# Patient Record
Sex: FEMALE | Race: WHITE | NOT HISPANIC OR LATINO | Employment: OTHER | URBAN - METROPOLITAN AREA
[De-identification: names, ages, dates, MRNs, and addresses within clinical notes are randomized per-mention and may not be internally consistent; named-entity substitution may affect disease eponyms.]

---

## 2017-01-19 ENCOUNTER — GENERIC CONVERSION - ENCOUNTER (OUTPATIENT)
Dept: OTHER | Facility: OTHER | Age: 72
End: 2017-01-19

## 2017-01-30 ENCOUNTER — GENERIC CONVERSION - ENCOUNTER (OUTPATIENT)
Dept: OTHER | Facility: OTHER | Age: 72
End: 2017-01-30

## 2017-02-07 ENCOUNTER — ALLSCRIPTS OFFICE VISIT (OUTPATIENT)
Dept: OTHER | Facility: OTHER | Age: 72
End: 2017-02-07

## 2017-03-22 ENCOUNTER — GENERIC CONVERSION - ENCOUNTER (OUTPATIENT)
Dept: OTHER | Facility: OTHER | Age: 72
End: 2017-03-22

## 2017-04-06 ENCOUNTER — GENERIC CONVERSION - ENCOUNTER (OUTPATIENT)
Dept: OTHER | Facility: OTHER | Age: 72
End: 2017-04-06

## 2017-07-12 ENCOUNTER — GENERIC CONVERSION - ENCOUNTER (OUTPATIENT)
Dept: OTHER | Facility: OTHER | Age: 72
End: 2017-07-12

## 2017-07-27 ENCOUNTER — ALLSCRIPTS OFFICE VISIT (OUTPATIENT)
Dept: OTHER | Facility: OTHER | Age: 72
End: 2017-07-27

## 2017-07-27 DIAGNOSIS — R06.02 SHORTNESS OF BREATH: ICD-10-CM

## 2017-07-31 ENCOUNTER — GENERIC CONVERSION - ENCOUNTER (OUTPATIENT)
Dept: OTHER | Facility: OTHER | Age: 72
End: 2017-07-31

## 2017-08-31 ENCOUNTER — GENERIC CONVERSION - ENCOUNTER (OUTPATIENT)
Dept: OTHER | Facility: OTHER | Age: 72
End: 2017-08-31

## 2017-09-07 ENCOUNTER — HOSPITAL ENCOUNTER (OUTPATIENT)
Dept: CT IMAGING | Facility: CLINIC | Age: 72
Discharge: HOME | End: 2017-09-07
Payer: MEDICARE

## 2017-09-07 DIAGNOSIS — R06.02 SHORTNESS OF BREATH: ICD-10-CM

## 2017-09-07 PROCEDURE — 71250 CT THORAX DX C-: CPT

## 2017-09-20 ENCOUNTER — GENERIC CONVERSION - ENCOUNTER (OUTPATIENT)
Dept: OTHER | Facility: OTHER | Age: 72
End: 2017-09-20

## 2018-01-11 NOTE — MISCELLANEOUS
Message   Recorded as Task   Date: 2016 03:18 PM, Created By: Mario Rodriguez   Task Name: Care Coordination   Assigned To: [facility] [place] [facility]   Regarding Patient: Selena Dance, Status: Active   Comment:    CintronLori - 17 May 2016 3:18 PM     TASK CREATED  faxed hold form to Dr Cielo Trujillo (Aspirin)    schedule for LESI X2   Reyna Shafer - 24 May 2016 3:53 PM     TASK EDITED  Recieved ok to hold asa x 6 days signed by Dr Cielo Trujillo 2016     s/w pt, advised of above  scheduled LESI #1 2016 - hold asa 6/3/2016, hold meloxicam 2016  LESI #2 2016, hold asa 2016, hold meloxicam 2016  Reviewed pre procedure instructions; eat a light meal - NPO 1 hour prior, , loose fitting clothing, c/b if illness or abx present  pt verbalized understanding and appreciation  denied additional blood thinning medication / nsaids  ***ASA HOLD WILL  2016  Request for hold prior to 2016 procedure faxed to Kelsea Mejia - 2016 4:09 PM     TASK EDITED  Received VM from pt  on [facility] triage line from  pm  Pt  states that she has an appt  on 16, and needs to reschedule due to a death in the family  Pt  requesting c/b at [phone]  Attempted to reach pt  around 2 pm  LMOM for pt  to c/b  Received additional VM on [place] triage line from 319 pm  Pt  states that she has to cx next Thursday's appt  Pt  requesting c/b  Kelsea Regalado - 2016 4:22 PM     TASK REPLIED TO: Previously Assigned To NURIA Cole  S/w pt  who states there was a death in her family, she will be unable to make her  appt, and that she is not expected to return until the third week in   Pt  was advised to cx  appt, and keep the scheduled  appt  as her first inj  appt  Pt  agreed that would be the best option  Reviewed hold instructions and pre-procedure instructions w/ pt  Pt  verbalized understanding in all matters addressed   Pt  very apprecaitive of help  Refaxed ASA hold request to Russell Hammond since current hold approval will  on 16  Reyna Shafer - 2016 10:32 AM     TASK EDITED  OK to hold asa x 6 days prior to procedure signed by Dr Cooper Sen 2016     to be scanned   Reyna Shafer - 2016 1:04 PM     TASK EDITED  s/w pt, confirmed 2016 procedure  Reviewed pre procedure instructions and asa hold starting   Pt verbalized understanding and appreciation  Active Problems    1  Allergic rhinitis (477 9) (J30 9)   2  Ankle pain, unspecified laterality   3  Anxiety disorder (300 00) (F41 9)   4  Arthralgia Of The Pelvis / Hip / Femur (719 45)   5  Asthma (493 90) (J45 909)   6  Breast cancer screening (V76 10) (Z12 39)   7  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)   8  Chronic low back pain (724 2,338 29) (M54 5,G89 29)   9  Chronic pain syndrome (338 4) (G89 4)   10  Generalized abdominal pain (789 07) (R10 84)   11  HTN (hypertension) (401 9) (I10)   12  Intervertebral disc disorder with radiculopathy of lumbosacral region (724 4) (M51 17)   13  Low back pain (724 2) (M54 5)   14  Lumbar spinal stenosis (724 02) (M48 06)   15  Lumbosacral stenosis (724 02) (M48 07)   16  Myofascial pain syndrome (729 1) (M79 1)   17  Need for immunization against influenza (V04 81) (Z23)   18  Need for influenza vaccination (V04 81) (Z23)   19  Obstructive sleep apnea (327 23) (G47 33)   20  Pancreatitis (577 0) (K85 9)   21  Xerostomia (527 7) (K11 7)    Current Meds   1  Advair Diskus 250-50 MCG/DOSE Inhalation Aerosol Powder Breath Activated; INHALE   1 PUFFS Every twelve hours; Therapy: 79REY7636 to (Evaluate:2016); Last Rx:76Zqw7738 Ordered   2  Aspirin 81 MG TABS; TAKE 1 TABLET DAILY; Therapy: (Recorded:56Pae6502) to Recorded   3  Atenolol 25 MG Oral Tablet; TAKE 1 TABLET TWICE DAILY; Therapy: (Recorded:2014) to Recorded   4   ClonazePAM 0 5 MG Oral Tablet; TAKE 1 TABLET TWICE DAILY; Last Rx:13Apr2016   Ordered   5  Co Q-10 100 MG Oral Capsule; TAKE 1 CAPSULE TWICE DAILY; Therapy: 00VUH8334 to Recorded   6  Crestor 5 MG Oral Tablet (Rosuvastatin Calcium); take 1 tablet 3 days per week; Therapy: (Recorded:87Ywv1619) to Recorded   7  Fenofibrate 48 MG Oral Tablet; 1 qd; Therapy: (Recorded:36Hgb0047) to Recorded   8  Losartan Potassium 50 MG Oral Tablet; 1 qd; Therapy: (Recorded:65Xub6824) to Recorded   9  Magnesium 250 MG Oral Tablet; TAKE 1 TABLET DAILY; Therapy: 92BYT4465 to Recorded   10  Meloxicam 15 MG Oral Tablet; TAKE 1 TABLET DAILY; Therapy: 32GRB2359 to (Evaluate:02Jun2015)  Requested for: 04RCZ0177; Last    Rx:52Ony0653 Ordered   11  Multi-Vitamin TABS; TAKE 1 TABLET DAILY; Therapy: (Recorded:98Fxn0259) to Recorded   12  Tylenol Extra Strength 500 MG Oral Tablet Recorded   13  Vitamin C TABS; Take 2 tablets daily; Therapy: (Recorded:67Jbs4908) to Recorded   14  Vitamin D 1000 UNIT Oral Tablet; TAKE 1 TABLET DAILY; Therapy: (Recorded:62Cgu9323) to Recorded    Allergies    1  Antihistamine TABS   2  Biaxin TABS   3  Codeine Sulfate TABS   4  Darvon CAPS   5  Pravastatin Sodium TABS   6   Wellbutrin TABS    Signatures   Electronically signed by : Anamaria Mathur, ; Jun 16 2016  1:06PM EST                       (Author)

## 2018-01-11 NOTE — RESULT NOTES
Verified Results  VAS RENAL ARTERY COMPLETE BILATERAL [id number] 10:21AM [place] Order Number: [id number]   Performing Comments: copy to Dr Liza Ortiz cardiology group   - Patient Instructions: To schedule this appointment, please contact Central Scheduling at [phone]   Order Number: [id number]   Performing Comments: copy to Dr Liza Ortiz cardiology group   - Patient Instructions: To schedule this appointment, please contact Central Scheduling at [phone]  Test Name Result Flag Reference   VAS RENAL ARTERY COMPLETE BILATERAL (Report)     [facility] REPORT   CLINICAL:   Indications: Benign Essential Hypertension [I10]  Intermittent spikes in   blood pressure per patient of couple of months  Risk Factors   The patient has history of hypertension and hyperlipidemia  FINDINGS:      Unilateral   PSV (cm/s) EDV (cm/s)  RI    Sup-Becca Ao       92     14 1 15    Celiac        128     30       Prox  SMA       157     26       Px Inf-Jose Ao     108     11 1 10    Ds Inf-Jose Ao     104     14 0 86       Right     PSV (cm/s) EDV (cm/s)  RAR  RI Kidney (cm)    Prox Renal      106     38 1 15 0 67           Mid Renal       121     36 1 31 0 70           Dist Renal      119     35 1 29 0 71           Celiac Artery     24      8    0 67           Kidney                          10 40    Hilum 3        25      8    0 67           Hilum         21      0    1 00              Left      PSV (cm/s) EDV (cm/s)  RAR  RI Kidney (cm)    Prox Renal      115     42 1 25 0 63           Mid Renal       100     22 1 08 0 77           Dist Renal      108     21 1 18 0 81           Celiac Artery     28      8    0 70           Kidney                          10 30    Hilum         32      8    0 74                    CONCLUSION:   Impression   The abdominal aorta is widely patent and normal caliber        RIGHT RENAL:   No evidence of significant arterial occlusive disease in the main renal artery  Adequate parenchymal flow noted with a renovascular resistive index of 0 67  Renal/Aorta Ratio: 1 3  The Kidney measures 10 4 cm      LEFT RENAL:   No evidence of significant arterial occlusive disease in the main renal artery  Adequate parenchymal flow noted with a renovascular resistive index of 0 70   Renal/Aorta Ratio: 1 3   The Kidney measures 10 3 cm      MESENTERIC:   Celiac and superior mesenteric arteries are patent        SIGNATURE:   Electronically Signed by: Jose Orozco MD, RPVI on 2016-11-16 08:41:51 PM

## 2018-01-12 NOTE — MISCELLANEOUS
Message   Recorded as Task   Date: 06/27/2016 09:52 AM, Created By: Jose Trejo   Task Name: Follow Up   Assigned To: [facility]   Regarding Patient: Raman Scale, Status: In Progress   Comment:    Elli Lauren - 27 Jun 2016 9:52 AM     TASK CREATED  S/p LESI #1 on 6/23/16 w/Dr Johnnie Galloway in [place]  No f/u scheduled    Please call 6/30/16   Elli Lauren - 30 Jun 2016 2:00 PM     TASK EDITED  1st attempt-lmom for f/u after injection   Elli Lauren - 07 Jul 2016 11:53 AM     TASK EDITED  2nd attempt-lmom for f/u after injection   Reyna Shafer - 08 Jul 2016 10:27 AM     TASK EDITED  addressed in task of 6/23        Active Problems    1  Allergic rhinitis (477 9) (J30 9)   2  Ankle pain, unspecified laterality   3  Anxiety disorder (300 00) (F41 9)   4  Arthralgia Of The Pelvis / Hip / Femur (719 45)   5  Asthma (493 90) (J45 909)   6  Breast cancer screening (V76 10) (Z12 39)   7  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)   8  Chronic low back pain (724 2,338 29) (M54 5,G89 29)   9  Chronic pain syndrome (338 4) (G89 4)   10  Generalized abdominal pain (789 07) (R10 84)   11  HTN (hypertension) (401 9) (I10)   12  Intervertebral disc disorder with radiculopathy of lumbosacral region (724 4) (M51 17)   13  Low back pain (724 2) (M54 5)   14  Lumbar spinal stenosis (724 02) (M48 06)   15  Lumbosacral stenosis (724 02) (M48 07)   16  Myofascial pain syndrome (729 1) (M79 1)   17  Need for immunization against influenza (V04 81) (Z23)   18  Need for influenza vaccination (V04 81) (Z23)   19  Obstructive sleep apnea (327 23) (G47 33)   20  Pancreatitis (577 0) (K85 9)   21  Xerostomia (527 7) (K11 7)    Current Meds   1  Advair Diskus 250-50 MCG/DOSE Inhalation Aerosol Powder Breath Activated; INHALE   1 PUFFS Every twelve hours; Therapy: 78WBK9468 to (Evaluate:28Jan2016); Last Rx:43Bap4909 Ordered   2  Aspirin 81 MG TABS; TAKE 1 TABLET DAILY;    Therapy: (Recorded:96Oav3668) to Recorded 3  Atenolol 25 MG Oral Tablet; TAKE 1 TABLET TWICE DAILY; Therapy: (Recorded:03Nov2014) to Recorded   4  ClonazePAM 0 5 MG Oral Tablet; TAKE 1 TABLET TWICE DAILY; Last Rx:22Jun2016   Ordered   5  Co Q-10 100 MG Oral Capsule; TAKE 1 CAPSULE TWICE DAILY; Therapy: 02FON2246 to Recorded   6  Crestor 5 MG Oral Tablet (Rosuvastatin Calcium); take 1 tablet 3 days per week; Therapy: (Recorded:58Kbz5918) to Recorded   7  Fenofibrate 48 MG Oral Tablet; 1 qd; Therapy: (Recorded:87Zmo6220) to Recorded   8  Losartan Potassium 50 MG Oral Tablet; 1 qd; Therapy: (Recorded:91Ogo8586) to Recorded   9  Magnesium 250 MG Oral Tablet; TAKE 1 TABLET DAILY; Therapy: 81UXI3668 to Recorded   10  Meloxicam 15 MG Oral Tablet; TAKE 1 TABLET DAILY; Therapy: 15QIM6263 to (Evaluate:02Jun2015)  Requested for: 89YGW5068; Last    Rx:60Xxt5800 Ordered   11  Multi-Vitamin TABS; TAKE 1 TABLET DAILY; Therapy: (Recorded:94Mnw9104) to Recorded   12  Tylenol Extra Strength 500 MG Oral Tablet Recorded   13  Vitamin C TABS; Take 2 tablets daily; Therapy: (Recorded:54Onk8720) to Recorded   14  Vitamin D 1000 UNIT Oral Tablet; TAKE 1 TABLET DAILY; Therapy: (Recorded:54Brw8111) to Recorded    Allergies    1  Antihistamine TABS   2  Biaxin TABS   3  Codeine Sulfate TABS   4  Darvon CAPS   5  Pravastatin Sodium TABS   6   Wellbutrin TABS    Signatures   Electronically signed by : Yasmeen Bowman, ; Jul 8 2016 10:27AM EST                       (Author)

## 2018-01-12 NOTE — MISCELLANEOUS
Message   Recorded as Task   Date: 10/26/2016 07:33 AM, Created By: Nicolás Chan   Task Name: Follow Up   Assigned To: [address] ,Team   Regarding Patient: Lizzie Strickland, Status: In Progress   Comment:    Velasquez Valenzuela - 26 Oct 2016 7:33 AM     TASK CREATED  Please call the patient and advise her that her insurance will not allow her to be on the nortriptyline  So she is to titrate off of the gabapentin as discussed and when she is off of the gabapentin, she will try a low dose Cymbalta at 20 mg every other night x 3 weeks, then every night  The side effects/risks are similar to the Nortriptyline as we had discussed at her OV, however, she should not drive or operate machinery until she sees how the medication affects her  She should f/u as scheduled  Reyna Shafer - 26 Oct 2016 10:57 AM     TASK EDITED  s/w pt, advised of above  Pt verbalized understanding  pt states she developed a rash over the weekend, stopped gabapentin abruptly on sunday r/t itching  Pt states itching resolved later in the day  Advised pt, will make DG aware  Pt confirmed cymbalta / duloxetine  will c/b w/ questions or if se's present  pt confirmed 12/1 ov w/ DG  Reyna Shafer - 26 Oct 2016 10:58 AM     TASK EDITED  Gabapentin added to allergy list   Velasquez Valenzuela - 26 Oct 2016 12:33 PM     TASK REPLIED TO: Previously Assigned To Velasquez Valenzuela  Provider is aware  She is to proceed with the Cymbalta as prescribed and escribed to her pharmacy  She is not to drive or operate machineary until she sees how it affects her and call with any side effects or issues  Reyna Shafer - 26 Oct 2016 4:08 PM     TASK EDITED  LMOM to cb  Reyna Shafer - 28 Oct 2016 3:46 PM     TASK EDITED  LMOM to cb - 2nd attempt   Irina Sherman - 31 Oct 2016 10:26 AM     TASK EDITED   Would you like us to send a can't reach you letter? 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)   9  Chronic low back pain (724 2,338 29) (M54 5,G89 29)   10  Chronic lumbar radiculopathy (724 4) (M54 16)   11  Encounter for screening mammogram for breast cancer (V76 12) (Z12 31)   12  Generalized abdominal pain (789 07) (R10 84)   13  HTN (hypertension) (401 9) (I10)   14  Lumbar spinal stenosis (724 02) (M48 06)   15  Myofascial pain syndrome (729 1) (M79 1)   16  Need for immunization against influenza (V04 81) (Z23)   17  Need for influenza vaccination (V04 81) (Z23)   18  Neurogenic claudication (724 03) (M48 06)   19  Obstructive sleep apnea (327 23) (G47 33)   20  Pain syndrome, chronic (338 4) (G89 4)   21  Pancreatitis (577 0) (K85 90)   22  Syncope and collapse (780 2) (R55)   23  Xerostomia (527 7) (K11 7)    Current Meds   1  Aleve 220 MG Oral Tablet; 1 qd along with 2 tylenol  as per pts ortho; Therapy: 67Ivu1833 to Recorded   2  Aspirin 81 MG TABS; TAKE 1 TABLET DAILY; Therapy: (Recorded:71Zyd5165) to Recorded   3  Atenolol 25 MG Oral Tablet; TAKE 1 TABLET TWICE DAILY; Therapy: (Recorded:96Xbu3797) to Recorded   4  Cholestyramine Light 4 GM Oral Packet; MIX THE CONTENTS OF 1 POWDER PACKET   WITH 2-6 OZ OF NONCARBONATED BEVERAGE AND SWALLOW ONCE DAILY; Therapy: 82Sss0352 to (Evaluate:60Jpy1093)  Requested for: 34Icf3615; Last   Rx:02Aek0267 Ordered   5  ClonazePAM 0 5 MG Oral Tablet; TAKE 1 TABLET TWICE DAILY; Last Rx:06Oct2016   Ordered   6  Co Q-10 100 MG Oral Capsule; TAKE 1 CAPSULE TWICE DAILY;    Therapy: 96OKG3023 to Recorded   7  Crestor 5 MG Oral Tablet (Rosuvastatin Calcium); take 1 tablet 3 days per week; Therapy: (Recorded:58Ltm1359) to Recorded   8  DULoxetine HCl - 20 MG Oral Capsule Delayed Release Particles; Take 1 pill every other   night x 3 weeks, then 1 pill every night; Therapy: 10LSH8201 to (Evaluate:25Nov2016)  Requested for: 26Oct2016; Last   Rx:26Oct2016 Ordered   9  Fenofibrate 48 MG Oral Tablet; 1 qd; Therapy: (Recorded:34Tit5189) to Recorded   10  Losartan Potassium 50 MG Oral Tablet; 1 qd; Therapy: (Recorded:47Rnn6249) to Recorded   11  Magnesium 250 MG Oral Tablet; TAKE 1 TABLET DAILY; Therapy: 02VCC1875 to Recorded   12  Multi-Vitamin TABS; TAKE 1 TABLET DAILY; Therapy: (Recorded:64Kqt9641) to Recorded   13  Tylenol Extra Strength 500 MG Oral Tablet Recorded   14  Vitamin C TABS; Take 2 tablets daily; Therapy: (Recorded:24Pgx0154) to Recorded    Allergies    1  Meloxicam TABS   2  Antihistamine TABS   3  Biaxin TABS   4  Codeine Sulfate TABS   5  Darvon CAPS   6  Gabapentin CAPS   7  Pravastatin Sodium TABS   8   Wellbutrin TABS    Signatures   Electronically signed by : Mollie Dakin, ; Nov 1 2016 11:19AM EST                       (Author)

## 2018-01-13 NOTE — MISCELLANEOUS
Message   Date: 17 Aug 2016 4:09 PM EST, Recorded By: Timo Dumont For: [facility] [place] [facility],Team   Caller: Abdulaziz Montana, Provider   Phone: [phone]   Reason: General Medical Question   vmm renetta conway calling from [facility] home care  Questioning if there is a sooner opening for the pt's procedure  Confirmed 81 mg asa  S/w Renetta, advised pt was offered an ov on 8/24 which she could not appear for  Offered next availability on 9/1  Renetta questioned if the 8/24 ov was still available - confirmed it is - offered to pt, pt declined  Confirmed 9/1/16 procedure date and time  Pt and Tmmy verbalized understanding  Active Problems    1  Allergic rhinitis (477 9) (J30 9)   2  Ankle pain, unspecified laterality   3  Anxiety disorder (300 00) (F41 9)   4  Arthralgia Of The Pelvis / Hip / Femur (719 45)   5  Asthma (493 90) (J45 909)   6  Breast cancer screening (V76 10) (Z12 39)   7  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)   8  Chronic low back pain (724 2,338 29) (M54 5,G89 29)   9  Chronic pain syndrome (338 4) (G89 4)   10  Generalized abdominal pain (789 07) (R10 84)   11  HTN (hypertension) (401 9) (I10)   12  Intervertebral disc disorder with radiculopathy of lumbosacral region (724 4) (M51 17)   13  Low back pain (724 2) (M54 5)   14  Lumbar spinal stenosis (724 02) (M48 06)   15  Lumbosacral stenosis (724 02) (M48 07)   16  Myofascial pain syndrome (729 1) (M79 1)   17  Need for immunization against influenza (V04 81) (Z23)   18  Need for influenza vaccination (V04 81) (Z23)   19  Obstructive sleep apnea (327 23) (G47 33)   20  Pancreatitis (577 0) (K85 9)   21  Xerostomia (527 7) (K11 7)    Current Meds   1  Advair Diskus 250-50 MCG/DOSE Inhalation Aerosol Powder Breath Activated; INHALE   1 PUFFS Every twelve hours; Therapy: 12YBH7533 to (Evaluate:28Jan2016); Last Rx:92Hgz8697 Ordered   2  Aspirin 81 MG TABS; TAKE 1 TABLET DAILY;    Therapy: (Recorded:04Qnj6789) to Recorded 3  Atenolol 25 MG Oral Tablet; TAKE 1 TABLET TWICE DAILY; Therapy: (Recorded:03Nov2014) to Recorded   4  ClonazePAM 0 5 MG Oral Tablet; TAKE 1 TABLET TWICE DAILY; Last Rx:83Sxg3594   Ordered   5  Co Q-10 100 MG Oral Capsule; TAKE 1 CAPSULE TWICE DAILY; Therapy: 28PCR9206 to Recorded   6  Crestor 5 MG Oral Tablet (Rosuvastatin Calcium); take 1 tablet 3 days per week; Therapy: (Recorded:16Udf2770) to Recorded   7  Fenofibrate 48 MG Oral Tablet; 1 qd; Therapy: (Recorded:13Ems5074) to Recorded   8  Losartan Potassium 50 MG Oral Tablet; 1 qd; Therapy: (Recorded:58Kiu8554) to Recorded   9  Magnesium 250 MG Oral Tablet; TAKE 1 TABLET DAILY; Therapy: 09RTL7846 to Recorded   10  Meloxicam 15 MG Oral Tablet; TAKE 1 TABLET DAILY; Therapy: 56OPU1311 to (Evaluate:02Jun2015)  Requested for: 07SNK9978; Last    Rx:59Spk8827 Ordered   11  Multi-Vitamin TABS; TAKE 1 TABLET DAILY; Therapy: (Recorded:72Apb3181) to Recorded   12  Tylenol Extra Strength 500 MG Oral Tablet Recorded   13  Vitamin C TABS; Take 2 tablets daily; Therapy: (Recorded:08Yub8221) to Recorded   14  Vitamin D 1000 UNIT Oral Tablet; TAKE 1 TABLET DAILY; Therapy: (Recorded:18Vcp6847) to Recorded    Allergies    1  Meloxicam TABS   2  Antihistamine TABS   3  Biaxin TABS   4  Codeine Sulfate TABS   5  Darvon CAPS   6  Pravastatin Sodium TABS   7   Wellbutrin TABS    Signatures   Electronically signed by : Josh Pulliam, ; Aug 17 2016  4:12PM EST                       (Author)

## 2018-01-14 VITALS
WEIGHT: 160 LBS | HEART RATE: 68 BPM | SYSTOLIC BLOOD PRESSURE: 126 MMHG | DIASTOLIC BLOOD PRESSURE: 76 MMHG | BODY MASS INDEX: 27.9 KG/M2

## 2018-01-14 NOTE — MISCELLANEOUS
Message   Recorded as Task   Date: 2016 03:18 PM, Created By: Tiny Cox   Task Name: Care Coordination   Assigned To: [facility] [place] [facility]   Regarding Patient: Kvng Harris, Status: Active   Comment:    Lori Cintron - 17 May 2016 3:18 PM     TASK CREATED  faxed hold form to Dr Margarita Borges (Aspirin)    schedule for LESI X2   Reyna Shafer - 24 May 2016 3:53 PM     TASK EDITED  Recieved ok to hold asa x 6 days signed by Dr Margarita Borges 2016     s/w pt, advised of above  scheduled LESI #1 2016 - hold asa 6/3/2016, hold meloxicam 2016  LESI #2 2016, hold asa 2016, hold meloxicam 2016  Reviewed pre procedure instructions; eat a light meal - NPO 1 hour prior, , loose fitting clothing, c/b if illness or abx present  pt verbalized understanding and appreciation  denied additional blood thinning medication / nsaids  ***ASA HOLD WILL  2016  Request for hold prior to 2016 procedure faxed to Kelsea Salter - 2016 4:09 PM     TASK EDITED  Received VM from pt  on [facility] triage line from  pm  Pt  states that she has an appt  on 16, and needs to reschedule due to a death in the family  Pt  requesting c/b at [phone]  Attempted to reach pt  around 2 pm  LMOM for pt  to c/b  Received additional VM on [place] triage line from 319 pm  Pt  states that she has to cx next Thursday's appt  Pt  requesting c/b  Kelsea Regalado - 2016 4:22 PM     TASK REPLIED TO: Previously Assigned To NURIA Cole  S/w pt  who states there was a death in her family, she will be unable to make her  appt, and that she is not expected to return until the third week in   Pt  was advised to cx  appt, and keep the scheduled  appt  as her first inj  appt  Pt  agreed that would be the best option  Reviewed hold instructions and pre-procedure instructions w/ pt  Pt  verbalized understanding in all matters addressed   Pt  very apprecaitive of help  Refaxed ASA hold request to Mina Carrasquillo since current hold approval will  on 16  Reyna Shafer - 2016 10:32 AM     TASK EDITED  OK to hold asa x 6 days prior to procedure signed by Dr Irma Cutler 2016     to be scanned   Reyna Shafer - 2016 1:04 PM     TASK EDITED  s/w pt, confirmed 2016 procedure  Reviewed pre procedure instructions and asa hold starting   Pt verbalized understanding and appreciation  Active Problems    1  Allergic rhinitis (477 9) (J30 9)   2  Ankle pain, unspecified laterality   3  Anxiety disorder (300 00) (F41 9)   4  Arthralgia Of The Pelvis / Hip / Femur (719 45)   5  Asthma (493 90) (J45 909)   6  Breast cancer screening (V76 10) (Z12 39)   7  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)   8  Chronic low back pain (724 2,338 29) (M54 5,G89 29)   9  Chronic pain syndrome (338 4) (G89 4)   10  Generalized abdominal pain (789 07) (R10 84)   11  HTN (hypertension) (401 9) (I10)   12  Intervertebral disc disorder with radiculopathy of lumbosacral region (724 4) (M51 17)   13  Low back pain (724 2) (M54 5)   14  Lumbar spinal stenosis (724 02) (M48 06)   15  Lumbosacral stenosis (724 02) (M48 07)   16  Myofascial pain syndrome (729 1) (M79 1)   17  Need for immunization against influenza (V04 81) (Z23)   18  Need for influenza vaccination (V04 81) (Z23)   19  Obstructive sleep apnea (327 23) (G47 33)   20  Pancreatitis (577 0) (K85 9)   21  Xerostomia (527 7) (K11 7)    Current Meds   1  Advair Diskus 250-50 MCG/DOSE Inhalation Aerosol Powder Breath Activated; INHALE   1 PUFFS Every twelve hours; Therapy: 06OWR1047 to (Evaluate:2016); Last Rx:25Wlb8206 Ordered   2  Aspirin 81 MG TABS; TAKE 1 TABLET DAILY; Therapy: (Recorded:79Uyx1418) to Recorded   3  Atenolol 25 MG Oral Tablet; TAKE 1 TABLET TWICE DAILY; Therapy: (Recorded:2014) to Recorded   4   ClonazePAM 0 5 MG Oral Tablet; TAKE 1 TABLET TWICE DAILY; Last Rx:13Apr2016   Ordered   5  Co Q-10 100 MG Oral Capsule; TAKE 1 CAPSULE TWICE DAILY; Therapy: 54AOQ1931 to Recorded   6  Crestor 5 MG Oral Tablet (Rosuvastatin Calcium); take 1 tablet 3 days per week; Therapy: (Recorded:74Qid8028) to Recorded   7  Fenofibrate 48 MG Oral Tablet; 1 qd; Therapy: (Recorded:34Vzl1253) to Recorded   8  Losartan Potassium 50 MG Oral Tablet; 1 qd; Therapy: (Recorded:54Kjn7682) to Recorded   9  Magnesium 250 MG Oral Tablet; TAKE 1 TABLET DAILY; Therapy: 00VXI0754 to Recorded   10  Meloxicam 15 MG Oral Tablet; TAKE 1 TABLET DAILY; Therapy: 92UUC2788 to (Evaluate:02Jun2015)  Requested for: 49TUE2678; Last    Rx:34Uon0480 Ordered   11  Multi-Vitamin TABS; TAKE 1 TABLET DAILY; Therapy: (Recorded:36Vpc2722) to Recorded   12  Tylenol Extra Strength 500 MG Oral Tablet Recorded   13  Vitamin C TABS; Take 2 tablets daily; Therapy: (Recorded:01Eve9214) to Recorded   14  Vitamin D 1000 UNIT Oral Tablet; TAKE 1 TABLET DAILY; Therapy: (Recorded:02Odk1153) to Recorded    Allergies    1  Antihistamine TABS   2  Biaxin TABS   3  Codeine Sulfate TABS   4  Darvon CAPS   5  Pravastatin Sodium TABS   6   Wellbutrin TABS    Signatures   Electronically signed by : Josh Pulliam, ; Jun 16 2016  1:05PM EST                       (Author)

## 2018-01-15 NOTE — PROGRESS NOTES
Assessment    1  Family history of depression (V17 0) (Z81 8) : Mother, Sister, Grandparent   2  Medicare annual wellness visit, initial (V70 0) (Z00 00)   3  Panic attack (300 01) (F41 0)   4  SOB (shortness of breath) on exertion (786 05) (R06 02)   5  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)   6  HTN (hypertension) (401 9) (I10)   7  Asthma (493 90) (J45 909)   8  Obstructive sleep apnea (327 23) (G47 33)    Plan  Anxiety disorder    · From  ClonazePAM 0 5 MG Oral Tablet 1 QD prn To ClonazePAM 0 5 MG Oral  Tablet take 1 tablet by mouth twice a day if needed  Chronic bronchitis with emphysema    · SPIROMETRY W/O BRONCHO- POC; Status:Complete - Retrospective Authorization;    Done: 97DGJ7200 09:40AM  Panic attack    · Citalopram Hydrobromide 10 MG Oral Tablet; TAKE 1 TABLET DAILY  SOB (shortness of breath) on exertion    · * CT CHEST WO CONTRAST; Status:Hold For - Scheduling; Requested for:76Buf8095;     Discussion/Summary  Impression: Initial Annual Wellness Visit, with preventive exam as well as age and risk appropriate counseling completed  Cardiovascular screening and counseling: screening is current  Diabetes screening and counseling: screening is current  Colorectal cancer screening and counseling: screening is current  Breast cancer screening and counseling: screening is current  Cervical cancer screening and counseling: the patient declines screening  Osteoporosis screening and counseling: screening is current  Abdominal aortic aneurysm screening and counseling: screening is current, screening not indicated and father had coronary- had exploded aorta  Glaucoma screening and counseling: screening is current  Advance Directive Planning: complete and up to date, she was encouraged to follow-up with me to discuss her questions and/or decisions  Chief Complaint  Pt here for [place] [address] today  pft done today in office  dk    nsr since last here        History of Present Illness  HPI: also complains of feeling winded after walking dog for 15 minutes- to see Dr Winston Davidson- had an echo and labs last week for her- await results- now feeling exhausted  also with increased anxiety issues   Welcome to Medicare and Wellness Visits: The patient is being seen for the subsequent annual wellness visit  Medicare Screening and Risk Factors   Hospitalizations: she has been previously hospitalizied and back surgery  Medicare Screening Tests Risk Questions   Abdominal aortic aneurysm risk assessment: none indicated  Osteoporosis risk assessment: none indicated  Drug and Alcohol Use: The patient has never smoked cigarettes and has never used smokeless tobacco  The patient reports frequent alcohol use and drinking 2 drinks per day  She has never used illicit drugs  Diet and Physical Activity: Current diet includes well balanced meals, low salt food choices, 4 servings of vegetables per day, 1-2 servings of meat per day, 2-3 servings of whole grains per day, 1 servings of simple carbohydrates per day, 2 servings of dairy products per day, 1 cups of coffee per day, 2 cups of tea per day, 0 cans of regular soda per day and 0 cans of diet soda per day  She exercises daily and exercises 7 times per week  Exercise: walking 30 minutes per day  Mood Disorder and Cognitive Impairment Screening:   Depression screening  negative for symptoms  Functional Ability/Level of Safety: Hearing is slightly decreased in the right ear, normal in the left ear, slightly decreased in right ear per pt and a hearing aid is not used  The patient is currently able to do activities of daily living without limitations, able to participate in social activities without limitations and able to drive without limitations  Fall risk factors: The patient fell 1 times in the past 12 months  Advance Directives: Advance directives: living will, durable power of  for health care directives and advance directives  Co-Managers and Medical Equipment/Suppliers: See Patient Care Team   Additional Information: encouraged to bring in copy of advanced directive  Preventive Quality Program 65 and Older: Falls Risk: The patient fell 1 times in the past 12 months  Patient Care Team    Care Team Member Role Specialty Office Number   Acosta Estrella DO Specialist Pain Management [phone] [address] Specialist Pain Management [phone]   Belén Mohamud MD  Cardiology [phone]   [facility] Specialist Orthopedic Surgery [phone]   COP[facility] Arturo Homans MD Specialist Orthopedic Surgery [phone], Connie DO Attending Internal Medicine [phone]   [facility]   [phone]   [facility] Pradoceci   [phone]     Active Problems    1  Allergic rhinitis (477 9) (J30 9)   2  Anxiety disorder (300 00) (F41 9)   3  Arthralgia Of The Pelvis / Hip / Femur (719 45)   4  Asthma (493 90) (J45 909)   5  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)   6  Chronic low back pain (724 2,338 29) (M54 5,G89 29)   7  Generalized abdominal pain (789 07) (R10 84)   8  HTN (hypertension) (401 9) (I10)   9  Lumbar spinal stenosis (724 02) (M48 06)   10  Myofascial pain syndrome (729 1) (M79 1)   11  Neurogenic claudication (724 03) (M48 06)   12  Obstructive sleep apnea (327 23) (G47 33)   13  Pain syndrome, chronic (338 4) (G89 4)   14  Pancreatitis (577 0) (K85 90)   15  Screening for colon cancer (V76 51) (Z12 11)   16  Screening for genitourinary condition (V81 6) (Z13 89)   17  Screening for osteoporosis (V82 81) (Z13 820)   18  Sebaceous cyst (706 2) (L72 3)   19   Xerostomia (527 7) (R68 2)    Past Medical History    · History of Abdominal pain, RUQ (789 01) (R10 11)   · Anxiety disorder (300 00) (F41 9)   · History of Arthralgia (719 40) (M25 50)   · History of Arthritis (V13 4)   · Asthma (493 90) (J45 909)   · History of Chronic lumbar radiculopathy (724 4) (M54 16)   · History of Chronic respiratory failure (518 83) (J96 10)   · History of Depression (311) (F32 9)   · History of Dysfunction of eustachian tube (381 81) (H69 80)   · History of Dyslipidemia (272 4) (E78 5)   · History of Fatigue (780 79) (R53 83)   · History of hypercholesterolemia (V12 29) (Z86 39)   · History of hypertension (V12 59) (Z86 79)   · History of low back pain (V13 59) (Z87 39)   · History of Lumbosacral stenosis (724 02) (M48 07)   · History of Need for influenza vaccination (V04 81) (Z23)   · History of Pneumonia (V12 61)   · History of Post traumatic stress disorder (PTSD) (309 81) (F43 10)   · History of Uncontrolled hypertension (401 9) (I10)    Surgical History    · History of Cholecystectomy   · History of Gallbladder Surgery   · History of Hysterectomy   · History of Oral Surgery   · History of Tonsillectomy    Family History  Mother    · Family history of    · Family history of depression (V17 0) (Z81 8)   · Family history of Lung Cancer (V16 1)  Father    · Family history of Coronary Artery Disease (V17 49)   · Family history of   Sister    · Family history of depression (V17 0) (Z81 8)  Grandparent    · Family history of depression (V17 0) (Z81 8)  Family History    · Family history of Heart Disease (V17 49)   · Family history of Hypertension (V17 49)    The family history was reviewed and updated today  Social History    · Alcohol drinker (V49 89) (Z78 9)   · Alcohol Use (History)   · 4 DRINKS EVERY NIGHT   · Cultural background   · NOT  OR    · Divorce (V61 03) (Z63 5)   ·    · Never A Smoker   · Person living alone (V60 3) (Z60 2)   · Primary spoken language English   · Racial background   · WHITE   · Retired  The social history was reviewed and updated today  Current Meds   1  Aspirin Adult Low Dose 81 MG Oral Tablet Delayed Release; TAKE 1 TABLET DAILY; Therapy: 93UBA6592 to Recorded   2  Atenolol 25 MG Oral Tablet; TAKE 1 TABLET TWICE DAILY;    Therapy: (Recorded:58Pao0831) to Recorded   3  ClonazePAM 0 5 MG Oral Tablet; 1 QD prn; Therapy: 34TIZ5003 to (Last Rx:91Vxe7393) Ordered   4  Crestor 5 MG Oral Tablet; take 1 tablet 3 days per week; Therapy: (Recorded:54Frk0426) to Recorded   5  Prevalite 4 GM/DOSE Oral Powder; PLACE CONTENTS OF 1 LEVEL SCOOPFUL IN   GLASS  ADD 6 OUNCES OF WATER  STIR TO UNIFORM CONSISTENCY AND   DRINK; Therapy: 24HGY0690 to Recorded   6  Vitamin D3 2000 UNIT Oral Capsule; 1 qd; Therapy: (Recorded:61Pxl0494) to Recorded    The medication list was reviewed and updated today  Allergies    1  Meloxicam TABS   2  Antihistamine TABS   3  Biaxin TABS   4  Codeine Sulfate TABS   5  Darvon CAPS   6  Gabapentin CAPS   7  Pravastatin Sodium TABS   8  Wellbutrin TABS   9  Zofran    Immunizations   1 2 3 4    Influenza  12-Sep-2013 03-Nov-2014 17-Dec-2015 06-Oct-2016    PPSV  2011       Td/DT  06-Jun-2011        Vitals  Signs    Heart Rate: 68  Respiration: 16  Systolic: 495  Diastolic: 84  Height: 5 ft 3 5 in  Weight: 164 lb   BMI Calculated: 28 6  BSA Calculated: 1 79    Physical Exam    Constitutional   General appearance: No acute distress, well appearing and well nourished  Eyes   Pupils and irises: Equal, round, reactive to light  Ophthalmoscopic examination: Normal fundi and optic discs  Ears, Nose, Mouth, and Throat   Otoscopic examination: Abnormal   some reisudal wax in right ear- mild dullness of tm  Nasal mucosa, septum, and turbinates: Normal without edema or erythema  Neck   Neck: Supple, symmetric, trachea midline, no masses  Pulmonary   Auscultation of lungs: Clear to auscultation  Auscultation of the lungs revealed no expiratory wheezing, normal expiratory time and no inspiratory wheezing  no rales or crackles were heard bilaterally  no rhonchi  no friction rub  no wheezing  no diminished breath sounds  no bronchial breath sounds     Cardiovascular   Auscultation of heart: Normal rate and rhythm, normal S1 and S2, no murmurs  The heart rate was normal  Heart sounds: normal S1, normal S2, no S3 and no S4  no murmurs were heard  Pedal pulses: 2+ bilaterally  Abdomen   Abdomen: Non-tender, no masses  Musculoskeletal   Joints, bones, and muscles: Abnormal   mild joint swelling in hands        Results/Data  SPIROMETRY W/O BRONCHO- POC 22TSO9294 09:40AM José Luis Purcell     Test Name Result Flag Reference   Spirometry 82MYD1615       Falls Risk Assessment (Dx Z13 89 Screen for Neurologic Disorder) 34Tyl5587 09:39AM User, Ahs     Test Name Result Flag Reference   Falls Risk      Falls with injury in the past year       Future Appointments    Date/Time Provider Specialty Site   08/22/2017 01:00 PM José Luis Purcell DO Internal Medicine ST [address] [place] [address]   Electronically signed by : Ho Carr DO; Jul 27 2017 10:24AM EST                       (Author)

## 2018-01-16 NOTE — MISCELLANEOUS
Message   Recorded as Task   Date: 06/23/2016 04:37 PM, Created By: Guy Luna   Task Name: Follow Up   Assigned To: [facility] [place] [facility]   Regarding Patient: Alondra Morales, Status: In Progress   CommentNolia Card - 23 Jun 2016 4:37 PM     TASK CREATED  Pt  appeared for LESI #1 w/ Dr Chano Hernandez today 6/23/16  Pt  taking ASA prescribed by cardiologist and meloxicam  Per Dr Chano Hernandez schedule pt  for repeat LESI in 2 weeks from today's inj  Pt  was advised a nurse will call her to coordinate scheduling her next procedure  Kelsea Regalado - 27 Jun 2016 1:11 PM     TASK EDITED  Attempted to reach pt  on home/cell phone  LMOM for pt  to c/b  Reyna Shafer - 29 Jun 2016 1:52 PM     TASK EDITED  LMOM to cb - 2nd attempt   Reyna Shafer - 01 Jul 2016 11:27 AM     TASK EDITED  [facility] 7/1/2016 @ 1029  Pt states she had an injection last thursday  Calling to schedule #2  c/b# [phone]   Reyna Shafer - 08 Jul 2016 10:26 AM     TASK EDITED  s/w pt, states she is calling to schedule a repeat injection  Pt states 100% relief after her LESI on 6/23  Advised pt, no need to schedule repeat injection if she is not having pain  C/b prn      ***Pt states she is having a new "discomfort" - "Tightness" in the back of her calves since 7/5  Pt states poss r/t a lot of flying - returned from [place] on 7/5/2016  Pt denied s/s of clot  States her "discomfort" is not interfering w/ her activities  No relief w/ meloxicam, will try tylenol today  Advised pt, will d/w Dr Chano Hernandez and cb  OK to hold asa signed by Dr Darrell Lynn 6/14/2016  Attila Miller - 08 Jul 2016 10:35 AM     TASK REPLIED TO: Previously Assigned To Attila Miller  please have her call back on [facility] and see how she does over weekend   Reyna Shafer - 01 Aug 2016 1:54 PM     TASK EDITED  [facility] 8/1/2016 @ 1102  Pt calling to schedule an injection  Needs to s/w the nurse r/t aspirin  Attempted to reach pt, lmom to cb     Reyna Shafer - 01 Aug 2016 3:01 PM     TASK REASSIGNED: Previously Assigned To [facility] [place] [facility],Team  s/w pt, states she is calling per Dr Doris Farr instruction  States her pain has returned - 80%  Would like to schedule an injection  Advised pt, will d/w Dr Tameka Monteiro and coordinate w/ Dr El Manrique and c/b     ** Repeat procedure? Any info from Dr Petr Wyman? Attila Miller - 01 Aug 2016 3:53 PM     TASK REPLIED TO: Previously Assigned To Attila Miller  repeat   Reyna Shafer - 01 Aug 2016 4:11 PM     TASK EDITED  Request for asa hold faxed to Reyna Conde - 10 Aug 2016 11:20 AM     TASK EDITED  received ok to hold asa x x days prioir to procedure signed by Dr El Manrique 8/4/2016   Reyna Shafer - 11 Aug 2016 3:00 PM     TASK EDITED  LMOM to cb   Reyna Shafer - 16 Aug 2016 10:01 AM     TASK EDITED  s/w pt, advised of above  pt verbalized understanding  states she is going to see Dr Chun Brizuela today  Requested this office c/b to fu tomorrow as she has difficulty getting through  Reyna Shafer - 17 Aug 2016 8:37 AM     TASK EDITED  S/w pt, states she met w/ Dr El Manrique yesterday and is ready to schedule her procedure  Scheduled LESI #2 9/1/2016  Reviewed pre procedure instructions; eat a light meal, npo 1 hour prior, , loose fitting clothing, c/b if illness / abx start prior to procedure, no asa 8/26 - 9/1 post procedure, no aleve 8/28 - 9/1 post procedure  pt verbalized understanding and denied additional blood thinning medication  Active Problems    1  Allergic rhinitis (477 9) (J30 9)   2  Ankle pain, unspecified laterality   3  Anxiety disorder (300 00) (F41 9)   4  Arthralgia Of The Pelvis / Hip / Femur (719 45)   5  Asthma (493 90) (J45 909)   6  Breast cancer screening (V76 10) (Z12 39)   7  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)   8  Chronic low back pain (724 2,338 29) (M54 5,G89 29)   9  Chronic pain syndrome (338 4) (G89 4)   10  Generalized abdominal pain (789 07) (R10 84)   11  HTN (hypertension) (401 9) (I10)   12  Intervertebral disc disorder with radiculopathy of lumbosacral region (724 4) (M51 17)   13  Low back pain (724 2) (M54 5)   14  Lumbar spinal stenosis (724 02) (M48 06)   15  Lumbosacral stenosis (724 02) (M48 07)   16  Myofascial pain syndrome (729 1) (M79 1)   17  Need for immunization against influenza (V04 81) (Z23)   18  Need for influenza vaccination (V04 81) (Z23)   19  Obstructive sleep apnea (327 23) (G47 33)   20  Pancreatitis (577 0) (K85 9)   21  Xerostomia (527 7) (K11 7)    Current Meds   1  Advair Diskus 250-50 MCG/DOSE Inhalation Aerosol Powder Breath Activated; INHALE   1 PUFFS Every twelve hours; Therapy: 24HYD7666 to (Evaluate:28Jan2016); Last Rx:02Feb2015 Ordered   2  Aspirin 81 MG TABS; TAKE 1 TABLET DAILY; Therapy: (Recorded:83Nqv0822) to Recorded   3  Atenolol 25 MG Oral Tablet; TAKE 1 TABLET TWICE DAILY; Therapy: (Recorded:03Nov2014) to Recorded   4  ClonazePAM 0 5 MG Oral Tablet; TAKE 1 TABLET TWICE DAILY; Last Rx:49Aoe2419   Ordered   5  Co Q-10 100 MG Oral Capsule; TAKE 1 CAPSULE TWICE DAILY; Therapy: 73DWL5727 to Recorded   6  Crestor 5 MG Oral Tablet (Rosuvastatin Calcium); take 1 tablet 3 days per week; Therapy: (Recorded:64Wwb7832) to Recorded   7  Fenofibrate 48 MG Oral Tablet; 1 qd; Therapy: (Recorded:22Yzn9802) to Recorded   8  Losartan Potassium 50 MG Oral Tablet; 1 qd; Therapy: (Recorded:04Wxc9433) to Recorded   9  Magnesium 250 MG Oral Tablet; TAKE 1 TABLET DAILY; Therapy: 09JUK6712 to Recorded   10  Meloxicam 15 MG Oral Tablet; TAKE 1 TABLET DAILY; Therapy: 05OOX7509 to (Evaluate:02Jun2015)  Requested for: 58QTW7073; Last    Rx:02Feb2015 Ordered   11  Multi-Vitamin TABS; TAKE 1 TABLET DAILY; Therapy: (Recorded:73Rle7025) to Recorded   12  Tylenol Extra Strength 500 MG Oral Tablet Recorded   13  Vitamin C TABS; Take 2 tablets daily; Therapy: (Recorded:25Byx3390) to Recorded   14  Vitamin D 1000 UNIT Oral Tablet; TAKE 1 TABLET DAILY;     Therapy: (Recorded:15Ofb8481) to Recorded    Allergies    1  Meloxicam TABS   2  Antihistamine TABS   3  Biaxin TABS   4  Codeine Sulfate TABS   5  Darvon CAPS   6  Pravastatin Sodium TABS   7   Wellbutrin TABS    Signatures   Electronically signed by : Hua Guerrero, ; Aug 17 2016  8:39AM EST                       (Author)

## 2018-01-18 NOTE — RESULT NOTES
Verified Results  (Q) SED RATE BY MODIFIED Pierrette Mortimer 30ZHK1426 03:16PM Cristina Osborn     Test Name Result Flag Reference   SED RATE BY MODIFIED$WESTERGREN 2 mm/h  < OR = 30

## 2018-01-22 VITALS
SYSTOLIC BLOOD PRESSURE: 152 MMHG | HEART RATE: 68 BPM | RESPIRATION RATE: 16 BRPM | WEIGHT: 164 LBS | DIASTOLIC BLOOD PRESSURE: 84 MMHG | BODY MASS INDEX: 28 KG/M2 | HEIGHT: 64 IN

## 2018-01-22 VITALS
HEIGHT: 64 IN | WEIGHT: 165.5 LBS | DIASTOLIC BLOOD PRESSURE: 70 MMHG | SYSTOLIC BLOOD PRESSURE: 128 MMHG | HEART RATE: 72 BPM | BODY MASS INDEX: 28.25 KG/M2

## 2018-01-24 NOTE — MISCELLANEOUS
Discussion/Summary  Discussion Summary:   Concerned about post cholecystectomy syndrome-a gree with limiting fatty food- try cholecystectomy syndrome- may need egd with Gi in future1    Counseling Documentation With Imm: The  patient1  was counseled regarding1  prognosis1 , risks and benefits of treatment options1   Medication SE Review and Pt Understands Tx: Possible side effects of new medications were reviewed with the patient/guardian today1  The treatment plan was reviewed with the patient/guardian  The patient/guardian understands and agrees with the treatment plan1        1 Amended By: Naye Degroot; Aug 25 2016 5:01 PM EST    Chief Complaint  Chief Complaint Free Text Note Form: Pt here for ERICK today  It was thought that she had vertigo, but pts card said it might have been a drug interaction  She saw her card last week and is now cleared and does not need to see them again unless she has problems  Pt has depression sx and had a fall with injury that sent her to [facility] ER  Both forms ordered  Pt was away on vacation and on Tue 8/9 she blacked out and fell hitting her head  She returned home and on 8/12 she went to  er  No refills needed  Was in [facility] ER and saw her cardiologist since last here  1  dk2        1 Amended By: Sophia Hinson; Aug 25 2016 4:22 PM EST   2 Amended By: Sophia Hinson; Aug 26 2016 10:46 AM EST    History of Present Illness  TCM Communication [facility]: The patient is being contacted for follow-up after hospitalization and Spoke to PT on Monday August 15,2016  She was hospitalized at [facility]  The date of admission: August 12,2016, date of discharge: August 14,2016  Diagnosis: Vertigo  She was discharged to home  Medications were not reviewed today  She scheduled a follow up appointment  Follow-up appointments with other specialists: Dr Kelle Alvarez on August 25,2016  Topics counseled included home health agency benefits     Communication performed and completed by URSZULA  Danyell Sebastian   HPI: According to PT- she doing OK - she's having little TIA and when she goes to the ER/hospital they can not see the episode - so the discharge diagnosis was vertigo  She does have the VNA coming in  The only problem that she had was that she needed a refill of her Klonopin - which I called into [facility] for her  No other problems or concerns at this time  Was in [address] [place] [address] Road on vacation and then when walking to room had trouble focusing and felt "cotton in head"- had trouble with numbers on elevator  fell onto bed and passed out- she thought it was for about 3 hours  got up as friend was pounding on door- fell and hit her head on bedstand and then wanted to wait until she go t home for care  8/12 went to er- had left finger to nose coordination on left and had left leg tightness   Seen by neurology and they thought possible vertigo  released on 8/14- hd mri etc  Then after hospital stay was seen by Dr Wei Cates- question of drug interaction or tia  always has been on aspirin daily for her heart- no medication changes  1        1 Amended By: Julissa Campos; Aug 25 2016 4:42 PM EST    Review of Systems  Complete-Female:   Constitutional:1  not yet released by vna and pt released her yesterday1 , but not feeling tired1         1 Amended By: Julissa Campos; Aug 25 2016 4:49 PM EST    Active Problems   1  Allergic rhinitis (477 9) (J30 9)  2  Ankle pain, unspecified laterality  3  Anxiety disorder (300 00) (F41 9)  4  Arthralgia Of The Pelvis / Hip / Femur (719 45)  5  Asthma (493 90) (J45 909)  6  Breast cancer screening (V76 10) (Z12 39)  7  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)  8  Chronic low back pain (724 2,338 29) (M54 5,G89 29)  9  Chronic pain syndrome (338 4) (G89 4)  10  Generalized abdominal pain (789 07) (R10 84)  11  HTN (hypertension) (401 9) (I10)  12  Intervertebral disc disorder with radiculopathy of lumbosacral region (724 4) (M51 17)  13  Low back pain (724 2) (M54 5)  14   Lumbar spinal stenosis (724 02) (M48 06)  15  Lumbosacral stenosis (724 02) (M48 07)  16  Myofascial pain syndrome (729 1) (M79 1)  17  Need for immunization against influenza (V04 81) (Z23)  18  Need for influenza vaccination (V04 81) (Z23)  19  Obstructive sleep apnea (327 23) (G47 33)  20  Pancreatitis (577 0) (K85 9)  21  Xerostomia (527 7) (K11 7)    Past Medical History   1  Anxiety disorder (300 00) (F41 9)  2  History of Arthralgia (719 40) (M25 50)  3  History of Arthritis (V13 4)  4  Asthma (493 90) (J45 909)  5  History of Chronic respiratory failure (518 83) (J96 10)  6  History of Depression (311) (F32 9)  7  History of Dysfunction of eustachian tube (381 81) (H69 80)  8  History of Dyslipidemia (272 4) (E78 5)  9  History of Fatigue (780 79) (R53 83)  10  History of hypercholesterolemia (V12 29) (Z86 39)  11  History of hypertension (V12 59) (Z86 79)  12  History of Pneumonia (V12 61)  13  History of Post traumatic stress disorder (PTSD) (309 81) (F43 10)  14  History of Syncope and collapse (780 2) (R55)    Surgical History   1  History of Cholecystectomy  2  History of Gallbladder Surgery  3  History of Hysterectomy  4  History of Oral Surgery  5  History of Tonsillectomy    Family History  Mother   1  Family history of   2  Family history of Lung Cancer (V16 1)  Father   3  Family history of Coronary Artery Disease (V17 49)  4  Family history of   Family History   5  Family history of Heart Disease (V17 49)  6  Family history of Hypertension (V17 49)  Family History Reviewed: The family history was reviewed and updated today  1        1 Amended By: Margarita Villagran; Aug 25 2016 4:49 PM EST    Social History    · Alcohol drinker (V49 89) (Z78 9)   · Alcohol Use (History)   · Cultural background   · Divorce (V61 03) (Z63 5)   ·    · Never A Smoker   · Person living alone (V60 3) (Z60 2)   · Primary spoken language English   · Racial background   · Retired    Social History Reviewed:  The social history was reviewed and updated today  1        1 Amended By: Jaya York; Aug 25 2016 4:50 PM EST    Current Meds  1  Advair Diskus 250-50 MCG/DOSE Inhalation Aerosol Powder Breath Activated; INHALE 1   PUFFS Every twelve hours; Therapy: 59JXA7710 to (Evaluate:28Jan2016); Last Rx:02Feb2015 Ordered  2  Aspirin 81 MG TABS; TAKE 1 TABLET DAILY; Therapy: (Recorded:81Hwr6257) to Recorded  3  Atenolol 25 MG Oral Tablet; TAKE 1 TABLET TWICE DAILY; Therapy: (Recorded:03Nov2014) to Recorded  4  ClonazePAM 0 5 MG Oral Tablet; TAKE 1 TABLET TWICE DAILY; Last Rx:[id number]   Ordered  5  Co Q-10 100 MG Oral Capsule; TAKE 1 CAPSULE TWICE DAILY; Therapy: 32IEU6952 to Recorded  6  Crestor 5 MG Oral Tablet; take 1 tablet 3 days per week; Therapy: (Recorded:11Ulw5893) to Recorded  7  Fenofibrate 48 MG Oral Tablet; 1 qd; Therapy: (Recorded:95Fnd7927) to Recorded  8  Losartan Potassium 50 MG Oral Tablet; 1 qd; Therapy: (Recorded:01Jqm3443) to Recorded  9  Magnesium 250 MG Oral Tablet; TAKE 1 TABLET DAILY; Therapy: 96EIW2083 to Recorded  10  Meloxicam 15 MG Oral Tablet; TAKE 1 TABLET DAILY; Therapy: 70SYG9829 to (Evaluate:02Jun2015)  Requested for: 90AQM0309; Last    Rx:02Feb2015 Ordered  11  Multi-Vitamin TABS; TAKE 1 TABLET DAILY; Therapy: (Recorded:18Zzu6533) to Recorded  12  Tylenol Extra Strength 500 MG Oral Tablet Recorded  13  Vitamin C TABS; Take 2 tablets daily; Therapy: (Recorded:71Oxo3000) to Recorded  14  Vitamin D 1000 UNIT Oral Tablet; TAKE 1 TABLET DAILY; Therapy: (Recorded:41Xlk3046) to Recorded  Medication List Reviewed: The medication list was reviewed and updated today  1        1 Amended ByMalathi Jeronimo; Aug 25 2016 4:49 PM EST    Allergies   1  Meloxicam TABS  2  Antihistamine TABS  3  Biaxin TABS  4  Codeine Sulfate TABS  5  Darvon CAPS  6  Pravastatin Sodium TABS  7   Wellbutrin TABS    Physical Exam    Constitutional1    General appearance: No acute distress, well appearing and well nourished  1    Ears, Nose, Mouth, and Throat1    Otoscopic examination: Tympanic membranes translucent with normal light reflex  Canals patent without erythema  1    Nasal mucosa, septum, and turbinates: Normal without edema or erythema  1    Oropharynx: Normal with no erythema, edema, exudate or lesions  1    Pulmonary1    Auscultation of lungs: Clear to auscultation  1    Cardiovascular1    Auscultation of heart: Normal rate and rhythm, normal S1 and S2, without murmurs  1    Examination of extremities for edema and/or varicosities: Normal 1    Carotid pulses: Normal 1    Abdomen1    Abdomen: Non-tender, no masses  1    Musculoskeletal1    Gait and station: Normal 1          1 Amended Marcelino Dick; Aug 25 2016 4:50 PM EST    Future Appointments    Date/Time Provider Specialty Site   08/25/2016 04:00 PM Berny Westfall DO Internal Medicine [facility] INTERNAL MED     Signatures   Electronically signed by : Roxane Babcock DO; Aug 15 2016  5:30PM EST                       (Author)    Electronically signed by : Roxane Babcock DO; Aug 15 2016  5:30PM EST                       (Author)    Electronically signed by : Roxane Babcock DO; Aug 25 2016  5:01PM EST                       (Author)    Electronically signed by : Roxane Babcock DO; Aug 26 2016 11:59AM EST                       (Author)    Electronically signed by : Roxane Babcock DO; Aug 26 2016 11:59AM EST                       (Author)

## 2018-02-12 DIAGNOSIS — F41.9 ANXIETY HYPERVENTILATION: Primary | ICD-10-CM

## 2018-02-12 DIAGNOSIS — F41.0 PANIC DISORDER WITHOUT AGORAPHOBIA: ICD-10-CM

## 2018-02-12 DIAGNOSIS — F45.8 ANXIETY HYPERVENTILATION: Primary | ICD-10-CM

## 2018-02-12 RX ORDER — CITALOPRAM 10 MG/1
10 TABLET ORAL DAILY
Qty: 30 TABLET | Refills: 5 | Status: SHIPPED | OUTPATIENT
Start: 2018-02-12 | End: 2018-10-15 | Stop reason: ALTCHOICE

## 2018-02-12 RX ORDER — CLONAZEPAM 0.5 MG/1
0.5 TABLET ORAL 2 TIMES DAILY
Qty: 60 TABLET | Refills: 0 | OUTPATIENT
Start: 2018-02-12 | End: 2018-04-20 | Stop reason: SDUPTHER

## 2018-04-20 DIAGNOSIS — F41.9 ANXIETY HYPERVENTILATION: ICD-10-CM

## 2018-04-20 DIAGNOSIS — F45.8 ANXIETY HYPERVENTILATION: ICD-10-CM

## 2018-04-20 RX ORDER — CLONAZEPAM 0.5 MG/1
TABLET ORAL
Qty: 60 TABLET | Refills: 1 | Status: SHIPPED | OUTPATIENT
Start: 2018-04-20 | End: 2018-07-02 | Stop reason: SDUPTHER

## 2018-07-02 DIAGNOSIS — F45.8 ANXIETY HYPERVENTILATION: ICD-10-CM

## 2018-07-02 DIAGNOSIS — F41.9 ANXIETY HYPERVENTILATION: ICD-10-CM

## 2018-07-02 RX ORDER — CLONAZEPAM 0.5 MG/1
0.5 TABLET ORAL 2 TIMES DAILY PRN
Qty: 60 TABLET | Refills: 0 | Status: SHIPPED | OUTPATIENT
Start: 2018-07-02 | End: 2018-08-24 | Stop reason: SDUPTHER

## 2018-07-13 ENCOUNTER — OFFICE VISIT (OUTPATIENT)
Dept: FAMILY MEDICINE CLINIC | Facility: HOSPITAL | Age: 73
End: 2018-07-13
Payer: MEDICARE

## 2018-07-13 VITALS
SYSTOLIC BLOOD PRESSURE: 112 MMHG | WEIGHT: 170.5 LBS | HEIGHT: 60 IN | BODY MASS INDEX: 33.47 KG/M2 | OXYGEN SATURATION: 94 % | HEART RATE: 66 BPM | DIASTOLIC BLOOD PRESSURE: 70 MMHG

## 2018-07-13 DIAGNOSIS — F41.9 ANXIETY DISORDER, UNSPECIFIED TYPE: ICD-10-CM

## 2018-07-13 DIAGNOSIS — I10 ESSENTIAL (PRIMARY) HYPERTENSION: ICD-10-CM

## 2018-07-13 DIAGNOSIS — M13.0 SYMMETRICAL POLYARTHRITIS: ICD-10-CM

## 2018-07-13 DIAGNOSIS — G89.29 CHRONIC RUQ PAIN: Primary | ICD-10-CM

## 2018-07-13 DIAGNOSIS — I10 ESSENTIAL HYPERTENSION: ICD-10-CM

## 2018-07-13 DIAGNOSIS — E78.00 PURE HYPERCHOLESTEROLEMIA: ICD-10-CM

## 2018-07-13 DIAGNOSIS — R10.11 CHRONIC RUQ PAIN: Primary | ICD-10-CM

## 2018-07-13 PROBLEM — F41.0 PANIC ATTACK: Status: ACTIVE | Noted: 2017-07-27

## 2018-07-13 PROCEDURE — 99214 OFFICE O/P EST MOD 30 MIN: CPT | Performed by: INTERNAL MEDICINE

## 2018-07-13 RX ORDER — MULTIVIT-MIN/IRON FUM/FOLIC AC 7.5 MG-4
1 TABLET ORAL DAILY
COMMUNITY
End: 2018-10-15 | Stop reason: ALTCHOICE

## 2018-07-13 RX ORDER — LOSARTAN POTASSIUM 25 MG/1
1 TABLET ORAL 2 TIMES DAILY
COMMUNITY
End: 2020-04-27 | Stop reason: SDUPTHER

## 2018-07-13 RX ORDER — CHOLESTYRAMINE LIGHT 4 G/5.7G
4 POWDER, FOR SUSPENSION ORAL
COMMUNITY
End: 2019-02-19 | Stop reason: ALTCHOICE

## 2018-07-13 RX ORDER — MULTIVIT WITH MINERALS/LUTEIN
250 TABLET ORAL DAILY
COMMUNITY

## 2018-07-13 RX ORDER — ASPIRIN 81 MG/1
1 TABLET ORAL DAILY
COMMUNITY
Start: 2017-02-07 | End: 2021-11-26 | Stop reason: ALTCHOICE

## 2018-07-13 RX ORDER — ROSUVASTATIN CALCIUM 5 MG/1
TABLET, COATED ORAL
COMMUNITY
End: 2019-11-08 | Stop reason: SDUPTHER

## 2018-07-13 RX ORDER — ACETAMINOPHEN 160 MG
TABLET,DISINTEGRATING ORAL DAILY
COMMUNITY

## 2018-07-13 RX ORDER — ATENOLOL 25 MG/1
TABLET ORAL 2 TIMES DAILY
COMMUNITY
Start: 2018-06-19 | End: 2020-01-17 | Stop reason: SDUPTHER

## 2018-07-13 RX ORDER — FENOFIBRATE 48 MG/1
TABLET, COATED ORAL DAILY
COMMUNITY
End: 2020-01-17 | Stop reason: SDUPTHER

## 2018-07-13 NOTE — PROGRESS NOTES
Assessment/Plan:             Problem List Items Addressed This Visit     None            Subjective:      Patient ID: Clara Mccormick is a 68 y o  female    1  ruq pain - has had for years- uses prevalite if worsening issues- she had prior "pancreatitis" issues  Worse at night- will have ct done and labs  Has seen Dr Bora Smith in past  2  Spells- seen by Dr Luz Maria Pace in past- she moved to [place]  She has spells where she feels drained and lies down then feels better- had syncope last fall and passed out in casino and hit head in room  Seen by Dr Gary Alfonso- to have holter next week and echo - do not that is a therapeutic relationship  Wrist Pain          The following portions of the patient's history were reviewed and updated as appropriate: allergies, current medications and problem list      Review of Systems   HENT:        Dry mouth in past- sligtly better   Musculoskeletal: Positive for arthralgias and joint swelling  Unable to play pickle ball- has pain and loss of hand strength  Dropping things and increased swelling  Using nsiads if bothersome  All other systems reviewed and are negative          Objective:      Current Outpatient Prescriptions:     ascorbic acid (VITAMIN C) 250 mg tablet, Take 250 mg by mouth daily, Disp: , Rfl:     aspirin (ASPIRIN ADULT LOW DOSE) 81 mg EC tablet, Take 1 tablet by mouth daily, Disp: , Rfl:     atenolol (TENORMIN) 25 mg tablet, , Disp: , Rfl:     Cholecalciferol (VITAMIN D3) 2000 units capsule, Take by mouth daily, Disp: , Rfl:     cholestyramine light (PREVALITE) 4 GM/DOSE powder, Take 4 g by mouth daily before dinner, Disp: , Rfl:     clonazePAM (KlonoPIN) 0 5 mg tablet, Take 1 tablet (0 5 mg total) by mouth 2 (two) times a day as needed for anxiety, Disp: 60 tablet, Rfl: 0    co-enzyme Q-10 30 MG capsule, Take 30 mg by mouth 3 (three) times a day, Disp: , Rfl:     fenofibrate (TRICOR) 48 mg tablet, Take by mouth daily, Disp: , Rfl:     losartan (COZAAR) 25 mg tablet, Take 1 tablet by mouth daily, Disp: , Rfl:     magnesium oxide (MAG-OX) 400 mg, Take 400 mg by mouth 2 (two) times a day, Disp: , Rfl:     Multiple Vitamins-Minerals (MULTIVITAMIN WITH MINERALS) tablet, Take 1 tablet by mouth daily, Disp: , Rfl:     rosuvastatin (CRESTOR) 5 mg tablet, Take by mouth, Disp: , Rfl:     citalopram (CeleXA) 10 mg tablet, Take 1 tablet (10 mg total) by mouth daily, Disp: 30 tablet, Rfl: 5    Blood pressure 112/70, pulse 66, height 5' 0 3" (1 532 m), weight 77 3 kg (170 lb 8 oz), SpO2 94 %  Physical Exam   Constitutional: She is oriented to person, place, and time  She appears well-developed and well-nourished  HENT:   Head: Normocephalic  Right Ear: External ear normal    Left Ear: External ear normal    Eyes: EOM are normal  Pupils are equal, round, and reactive to light  Right eye exhibits no discharge  Left eye exhibits no discharge  Neck: No JVD present  Cardiovascular: Normal rate, regular rhythm and normal heart sounds  No murmur heard  Pulmonary/Chest: Effort normal and breath sounds normal  No respiratory distress  She has no wheezes  She has no rales  Abdominal: Soft  Bowel sounds are normal  She exhibits no distension  There is no tenderness  Musculoskeletal: She exhibits no edema  Swelling in dip and pip joints with thickening of synovium pip bilaterally   Neurological: She is alert and oriented to person, place, and time  No cranial nerve deficit   Coordination normal

## 2018-07-31 ENCOUNTER — APPOINTMENT (OUTPATIENT)
Dept: LAB | Facility: CLINIC | Age: 73
End: 2018-07-31
Payer: MEDICARE

## 2018-07-31 DIAGNOSIS — M13.0 SYMMETRICAL POLYARTHRITIS: ICD-10-CM

## 2018-07-31 DIAGNOSIS — R10.11 CHRONIC RUQ PAIN: ICD-10-CM

## 2018-07-31 DIAGNOSIS — G89.29 CHRONIC RUQ PAIN: ICD-10-CM

## 2018-07-31 DIAGNOSIS — I10 ESSENTIAL (PRIMARY) HYPERTENSION: ICD-10-CM

## 2018-07-31 DIAGNOSIS — E78.00 PURE HYPERCHOLESTEROLEMIA: ICD-10-CM

## 2018-07-31 LAB
25(OH)D3 SERPL-MCNC: 21.6 NG/ML (ref 30–100)
ALBUMIN SERPL BCP-MCNC: 3.9 G/DL (ref 3.5–5)
ALP SERPL-CCNC: 61 U/L (ref 46–116)
ALT SERPL W P-5'-P-CCNC: 34 U/L (ref 12–78)
AMYLASE SERPL-CCNC: 34 IU/L (ref 25–115)
ANION GAP SERPL CALCULATED.3IONS-SCNC: 8 MMOL/L (ref 4–13)
AST SERPL W P-5'-P-CCNC: 25 U/L (ref 5–45)
BASOPHILS # BLD AUTO: 0.02 THOUSANDS/ΜL (ref 0–0.1)
BASOPHILS NFR BLD AUTO: 0 % (ref 0–1)
BILIRUB SERPL-MCNC: 0.75 MG/DL (ref 0.2–1)
BUN SERPL-MCNC: 19 MG/DL (ref 5–25)
CALCIUM SERPL-MCNC: 9.3 MG/DL (ref 8.3–10.1)
CHLORIDE SERPL-SCNC: 109 MMOL/L (ref 100–108)
CHOLEST SERPL-MCNC: 210 MG/DL (ref 50–200)
CO2 SERPL-SCNC: 24 MMOL/L (ref 21–32)
CREAT SERPL-MCNC: 0.63 MG/DL (ref 0.6–1.3)
CRP SERPL HS-MCNC: 4.05 MG/L
EOSINOPHIL # BLD AUTO: 0.14 THOUSAND/ΜL (ref 0–0.61)
EOSINOPHIL NFR BLD AUTO: 3 % (ref 0–6)
ERYTHROCYTE [DISTWIDTH] IN BLOOD BY AUTOMATED COUNT: 12.9 % (ref 11.6–15.1)
ERYTHROCYTE [SEDIMENTATION RATE] IN BLOOD: 13 MM/HOUR (ref 0–20)
GFR SERPL CREATININE-BSD FRML MDRD: 90 ML/MIN/1.73SQ M
GLUCOSE P FAST SERPL-MCNC: 107 MG/DL (ref 65–99)
HCT VFR BLD AUTO: 40.5 % (ref 34.8–46.1)
HDLC SERPL-MCNC: 44 MG/DL (ref 40–60)
HGB BLD-MCNC: 13.3 G/DL (ref 11.5–15.4)
IMM GRANULOCYTES # BLD AUTO: 0.02 THOUSAND/UL (ref 0–0.2)
IMM GRANULOCYTES NFR BLD AUTO: 0 % (ref 0–2)
LDLC SERPL CALC-MCNC: 106 MG/DL (ref 0–100)
LIPASE SERPL-CCNC: 160 U/L (ref 73–393)
LYMPHOCYTES # BLD AUTO: 1.75 THOUSANDS/ΜL (ref 0.6–4.47)
LYMPHOCYTES NFR BLD AUTO: 36 % (ref 14–44)
MCH RBC QN AUTO: 30.3 PG (ref 26.8–34.3)
MCHC RBC AUTO-ENTMCNC: 32.8 G/DL (ref 31.4–37.4)
MCV RBC AUTO: 92 FL (ref 82–98)
MONOCYTES # BLD AUTO: 0.36 THOUSAND/ΜL (ref 0.17–1.22)
MONOCYTES NFR BLD AUTO: 7 % (ref 4–12)
NEUTROPHILS # BLD AUTO: 2.56 THOUSANDS/ΜL (ref 1.85–7.62)
NEUTS SEG NFR BLD AUTO: 54 % (ref 43–75)
NRBC BLD AUTO-RTO: 0 /100 WBCS
PLATELET # BLD AUTO: 233 THOUSANDS/UL (ref 149–390)
PMV BLD AUTO: 11.5 FL (ref 8.9–12.7)
POTASSIUM SERPL-SCNC: 4.1 MMOL/L (ref 3.5–5.3)
PROT SERPL-MCNC: 6.8 G/DL (ref 6.4–8.2)
RBC # BLD AUTO: 4.39 MILLION/UL (ref 3.81–5.12)
SODIUM SERPL-SCNC: 141 MMOL/L (ref 136–145)
TRIGL SERPL-MCNC: 302 MG/DL
URATE SERPL-MCNC: 6.7 MG/DL (ref 2–6.8)
WBC # BLD AUTO: 4.85 THOUSAND/UL (ref 4.31–10.16)

## 2018-07-31 PROCEDURE — 80061 LIPID PANEL: CPT

## 2018-07-31 PROCEDURE — 84550 ASSAY OF BLOOD/URIC ACID: CPT

## 2018-07-31 PROCEDURE — 36415 COLL VENOUS BLD VENIPUNCTURE: CPT

## 2018-07-31 PROCEDURE — 86235 NUCLEAR ANTIGEN ANTIBODY: CPT

## 2018-07-31 PROCEDURE — 86430 RHEUMATOID FACTOR TEST QUAL: CPT

## 2018-07-31 PROCEDURE — 86038 ANTINUCLEAR ANTIBODIES: CPT

## 2018-07-31 PROCEDURE — 85652 RBC SED RATE AUTOMATED: CPT

## 2018-07-31 PROCEDURE — 86666 EHRLICHIA ANTIBODY: CPT

## 2018-07-31 PROCEDURE — 82150 ASSAY OF AMYLASE: CPT

## 2018-07-31 PROCEDURE — 82306 VITAMIN D 25 HYDROXY: CPT

## 2018-07-31 PROCEDURE — 86618 LYME DISEASE ANTIBODY: CPT

## 2018-07-31 PROCEDURE — 80053 COMPREHEN METABOLIC PANEL: CPT

## 2018-07-31 PROCEDURE — 83690 ASSAY OF LIPASE: CPT

## 2018-07-31 PROCEDURE — 86141 C-REACTIVE PROTEIN HS: CPT

## 2018-07-31 PROCEDURE — 85025 COMPLETE CBC W/AUTO DIFF WBC: CPT

## 2018-08-01 LAB
B BURGDOR IGG SER IA-ACNC: 0.07
B BURGDOR IGM SER IA-ACNC: 0.18
ENA SS-A AB SER-ACNC: <0.2 AI (ref 0–0.9)
ENA SS-B AB SER-ACNC: <0.2 AI (ref 0–0.9)
RHEUMATOID FACT SER QL LA: NEGATIVE
RYE IGE QN: NEGATIVE

## 2018-08-02 LAB
A PHAGOCYTOPH IGG TITR SER IF: NEGATIVE {TITER}
A PHAGOCYTOPH IGM TITR SER IF: NEGATIVE {TITER}
E CHAFFEENSIS IGG TITR SER IF: NEGATIVE {TITER}
E CHAFFEENSIS IGM TITR SER IF: NEGATIVE {TITER}

## 2018-08-24 ENCOUNTER — OFFICE VISIT (OUTPATIENT)
Dept: FAMILY MEDICINE CLINIC | Facility: HOSPITAL | Age: 73
End: 2018-08-24
Payer: MEDICARE

## 2018-08-24 VITALS
WEIGHT: 176 LBS | HEART RATE: 72 BPM | DIASTOLIC BLOOD PRESSURE: 80 MMHG | SYSTOLIC BLOOD PRESSURE: 138 MMHG | BODY MASS INDEX: 30.05 KG/M2 | HEIGHT: 64 IN

## 2018-08-24 DIAGNOSIS — I10 ESSENTIAL HYPERTENSION: ICD-10-CM

## 2018-08-24 DIAGNOSIS — F41.9 ANXIETY HYPERVENTILATION: ICD-10-CM

## 2018-08-24 DIAGNOSIS — J30.9 ALLERGIC RHINITIS, UNSPECIFIED SEASONALITY, UNSPECIFIED TRIGGER: ICD-10-CM

## 2018-08-24 DIAGNOSIS — G95.19 NEUROGENIC CLAUDICATION (HCC): ICD-10-CM

## 2018-08-24 DIAGNOSIS — F45.8 ANXIETY HYPERVENTILATION: ICD-10-CM

## 2018-08-24 DIAGNOSIS — M54.42 CHRONIC BILATERAL LOW BACK PAIN WITH LEFT-SIDED SCIATICA: ICD-10-CM

## 2018-08-24 DIAGNOSIS — G89.29 CHRONIC BILATERAL LOW BACK PAIN WITH LEFT-SIDED SCIATICA: ICD-10-CM

## 2018-08-24 DIAGNOSIS — G47.33 OBSTRUCTIVE SLEEP APNEA: ICD-10-CM

## 2018-08-24 DIAGNOSIS — J44.9 CHRONIC BRONCHITIS WITH EMPHYSEMA (HCC): Primary | ICD-10-CM

## 2018-08-24 PROCEDURE — 99214 OFFICE O/P EST MOD 30 MIN: CPT | Performed by: INTERNAL MEDICINE

## 2018-08-24 RX ORDER — CLONAZEPAM 0.5 MG/1
0.5 TABLET ORAL 2 TIMES DAILY PRN
Qty: 60 TABLET | Refills: 0 | Status: SHIPPED | OUTPATIENT
Start: 2018-08-24 | End: 2018-10-15 | Stop reason: SDUPTHER

## 2018-08-24 RX ORDER — FLUTICASONE FUROATE AND VILANTEROL 100; 25 UG/1; UG/1
1 POWDER RESPIRATORY (INHALATION) DAILY
Qty: 1 INHALER | Refills: 5 | Status: SHIPPED | OUTPATIENT
Start: 2018-08-24 | End: 2019-03-21 | Stop reason: ALTCHOICE

## 2018-08-24 NOTE — ASSESSMENT & PLAN NOTE
Has seen Dr Vic Hester in past but she did stop her other inhaler- now restarted her proair recently

## 2018-08-24 NOTE — ASSESSMENT & PLAN NOTE
Seen yesterday by Dr Bharath Morales- had worsening of pain in past few weeks- had surgery over 2 years ago- to have mri of lumbar and  With and without contrast

## 2018-08-24 NOTE — PROGRESS NOTES
Assessment/Plan:             Problem List Items Addressed This Visit        Other    Chronic low back pain - Primary     Seen yesterday by Dr Srini Helton- had worsening of pain in past few weeks- had surgery over 2 years ago- to have mri of lumbar and  With and without contrast                 Subjective:      Patient ID: Sugey Fry is a 68 y o  female    1  Severe fatigue- has waves of tiredness and feels drained-w ill sleep for 2 hours then had heart monitor with Dr Keyur Lawrence - no issues on holter and echo was stable  Sister noted her to be wheezing  Has hx of sleep apnea- see by Dr Kaiden Henderson in past in [place]- had a cpap- her dog goes beserk so she stopped using it  She was unable to do repeat study as no one was  available to take care of dog  Now feels sob        The following portions of the patient's history were reviewed and updated as appropriate: allergies, current medications and problem list      Review of Systems   Respiratory: Positive for shortness of breath  Musculoskeletal: Positive for back pain and gait problem  All other systems reviewed and are negative          Objective:      Current Outpatient Prescriptions:     ascorbic acid (VITAMIN C) 250 mg tablet, Take 250 mg by mouth daily, Disp: , Rfl:     aspirin (ASPIRIN ADULT LOW DOSE) 81 mg EC tablet, Take 1 tablet by mouth daily, Disp: , Rfl:     atenolol (TENORMIN) 25 mg tablet, 2 (two) times a day  , Disp: , Rfl:     Cholecalciferol (VITAMIN D3) 2000 units capsule, Take by mouth daily, Disp: , Rfl:     cholestyramine light (PREVALITE) 4 GM/DOSE powder, Take 4 g by mouth daily before dinner, Disp: , Rfl:     citalopram (CeleXA) 10 mg tablet, Take 1 tablet (10 mg total) by mouth daily, Disp: 30 tablet, Rfl: 5    clonazePAM (KlonoPIN) 0 5 mg tablet, Take 1 tablet (0 5 mg total) by mouth 2 (two) times a day as needed for anxiety, Disp: 60 tablet, Rfl: 0    co-enzyme Q-10 30 MG capsule, Take 30 mg by mouth 3 (three) times a day, Disp: , Rfl:     fenofibrate (TRICOR) 48 mg tablet, Take by mouth daily, Disp: , Rfl:     losartan (COZAAR) 25 mg tablet, Take 1 tablet by mouth daily, Disp: , Rfl:     magnesium oxide (MAG-OX) 400 mg, Take 400 mg by mouth 2 (two) times a day, Disp: , Rfl:     Multiple Vitamins-Minerals (MULTIVITAMIN WITH MINERALS) tablet, Take 1 tablet by mouth daily, Disp: , Rfl:     rosuvastatin (CRESTOR) 5 mg tablet, Take by mouth, Disp: , Rfl:     Blood pressure 138/80, pulse 72, height 5' 3 5" (1 613 m), weight 79 8 kg (176 lb)  Physical Exam   Constitutional: She is oriented to person, place, and time  She appears well-developed and well-nourished  HENT:   Head: Normocephalic  Right Ear: External ear normal    Left Ear: External ear normal    Eyes: EOM are normal  Pupils are equal, round, and reactive to light  Right eye exhibits no discharge  Left eye exhibits no discharge  Neck: No JVD present  Cardiovascular: Normal rate, regular rhythm and normal heart sounds  No murmur heard  Pulmonary/Chest: Effort normal  No respiratory distress  She has wheezes  She has no rales  Forced end exp wheezes   Abdominal: Soft  Bowel sounds are normal  She exhibits no distension  There is tenderness  Musculoskeletal: She exhibits tenderness  She exhibits no edema  Lumbar tenderness left greater than right   Neurological: She is alert and oriented to person, place, and time  No cranial nerve deficit  Coordination normal    Psychiatric: She has a normal mood and affect  Nursing note and vitals reviewed

## 2018-08-29 ENCOUNTER — HOSPITAL ENCOUNTER (OUTPATIENT)
Dept: PULMONOLOGY | Facility: HOSPITAL | Age: 73
Discharge: HOME | End: 2018-08-29
Attending: INTERNAL MEDICINE
Payer: MEDICARE

## 2018-08-29 DIAGNOSIS — J44.9 CHRONIC BRONCHITIS WITH EMPHYSEMA (HCC): ICD-10-CM

## 2018-08-29 PROCEDURE — 94729 DIFFUSING CAPACITY: CPT

## 2018-08-29 PROCEDURE — 94060 EVALUATION OF WHEEZING: CPT | Performed by: INTERNAL MEDICINE

## 2018-08-29 PROCEDURE — 94760 N-INVAS EAR/PLS OXIMETRY 1: CPT

## 2018-08-29 PROCEDURE — 94060 EVALUATION OF WHEEZING: CPT

## 2018-08-29 PROCEDURE — 94729 DIFFUSING CAPACITY: CPT | Performed by: INTERNAL MEDICINE

## 2018-08-29 RX ORDER — ALBUTEROL SULFATE 2.5 MG/3ML
2.5 SOLUTION RESPIRATORY (INHALATION) EVERY 6 HOURS PRN
Status: DISCONTINUED | OUTPATIENT
Start: 2018-08-29 | End: 2018-09-02 | Stop reason: HOSPADM

## 2018-08-29 RX ADMIN — ALBUTEROL SULFATE 2.5 MG: 2.5 SOLUTION RESPIRATORY (INHALATION) at 12:50

## 2018-08-31 ENCOUNTER — TELEPHONE (OUTPATIENT)
Dept: FAMILY MEDICINE CLINIC | Facility: HOSPITAL | Age: 73
End: 2018-08-31

## 2018-08-31 ENCOUNTER — HOSPITAL ENCOUNTER (OUTPATIENT)
Dept: CT IMAGING | Facility: CLINIC | Age: 73
Discharge: HOME | End: 2018-08-31
Attending: INTERNAL MEDICINE
Payer: MEDICARE

## 2018-08-31 DIAGNOSIS — R10.11 CHRONIC RUQ PAIN: ICD-10-CM

## 2018-08-31 DIAGNOSIS — G89.29 CHRONIC RUQ PAIN: ICD-10-CM

## 2018-08-31 PROCEDURE — 74177 CT ABD & PELVIS W/CONTRAST: CPT

## 2018-08-31 RX ADMIN — IOHEXOL 100 ML: 350 INJECTION, SOLUTION INTRAVENOUS at 11:26

## 2018-09-09 NOTE — TELEPHONE ENCOUNTER
CT showed diffuse inflammation consistent with colitis-patient should be seen in follow-up by GI  Also has suggestion of fatty liver on CT     Not sure but I think those results may have already been sent to her

## 2018-10-15 ENCOUNTER — OFFICE VISIT (OUTPATIENT)
Dept: FAMILY MEDICINE CLINIC | Facility: HOSPITAL | Age: 73
End: 2018-10-15
Payer: MEDICARE

## 2018-10-15 VITALS
SYSTOLIC BLOOD PRESSURE: 134 MMHG | HEIGHT: 64 IN | HEART RATE: 69 BPM | WEIGHT: 173 LBS | DIASTOLIC BLOOD PRESSURE: 76 MMHG | BODY MASS INDEX: 29.53 KG/M2

## 2018-10-15 DIAGNOSIS — J45.40 MODERATE PERSISTENT ASTHMA WITHOUT COMPLICATION: Primary | ICD-10-CM

## 2018-10-15 DIAGNOSIS — Z23 NEED FOR INFLUENZA VACCINATION: ICD-10-CM

## 2018-10-15 DIAGNOSIS — R20.9 COLD LEFT FOOT: ICD-10-CM

## 2018-10-15 DIAGNOSIS — R94.2 DECREASED DIFFUSION CAPACITY OF LUNG: ICD-10-CM

## 2018-10-15 DIAGNOSIS — F41.9 ANXIETY HYPERVENTILATION: ICD-10-CM

## 2018-10-15 DIAGNOSIS — F45.8 ANXIETY HYPERVENTILATION: ICD-10-CM

## 2018-10-15 DIAGNOSIS — I10 ESSENTIAL HYPERTENSION: ICD-10-CM

## 2018-10-15 PROBLEM — I77.9 BILATERAL CAROTID ARTERY DISEASE (HCC): Status: ACTIVE | Noted: 2018-10-15

## 2018-10-15 PROBLEM — E55.9 VITAMIN D DEFICIENCY: Status: ACTIVE | Noted: 2018-10-15

## 2018-10-15 PROCEDURE — G0008 ADMIN INFLUENZA VIRUS VAC: HCPCS

## 2018-10-15 PROCEDURE — 90662 IIV NO PRSV INCREASED AG IM: CPT

## 2018-10-15 PROCEDURE — 99214 OFFICE O/P EST MOD 30 MIN: CPT | Performed by: INTERNAL MEDICINE

## 2018-10-15 RX ORDER — CLONAZEPAM 0.5 MG/1
0.5 TABLET ORAL 2 TIMES DAILY PRN
Qty: 60 TABLET | Refills: 0 | Status: SHIPPED | OUTPATIENT
Start: 2018-10-15 | End: 2018-12-26 | Stop reason: SDUPTHER

## 2018-10-15 NOTE — PROGRESS NOTES
Assessment/Plan:             Problem List Items Addressed This Visit        Respiratory    Asthma      Other Visit Diagnoses     Need for influenza vaccination    -  Primary    Relevant Orders    influenza vaccine, [id number], high-dose, PF 0 5 mL, for patients 65 yr+ (FLUZONE HIGH-DOSE) (Completed)            Subjective:      Patient ID: Zeina Meza is a 68 y o  female    1  Asthma- ? Of diagnosis- had low diffusion capacity- ct showed linear scarring in bases=we had given her breo samples in August- helped her breathing but has had nighttime throat tightness but no thrush note- has stopped breo and it improved  2  Dizziness- seen by cardiology and told to concentrate on breathing when bending over to  dog poop'  3  Left leg coolness- most of day this weekend and uncomfortable at night with left leg coolness- had breifly before but not to this extent- no color changes  Was given a slip by cardiology for vascualr pressures  Has hx of leg cramps- will give rx for arterial doppler  4  Leg cramps- she had cut back on her statin to every other day- uses          The following portions of the patient's history were reviewed and updated as appropriate: allergies, current medications and problem list      Review of Systems   Constitutional: Negative for chills and fever  Musculoskeletal: Positive for back pain  Increased back pain recently-Saw her back surgeon- taking alieve and acetaminophen for pain   All other systems reviewed and are negative          Objective:      Current Outpatient Prescriptions:     ascorbic acid (VITAMIN C) 250 mg tablet, Take 250 mg by mouth daily, Disp: , Rfl:     aspirin (ASPIRIN ADULT LOW DOSE) 81 mg EC tablet, Take 1 tablet by mouth daily, Disp: , Rfl:     atenolol (TENORMIN) 25 mg tablet, 2 (two) times a day  , Disp: , Rfl:     Cholecalciferol (VITAMIN D3) 2000 units capsule, Take by mouth daily, Disp: , Rfl:     cholestyramine light (PREVALITE) 4 GM/DOSE powder, Take 4 g by mouth daily before dinner, Disp: , Rfl:     clonazePAM (KlonoPIN) 0 5 mg tablet, Take 1 tablet (0 5 mg total) by mouth 2 (two) times a day as needed for anxiety, Disp: 60 tablet, Rfl: 0    co-enzyme Q-10 30 MG capsule, Take 30 mg by mouth 3 (three) times a day, Disp: , Rfl:     fenofibrate (TRICOR) 48 mg tablet, Take by mouth daily, Disp: , Rfl:     fluticasone-vilanterol (BREO ELLIPTA) 100-25 mcg/inh inhaler, Inhale 1 puff daily Rinse mouth after use , Disp: 1 Inhaler, Rfl: 5    losartan (COZAAR) 25 mg tablet, Take 1 tablet by mouth daily, Disp: , Rfl:     magnesium oxide (MAG-OX) 400 mg, Take 400 mg by mouth 2 (two) times a day, Disp: , Rfl:     rosuvastatin (CRESTOR) 5 mg tablet, Take by mouth, Disp: , Rfl:     Blood pressure 134/76, pulse 69, height 5' 3 5" (1 613 m), weight 78 5 kg (173 lb)  Physical Exam   Constitutional: She is oriented to person, place, and time  HENT:   Head: Normocephalic and atraumatic  Right Ear: External ear normal    Left Ear: External ear normal    Eyes: Right eye exhibits no discharge  Left eye exhibits no discharge  Abdominal: Soft  Bowel sounds are normal  She exhibits no distension  There is no tenderness  Neurological: She is alert and oriented to person, place, and time  Skin: Skin is warm and dry  Nursing note and vitals reviewed

## 2018-12-10 ENCOUNTER — TELEPHONE (OUTPATIENT)
Dept: FAMILY MEDICINE CLINIC | Facility: HOSPITAL | Age: 73
End: 2018-12-10

## 2018-12-10 DIAGNOSIS — M79.642 PAIN IN BOTH HANDS: Primary | ICD-10-CM

## 2018-12-10 DIAGNOSIS — M79.641 PAIN IN BOTH HANDS: Primary | ICD-10-CM

## 2018-12-19 ENCOUNTER — APPOINTMENT (OUTPATIENT)
Dept: RADIOLOGY | Facility: CLINIC | Age: 73
End: 2018-12-19
Payer: MEDICARE

## 2018-12-19 DIAGNOSIS — M79.641 PAIN IN BOTH HANDS: ICD-10-CM

## 2018-12-19 DIAGNOSIS — M79.642 PAIN IN BOTH HANDS: ICD-10-CM

## 2018-12-19 PROCEDURE — 73130 X-RAY EXAM OF HAND: CPT

## 2018-12-26 DIAGNOSIS — F41.9 ANXIETY HYPERVENTILATION: ICD-10-CM

## 2018-12-26 DIAGNOSIS — F45.8 ANXIETY HYPERVENTILATION: ICD-10-CM

## 2018-12-26 DIAGNOSIS — M19.90 ARTHRITIS: Primary | ICD-10-CM

## 2018-12-26 RX ORDER — CLONAZEPAM 0.5 MG/1
0.5 TABLET ORAL 2 TIMES DAILY PRN
Qty: 60 TABLET | Refills: 1 | Status: SHIPPED | OUTPATIENT
Start: 2018-12-26 | End: 2019-02-19 | Stop reason: SDUPTHER

## 2019-01-14 ENCOUNTER — OFFICE VISIT (OUTPATIENT)
Dept: OBGYN CLINIC | Facility: CLINIC | Age: 74
End: 2019-01-14
Payer: MEDICARE

## 2019-01-14 VITALS
WEIGHT: 173.2 LBS | SYSTOLIC BLOOD PRESSURE: 123 MMHG | HEIGHT: 64 IN | BODY MASS INDEX: 29.57 KG/M2 | DIASTOLIC BLOOD PRESSURE: 79 MMHG | HEART RATE: 60 BPM

## 2019-01-14 DIAGNOSIS — M18.0 PRIMARY OSTEOARTHRITIS OF BOTH FIRST CARPOMETACARPAL JOINTS: Primary | ICD-10-CM

## 2019-01-14 DIAGNOSIS — M19.041 OSTEOARTHRITIS OF FINGERS OF BOTH HANDS: ICD-10-CM

## 2019-01-14 DIAGNOSIS — M62.40: ICD-10-CM

## 2019-01-14 DIAGNOSIS — M19.90 ARTHRITIS: ICD-10-CM

## 2019-01-14 DIAGNOSIS — M19.042 OSTEOARTHRITIS OF FINGERS OF BOTH HANDS: ICD-10-CM

## 2019-01-14 PROCEDURE — 99203 OFFICE O/P NEW LOW 30 MIN: CPT | Performed by: ORTHOPAEDIC SURGERY

## 2019-01-14 NOTE — PROGRESS NOTES
ASSESSMENT/PLAN:    Assessment:   Diffuse OA both hands and fingers, B/L 1st CMC joint osteoarthritis, extrinsic tightness    Plan:   Diagnostics reviewed and physical exam performed  Diagnosis, treatment options and associated risks were discussed with the patient including no treatment, nonsurgical treatment and potential for surgical intervention  The patient was given the opportunity to ask questions regarding each  Resume activities as tolerated, NSAIDs, Tylenol and therapy to improve function  Cheilectomy procedure offered and discussed as well as fusion surgery for most definitive outcome  OTC Aleve and Tylenol as discussed for the next 2 weeks regularly and then as needed      Follow Up:  2  month(s)    To Do Next Visit:    re-evaluation    General Discussions:     Osteoarthritis:  The anatomy and physiology of osteoarthritis was discussed with the patient today in the office  Deterioration of the articular cartilage eventually leads to altered mobility at the joint, resulting in joint subluxation, osteophyte formation, cystic changes, as well as subchondral sclerosis  Eventually, pain, limited mobility, and compensatory hypermobility at surrounding joints may develop  While normal activity and usage of the joint may provide a painful experience to the patient, this typically does not result in damage to the limb  Treatment options include splints to decreased joint edema, pain, and inflammation  Therapy exercises to strengthen the surrounding musculature may relieve pain, but do not alter the overall continued development of osteoarthritis  Oral medications, topical medications, corticosteroid injections may decrease pain and increase overall function  Eventually, some patients may require surgical intervention       Operative Discussions:   none indicated today    _____________________________________________________  CHIEF COMPLAINT:  Chief Complaint   Patient presents with    Left Hand - Pain    Right Hand - Pain         SUBJECTIVE:  Irish Bazan is a 69y o  year old RHD female who presents with complaints of bilateral, right greater than left hand stiffness  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  Denies any locking or treating  Denies any numbness or tingling  She notes painful and stiff joints at her fingers mainly at the DIP and PIP joints  She used to be active with tennis and pickle ball however due to the small handle of the racquet she has stopped racquet sports  She plays cards as well as mahjong and has difficulty grasping the tiles  She does find that she drops items when trying to be held in her hands  She had a negative rheumatoid factor in 2018         PAST MEDICAL HISTORY:  Past Medical History:   Diagnosis Date    Anxiety disorder     Arthralgia     Arthritis     Asthma     Chronic lumbar radiculopathy     last assessed: 16    Chronic respiratory failure (HCC)     Depression     Dysfunction of eustachian tube     Dyslipidemia     Fatigue     Hypercholesterolemia     Hypertension     Lumbosacral stenosis     last assessed: 16    Pancreatitis     resolved: 17    Pneumonia     PTSD (post-traumatic stress disorder)        PAST SURGICAL HISTORY:  Past Surgical History:   Procedure Laterality Date    CHOLECYSTECTOMY      GALLBLADDER SURGERY      HYSTERECTOMY      pt thinks she still has one ovary, done over 20 yrs ago    MOUTH SURGERY      TONSILLECTOMY         FAMILY HISTORY:  Family History   Problem Relation Age of Onset    Depression Mother         panic attacks    Lung cancer Mother     Coronary artery disease Father         high # of paternal family members  in their 46s    Depression Sister         panic attacks    Depression Other         panic attacks    Heart disease Family     Hypertension Family        SOCIAL HISTORY:  Social History   Substance Use Topics    Smoking status: Never Smoker    Smokeless tobacco: Never Used    Alcohol use Yes      Comment: 4 drinks every night       MEDICATIONS:    Current Outpatient Prescriptions:     ascorbic acid (VITAMIN C) 250 mg tablet, Take 250 mg by mouth daily, Disp: , Rfl:     aspirin (ASPIRIN ADULT LOW DOSE) 81 mg EC tablet, Take 1 tablet by mouth daily, Disp: , Rfl:     atenolol (TENORMIN) 25 mg tablet, 2 (two) times a day  , Disp: , Rfl:     Cholecalciferol (VITAMIN D3) 2000 units capsule, Take by mouth daily, Disp: , Rfl:     cholestyramine light (PREVALITE) 4 GM/DOSE powder, Take 4 g by mouth daily before dinner, Disp: , Rfl:     clonazePAM (KlonoPIN) 0 5 mg tablet, Take 1 tablet (0 5 mg total) by mouth 2 (two) times a day as needed for anxiety, Disp: 60 tablet, Rfl: 1    co-enzyme Q-10 30 MG capsule, Take 30 mg by mouth 3 (three) times a day, Disp: , Rfl:     fenofibrate (TRICOR) 48 mg tablet, Take by mouth daily, Disp: , Rfl:     losartan (COZAAR) 25 mg tablet, Take 1 tablet by mouth daily, Disp: , Rfl:     magnesium oxide (MAG-OX) 400 mg, Take 400 mg by mouth 2 (two) times a day, Disp: , Rfl:     fluticasone-vilanterol (BREO ELLIPTA) 100-25 mcg/inh inhaler, Inhale 1 puff daily Rinse mouth after use , Disp: 1 Inhaler, Rfl: 5    rosuvastatin (CRESTOR) 5 mg tablet, Take by mouth, Disp: , Rfl:     ALLERGIES:  Allergies   Allergen Reactions    Antihistamines, Diphenhydramine-Type     Bupropion     Clarithromycin     Codeine Other (See Comments)     increased HR    Gabapentin     Meloxicam Nausea Only and Visual Disturbance     Other reaction(s): Numbness  Action Taken: med stopped ; Category: Allergy;     Ondansetron     Pravastatin     Propoxyphene Other (See Comments)     increased HR       REVIEW OF SYSTEMS:  Pertinent items are noted in HPI  A comprehensive review of systems was negative      LABS:  HgA1c: No results found for: HGBA1C  BMP:   Lab Results   Component Value Date    CALCIUM 9 3 07/31/2018     07/15/2016    K 4 1 07/31/2018    CO2 24 07/31/2018     (H) 07/31/2018    BUN 19 07/31/2018    CREATININE 0 63 07/31/2018         _____________________________________________________  PHYSICAL EXAMINATION:  General: well developed and well nourished, alert, oriented times 3 and appears comfortable  Psychiatric: Normal  HEENT: Trachea Midline, No torticollis  Cardiovascular: No discernable arrhythmia  Pulmonary: No wheezing or stridor  Skin: No Erythema, No Lacerations, No Fluctuation, No Ulcerations  Neurovascular: Sensation Intact to the Median, Ulnar, Radial Nerve, Motor Intact to the Median, Ulnar, Radial Nerve and Pulses Intact    MUSCULOSKELETAL EXAMINATION:  LEFT SIDE:  CMC: Negative Shoulder Sign, No Instability, Positive grind and Positive tendnerness CMC   Finger:  no triggering of any finger  heberden's nodules radial small, radial+ulna ring long and index, radial thumb IP joint  Composite fist lacks last 0 5cm from terminal flexion     RIGHT SIDE:  CMC: Negative Shoulder Sign, No Instability, Positive grind and Positive tendnerness CMC    Finger:  no triggering of any finger  heberdens nodules radial small, radial+ulna ring long and index, radial thumb IP joint  Composite fist lacks last 1 5cm from terminal flexion      _____________________________________________________  STUDIES REVIEWED:  The attending physician has personally reviewed the pertinent films in PACS and interpretation is as follows:  Right and left hand x-rays taken on 12/19/2018 were reviewed today and show:  No fracture dislocation, moderate severe DJD at the DIP and PIP joints with diffuse mid carpal osteoarthritis, moderate severe scaphoid tried a great roll osteoarthritis as well as CMC joint osteoarthritis, right worse than left          PROCEDURES PERFORMED:  Procedures  No Procedures performed today   Scribe Attestation    I,:   Christiano Huber am acting as a scribe while in the presence of the attending physician :        I,:   Noemi Ye MD personally performed the services described in this documentation    as scribed in my presence :

## 2019-02-15 ENCOUNTER — EVALUATION (OUTPATIENT)
Dept: OCCUPATIONAL THERAPY | Facility: CLINIC | Age: 74
End: 2019-02-15
Payer: MEDICARE

## 2019-02-15 DIAGNOSIS — M19.041 OSTEOARTHRITIS OF FINGERS OF BOTH HANDS: ICD-10-CM

## 2019-02-15 DIAGNOSIS — M19.042 OSTEOARTHRITIS OF FINGERS OF BOTH HANDS: ICD-10-CM

## 2019-02-15 DIAGNOSIS — M62.40: ICD-10-CM

## 2019-02-15 DIAGNOSIS — M18.0 PRIMARY OSTEOARTHRITIS OF BOTH FIRST CARPOMETACARPAL JOINTS: ICD-10-CM

## 2019-02-15 PROCEDURE — 97018 PARAFFIN BATH THERAPY: CPT | Performed by: OCCUPATIONAL THERAPIST

## 2019-02-15 PROCEDURE — 97166 OT EVAL MOD COMPLEX 45 MIN: CPT | Performed by: OCCUPATIONAL THERAPIST

## 2019-02-15 NOTE — PROGRESS NOTES
OT Evaluation     Today's date: 2/15/2019  Patient name: Usman Win  : 1945  MRN: [id number]  Referring provider: Isabel Kolb MD  Dx:   Encounter Diagnosis     ICD-10-CM    1  Primary osteoarthritis of both first carpometacarpal joints M18 0 Ambulatory referral to PT/OT hand therapy   2  Osteoarthritis of fingers of both hands M19 041 Ambulatory referral to PT/OT hand therapy    M19 042    3  Extrinsic extensor contracture M62 40 Ambulatory referral to PT/OT hand therapy                  Assessment  Assessment details: Pt  Presenting with Diffuse OA both hands and fingers, B/L 1st CMC joint osteoarthritis, extrinsic tightness  Pt  To benefit from OT to improve function and manage pain  Treatment to include adaptive equipment, joint protection, modalities, ROM, stretching, manual tx and progressive strengthening  Impairments: abnormal or restricted ROM, activity intolerance, impaired physical strength, lacks appropriate home exercise program and pain with function  Functional limitations: Pt  is dropping things, difficulty opening containers, hold and manipulate utensils, needle point and drawing is diffulty, holding a racket and weakness with gripping activities  Understanding of Dx/Px/POC: good   Prognosis: good    Goals  STG( 3 visits)  1  Compliant with HEP  2  Decrease pain by 10% with function  LTG(6 visits)  1  Decrease pain by 25% with function  2  Improve FOTO score to predicted outcome or higher  3   Full composite fist B/L    Plan  Patient would benefit from: skilled occupational therapy  Planned modality interventions: thermotherapy: paraffin bath, thermotherapy: hydrocollator packs, cryotherapy and fluidotherapy  Planned therapy interventions: home exercise program, graded exercise, functional ROM exercises, fine motor coordination training, therapeutic exercise, therapeutic activities, stretching, strengthening, orthotic fitting/training, manual therapy, joint mobilization and activity modification  Frequency: 2x week  Plan of Care beginning date: 2/15/2019  Plan of Care expiration date: 3/15/2019  Treatment plan discussed with: patient        Subjective Evaluation    History of Present Illness  Mechanism of injury: 69y o  year old RHD female who presents with complaints of bilateral, right greater than left hand stiffness  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  She notes painful and stiff joints at her fingers mainly at the DIP and PIP joints  She used to be active with tennis and pickle ball however due to the small handle of the racquet she has stopped racquet sports  She plays cards as well as mahjong and has difficulty grasping the tiles  She does find that she drops items when trying to be held in her hands      She had a negative rheumatoid factor in 2018  Quality of life: good    Pain  Current pain rating: 3  At worst pain rating: 10 (heberden's nodule inflammation)  Quality: discomfort, sharp, radiating, throbbing and tight  Relieving factors: medications  Aggravating factors: eating and lifting  Progression: worsening    Social Support  Lives in: multiple-level home  Lives with: alone    Employment status: working (artist)  Hand dominance: right    Treatments  Current treatment: occupational therapy  Patient Goals  Patient goals for therapy: increased motion, decreased pain and increased strength  Patient goal: draw without pain, pt  is a         Objective     Tenderness     Left Wrist/Hand   Tenderness in the [facility]  Right Wrist/Hand   Tenderness in the carpometacarpal joint       Neurological Testing     Sensation     Wrist/Hand   Left   Intact: light touch    Right   Intact: light touch    Active Range of Motion     Left Wrist   Normal active range of motion    Right Wrist   Normal active range of motion    Left Thumb   Flexion     CMC: 30 degrees    MP: 40 degrees    DIP: 70 degrees  Opposition: Opposes to 5th    Right Thumb   Flexion     CMC: 20    MP: 60    DIP: 80  Opposition: Opposes to 5th    Additional Active Range of Motion Details  Loose composite fist , 1 5cm from end range composite flexion bilaterally    Strength/Myotome Testing     Left Wrist/Hand   Normal wrist strength     (2nd hand position)     Trial 1: 15    Thumb Strength  Key/Lateral Pinch     Trail 1: 7  Tip/Two-Point Pinch     Trial 1: 1  Palmar/Three-Point Pinch     Trial 1: 8    Right Wrist/Hand   Normal wrist strength     (2nd hand position)     Trial 1: 20    Thumb Strength   Key/Lateral Pinch     Trial 1: 7  Tip/Two-Point Pinch     Trial 1: 1  Palmar/Three-Point Pinch     Trial 1: 8    Tests     Left Wrist/Hand   Negative CMC grind  Right Wrist/Hand   Negative CMC grind  Swelling     Left Wrist/Hand   Circumference MCP: 18 1 cm    Right Wrist/Hand   Circumference MCP: 18 5 cm      Flowsheet Rows      Most Recent Value   PT/OT G-Codes   Current Score  51   Projected Score  64          Precautions: Universal    Daily Treatment Diary     Manual  2/15            IASTM IP jt s             Extrinsic stretches             retrogreade                                           Exercise Diary  2/15            Digit ROM             Hook/fist             Wrist curls             opposition                                                                                                                                                                                                                HEP- adaptive equip  TGEs, heat mod   reviewed                Modalities  2/15            Pdip B/L 10m

## 2019-02-19 ENCOUNTER — DOCUMENTATION (OUTPATIENT)
Dept: FAMILY MEDICINE CLINIC | Facility: HOSPITAL | Age: 74
End: 2019-02-19

## 2019-02-19 ENCOUNTER — OFFICE VISIT (OUTPATIENT)
Dept: FAMILY MEDICINE CLINIC | Facility: HOSPITAL | Age: 74
End: 2019-02-19
Payer: MEDICARE

## 2019-02-19 VITALS
HEART RATE: 65 BPM | HEIGHT: 64 IN | OXYGEN SATURATION: 96 % | BODY MASS INDEX: 29.45 KG/M2 | DIASTOLIC BLOOD PRESSURE: 74 MMHG | TEMPERATURE: 96.3 F | SYSTOLIC BLOOD PRESSURE: 110 MMHG | WEIGHT: 172.5 LBS

## 2019-02-19 DIAGNOSIS — R10.11 RUQ PAIN: Primary | ICD-10-CM

## 2019-02-19 DIAGNOSIS — I77.9 BILATERAL CAROTID ARTERY DISEASE, UNSPECIFIED TYPE (HCC): ICD-10-CM

## 2019-02-19 DIAGNOSIS — F45.8 ANXIETY HYPERVENTILATION: ICD-10-CM

## 2019-02-19 DIAGNOSIS — J44.9 CHRONIC BRONCHITIS WITH EMPHYSEMA (HCC): ICD-10-CM

## 2019-02-19 DIAGNOSIS — F41.9 ANXIETY HYPERVENTILATION: ICD-10-CM

## 2019-02-19 DIAGNOSIS — I10 ESSENTIAL HYPERTENSION: ICD-10-CM

## 2019-02-19 PROCEDURE — 99213 OFFICE O/P EST LOW 20 MIN: CPT | Performed by: INTERNAL MEDICINE

## 2019-02-19 RX ORDER — POTASSIUM CHLORIDE 20 MEQ/1
20 TABLET, EXTENDED RELEASE ORAL DAILY
COMMUNITY
End: 2019-11-06 | Stop reason: SDUPTHER

## 2019-02-19 RX ORDER — DICYCLOMINE HYDROCHLORIDE 10 MG/1
10 CAPSULE ORAL 3 TIMES DAILY PRN
Qty: 30 CAPSULE | Refills: 1 | Status: SHIPPED | OUTPATIENT
Start: 2019-02-19 | End: 2019-04-11

## 2019-02-19 RX ORDER — CLONAZEPAM 0.5 MG/1
0.5 TABLET ORAL 2 TIMES DAILY PRN
Qty: 60 TABLET | Refills: 1 | Status: SHIPPED | OUTPATIENT
Start: 2019-02-19 | End: 2019-08-09 | Stop reason: SDUPTHER

## 2019-02-19 NOTE — PROGRESS NOTES
Assessment/Plan:             Problem List Items Addressed This Visit        Respiratory    Chronic bronchitis with emphysema ([facility] [address] )       Cardiovascular and Mediastinum    HTN (hypertension)     On loartan and atenolol- home readigns are good 138 / 78 max         Bilateral carotid artery disease ([facility] [address] )       Other    RUQ pain - Primary     Occurs at night and is severe stabbing in ruq- has increased gas pains and at times using  antigas pills and papya enzymes  has had since summer with ct done showing some colitis in August- did not followup with gi - had seen Dr Anitha Stahl in past    Now having it every night with awakening her from sleep  Improved slightly with gas pills  No eating late at night - only has some herbal teas in pm     had tried the cholestyramine powder without improvment           Other Visit Diagnoses     Anxiety hyperventilation                Subjective:      Patient ID: Sumeet Galvin is a 69 y o  female    1  Gas pains- had severe  Episodes after  Back usrgery and needed injection to relax her bowels- will try Bentyl      The following portions of the patient's history were reviewed and updated as appropriate: allergies, current medications and problem list      Review of Systems   Gastrointestinal: Positive for abdominal distention and abdominal pain  Rare constipation- has loose stools   Genitourinary: Negative for difficulty urinating  All other systems reviewed and are negative          Objective:      Current Outpatient Medications:     ascorbic acid (VITAMIN C) 250 mg tablet, Take 250 mg by mouth daily, Disp: , Rfl:     aspirin (ASPIRIN ADULT LOW DOSE) 81 mg EC tablet, Take 1 tablet by mouth daily, Disp: , Rfl:     atenolol (TENORMIN) 25 mg tablet, 2 (two) times a day  , Disp: , Rfl:     Cholecalciferol (VITAMIN D3) 2000 units capsule, Take by mouth daily, Disp: , Rfl:     clonazePAM (KlonoPIN) 0 5 mg tablet, Take 1 tablet (0 5 mg total) by mouth 2 (two) times a day as needed for anxiety, Disp: 60 tablet, Rfl: 1    co-enzyme Q-10 30 MG capsule, Take 30 mg by mouth 3 (three) times a day, Disp: , Rfl:     fenofibrate (TRICOR) 48 mg tablet, Take by mouth daily, Disp: , Rfl:     losartan (COZAAR) 25 mg tablet, Take 1 tablet by mouth daily, Disp: , Rfl:     magnesium oxide (MAG-OX) 400 mg, Take 400 mg by mouth 2 (two) times a day, Disp: , Rfl:     potassium chloride (K-DUR,KLOR-CON) 20 mEq tablet, Take 20 mEq by mouth daily, Disp: , Rfl:     rosuvastatin (CRESTOR) 5 mg tablet, Take by mouth, Disp: , Rfl:     fluticasone-vilanterol (BREO ELLIPTA) 100-25 mcg/inh inhaler, Inhale 1 puff daily Rinse mouth after use  (Patient not taking: Reported on 2/19/2019), Disp: 1 Inhaler, Rfl: 5    Blood pressure 110/74, pulse 65, temperature (!) 96 3 °F (35 7 °C), height 5' 3 5" (1 613 m), weight 78 2 kg (172 lb 8 oz), SpO2 96 %  Physical Exam   Constitutional: She is oriented to person, place, and time  HENT:   Head: Normocephalic and atraumatic  Right Ear: External ear normal    Left Ear: External ear normal    Eyes: Right eye exhibits no discharge  Left eye exhibits no discharge  Neck: No thyromegaly present  Cardiovascular: Normal rate and regular rhythm  Exam reveals no friction rub  No murmur heard  Pulmonary/Chest: Effort normal and breath sounds normal  No respiratory distress  She has no wheezes  Abdominal: Soft  Bowel sounds are normal  She exhibits no distension  There is no tenderness  Musculoskeletal: She exhibits no edema  Neurological: She is alert and oriented to person, place, and time  Skin: Skin is warm and dry  Nursing note and vitals reviewed

## 2019-02-19 NOTE — ASSESSMENT & PLAN NOTE
Occurs at night and is severe stabbing in ruq- has increased gas pains and at times using  antigas pills and papya enzymes  has had since summer with ct done showing some colitis in August- did not followup with gi - had seen Dr Jennifer Lala in past    Now having it every night with awakening her from sleep  Improved slightly with gas pills   No eating late at night - only has some herbal teas in pm     had tried the cholestyramine powder without improvment

## 2019-02-20 ENCOUNTER — APPOINTMENT (OUTPATIENT)
Dept: OCCUPATIONAL THERAPY | Facility: CLINIC | Age: 74
End: 2019-02-20
Payer: MEDICARE

## 2019-02-22 ENCOUNTER — OFFICE VISIT (OUTPATIENT)
Dept: OCCUPATIONAL THERAPY | Facility: CLINIC | Age: 74
End: 2019-02-22
Payer: MEDICARE

## 2019-02-22 DIAGNOSIS — M18.0 PRIMARY OSTEOARTHRITIS OF BOTH FIRST CARPOMETACARPAL JOINTS: Primary | ICD-10-CM

## 2019-02-22 PROCEDURE — 97110 THERAPEUTIC EXERCISES: CPT | Performed by: OCCUPATIONAL THERAPIST

## 2019-02-22 PROCEDURE — 97018 PARAFFIN BATH THERAPY: CPT | Performed by: OCCUPATIONAL THERAPIST

## 2019-02-22 PROCEDURE — 97140 MANUAL THERAPY 1/> REGIONS: CPT | Performed by: OCCUPATIONAL THERAPIST

## 2019-02-22 NOTE — PROGRESS NOTES
Daily Note     Today's date: 2019  Patient name: Nicanor Queen  : 1945  MRN: [id number]  Referring provider: Ally Underwood MD  Dx:   Encounter Diagnosis     ICD-10-CM    1  Primary osteoarthritis of both first carpometacarpal joints M18 0                   Subjective: They hurt when I do my exercises      Objective: See treatment diary below      Manual  2/15 2/22           IASTM IP jt s             Extrinsic stretches B/L  4m           retrogreade B/L  8m                                         Exercise Diary  2/15 2/22           Digit ROM  x10           Hook/fist  skinny pegs           Wrist curls             opposition  Small pegs                                                                                                                                                                                                              HEP- adaptive equip  TGEs, heat mod  reviewed                Modalities  2/15 2/22           Pdip B/L 10m 10m                                       Assessment: Tolerated treatment well  Patient to decrease frequency of exercises due to pain, pt encouraged to gauge repetitions to reduce pain  Pt  to purchase home parrafin unit  Plan: Continue per plan of care

## 2019-02-27 ENCOUNTER — OFFICE VISIT (OUTPATIENT)
Dept: OCCUPATIONAL THERAPY | Facility: CLINIC | Age: 74
End: 2019-02-27
Payer: MEDICARE

## 2019-02-27 DIAGNOSIS — M18.0 PRIMARY OSTEOARTHRITIS OF BOTH FIRST CARPOMETACARPAL JOINTS: Primary | ICD-10-CM

## 2019-02-27 PROCEDURE — 97110 THERAPEUTIC EXERCISES: CPT | Performed by: OCCUPATIONAL THERAPIST

## 2019-02-27 PROCEDURE — 97140 MANUAL THERAPY 1/> REGIONS: CPT | Performed by: OCCUPATIONAL THERAPIST

## 2019-02-27 PROCEDURE — 97018 PARAFFIN BATH THERAPY: CPT | Performed by: OCCUPATIONAL THERAPIST

## 2019-02-27 NOTE — PROGRESS NOTES
Daily Note     Today's date: 2019  Patient name: Deedee Cormier  : 1945  MRN: [id number]  Referring provider: Ghulam Ash MD  Dx:   Encounter Diagnosis     ICD-10-CM    1  Primary osteoarthritis of both first carpometacarpal joints M18 0                   Subjective: I started using my Paraffin machine  Objective: See treatment diary below      Manual  2/15 2/22 2/27          IASTM IP jt s             Extrinsic stretches B/L  4m 4m          retrogreade B/L  8m 8m                                        Exercise Diary  2/15 2/22 2/27          Digit ROM  x10 x10          Hook/fist  skinny pegs skinny pegs          Wrist curls             opposition  Small pegs Small pegs          Gross     YPW x20 each                                                                                                                                                                                                HEP- adaptive equip  TGEs, heat mod  reviewed                Modalities  2/15 2/22 2/27          Pdip B/L 10m 10m 10m                                      Assessment: Tolerated treatment well  Patient now has a home parifin unit for long term use  Mild catching of bilateral long fingers reported with end range flexion, mild tenderness over A1 pulleys      Plan: Continue per plan of care

## 2019-03-06 ENCOUNTER — APPOINTMENT (OUTPATIENT)
Dept: OCCUPATIONAL THERAPY | Facility: CLINIC | Age: 74
End: 2019-03-06
Payer: MEDICARE

## 2019-03-13 ENCOUNTER — OFFICE VISIT (OUTPATIENT)
Dept: OCCUPATIONAL THERAPY | Facility: CLINIC | Age: 74
End: 2019-03-13
Payer: MEDICARE

## 2019-03-13 DIAGNOSIS — M19.042 OSTEOARTHRITIS OF FINGERS OF BOTH HANDS: ICD-10-CM

## 2019-03-13 DIAGNOSIS — M19.041 OSTEOARTHRITIS OF FINGERS OF BOTH HANDS: ICD-10-CM

## 2019-03-13 DIAGNOSIS — M18.0 PRIMARY OSTEOARTHRITIS OF BOTH FIRST CARPOMETACARPAL JOINTS: Primary | ICD-10-CM

## 2019-03-13 PROCEDURE — 97018 PARAFFIN BATH THERAPY: CPT | Performed by: OCCUPATIONAL THERAPIST

## 2019-03-13 PROCEDURE — 97110 THERAPEUTIC EXERCISES: CPT | Performed by: OCCUPATIONAL THERAPIST

## 2019-03-13 PROCEDURE — 97140 MANUAL THERAPY 1/> REGIONS: CPT | Performed by: OCCUPATIONAL THERAPIST

## 2019-03-13 NOTE — PROGRESS NOTES
Daily Note     Today's date: 3/13/2019  Patient name: Ariel Silva  : 1945  MRN: [id number]  Referring provider: Cielo Wang MD  Dx:   Encounter Diagnosis     ICD-10-CM    1  Primary osteoarthritis of both first carpometacarpal joints M18 0    2  Osteoarthritis of fingers of both hands M19 041     M19 042                   Subjective: My middle finger hurts a lot  Objective: See treatment diary below      Manual  2/15 2/22 2/27 3/13         IASTM IP jt s             Extrinsic stretches B/L  4m 4m 4m         retrogreade B/L  8m 8m 8m                                       Exercise Diary  2/15 2/22 2/27 3/13         Digit ROM  x10 x10 x10         Hook/fist  skinny pegs skinny pegs skinny pegs         Wrist curls             opposition  Small pegs Small pegs Small pegs         Gross     YPW x20 each YPW x20  each                                                                                                                                                                                               HEP- adaptive equip  TGEs, heat mod  reviewed                Modalities  2/15 2/22 2/27 3/13         Pdip B/L 10m 10m 10m 10m                                     Assessment: Tolerated treatment well  ROM and strength is WFLs and pain is less  Patient now has a home parifin unit for long term use  She is compliant with self management at home with HEP, heat modalities and adaptive equipment/modifications to perform ADLs/IADLs  Plan: Continue per plan of care  5/15/19- Pt  Did not return for follow up tx

## 2019-03-18 ENCOUNTER — OFFICE VISIT (OUTPATIENT)
Dept: OBGYN CLINIC | Facility: CLINIC | Age: 74
End: 2019-03-18
Payer: MEDICARE

## 2019-03-18 VITALS
HEIGHT: 64 IN | HEART RATE: 72 BPM | WEIGHT: 174.4 LBS | SYSTOLIC BLOOD PRESSURE: 145 MMHG | DIASTOLIC BLOOD PRESSURE: 76 MMHG | BODY MASS INDEX: 29.78 KG/M2

## 2019-03-18 DIAGNOSIS — M18.0 PRIMARY OSTEOARTHRITIS OF BOTH FIRST CARPOMETACARPAL JOINTS: Primary | ICD-10-CM

## 2019-03-18 DIAGNOSIS — M19.041 OSTEOARTHRITIS OF FINGERS OF BOTH HANDS: ICD-10-CM

## 2019-03-18 DIAGNOSIS — M19.042 OSTEOARTHRITIS OF FINGERS OF BOTH HANDS: ICD-10-CM

## 2019-03-18 DIAGNOSIS — M65.331 TRIGGER FINGER, RIGHT MIDDLE FINGER: ICD-10-CM

## 2019-03-18 PROCEDURE — 20550 NJX 1 TENDON SHEATH/LIGAMENT: CPT | Performed by: ORTHOPAEDIC SURGERY

## 2019-03-18 PROCEDURE — 99213 OFFICE O/P EST LOW 20 MIN: CPT | Performed by: ORTHOPAEDIC SURGERY

## 2019-03-18 RX ORDER — BETAMETHASONE SODIUM PHOSPHATE AND BETAMETHASONE ACETATE 3; 3 MG/ML; MG/ML
3 INJECTION, SUSPENSION INTRA-ARTICULAR; INTRALESIONAL; INTRAMUSCULAR; SOFT TISSUE
Status: COMPLETED | OUTPATIENT
Start: 2019-03-18 | End: 2019-03-18

## 2019-03-18 RX ORDER — CELECOXIB 200 MG/1
200 CAPSULE ORAL
Qty: 30 CAPSULE | Refills: 0 | Status: SHIPPED | OUTPATIENT
Start: 2019-03-18 | End: 2019-08-02 | Stop reason: SDUPTHER

## 2019-03-18 RX ORDER — LIDOCAINE HYDROCHLORIDE 10 MG/ML
1 INJECTION, SOLUTION INFILTRATION; PERINEURAL
Status: COMPLETED | OUTPATIENT
Start: 2019-03-18 | End: 2019-03-18

## 2019-03-18 RX ADMIN — BETAMETHASONE SODIUM PHOSPHATE AND BETAMETHASONE ACETATE 3 MG: 3; 3 INJECTION, SUSPENSION INTRA-ARTICULAR; INTRALESIONAL; INTRAMUSCULAR; SOFT TISSUE at 11:31

## 2019-03-18 RX ADMIN — LIDOCAINE HYDROCHLORIDE 1 ML: 10 INJECTION, SOLUTION INFILTRATION; PERINEURAL at 11:31

## 2019-03-18 NOTE — PROGRESS NOTES
ASSESSMENT/PLAN:    Assessment:     Trigger finger right  Osteoarthritis both hands and fingers    Plan:   Patient can take OTC tylenol arthritis as directed, will send celebrex 200mg to phx once daily  Middle trigger finger injection right , ice over the next few days, can repeat injection once more then would need surgery to fix trigger finger  She wants to hold off on any surgery for osteoarthritis in all her fingers, she will continue with moist heat, paraffin, exercises at this time  PRN  If celebrex offers relief she can call in and can sent phx to mail order  Follow Up:    _____________________________________________________  CHIEF COMPLAINT:  Chief Complaint   Patient presents with    Left Hand - Follow-up    Right Hand - Follow-up         SUBJECTIVE:  Ar Rodríguez is a 69y o  year old female who presents for follow up regarding  Bilateral hands  Moderate to severe DJD at DIP and PIP joints of fingers, CMC arthritis right worse than left  Due to level of arthritis Cheilectomy was discussed, along with fusion  She is using aleve and tylenol for pain relief  Middle fingers today bother her the most, she will note catching at times in those fingers primarily  She has attended therapy and received paraffin dips, moist heat which helps alleviate arthritic pain  She was given HEP, daily stretching exercises to maintain range of motion in finger joints  She does go to the gym and constant gripping increases pain, then goes home use moist heat on hands  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, right worse than left  Pain and stiffness in DIP and PIP joints with activity  She does drop items due to lack of  strength     Denies numbness or tingling  Radiation: None    PAST MEDICAL HISTORY:  Past Medical History:   Diagnosis Date    Anxiety disorder     Arthralgia     Arthritis     Asthma     Chronic lumbar radiculopathy     last assessed: 12/20/16    [facility]     Depression     Dysfunction of eustachian tube     Dyslipidemia     Fatigue     Hypercholesterolemia     Hypertension     Lumbosacral stenosis     last assessed: 16    Pancreatitis     resolved: 17    Pneumonia     PTSD (post-traumatic stress disorder)        PAST SURGICAL HISTORY:  Past Surgical History:   Procedure Laterality Date    CHOLECYSTECTOMY      GALLBLADDER SURGERY      HYSTERECTOMY      pt thinks she still has one ovary, done over 21 yrs ago    MOUTH SURGERY      TONSILLECTOMY         FAMILY HISTORY:  Family History   Problem Relation Age of Onset    Depression Mother         panic attacks    Lung cancer Mother     Coronary artery disease Father         high # of paternal family members  in their 46s    Depression Sister         panic attacks    Depression Other         panic attacks    Heart disease Family     Hypertension Family        SOCIAL HISTORY:  Social History     Tobacco Use    Smoking status: Never Smoker    Smokeless tobacco: Never Used   Substance Use Topics    Alcohol use: Yes     Comment: 4 drinks every night    Drug use: No       MEDICATIONS:    Current Outpatient Medications:     ascorbic acid (VITAMIN C) 250 mg tablet, Take 250 mg by mouth daily, Disp: , Rfl:     aspirin (ASPIRIN ADULT LOW DOSE) 81 mg EC tablet, Take 1 tablet by mouth daily, Disp: , Rfl:     atenolol (TENORMIN) 25 mg tablet, 2 (two) times a day  , Disp: , Rfl:     Cholecalciferol (VITAMIN D3) 2000 units capsule, Take by mouth daily, Disp: , Rfl:     clonazePAM (KlonoPIN) 0 5 mg tablet, Take 1 tablet (0 5 mg total) by mouth 2 (two) times a day as needed for anxiety, Disp: 60 tablet, Rfl: 1    co-enzyme Q-10 30 MG capsule, Take 30 mg by mouth 3 (three) times a day, Disp: , Rfl:     dicyclomine (BENTYL) 10 mg capsule, Take 1 capsule (10 mg total) by mouth 3 (three) times a day as needed (sharp abdominal pain), Disp: 30 capsule, Rfl: 1    fenofibrate (TRICOR) 48 mg tablet, Take by mouth daily, Disp: , Rfl:     losartan (COZAAR) 25 mg tablet, Take 1 tablet by mouth daily, Disp: , Rfl:     magnesium oxide (MAG-OX) 400 mg, Take 400 mg by mouth 2 (two) times a day, Disp: , Rfl:     potassium chloride (K-DUR,KLOR-CON) 20 mEq tablet, Take 20 mEq by mouth daily, Disp: , Rfl:     rosuvastatin (CRESTOR) 5 mg tablet, Take by mouth, Disp: , Rfl:     fluticasone-vilanterol (BREO ELLIPTA) 100-25 mcg/inh inhaler, Inhale 1 puff daily Rinse mouth after use  (Patient not taking: Reported on 2/19/2019), Disp: 1 Inhaler, Rfl: 5    ALLERGIES:  Allergies   Allergen Reactions    Antihistamines, Diphenhydramine-Type     Bupropion     Clarithromycin     Codeine Other (See Comments)     increased HR    Gabapentin     Meloxicam Nausea Only and Visual Disturbance     Other reaction(s): Numbness  Action Taken: med stopped ; Category: Allergy;     Ondansetron     Pravastatin     Propoxyphene Other (See Comments)     increased HR       REVIEW OF SYSTEMS:  Pertinent items are noted in HPI  A comprehensive review of systems was negative      LABS:  HgA1c: No results found for: HGBA1C  BMP:   Lab Results   Component Value Date    CALCIUM 9 3 07/31/2018     07/15/2016    K 4 1 07/31/2018    CO2 24 07/31/2018     (H) 07/31/2018    BUN 19 07/31/2018    CREATININE 0 63 07/31/2018           _____________________________________________________  PHYSICAL EXAMINATION:  General: well developed and well nourished, alert, oriented times 3 and appears comfortable  Psychiatric: Normal  HEENT: Trachea Midline, No torticollis  Cardiovascular: No discernable arrhythmia  Pulmonary: No wheezing or stridor  Skin: No masses, erthema, lacerations, fluctation, ulcerations  Neurovascular: Sensation Intact to the Median, Ulnar, Radial Nerve, Motor Intact to the Median, Ulnar, Radial Nerve and Pulses Intact    MUSCULOSKELETAL EXAMINATION:        Bilateral hands  Finger right middle tenderness over A-1 pulley with catching   heberdens nodules all fingers   Middle finger  0 5 from composite fist terminal flexion  No instability     Left side  She is able to make full composite fist, herberden's nodules all fingers IP joints  No instability    _____________________________________________________  STUDIES REVIEWED:  No Studies to review      PROCEDURES PERFORMED:  Hand/upper extremity injection: R long A1  Date/Time: 3/18/2019 11:31 AM  Consent given by: patient  Site marked: site marked  Timeout: Immediately prior to procedure a time out was called to verify the correct patient, procedure, equipment, support staff and site/side marked as required   Supporting Documentation  Indications: pain   Procedure Details  Condition:trigger finger Location: long finger - R long A1   Preparation: Patient was prepped and draped in the usual sterile fashion  Needle size: 25 G  Ultrasound guidance: no  Approach: volar  Medications administered: 1 mL lidocaine 1 %; 3 mg betamethasone acetate-betamethasone sodium phosphate 6 (3-3) mg/mL    Patient tolerance: patient tolerated the procedure well with no immediate complications  Dressing:  Sterile dressing applied           Scribe Attestation    I,:   Catheline Bump am acting as a scribe while in the presence of the attending physician :        I,:   Makenna Mena MD personally performed the services described in this documentation    as scribed in my presence :

## 2019-03-21 ENCOUNTER — OFFICE VISIT (OUTPATIENT)
Dept: FAMILY MEDICINE CLINIC | Facility: HOSPITAL | Age: 74
End: 2019-03-21
Payer: MEDICARE

## 2019-03-21 VITALS
DIASTOLIC BLOOD PRESSURE: 94 MMHG | BODY MASS INDEX: 28.85 KG/M2 | HEART RATE: 70 BPM | RESPIRATION RATE: 16 BRPM | WEIGHT: 169 LBS | SYSTOLIC BLOOD PRESSURE: 162 MMHG | HEIGHT: 64 IN

## 2019-03-21 DIAGNOSIS — I10 ESSENTIAL HYPERTENSION: Primary | ICD-10-CM

## 2019-03-21 PROBLEM — R10.11 RUQ PAIN: Status: RESOLVED | Noted: 2019-02-19 | Resolved: 2019-03-21

## 2019-03-21 PROCEDURE — 99214 OFFICE O/P EST MOD 30 MIN: CPT | Performed by: INTERNAL MEDICINE

## 2019-03-21 NOTE — PATIENT INSTRUCTIONS
Increase losartan to 25mg two pill in the evening and atenolol to 25mg in and and 50mg in pm!    Check your blood pressure after resting for 10 minutes twice a day and when your BP returns to your baseline decrease atenolol to 25mg twice a day first then the losartan to 25 mg daily! Please not take celebrex till your blood pressure returns to usual levels!

## 2019-03-21 NOTE — ASSESSMENT & PLAN NOTE
BP is uncontrolled without evidence of end organ damage  Steroid injection could play a role in exacerbation, anxiety contributes to it as well  Will increase losartan to 25mg two pill in the evening and atenolol to 25mg in and and 50mg in pm     Since pt has urinary frequency and received steroids will check fasting BMP and will also get UA tomorrow

## 2019-03-21 NOTE — PROGRESS NOTES
Assessment/Plan:    Essential hypertension  BP is uncontrolled without evidence of end organ damage  Steroid injection could play a role in exacerbation, anxiety contributes to it as well  Will increase losartan to 25mg two pill in the evening and atenolol to 25mg in and and 50mg in pm     Since pt has urinary frequency and received steroids will check fasting BMP and will also get UA tomorrow  Diagnoses and all orders for this visit:    Essential hypertension  -     Basic metabolic panel; Future  -     CBC; Future  -     TSH, 3rd generation with Free T4 reflex; Future  -     UA w Reflex to Microscopic w Reflex to Culture          Subjective:      Patient ID: Nicanor Queen is a 69 y o  female  Pt woke up with a feeling of buzzing in the head and red, burning cheeks  She took her BP that was 170/90  BP yesterday was 204-620 systolic  HRs are 70-90  She relaxed by taking clonazepam and drinking some wine  Her Cardiologist's RN today recommended rechecking BP later  Pt was on aleve for osteoarthritis of the hands  Hand surgery recommended celebrex but she hasn't started that yet, took tylenol arthritis for the hand pain  Pt received betamethasone injection in there hand on 3/18  The following portions of the patient's history were reviewed and updated as appropriate: current medications, past family history, past medical history, past social history, past surgical history and problem list     Review of Systems   Constitutional: Negative for fever  Eyes: Negative for visual disturbance  Respiratory: Negative for shortness of breath  Cardiovascular: Negative for chest pain  Gastrointestinal: Positive for diarrhea  Negative for abdominal pain  Genitourinary: Positive for frequency  Negative for dysuria, flank pain, hematuria and urgency  Musculoskeletal:        Hand pain   Neurological: Negative for headaches  Psychiatric/Behavioral: Negative for self-injury   The patient is nervous/anxious  Objective:    /94   Pulse 70   Resp 16   Ht 5' 3 5" (1 613 m)   Wt 76 7 kg (169 lb)   LMP  (LMP Unknown)   BMI 29 47 kg/m²      Physical Exam   Constitutional: She is oriented to person, place, and time  She appears well-developed  HENT:   Head: Normocephalic  Eyes: Conjunctivae are normal    Neck: Normal range of motion  Neck supple  Cardiovascular: Normal rate and regular rhythm  Pulmonary/Chest: Effort normal and breath sounds normal  She has no rales  Abdominal: Soft  Bowel sounds are normal  There is no tenderness  Neurological: She is alert and oriented to person, place, and time  No cranial nerve deficit  Skin: Skin is warm and dry             Andressa Goyal MD

## 2019-03-22 ENCOUNTER — LAB (OUTPATIENT)
Dept: LAB | Facility: CLINIC | Age: 74
End: 2019-03-22
Payer: MEDICARE

## 2019-03-22 DIAGNOSIS — I10 ESSENTIAL HYPERTENSION: ICD-10-CM

## 2019-03-22 LAB
ANION GAP SERPL CALCULATED.3IONS-SCNC: 3 MMOL/L (ref 4–13)
BILIRUB UR QL STRIP: NEGATIVE
BUN SERPL-MCNC: 20 MG/DL (ref 5–25)
CALCIUM SERPL-MCNC: 9.6 MG/DL (ref 8.3–10.1)
CHLORIDE SERPL-SCNC: 108 MMOL/L (ref 100–108)
CLARITY UR: CLEAR
CO2 SERPL-SCNC: 28 MMOL/L (ref 21–32)
COLOR UR: YELLOW
CREAT SERPL-MCNC: 0.68 MG/DL (ref 0.6–1.3)
ERYTHROCYTE [DISTWIDTH] IN BLOOD BY AUTOMATED COUNT: 13 % (ref 11.6–15.1)
GFR SERPL CREATININE-BSD FRML MDRD: 87 ML/MIN/1.73SQ M
GLUCOSE P FAST SERPL-MCNC: 96 MG/DL (ref 65–99)
GLUCOSE UR STRIP-MCNC: NEGATIVE MG/DL
HCT VFR BLD AUTO: 41.6 % (ref 34.8–46.1)
HGB BLD-MCNC: 13.5 G/DL (ref 11.5–15.4)
HGB UR QL STRIP.AUTO: NEGATIVE
KETONES UR STRIP-MCNC: NEGATIVE MG/DL
LEUKOCYTE ESTERASE UR QL STRIP: NEGATIVE
MCH RBC QN AUTO: 29.8 PG (ref 26.8–34.3)
MCHC RBC AUTO-ENTMCNC: 32.5 G/DL (ref 31.4–37.4)
MCV RBC AUTO: 92 FL (ref 82–98)
NITRITE UR QL STRIP: NEGATIVE
PH UR STRIP.AUTO: 7 [PH]
PLATELET # BLD AUTO: 207 THOUSANDS/UL (ref 149–390)
PMV BLD AUTO: 11.9 FL (ref 8.9–12.7)
POTASSIUM SERPL-SCNC: 3.6 MMOL/L (ref 3.5–5.3)
PROT UR STRIP-MCNC: NEGATIVE MG/DL
RBC # BLD AUTO: 4.53 MILLION/UL (ref 3.81–5.12)
SODIUM SERPL-SCNC: 139 MMOL/L (ref 136–145)
SP GR UR STRIP.AUTO: 1.02 (ref 1–1.03)
TSH SERPL DL<=0.05 MIU/L-ACNC: 1.41 UIU/ML (ref 0.36–3.74)
UROBILINOGEN UR QL STRIP.AUTO: 1 E.U./DL
WBC # BLD AUTO: 5.26 THOUSAND/UL (ref 4.31–10.16)

## 2019-03-22 PROCEDURE — 85027 COMPLETE CBC AUTOMATED: CPT

## 2019-03-22 PROCEDURE — 36415 COLL VENOUS BLD VENIPUNCTURE: CPT

## 2019-03-22 PROCEDURE — 81003 URINALYSIS AUTO W/O SCOPE: CPT | Performed by: INTERNAL MEDICINE

## 2019-03-22 PROCEDURE — 84443 ASSAY THYROID STIM HORMONE: CPT

## 2019-03-22 PROCEDURE — 80048 BASIC METABOLIC PNL TOTAL CA: CPT

## 2019-03-29 ENCOUNTER — OFFICE VISIT (OUTPATIENT)
Dept: FAMILY MEDICINE CLINIC | Facility: HOSPITAL | Age: 74
End: 2019-03-29
Payer: MEDICARE

## 2019-03-29 VITALS
WEIGHT: 168 LBS | HEART RATE: 61 BPM | BODY MASS INDEX: 28.68 KG/M2 | SYSTOLIC BLOOD PRESSURE: 106 MMHG | DIASTOLIC BLOOD PRESSURE: 64 MMHG | HEIGHT: 64 IN

## 2019-03-29 DIAGNOSIS — Z00.00 MEDICARE ANNUAL WELLNESS VISIT, SUBSEQUENT: Primary | ICD-10-CM

## 2019-03-29 DIAGNOSIS — I10 ESSENTIAL HYPERTENSION: ICD-10-CM

## 2019-03-29 DIAGNOSIS — Z12.39 SCREENING FOR BREAST CANCER: ICD-10-CM

## 2019-03-29 DIAGNOSIS — Z11.59 NEED FOR HEPATITIS C SCREENING TEST: ICD-10-CM

## 2019-03-29 DIAGNOSIS — Z92.89 TRANSFUSION HISTORY: ICD-10-CM

## 2019-03-29 PROCEDURE — G0439 PPPS, SUBSEQ VISIT: HCPCS | Performed by: INTERNAL MEDICINE

## 2019-03-29 NOTE — PATIENT INSTRUCTIONS
Bring us a copy of your advanced directive   see if you can find a copy of fathers death certificate-? Aneurysm vs heart rupture  Do labs in next week or 2 before appt with julia Cano   keep on cozaar  50 mg daily

## 2019-03-29 NOTE — PROGRESS NOTES
Assessment and Plan:  Problem List Items Addressed This Visit     None      Visit Diagnoses     Screening for breast cancer    -  Primary        Health Maintenance Due   Topic Date Due    Hepatitis C Screening  1945    Medicare Annual Wellness Visit (AWV)  1945    BMI: Followup Plan  01/10/1964    DTaP,Tdap,and Td Vaccines (1 - Tdap) 2011    MAMMOGRAM  2017    Pneumococcal PPSV23/PCV13 65+ Years / Low and Medium Risk (2 of 2 - PPSV23) 2018    OT PLAN OF CARE  2019         HPI:  Patient Active Problem List   Diagnosis    Allergic rhinitis    Anxiety disorder    Asthma    Chronic bronchitis with emphysema (HCC)    Chronic low back pain    Essential hypertension    Myofascial pain syndrome    Neurogenic claudication    Obstructive sleep apnea    Panic attack    Xerostomia    Symmetrical polyarthritis    Vitamin D deficiency    Bilateral carotid artery disease (HCC)     Past Medical History:   Diagnosis Date    Anxiety disorder     Arthralgia     Arthritis     Asthma     Chronic lumbar radiculopathy     last assessed: 16    Chronic respiratory failure (HCC)     Depression     Dysfunction of eustachian tube     Dyslipidemia     Fatigue     HTN (hypertension) 10/29/2014    Hypercholesterolemia     Hypertension     Lumbosacral stenosis     last assessed: 16    Pancreatitis     resolved: 17    Pneumonia     PTSD (post-traumatic stress disorder)      Past Surgical History:   Procedure Laterality Date    CHOLECYSTECTOMY      GALLBLADDER SURGERY      HYSTERECTOMY      pt thinks she still has one ovary, done over 20 yrs ago    MOUTH SURGERY      TONSILLECTOMY       Family History   Problem Relation Age of Onset    Depression Mother         panic attacks    Lung cancer Mother     Mental illness Mother     Substance Abuse Mother         CIGS    Coronary artery disease Father         high # of paternal family members  in their 46s    Substance Abuse Father         CIGS    Depression Sister         panic attacks    Depression Other         panic attacks    Heart disease Family     Hypertension Family      Social History     Tobacco Use   Smoking Status Never Smoker   Smokeless Tobacco Never Used     Social History     Substance and Sexual Activity   Alcohol Use Yes    Comment: 4 drinks every night      Social History     Substance and Sexual Activity   Drug Use No         Current Outpatient Medications   Medication Sig Dispense Refill    ascorbic acid (VITAMIN C) 250 mg tablet Take 250 mg by mouth daily      aspirin (ASPIRIN ADULT LOW DOSE) 81 mg EC tablet Take 1 tablet by mouth daily      atenolol (TENORMIN) 25 mg tablet 2 (two) times a day        celecoxib (CeleBREX) 200 mg capsule Take 1 capsule (200 mg total) by mouth daily at bedtime 30 capsule 0    Cholecalciferol (VITAMIN D3) 2000 units capsule Take by mouth daily      clonazePAM (KlonoPIN) 0 5 mg tablet Take 1 tablet (0 5 mg total) by mouth 2 (two) times a day as needed for anxiety 60 tablet 1    co-enzyme Q-10 30 MG capsule Take 30 mg by mouth 3 (three) times a day      dicyclomine (BENTYL) 10 mg capsule Take 1 capsule (10 mg total) by mouth 3 (three) times a day as needed (sharp abdominal pain) (Patient taking differently: PER PT 1 AT HS) 30 capsule 1    fenofibrate (TRICOR) 48 mg tablet Take by mouth daily      losartan (COZAAR) 25 mg tablet Take 1 tablet by mouth daily      magnesium oxide (MAG-OX) 400 mg Take 400 mg by mouth 2 (two) times a day      potassium chloride (K-DUR,KLOR-CON) 20 mEq tablet Take 20 mEq by mouth daily      rosuvastatin (CRESTOR) 5 mg tablet Take by mouth       No current facility-administered medications for this visit        Allergies   Allergen Reactions    Antihistamines, Diphenhydramine-Type     Bupropion     Clarithromycin     Codeine Other (See Comments)     increased HR    Gabapentin     Meloxicam Nausea Only and Visual Disturbance     Other reaction(s): Numbness  Action Taken: med stopped ; Category: Allergy;     Ondansetron     Pravastatin     Propoxyphene Other (See Comments)     increased HR     Immunization History   Administered Date(s) Administered    Influenza Quadrivalent Preservative Free 3 years and older IM 11/03/2014    Influenza Split High Dose Preservative Free IM 12/17/2015, 10/06/2016, 09/20/2017    Influenza TIV (IM) 09/12/2013    Influenza, high dose seasonal 0 5 mL 10/15/2018    Pneumococcal Conjugate 13-Valent 07/27/2017    Pneumococcal Polysaccharide PPV23 01/01/2011    Td (adult), adsorbed 06/06/2011       Patient Care Team:  Marion Barba DO as PCP - General  DO Suhail Oropeza MD Melvia Canning, MD Iven Poling, DO  Levora Day,  [address] Medicare Screening Tests and Risk Assessments:  Batsheva Thomas is here for her Subsequent Wellness visit  Last Medicare Wellness visit information reviewed, patient interviewed and updates made to the record today  Health Risk Assessment:  Patient rates overall health as very good  Patient feels that their physical health rating is Same  Eyesight was rated as Slightly worse  Hearing was rated as Same  Patient feels that their emotional and mental health rating is Same  Pain experienced by patient in the last 7 days has been Some  Patient's pain rating has been 8/10  Emotional/Mental Health:  Patient has been feeling nervous/anxious  PHQ-9 Depression Screening:    Frequency of the following problems over the past two weeks:      1  Little interest or pleasure in doing things: 0 - not at all      2  Feeling down, depressed, or hopeless: 0 - not at all  PHQ-2 Score: 0          Broken Bones/Falls: Fall Risk Assessment:    In the past year, patient has experienced: No history of falling in past year          Bladder/Bowel:  Patient has leaked urine accidently in the last six months  Patient reports no loss of bowel control      Immunizations:  Patient has had a flu vaccination within the last year  Patient has received a pneumonia shot  Patient has received a shingles shot  Patient has received tetanus/diphtheria shot  Date of tetanus/diphtheria shot: 6/6/2011    Home Safety:  Patient has trouble with stairs inside or outside of their home  Patient currently reports that there are no safety hazards present in home, working smoke alarms, working carbon monoxide detectors  Preventative Screenings:   Breast cancer screening performed, 11/23/2016  colon cancer screen completed, 6/4/2015  cholesterol screen completed, glaucoma eye exam completed, (Additional Comments: mammo ordered)    Nutrition:  Current diet: Low Saturated Fat, No Added Salt and Limited junk food with servings of the following:    Medications:  Patient is currently taking over-the-counter supplements  Patient is able to manage medications  Lifestyle Choices:  Patient reports no tobacco use  Patient has not smoked or used tobacco in the past   Patient reports alcohol use  Alcohol use per week: 14-16  Patient drives a vehicle  Patient wears seat belt  Current level of exercise of physical activity described by patient as: above average, walk, work out at [employer]  Activities of Daily Living:  Can get out of bed by his or her self, able to dress self, able to make own meals, able to do own shopping, able to bathe self, can do own laundry/housekeeping, can manage own money, pay bills and track expenses    Previous Hospitalizations:  No hospitalization or ED visit in past 12 months        Advanced Directives:  Patient has decided on a power of   Patient has spoken to designated power of   Patient has completed advanced directive  Preventative Screening/Counseling:      Cardiovascular:      General: Risks and Benefits Discussed and Screening Current      Counseling: Healthy Diet and Healthy Weight      Comments:  Will have repeat level of lipid this month before she sees Dr Pappas        Diabetes:      General: Risks and Benefits Discussed and Screening Current      Counseling: Healthy Diet      Comments: Labs this month were normal        Colorectal Cancer:      General: Risks and Benefits Discussed and Screening Current          Breast Cancer:      General: Risks and Benefits Discussed          Cervical Cancer:      General: Risks and Benefits Discussed and Screening Not Indicated      Comments: Never had abnormal        Osteoporosis:      General: Risks and Benefits Discussed and Screening Current      Counseling: Calcium and Vitamin D Intake and Regular Weightbearing Exercise      Comments: Had low vit d- now on increased supplements- had high bone density        AAA:      General: Risks and Benefits Discussed and Screening Not Indicated      Comments: Family hx of cardiac disease- father had rupture of ? heart        Glaucoma:      General: Screening Current and Risks and Benefits Discussed      Comments: Has early cataracts forming also        HIV:      General: Risks and Benefits Discussed          Hepatitis C:      General: Risks and Benefits Discussed        Advanced Directives:   Patient has living will for healthcare, Information on ACP and/or AD provided  Additional Comments: Will bring in copy    Immunizations:  Patient reviewed and up to date      Pneumococcal: Risks & Benefits Discussed and Lifetime Vaccine Completed      Shingrix: Risks & Benefits Discussed      Zostavax: Risks & Benefits Discussed and Zostavax Vaccine UTD      TD: Risks & Benefits Discussed and Td Vaccine UTD            No exam data present    Physical Exam:  Review of Systems   Constitutional: Positive for fatigue  Has chronic pain in  Back and hands   Respiratory: Negative for shortness of breath  Cardiovascular: Negative for chest pain          Had elevated bp so taken off her nsaid- seen by dr Sagar Andres and losartan increased to 50 mg daily- also had one day she took extra betablocker- she did not take it at 50 mg nighttly as he had ordered- now bopis lower so will just continue on the higher dose of losartan  Gastrointestinal: Negative for bowel incontinence  Musculoskeletal: Positive for back pain  Psychiatric/Behavioral: The patient is nervous/anxious  All other systems reviewed and are negative  There were no vitals filed for this visit  There is no height or weight on file to calculate BMI  Physical Exam   Constitutional: She is oriented to person, place, and time  HENT:   Head: Normocephalic and atraumatic  Right Ear: External ear normal    Left Ear: External ear normal    Eyes: Right eye exhibits no discharge  Left eye exhibits no discharge  Neck: No thyromegaly present  Cardiovascular: Normal rate and regular rhythm  Exam reveals no friction rub  No murmur heard  Pulmonary/Chest: Effort normal and breath sounds normal  No respiratory distress  She has no wheezes  Abdominal: Soft  Bowel sounds are normal  She exhibits no distension  There is no tenderness  Musculoskeletal: She exhibits tenderness  She exhibits no edema  distal ip joints with swelling and tenderness bilateral  Had prior steroid injections on 3rd finger- now able to bend   Neurological: She is alert and oriented to person, place, and time  Skin: Skin is warm and dry  Psychiatric: She has a normal mood and affect  Her behavior is normal    Nursing note and vitals reviewed

## 2019-04-11 ENCOUNTER — OFFICE VISIT (OUTPATIENT)
Dept: GASTROENTEROLOGY | Facility: CLINIC | Age: 74
End: 2019-04-11
Payer: MEDICARE

## 2019-04-11 VITALS
HEIGHT: 64 IN | BODY MASS INDEX: 28.85 KG/M2 | HEART RATE: 66 BPM | SYSTOLIC BLOOD PRESSURE: 138 MMHG | DIASTOLIC BLOOD PRESSURE: 86 MMHG | WEIGHT: 169 LBS

## 2019-04-11 DIAGNOSIS — Z12.11 COLON CANCER SCREENING: ICD-10-CM

## 2019-04-11 DIAGNOSIS — K58.0 IRRITABLE BOWEL SYNDROME WITH DIARRHEA: ICD-10-CM

## 2019-04-11 DIAGNOSIS — R10.11 RIGHT UPPER QUADRANT ABDOMINAL PAIN: Primary | ICD-10-CM

## 2019-04-11 PROCEDURE — 99214 OFFICE O/P EST MOD 30 MIN: CPT | Performed by: INTERNAL MEDICINE

## 2019-04-11 RX ORDER — DICYCLOMINE HCL 20 MG
20 TABLET ORAL
Qty: 30 TABLET | Refills: 12 | Status: SHIPPED | OUTPATIENT
Start: 2019-04-11 | End: 2019-06-17

## 2019-04-13 ENCOUNTER — HOSPITAL ENCOUNTER (OUTPATIENT)
Dept: MAMMOGRAPHY | Facility: CLINIC | Age: 74
Discharge: HOME | End: 2019-04-13
Payer: MEDICARE

## 2019-04-13 ENCOUNTER — APPOINTMENT (OUTPATIENT)
Dept: LAB | Facility: CLINIC | Age: 74
End: 2019-04-13
Payer: MEDICARE

## 2019-04-13 ENCOUNTER — TRANSCRIBE ORDERS (OUTPATIENT)
Dept: LAB | Facility: CLINIC | Age: 74
End: 2019-04-13

## 2019-04-13 VITALS — BODY MASS INDEX: 28.85 KG/M2 | HEIGHT: 64 IN | WEIGHT: 169 LBS

## 2019-04-13 DIAGNOSIS — Z92.89 TRANSFUSION HISTORY: ICD-10-CM

## 2019-04-13 DIAGNOSIS — Z11.59 NEED FOR HEPATITIS C SCREENING TEST: ICD-10-CM

## 2019-04-13 DIAGNOSIS — I10 ESSENTIAL HYPERTENSION: ICD-10-CM

## 2019-04-13 DIAGNOSIS — Z92.89 TRANSFUSION HISTORY: Primary | ICD-10-CM

## 2019-04-13 DIAGNOSIS — Z12.39 SCREENING FOR BREAST CANCER: ICD-10-CM

## 2019-04-13 LAB
ALBUMIN SERPL BCP-MCNC: 4.2 G/DL (ref 3.5–5)
ALP SERPL-CCNC: 63 U/L (ref 46–116)
ALT SERPL W P-5'-P-CCNC: 27 U/L (ref 12–78)
ANION GAP SERPL CALCULATED.3IONS-SCNC: 5 MMOL/L (ref 4–13)
AST SERPL W P-5'-P-CCNC: 17 U/L (ref 5–45)
BASOPHILS # BLD AUTO: 0.03 THOUSANDS/ΜL (ref 0–0.1)
BASOPHILS NFR BLD AUTO: 1 % (ref 0–1)
BILIRUB SERPL-MCNC: 0.85 MG/DL (ref 0.2–1)
BUN SERPL-MCNC: 19 MG/DL (ref 5–25)
CALCIUM SERPL-MCNC: 9.1 MG/DL (ref 8.3–10.1)
CHLORIDE SERPL-SCNC: 109 MMOL/L (ref 100–108)
CHOLEST SERPL-MCNC: 207 MG/DL (ref 50–200)
CO2 SERPL-SCNC: 25 MMOL/L (ref 21–32)
CREAT SERPL-MCNC: 0.71 MG/DL (ref 0.6–1.3)
CREAT UR-MCNC: 91.9 MG/DL
EOSINOPHIL # BLD AUTO: 0.12 THOUSAND/ΜL (ref 0–0.61)
EOSINOPHIL NFR BLD AUTO: 3 % (ref 0–6)
ERYTHROCYTE [DISTWIDTH] IN BLOOD BY AUTOMATED COUNT: 13 % (ref 11.6–15.1)
GFR SERPL CREATININE-BSD FRML MDRD: 85 ML/MIN/1.73SQ M
GLUCOSE P FAST SERPL-MCNC: 103 MG/DL (ref 65–99)
HCT VFR BLD AUTO: 40.3 % (ref 34.8–46.1)
HDLC SERPL-MCNC: 56 MG/DL (ref 40–60)
HGB BLD-MCNC: 13.4 G/DL (ref 11.5–15.4)
IMM GRANULOCYTES # BLD AUTO: 0.01 THOUSAND/UL (ref 0–0.2)
IMM GRANULOCYTES NFR BLD AUTO: 0 % (ref 0–2)
LDLC SERPL CALC-MCNC: 114 MG/DL (ref 0–100)
LYMPHOCYTES # BLD AUTO: 1.96 THOUSANDS/ΜL (ref 0.6–4.47)
LYMPHOCYTES NFR BLD AUTO: 44 % (ref 14–44)
MCH RBC QN AUTO: 29.8 PG (ref 26.8–34.3)
MCHC RBC AUTO-ENTMCNC: 33.3 G/DL (ref 31.4–37.4)
MCV RBC AUTO: 90 FL (ref 82–98)
MICROALBUMIN UR-MCNC: 35.8 MG/L (ref 0–20)
MICROALBUMIN/CREAT 24H UR: 39 MG/G CREATININE (ref 0–30)
MONOCYTES # BLD AUTO: 0.32 THOUSAND/ΜL (ref 0.17–1.22)
MONOCYTES NFR BLD AUTO: 7 % (ref 4–12)
NEUTROPHILS # BLD AUTO: 2 THOUSANDS/ΜL (ref 1.85–7.62)
NEUTS SEG NFR BLD AUTO: 45 % (ref 43–75)
NRBC BLD AUTO-RTO: 0 /100 WBCS
PLATELET # BLD AUTO: 236 THOUSANDS/UL (ref 149–390)
PMV BLD AUTO: 10.8 FL (ref 8.9–12.7)
POTASSIUM SERPL-SCNC: 3.8 MMOL/L (ref 3.5–5.3)
PROT SERPL-MCNC: 7 G/DL (ref 6.4–8.2)
RBC # BLD AUTO: 4.49 MILLION/UL (ref 3.81–5.12)
SODIUM SERPL-SCNC: 139 MMOL/L (ref 136–145)
TRIGL SERPL-MCNC: 186 MG/DL
WBC # BLD AUTO: 4.44 THOUSAND/UL (ref 4.31–10.16)

## 2019-04-13 PROCEDURE — 77063 BREAST TOMOSYNTHESIS BI: CPT

## 2019-04-13 PROCEDURE — 86803 HEPATITIS C AB TEST: CPT

## 2019-04-13 PROCEDURE — 85025 COMPLETE CBC W/AUTO DIFF WBC: CPT

## 2019-04-13 PROCEDURE — 36415 COLL VENOUS BLD VENIPUNCTURE: CPT

## 2019-04-13 PROCEDURE — 82043 UR ALBUMIN QUANTITATIVE: CPT | Performed by: INTERNAL MEDICINE

## 2019-04-13 PROCEDURE — 80061 LIPID PANEL: CPT

## 2019-04-13 PROCEDURE — 80053 COMPREHEN METABOLIC PANEL: CPT

## 2019-04-13 PROCEDURE — 77067 SCR MAMMO BI INCL CAD: CPT

## 2019-04-13 PROCEDURE — 87389 HIV-1 AG W/HIV-1&-2 AB AG IA: CPT

## 2019-04-13 PROCEDURE — 82570 ASSAY OF URINE CREATININE: CPT | Performed by: INTERNAL MEDICINE

## 2019-04-14 LAB — HCV AB SER QL: NORMAL

## 2019-04-15 LAB — HIV 1+2 AB+HIV1 P24 AG SERPL QL IA: NORMAL

## 2019-04-18 ENCOUNTER — TELEPHONE (OUTPATIENT)
Dept: FAMILY MEDICINE CLINIC | Facility: HOSPITAL | Age: 74
End: 2019-04-18

## 2019-04-19 DIAGNOSIS — K21.9 GASTROESOPHAGEAL REFLUX DISEASE, ESOPHAGITIS PRESENCE NOT SPECIFIED: Primary | ICD-10-CM

## 2019-04-19 RX ORDER — PANTOPRAZOLE SODIUM 40 MG/1
40 TABLET, DELAYED RELEASE ORAL DAILY
Qty: 30 TABLET | Refills: 2 | Status: SHIPPED | OUTPATIENT
Start: 2019-04-19 | End: 2019-08-01 | Stop reason: SDUPTHER

## 2019-06-10 DIAGNOSIS — F41.9 ANXIETY HYPERVENTILATION: ICD-10-CM

## 2019-06-10 DIAGNOSIS — F45.8 ANXIETY HYPERVENTILATION: ICD-10-CM

## 2019-06-10 RX ORDER — CLONAZEPAM 0.5 MG/1
TABLET ORAL
Qty: 60 TABLET | Refills: 1 | Status: SHIPPED | OUTPATIENT
Start: 2019-06-10 | End: 2019-08-09 | Stop reason: SDUPTHER

## 2019-06-17 ENCOUNTER — OFFICE VISIT (OUTPATIENT)
Dept: GASTROENTEROLOGY | Facility: CLINIC | Age: 74
End: 2019-06-17
Payer: MEDICARE

## 2019-06-17 VITALS
DIASTOLIC BLOOD PRESSURE: 84 MMHG | WEIGHT: 173 LBS | HEART RATE: 70 BPM | BODY MASS INDEX: 29.53 KG/M2 | SYSTOLIC BLOOD PRESSURE: 130 MMHG | HEIGHT: 64 IN

## 2019-06-17 DIAGNOSIS — K29.60 EROSIVE GASTRITIS: ICD-10-CM

## 2019-06-17 DIAGNOSIS — K58.0 IRRITABLE BOWEL SYNDROME WITH DIARRHEA: Primary | ICD-10-CM

## 2019-06-17 PROCEDURE — 99214 OFFICE O/P EST MOD 30 MIN: CPT | Performed by: INTERNAL MEDICINE

## 2019-06-17 RX ORDER — DICYCLOMINE HYDROCHLORIDE 10 MG/1
20 CAPSULE ORAL 2 TIMES DAILY
Qty: 360 CAPSULE | Refills: 12 | Status: SHIPPED | OUTPATIENT
Start: 2019-06-17 | End: 2020-09-08

## 2019-07-02 ENCOUNTER — TRANSCRIBE ORDERS (OUTPATIENT)
Dept: ADMINISTRATIVE | Facility: HOSPITAL | Age: 74
End: 2019-07-02

## 2019-07-02 ENCOUNTER — APPOINTMENT (OUTPATIENT)
Dept: LAB | Facility: CLINIC | Age: 74
End: 2019-07-02
Payer: MEDICARE

## 2019-07-02 DIAGNOSIS — Z01.812 PRE-PROCEDURAL LABORATORY EXAMINATIONS: ICD-10-CM

## 2019-07-02 DIAGNOSIS — Z01.812 PRE-OPERATIVE LABORATORY EXAMINATION: Primary | ICD-10-CM

## 2019-07-02 LAB — BUN SERPL-MCNC: 21 MG/DL (ref 5–25)

## 2019-07-02 PROCEDURE — 82565 ASSAY OF CREATININE: CPT

## 2019-07-02 PROCEDURE — 84520 ASSAY OF UREA NITROGEN: CPT

## 2019-07-02 PROCEDURE — 36415 COLL VENOUS BLD VENIPUNCTURE: CPT

## 2019-07-05 LAB
CREAT SERPL-MCNC: 0.88 MG/DL (ref 0.6–1.3)
GFR SERPL CREATININE-BSD FRML MDRD: 65 ML/MIN/1.73SQ M

## 2019-08-01 DIAGNOSIS — K21.9 GASTROESOPHAGEAL REFLUX DISEASE, ESOPHAGITIS PRESENCE NOT SPECIFIED: ICD-10-CM

## 2019-08-01 RX ORDER — PANTOPRAZOLE SODIUM 40 MG/1
40 TABLET, DELAYED RELEASE ORAL DAILY
Qty: 30 TABLET | Refills: 2 | Status: SHIPPED | OUTPATIENT
Start: 2019-08-01 | End: 2019-10-09 | Stop reason: SDUPTHER

## 2019-08-02 ENCOUNTER — TELEPHONE (OUTPATIENT)
Dept: OBGYN CLINIC | Facility: CLINIC | Age: 74
End: 2019-08-02

## 2019-08-02 DIAGNOSIS — M19.042 OSTEOARTHRITIS OF FINGERS OF BOTH HANDS: ICD-10-CM

## 2019-08-02 DIAGNOSIS — M19.041 OSTEOARTHRITIS OF FINGERS OF BOTH HANDS: ICD-10-CM

## 2019-08-02 RX ORDER — CELECOXIB 200 MG/1
200 CAPSULE ORAL
Qty: 30 CAPSULE | Refills: 1 | Status: SHIPPED | OUTPATIENT
Start: 2019-08-02 | End: 2019-11-29 | Stop reason: SDUPTHER

## 2019-08-02 NOTE — TELEPHONE ENCOUNTER
Pt contacted Call Center requested refill of their medication  Tin Gambino would like to know if she has to take this at bed time as she takes so many at that time as well  Medication Name: Celecoxib       Dosage of Med: 200 mg      Frequency of Med: 1 capsule at bed time      Remaining Medication: 0      Pharmacy and Location: [facility]        Pt  Preferred Callback Phone Number: [phone]       Thank you

## 2019-08-09 ENCOUNTER — OFFICE VISIT (OUTPATIENT)
Dept: FAMILY MEDICINE CLINIC | Facility: HOSPITAL | Age: 74
End: 2019-08-09
Payer: MEDICARE

## 2019-08-09 VITALS
OXYGEN SATURATION: 96 % | WEIGHT: 176.2 LBS | DIASTOLIC BLOOD PRESSURE: 72 MMHG | BODY MASS INDEX: 30.08 KG/M2 | SYSTOLIC BLOOD PRESSURE: 124 MMHG | HEIGHT: 64 IN | HEART RATE: 75 BPM

## 2019-08-09 DIAGNOSIS — M54.42 CHRONIC BILATERAL LOW BACK PAIN WITH LEFT-SIDED SCIATICA: Primary | ICD-10-CM

## 2019-08-09 DIAGNOSIS — I10 ESSENTIAL HYPERTENSION: ICD-10-CM

## 2019-08-09 DIAGNOSIS — K29.60 EROSIVE GASTRITIS: ICD-10-CM

## 2019-08-09 DIAGNOSIS — F41.9 ANXIETY DISORDER, UNSPECIFIED TYPE: ICD-10-CM

## 2019-08-09 DIAGNOSIS — E66.09 CLASS 1 OBESITY DUE TO EXCESS CALORIES WITHOUT SERIOUS COMORBIDITY WITH BODY MASS INDEX (BMI) OF 30.0 TO 30.9 IN ADULT: ICD-10-CM

## 2019-08-09 DIAGNOSIS — F45.8 ANXIETY HYPERVENTILATION: ICD-10-CM

## 2019-08-09 DIAGNOSIS — G95.19 NEUROGENIC CLAUDICATION (HCC): ICD-10-CM

## 2019-08-09 DIAGNOSIS — Z23 NEED FOR PNEUMOCOCCAL VACCINATION: ICD-10-CM

## 2019-08-09 DIAGNOSIS — F41.9 ANXIETY HYPERVENTILATION: ICD-10-CM

## 2019-08-09 DIAGNOSIS — G89.29 CHRONIC BILATERAL LOW BACK PAIN WITH LEFT-SIDED SCIATICA: Primary | ICD-10-CM

## 2019-08-09 PROBLEM — E66.811 CLASS 1 OBESITY WITHOUT SERIOUS COMORBIDITY IN ADULT: Status: ACTIVE | Noted: 2019-08-09

## 2019-08-09 PROBLEM — E66.9 CLASS 1 OBESITY WITHOUT SERIOUS COMORBIDITY IN ADULT: Status: ACTIVE | Noted: 2019-08-09

## 2019-08-09 PROCEDURE — G0009 ADMIN PNEUMOCOCCAL VACCINE: HCPCS

## 2019-08-09 PROCEDURE — 99214 OFFICE O/P EST MOD 30 MIN: CPT | Performed by: INTERNAL MEDICINE

## 2019-08-09 PROCEDURE — 90732 PPSV23 VACC 2 YRS+ SUBQ/IM: CPT

## 2019-08-09 RX ORDER — CLONAZEPAM 0.5 MG/1
0.5 TABLET ORAL 2 TIMES DAILY PRN
Qty: 60 TABLET | Refills: 1 | Status: SHIPPED | OUTPATIENT
Start: 2019-08-09 | End: 2019-11-01 | Stop reason: SDUPTHER

## 2019-08-09 RX ORDER — TRAMADOL HYDROCHLORIDE 50 MG/1
50 TABLET ORAL 2 TIMES DAILY
Qty: 60 TABLET | Refills: 0 | Status: SHIPPED | OUTPATIENT
Start: 2019-08-09 | End: 2020-07-14 | Stop reason: SDUPTHER

## 2019-08-09 NOTE — ASSESSMENT & PLAN NOTE
Walking about 10 feet and gets pain   Saw her surgeon Dr Christina Saenz  at [facility] recently- having excruciating pain- difficulty walking - he referred to pain management and PT to follow- has pain management scheduled in September at [facility]  Not able to exercise    Had 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 - had prior surgery with 6 pins in place

## 2019-08-09 NOTE — ASSESSMENT & PLAN NOTE
Avoid nsaid otc like alikathrine- on protonix with Dr Paola French- was switched to celebrex by ELISEO Sandoval in past

## 2019-08-13 NOTE — PROGRESS NOTES
Assessment/Plan:             Problem List Items Addressed This Visit        Digestive    Erosive gastritis     Avoid nsaid otc like alieve- on protonix with Dr Violet Jang- was switched to celebrex by ELISEO Mari in past             Cardiovascular and Mediastinum    Essential hypertension     Well controlled            Other    Anxiety disorder    Chronic low back pain - Primary     Will order tramadol 50 mg bid- pain contract signed         Relevant Medications    traMADol (ULTRAM) 50 mg tablet    Neurogenic claudication     Walking about 10 feet and gets pain   Saw her surgeon Dr Kasie Marcus  at [facility] recently- having excruciating pain- difficulty walking - he referred to pain management and PT to follow- has pain management scheduled in September at [facility]  Not able to exercise  Had 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 - had prior surgery with 6 pins in place          Class 1 obesity without serious comorbidity in adult      Other Visit Diagnoses     Anxiety hyperventilation        Relevant Medications    clonazePAM (KlonoPIN) 0 5 mg tablet    Need for pneumococcal vaccination        Relevant Orders    PNEUMOCOCCAL POLYSACCHARIDE VACCINE 23-VALENT =>1YO SQ IM (Completed)            Subjective:      Patient ID: Sugey Fry is a 69 y o  female    1  Patient here for follow-up with complaints of ongoing back and leg pain issues  We discussed having her see pain management and will give her options such as Dr Stephanie Rodney   or [place] pain management for follow-up  Has worsening symptoms with walking or changing position  2  Hypertension-stable control without complaints of headache or dizziness      The following portions of the patient's history were reviewed and updated as appropriate: allergies, current medications and problem list      Review of Systems   Constitutional: Negative for chills and fever  Musculoskeletal: Positive for back pain     All other systems reviewed and are negative  Objective:      Current Outpatient Medications:     aspirin (ASPIRIN ADULT LOW DOSE) 81 mg EC tablet, Take 1 tablet by mouth daily, Disp: , Rfl:     atenolol (TENORMIN) 25 mg tablet, 2 (two) times a day  , Disp: , Rfl:     celecoxib (CeleBREX) 200 mg capsule, Take 1 capsule (200 mg total) by mouth daily at bedtime, Disp: 30 capsule, Rfl: 1    Cholecalciferol (VITAMIN D3) 2000 units capsule, Take by mouth daily, Disp: , Rfl:     clonazePAM (KlonoPIN) 0 5 mg tablet, Take 1 tablet (0 5 mg total) by mouth 2 (two) times a day as needed for anxiety, Disp: 60 tablet, Rfl: 1    co-enzyme Q-10 30 MG capsule, Take 30 mg by mouth 3 (three) times a day, Disp: , Rfl:     dicyclomine (BENTYL) 10 mg capsule, Take 2 capsules (20 mg total) by mouth 2 (two) times a day, Disp: 360 capsule, Rfl: 12    fenofibrate (TRICOR) 48 mg tablet, Take by mouth daily, Disp: , Rfl:     losartan (COZAAR) 25 mg tablet, Take 1 tablet by mouth 2 (two) times a day , Disp: , Rfl:     magnesium oxide (MAG-OX) 400 mg, Take 400 mg by mouth 2 (two) times a day, Disp: , Rfl:     pantoprazole (PROTONIX) 40 mg tablet, Take 1 tablet (40 mg total) by mouth daily, Disp: 30 tablet, Rfl: 2    rosuvastatin (CRESTOR) 5 mg tablet, Take by mouth, Disp: , Rfl:     ascorbic acid (VITAMIN C) 250 mg tablet, Take 250 mg by mouth daily, Disp: , Rfl:     potassium chloride (K-DUR,KLOR-CON) 20 mEq tablet, Take 20 mEq by mouth daily, Disp: , Rfl:     traMADol (ULTRAM) 50 mg tablet, Take 1 tablet (50 mg total) by mouth 2 (two) times a day, Disp: 60 tablet, Rfl: 0    Blood pressure 124/72, pulse 75, height 5' 4" (1 626 m), weight 79 9 kg (176 lb 3 2 oz), SpO2 96 %  Physical Exam   Constitutional: She is oriented to person, place, and time  HENT:   Head: Normocephalic and atraumatic  Right Ear: External ear normal    Left Ear: External ear normal    Eyes: Right eye exhibits no discharge  Left eye exhibits no discharge     Neck: No thyromegaly present  Cardiovascular: Normal rate and regular rhythm  Exam reveals no friction rub  No murmur heard  Pulmonary/Chest: Effort normal and breath sounds normal  No respiratory distress  She has no wheezes  Abdominal: Soft  Bowel sounds are normal  She exhibits no distension  There is no tenderness  Musculoskeletal: She exhibits tenderness  She exhibits no edema  Chronic distal ip joints swelling in the hands bilaterally     Lower lumbar tenderness with slightly increased paravertebral muscles spasms on the right compared to the left  Some increased pain with straight leg raising bilaterally   Neurological: She is alert and oriented to person, place, and time  She displays normal reflexes  Coordination normal    Skin: Skin is warm and dry  Psychiatric: She has a normal mood and affect  Her behavior is normal  Judgment and thought content normal    Nursing note and vitals reviewed  BMI Counseling: Body mass index is 30 24 kg/m²  Discussed the patient's BMI with her  The BMI is above average  BMI counseling and education was provided to the patient  Exercise recommendations include exercising 3-5 times per week   patient may need PT for her back and then can resume more active aerobic exercise program

## 2019-10-09 ENCOUNTER — OFFICE VISIT (OUTPATIENT)
Dept: GASTROENTEROLOGY | Facility: CLINIC | Age: 74
End: 2019-10-09
Payer: MEDICARE

## 2019-10-09 VITALS
DIASTOLIC BLOOD PRESSURE: 84 MMHG | SYSTOLIC BLOOD PRESSURE: 124 MMHG | HEART RATE: 76 BPM | WEIGHT: 172 LBS | BODY MASS INDEX: 29.37 KG/M2 | HEIGHT: 64 IN

## 2019-10-09 DIAGNOSIS — K21.9 GASTROESOPHAGEAL REFLUX DISEASE, ESOPHAGITIS PRESENCE NOT SPECIFIED: ICD-10-CM

## 2019-10-09 DIAGNOSIS — K29.60 EROSIVE GASTRITIS: ICD-10-CM

## 2019-10-09 DIAGNOSIS — Z12.11 COLON CANCER SCREENING: ICD-10-CM

## 2019-10-09 DIAGNOSIS — K58.0 IRRITABLE BOWEL SYNDROME WITH DIARRHEA: Primary | ICD-10-CM

## 2019-10-09 PROCEDURE — 99213 OFFICE O/P EST LOW 20 MIN: CPT | Performed by: INTERNAL MEDICINE

## 2019-10-09 RX ORDER — PANTOPRAZOLE SODIUM 40 MG/1
40 TABLET, DELAYED RELEASE ORAL DAILY
Qty: 90 TABLET | Refills: 6 | Status: SHIPPED | OUTPATIENT
Start: 2019-10-09 | End: 2021-04-02

## 2019-10-09 NOTE — LETTER
October 9, 2019     Vitaly Marchantony, [address]  [address]  [facility] [address]    Patient: Pauline Grayson   YOB: 1945   Date of Visit: 10/9/2019       Dear Dr Arthur Matos: Thank you for referring Guy Amaya to me for evaluation  Below are my notes for this consultation  If you have questions, please do not hesitate to call me  I look forward to following your patient along with you  Sincerely,        Monica Galan MD        CC: No Recipients  Monica Galan MD  10/9/2019  3:43 PM  Sign at close encounter  [facility] - Outpatient Follow-up Note  Pauline Grayson 69 y o  female MRN: [id number]  Encounter: [id number]    ASSESSMENT AND PLAN:      1  Irritable bowel syndrome with diarrhea  Chronic abdominal pain particularly at night  Seems to be doing well using Bentyl 20 mg twice a day  Sometimes takes an extra or Bentyl at bedtime  - continue Bentyl 20 mg in the morning and at bedtime  Can take 10 mg at dinner time if needed    2  Erosive gastritis  3  Gastroesophageal reflux disease, esophagitis presence not specified  Probably NSAID related  Tried to wean off NSAIDs but back on it  Feeling well taking pantoprazole daily  - pantoprazole (PROTONIX) 40 mg tablet; Take 1 tablet (40 mg total) by mouth daily  Dispense: 90 tablet; Refill: 6    4  Colon cancer screening  Last colonoscopy 2014-15 by Dr Cameron Armendariz      Followup Appointment:  6 months  ______________________________________________________________________    Chief Complaint   Patient presents with    Follow up-IBS-D     HPI:  Patient returns today for follow-up  She is doing relatively well taking Bentyl twice a day and pantoprazole in the morning  She had erosive gastritis on her endoscopy and we tried to wean her off the NSAIDs but she needed them  She is back taking Aleve daily  She is also using CBD oil which she thinks is really helping    She continues to have intermittent spasm me right upper quadrant and bilateral lower quadrant pain  This usually occurs at night and is resolved with the Bentyl  She is taking it now twice a day  She does admit to taking a 3rd Bentyl at bedtime which seems to help more  She is moving her bowels regularly  Her weight is stable      Historical Information   Past Medical History:   Diagnosis Date    Anxiety disorder     Arthralgia     Arthritis     Asthma     Chronic lumbar radiculopathy     last assessed: 16    Chronic respiratory failure ([address] )     Colon cancer screening 2019    Last colonoscopy -15    Depression     Dysfunction of eustachian tube     Dyslipidemia     Fatigue     HTN (hypertension) 10/29/2014    Hypercholesterolemia     Hypertension     Irritable bowel syndrome with diarrhea 2019    Lumbosacral stenosis     last assessed: 16    Pancreatitis     resolved: 17    Pneumonia     PTSD (post-traumatic stress disorder)      Past Surgical History:   Procedure Laterality Date    CHOLECYSTECTOMY      GALLBLADDER SURGERY      HYSTERECTOMY      pt thinks she still has one ovary, done over 20 yrs ago   [address]  N/A 2018    TONSILLECTOMY       Social History     Substance and Sexual Activity   Alcohol Use Yes    Frequency: 4 or more times a week    Comment: 4 drinks every night     Social History     Substance and Sexual Activity   Drug Use No     Social History     Tobacco Use   Smoking Status Never Smoker   Smokeless Tobacco Never Used     Family History   Problem Relation Age of Onset    Depression Mother         panic attacks    Lung cancer Mother     Mental illness Mother     Substance Abuse Mother         CIGS    Coronary artery disease Father         high # of paternal family members  in their 46s    Substance Abuse Father         CIGS    Depression Sister         panic attacks    Depression Other         panic attacks    Heart disease Family     Hypertension Family     Breast cancer additional onset Daughter     Colon cancer Neg Hx     Colon polyps Neg Hx          Current Outpatient Medications:     aspirin (ASPIRIN ADULT LOW DOSE) 81 mg EC tablet    atenolol (TENORMIN) 25 mg tablet    Cholecalciferol (VITAMIN D3) 2000 units capsule    clonazePAM (KlonoPIN) 0 5 mg tablet    co-enzyme Q-10 30 MG capsule    dicyclomine (BENTYL) 10 mg capsule    fenofibrate (TRICOR) 48 mg tablet    losartan (COZAAR) 25 mg tablet    magnesium oxide (MAG-OX) 400 mg    pantoprazole (PROTONIX) 40 mg tablet    rosuvastatin (CRESTOR) 5 mg tablet    ascorbic acid (VITAMIN C) 250 mg tablet    celecoxib (CeleBREX) 200 mg capsule    potassium chloride (K-DUR,KLOR-CON) 20 mEq tablet    traMADol (ULTRAM) 50 mg tablet  Allergies   Allergen Reactions    Antihistamines, Diphenhydramine-Type     Bupropion     Clarithromycin     Codeine Other (See Comments)     increased HR    Gabapentin     Meloxicam Nausea Only and Visual Disturbance     Other reaction(s): Numbness  Action Taken: med stopped ; Category: Allergy;     Ondansetron     Pravastatin     Propoxyphene Other (See Comments)     increased HR       PHYSICAL EXAM:    Blood pressure 124/84, pulse 76, height 5' 4" (1 626 m), weight 78 kg (172 lb)  Body mass index is 29 52 kg/m²  General Appearance: NAD, cooperative, alert  Eyes: Anicteric, PERRLA, EOMI  ENT:  Normocephalic, atraumatic, normal mucosa  Neck:  Supple, symmetrical, trachea midline  Resp:  Clear to auscultation bilaterally; no rales, rhonchi or wheezing; respirations unlabored   CV:  S1 S2, Regular rate and rhythm; no murmur, rub, or gallop  GI:  Soft, non-tender, non-distended; normal bowel sounds; no masses, no organomegaly   Rectal: Deferred  Musculoskeletal: No cyanosis, clubbing or edema  Normal ROM    Skin:  No jaundice, rashes, or lesions   Heme/Lymph: No palpable cervical lymphadenopathy  Psych: Normal affect, good eye contact  Neuro: No gross deficits, AAOx3    Lab Results:   Lab Results   Component Value Date    WBC 4 44 04/13/2019    HGB 13 4 04/13/2019    HCT 40 3 04/13/2019    MCV 90 04/13/2019     04/13/2019     Lab Results   Component Value Date     07/15/2016    K 3 8 04/13/2019     (H) 04/13/2019    CO2 25 04/13/2019    BUN 21 07/02/2019    CREATININE 0 88 07/02/2019    GLUF 103 (H) 04/13/2019    CALCIUM 9 1 04/13/2019    AST 17 04/13/2019    ALT 27 04/13/2019    ALKPHOS 63 04/13/2019    PROT 6 7 07/15/2016    BILITOT 0 6 07/15/2016    EGFR 65 07/02/2019     No results found for: IRON, TIBC, FERRITIN  Lab Results   Component Value Date    LIPASE 160 07/31/2018       Radiology Results:   No results found

## 2019-10-09 NOTE — PROGRESS NOTES
[id number] [facility] - Outpatient Follow-up Note  Kaycee July 69 y o  female MRN: [id number]  Encounter: [id number]    ASSESSMENT AND PLAN:      1  Irritable bowel syndrome with diarrhea  Chronic abdominal pain particularly at night  Seems to be doing well using Bentyl 20 mg twice a day  Sometimes takes an extra or Bentyl at bedtime  - continue Bentyl 20 mg in the morning and at bedtime  Can take 10 mg at dinner time if needed    2  Erosive gastritis  3  Gastroesophageal reflux disease, esophagitis presence not specified  Probably NSAID related  Tried to wean off NSAIDs but back on it  Feeling well taking pantoprazole daily  - pantoprazole (PROTONIX) 40 mg tablet; Take 1 tablet (40 mg total) by mouth daily  Dispense: 90 tablet; Refill: 6    4  Colon cancer screening  Last colonoscopy 2014-15 by Dr Domingo Bowers      Followup Appointment:  6 months  ______________________________________________________________________    Chief Complaint   Patient presents with    Follow up-IBS-D     HPI:  Patient returns today for follow-up  She is doing relatively well taking Bentyl twice a day and pantoprazole in the morning  She had erosive gastritis on her endoscopy and we tried to wean her off the NSAIDs but she needed them  She is back taking Aleve daily  She is also using CBD oil which she thinks is really helping  She continues to have intermittent spasm me right upper quadrant and bilateral lower quadrant pain  This usually occurs at night and is resolved with the Bentyl  She is taking it now twice a day  She does admit to taking a 3rd Bentyl at bedtime which seems to help more  She is moving her bowels regularly  Her weight is stable      Historical Information   Past Medical History:   Diagnosis Date    Anxiety disorder     Arthralgia     Arthritis     Asthma     Chronic lumbar radiculopathy     last assessed: 12/20/16    Chronic respiratory failure (HCC)     Colon cancer screening 2019    Last colonoscopy 2014-15    Depression     Dysfunction of eustachian tube     Dyslipidemia     Fatigue     HTN (hypertension) 10/29/2014    Hypercholesterolemia     Hypertension     Irritable bowel syndrome with diarrhea 2019    Lumbosacral stenosis     last assessed: 16    Pancreatitis     resolved: 17    Pneumonia     PTSD (post-traumatic stress disorder)      Past Surgical History:   Procedure Laterality Date    CHOLECYSTECTOMY      GALLBLADDER SURGERY      HYSTERECTOMY      pt thinks she still has one ovary, done over 20 yrs ago   [address]  N/A 2018    TONSILLECTOMY       Social History     Substance and Sexual Activity   Alcohol Use Yes    Frequency: 4 or more times a week    Comment: 4 drinks every night     Social History     Substance and Sexual Activity   Drug Use No     Social History     Tobacco Use   Smoking Status Never Smoker   Smokeless Tobacco Never Used     Family History   Problem Relation Age of Onset    Depression Mother         panic attacks    Lung cancer Mother     Mental illness Mother     Substance Abuse Mother         CIGS    Coronary artery disease Father         high # of paternal family members  in their 46s    Substance Abuse Father         CIGS    Depression Sister         panic attacks    Depression Other         panic attacks    Heart disease Family     Hypertension Family     Breast cancer additional onset Daughter     Colon cancer Neg Hx     Colon polyps Neg Hx          Current Outpatient Medications:     aspirin (ASPIRIN ADULT LOW DOSE) 81 mg EC tablet    atenolol (TENORMIN) 25 mg tablet    Cholecalciferol (VITAMIN D3) 2000 units capsule    clonazePAM (KlonoPIN) 0 5 mg tablet    co-enzyme Q-10 30 MG capsule    dicyclomine (BENTYL) 10 mg capsule    fenofibrate (TRICOR) 48 mg tablet    losartan (COZAAR) 25 mg tablet    magnesium oxide (MAG-OX) 400 mg   pantoprazole (PROTONIX) 40 mg tablet    rosuvastatin (CRESTOR) 5 mg tablet    ascorbic acid (VITAMIN C) 250 mg tablet    celecoxib (CeleBREX) 200 mg capsule    potassium chloride (K-DUR,KLOR-CON) 20 mEq tablet    traMADol (ULTRAM) 50 mg tablet  Allergies   Allergen Reactions    Antihistamines, Diphenhydramine-Type     Bupropion     Clarithromycin     Codeine Other (See Comments)     increased HR    Gabapentin     Meloxicam Nausea Only and Visual Disturbance     Other reaction(s): Numbness  Action Taken: med stopped ; Category: Allergy;     Ondansetron     Pravastatin     Propoxyphene Other (See Comments)     increased HR       PHYSICAL EXAM:    Blood pressure 124/84, pulse 76, height 5' 4" (1 626 m), weight 78 kg (172 lb)  Body mass index is 29 52 kg/m²  General Appearance: NAD, cooperative, alert  Eyes: Anicteric, PERRLA, EOMI  ENT:  Normocephalic, atraumatic, normal mucosa  Neck:  Supple, symmetrical, trachea midline  Resp:  Clear to auscultation bilaterally; no rales, rhonchi or wheezing; respirations unlabored   CV:  S1 S2, Regular rate and rhythm; no murmur, rub, or gallop  GI:  Soft, non-tender, non-distended; normal bowel sounds; no masses, no organomegaly   Rectal: Deferred  Musculoskeletal: No cyanosis, clubbing or edema  Normal ROM    Skin:  No jaundice, rashes, or lesions   Heme/Lymph: No palpable cervical lymphadenopathy  Psych: Normal affect, good eye contact  Neuro: No gross deficits, AAOx3    Lab Results:   Lab Results   Component Value Date    WBC 4 44 04/13/2019    HGB 13 4 04/13/2019    HCT 40 3 04/13/2019    MCV 90 04/13/2019     04/13/2019     Lab Results   Component Value Date     07/15/2016    K 3 8 04/13/2019     (H) 04/13/2019    CO2 25 04/13/2019    BUN 21 07/02/2019    CREATININE 0 88 07/02/2019    GLUF 103 (H) 04/13/2019    CALCIUM 9 1 04/13/2019    AST 17 04/13/2019    ALT 27 04/13/2019    ALKPHOS 63 04/13/2019    PROT 6 7 07/15/2016    BILITOT 0 6 07/15/2016    EGFR 65 07/02/2019     No results found for: IRON, TIBC, FERRITIN  Lab Results   Component Value Date    LIPASE 160 07/31/2018       Radiology Results:   No results found

## 2019-11-01 DIAGNOSIS — F41.9 ANXIETY HYPERVENTILATION: ICD-10-CM

## 2019-11-01 DIAGNOSIS — F45.8 ANXIETY HYPERVENTILATION: ICD-10-CM

## 2019-11-01 RX ORDER — CLONAZEPAM 0.5 MG/1
TABLET ORAL
Qty: 60 TABLET | Refills: 0 | Status: SHIPPED | OUTPATIENT
Start: 2019-11-01 | End: 2019-11-06 | Stop reason: SDUPTHER

## 2019-11-06 ENCOUNTER — OFFICE VISIT (OUTPATIENT)
Dept: FAMILY MEDICINE CLINIC | Facility: HOSPITAL | Age: 74
End: 2019-11-06
Payer: MEDICARE

## 2019-11-06 VITALS
WEIGHT: 171.2 LBS | BODY MASS INDEX: 29.23 KG/M2 | HEIGHT: 64 IN | OXYGEN SATURATION: 93 % | HEART RATE: 71 BPM | SYSTOLIC BLOOD PRESSURE: 132 MMHG | DIASTOLIC BLOOD PRESSURE: 90 MMHG

## 2019-11-06 DIAGNOSIS — M79.18 MYOFASCIAL PAIN SYNDROME: Primary | ICD-10-CM

## 2019-11-06 DIAGNOSIS — I10 ESSENTIAL HYPERTENSION: ICD-10-CM

## 2019-11-06 DIAGNOSIS — I77.9 BILATERAL CAROTID ARTERY DISEASE, UNSPECIFIED TYPE (HCC): ICD-10-CM

## 2019-11-06 DIAGNOSIS — F45.8 ANXIETY HYPERVENTILATION: ICD-10-CM

## 2019-11-06 DIAGNOSIS — F41.9 ANXIETY HYPERVENTILATION: ICD-10-CM

## 2019-11-06 PROBLEM — R55 SYNCOPE: Status: ACTIVE | Noted: 2019-11-06

## 2019-11-06 PROCEDURE — 99214 OFFICE O/P EST MOD 30 MIN: CPT | Performed by: INTERNAL MEDICINE

## 2019-11-06 RX ORDER — POTASSIUM CHLORIDE 20 MEQ/1
20 TABLET, EXTENDED RELEASE ORAL DAILY
Qty: 90 TABLET | Refills: 3 | Status: SHIPPED | OUTPATIENT
Start: 2019-11-06 | End: 2019-11-11 | Stop reason: ALTCHOICE

## 2019-11-06 RX ORDER — CLONAZEPAM 0.5 MG/1
0.5 TABLET ORAL 2 TIMES DAILY PRN
Qty: 60 TABLET | Refills: 0 | Status: SHIPPED | OUTPATIENT
Start: 2019-11-06 | End: 2020-02-18 | Stop reason: SDUPTHER

## 2019-11-06 NOTE — ASSESSMENT & PLAN NOTE
Last episode over a year ago- had seen Dr Doris Conner- had holter at that time- will give phone number for Dr Dunia Stone

## 2019-11-06 NOTE — PROGRESS NOTES
Assessment/Plan:             Problem List Items Addressed This Visit        Cardiovascular and Mediastinum    Essential hypertension    Relevant Medications    potassium chloride (K-DUR,KLOR-CON) 20 mEq tablet    Other Relevant Orders    Ambulatory referral to Cardiology    CBC and differential    Comprehensive metabolic panel    Lipid Panel with Direct LDL reflex    Bilateral carotid artery disease ([address] )    Relevant Orders    Ambulatory referral to Cardiology    CBC and differential    Comprehensive metabolic panel    Lipid Panel with Direct LDL reflex       Musculoskeletal and Integument    Myofascial pain syndrome - Primary      Other Visit Diagnoses     Anxiety hyperventilation        Relevant Medications    clonazePAM (KlonoPIN) 0 5 mg tablet            Subjective:      Patient ID: Pauline Grayson is a 69 y o  female    1  Lumbar pain- had surgery 3 years with [facility]- has been doing PT- not improving- had difficulty  walking form parking lot- will give handicapped placard  - has tramadol and celebrex - did not improve pain- I would like her to revisit with [facility] team due to pain- now pain is 0 with sitting but when walking 4-8 at times- using cbd oil is taking edge off    now doing water therapy and balance training  2  Cardiology- she is looking to change from bux [facility] office- wishes to change to [facility] network  Will renew her crestor and order lipid profile    Given form for controlled meds to sign- on tramdol and klonazepim      The following portions of the patient's history were reviewed and updated as appropriate: allergies, current medications and problem list      Review of Systems   Respiratory: Positive for chest tightness  Cardiovascular: Negative for palpitations  Musculoskeletal: Positive for back pain and gait problem  All other systems reviewed and are negative          Objective:      Current Outpatient Medications:     ascorbic acid (VITAMIN C) 250 mg tablet, Take 250 mg by mouth daily, Disp: , Rfl:     aspirin (ASPIRIN ADULT LOW DOSE) 81 mg EC tablet, Take 1 tablet by mouth daily, Disp: , Rfl:     atenolol (TENORMIN) 25 mg tablet, 2 (two) times a day  , Disp: , Rfl:     celecoxib (CeleBREX) 200 mg capsule, Take 1 capsule (200 mg total) by mouth daily at bedtime, Disp: 30 capsule, Rfl: 1    Cholecalciferol (VITAMIN D3) 2000 units capsule, Take by mouth daily, Disp: , Rfl:     clonazePAM (KlonoPIN) 0 5 mg tablet, Take 1 tablet (0 5 mg total) by mouth 2 (two) times a day as needed for anxiety, Disp: 60 tablet, Rfl: 0    co-enzyme Q-10 30 MG capsule, Take 30 mg by mouth 3 (three) times a day, Disp: , Rfl:     dicyclomine (BENTYL) 10 mg capsule, Take 2 capsules (20 mg total) by mouth 2 (two) times a day, Disp: 360 capsule, Rfl: 12    fenofibrate (TRICOR) 48 mg tablet, Take by mouth daily, Disp: , Rfl:     losartan (COZAAR) 25 mg tablet, Take 1 tablet by mouth 2 (two) times a day , Disp: , Rfl:     magnesium oxide (MAG-OX) 400 mg, Take 400 mg by mouth 2 (two) times a day, Disp: , Rfl:     pantoprazole (PROTONIX) 40 mg tablet, Take 1 tablet (40 mg total) by mouth daily, Disp: 90 tablet, Rfl: 6    potassium chloride (K-DUR,KLOR-CON) 20 mEq tablet, Take 1 tablet (20 mEq total) by mouth daily, Disp: 90 tablet, Rfl: 3    rosuvastatin (CRESTOR) 5 mg tablet, Take by mouth, Disp: , Rfl:     traMADol (ULTRAM) 50 mg tablet, Take 1 tablet (50 mg total) by mouth 2 (two) times a day, Disp: 60 tablet, Rfl: 0    Blood pressure 132/90, pulse 71, height 5' 4" (1 626 m), weight 77 7 kg (171 lb 3 2 oz), SpO2 93 %  Physical Exam   Constitutional: She appears well-developed and well-nourished  She appears distressed  HENT:   Head: Normocephalic  Neck: No tracheal deviation present  No thyromegaly present  Cardiovascular: Normal rate and regular rhythm  Pulmonary/Chest: Effort normal and breath sounds normal  No respiratory distress  She has no wheezes  Abdominal: Soft   Bowel sounds are normal  She exhibits no distension  There is no tenderness  Musculoskeletal: She exhibits tenderness  She exhibits no edema or deformity  Lumbar tenderness and across si joints with marked tenderness   Lymphadenopathy:     She has no cervical adenopathy  Nursing note and vitals reviewed

## 2019-11-08 ENCOUNTER — TELEPHONE (OUTPATIENT)
Dept: FAMILY MEDICINE CLINIC | Facility: HOSPITAL | Age: 74
End: 2019-11-08

## 2019-11-08 DIAGNOSIS — E78.00 PURE HYPERCHOLESTEROLEMIA: Primary | ICD-10-CM

## 2019-11-08 RX ORDER — ROSUVASTATIN CALCIUM 5 MG/1
TABLET, COATED ORAL
Qty: 40 TABLET | Refills: 3 | Status: SHIPPED | OUTPATIENT
Start: 2019-11-08 | End: 2019-11-14 | Stop reason: SDUPTHER

## 2019-11-08 NOTE — TELEPHONE ENCOUNTER
LM for pt to call back  Doesn't look like it was sent  I need to know if pt wants 3 mo supply or 1  Directions need to be clarified also, in chart from [employer] it says 1 tab --3 days a week    dk

## 2019-11-08 NOTE — TELEPHONE ENCOUNTER
Talked to pt  She takes the generic crestor 5 mg, takes 1 tab --3 times a week  Wants 3 month supply or # 40 for this  [facility]    I will q up for ef   dk

## 2019-11-11 NOTE — PROGRESS NOTES
Telephone Call Documentation    Sasha Adams       1945            I  Patient called that she had received a refill on her potassium supplements    She reports that she has not taken that for years therefore I have discontinued it on her med last  Med Change/ Regimen Change:

## 2019-11-14 DIAGNOSIS — E78.00 PURE HYPERCHOLESTEROLEMIA: ICD-10-CM

## 2019-11-14 RX ORDER — ROSUVASTATIN CALCIUM 5 MG/1
TABLET, COATED ORAL
Qty: 40 TABLET | Refills: 3 | Status: SHIPPED | OUTPATIENT
Start: 2019-11-14 | End: 2020-01-17 | Stop reason: SDUPTHER

## 2019-11-29 DIAGNOSIS — M19.042 OSTEOARTHRITIS OF FINGERS OF BOTH HANDS: ICD-10-CM

## 2019-11-29 DIAGNOSIS — M19.041 OSTEOARTHRITIS OF FINGERS OF BOTH HANDS: ICD-10-CM

## 2019-11-29 RX ORDER — CELECOXIB 200 MG/1
200 CAPSULE ORAL
Qty: 30 CAPSULE | Refills: 1 | Status: SHIPPED | OUTPATIENT
Start: 2019-11-29 | End: 2020-01-27

## 2019-12-11 ENCOUNTER — APPOINTMENT (OUTPATIENT)
Dept: LAB | Facility: CLINIC | Age: 74
End: 2019-12-11
Payer: MEDICARE

## 2019-12-11 DIAGNOSIS — I77.9 BILATERAL CAROTID ARTERY DISEASE, UNSPECIFIED TYPE (HCC): ICD-10-CM

## 2019-12-11 DIAGNOSIS — I10 ESSENTIAL HYPERTENSION: ICD-10-CM

## 2019-12-11 LAB
ALBUMIN SERPL BCP-MCNC: 4.2 G/DL (ref 3.5–5)
ALP SERPL-CCNC: 60 U/L (ref 46–116)
ALT SERPL W P-5'-P-CCNC: 36 U/L (ref 12–78)
ANION GAP SERPL CALCULATED.3IONS-SCNC: 6 MMOL/L (ref 4–13)
AST SERPL W P-5'-P-CCNC: 23 U/L (ref 5–45)
BASOPHILS # BLD AUTO: 0.03 THOUSANDS/ΜL (ref 0–0.1)
BASOPHILS NFR BLD AUTO: 1 % (ref 0–1)
BILIRUB SERPL-MCNC: 0.58 MG/DL (ref 0.2–1)
BUN SERPL-MCNC: 19 MG/DL (ref 5–25)
CALCIUM SERPL-MCNC: 9.6 MG/DL (ref 8.3–10.1)
CHLORIDE SERPL-SCNC: 110 MMOL/L (ref 100–108)
CHOLEST SERPL-MCNC: 171 MG/DL (ref 50–200)
CO2 SERPL-SCNC: 26 MMOL/L (ref 21–32)
CREAT SERPL-MCNC: 0.71 MG/DL (ref 0.6–1.3)
EOSINOPHIL # BLD AUTO: 0.12 THOUSAND/ΜL (ref 0–0.61)
EOSINOPHIL NFR BLD AUTO: 3 % (ref 0–6)
ERYTHROCYTE [DISTWIDTH] IN BLOOD BY AUTOMATED COUNT: 12.8 % (ref 11.6–15.1)
GFR SERPL CREATININE-BSD FRML MDRD: 85 ML/MIN/1.73SQ M
GLUCOSE P FAST SERPL-MCNC: 104 MG/DL (ref 65–99)
HCT VFR BLD AUTO: 41.9 % (ref 34.8–46.1)
HDLC SERPL-MCNC: 50 MG/DL
HGB BLD-MCNC: 13.7 G/DL (ref 11.5–15.4)
IMM GRANULOCYTES # BLD AUTO: 0.01 THOUSAND/UL (ref 0–0.2)
IMM GRANULOCYTES NFR BLD AUTO: 0 % (ref 0–2)
LDLC SERPL CALC-MCNC: 93 MG/DL (ref 0–100)
LYMPHOCYTES # BLD AUTO: 1.74 THOUSANDS/ΜL (ref 0.6–4.47)
LYMPHOCYTES NFR BLD AUTO: 37 % (ref 14–44)
MCH RBC QN AUTO: 29.8 PG (ref 26.8–34.3)
MCHC RBC AUTO-ENTMCNC: 32.7 G/DL (ref 31.4–37.4)
MCV RBC AUTO: 91 FL (ref 82–98)
MONOCYTES # BLD AUTO: 0.34 THOUSAND/ΜL (ref 0.17–1.22)
MONOCYTES NFR BLD AUTO: 7 % (ref 4–12)
NEUTROPHILS # BLD AUTO: 2.41 THOUSANDS/ΜL (ref 1.85–7.62)
NEUTS SEG NFR BLD AUTO: 52 % (ref 43–75)
NRBC BLD AUTO-RTO: 0 /100 WBCS
PLATELET # BLD AUTO: 235 THOUSANDS/UL (ref 149–390)
PMV BLD AUTO: 11.1 FL (ref 8.9–12.7)
POTASSIUM SERPL-SCNC: 4.1 MMOL/L (ref 3.5–5.3)
PROT SERPL-MCNC: 6.7 G/DL (ref 6.4–8.2)
RBC # BLD AUTO: 4.59 MILLION/UL (ref 3.81–5.12)
SODIUM SERPL-SCNC: 142 MMOL/L (ref 136–145)
TRIGL SERPL-MCNC: 138 MG/DL
WBC # BLD AUTO: 4.65 THOUSAND/UL (ref 4.31–10.16)

## 2019-12-11 PROCEDURE — 80061 LIPID PANEL: CPT

## 2019-12-11 PROCEDURE — 85025 COMPLETE CBC W/AUTO DIFF WBC: CPT

## 2019-12-11 PROCEDURE — 36415 COLL VENOUS BLD VENIPUNCTURE: CPT

## 2019-12-11 PROCEDURE — 80053 COMPREHEN METABOLIC PANEL: CPT

## 2019-12-12 ENCOUNTER — TELEPHONE (OUTPATIENT)
Dept: FAMILY MEDICINE CLINIC | Facility: HOSPITAL | Age: 74
End: 2019-12-12

## 2019-12-12 DIAGNOSIS — R73.01 ELEVATED FASTING GLUCOSE: Primary | ICD-10-CM

## 2019-12-12 NOTE — TELEPHONE ENCOUNTER
I left a message for pt to call back so I can go over her results I also printed up new lab orders for her to have done in 3 mo DD

## 2019-12-12 NOTE — TELEPHONE ENCOUNTER
----- Message from Vitaly Valdez DO sent at 12/12/2019  8:26 AM EST -----  Patient's labs show a mildly elevated fasting glucose at 1:04  Improved lipid profile with cholesterol now down to normal at 171 from prior reading of 207-triglycerides are in normal range now 138 and LDL is 93-down from 114  Have a fasting BMP and hemoglobin A1c done in 3 months

## 2020-01-17 ENCOUNTER — CONSULT (OUTPATIENT)
Dept: CARDIOLOGY CLINIC | Facility: CLINIC | Age: 75
End: 2020-01-17
Payer: MEDICARE

## 2020-01-17 VITALS
SYSTOLIC BLOOD PRESSURE: 128 MMHG | BODY MASS INDEX: 29.88 KG/M2 | HEIGHT: 64 IN | DIASTOLIC BLOOD PRESSURE: 70 MMHG | WEIGHT: 175 LBS | HEART RATE: 69 BPM

## 2020-01-17 DIAGNOSIS — E66.09 CLASS 1 OBESITY DUE TO EXCESS CALORIES WITHOUT SERIOUS COMORBIDITY WITH BODY MASS INDEX (BMI) OF 30.0 TO 30.9 IN ADULT: ICD-10-CM

## 2020-01-17 DIAGNOSIS — I77.9 BILATERAL CAROTID ARTERY DISEASE, UNSPECIFIED TYPE (HCC): Primary | ICD-10-CM

## 2020-01-17 DIAGNOSIS — Q27.8 ABERRANT RIGHT SUBCLAVIAN ARTERY: ICD-10-CM

## 2020-01-17 DIAGNOSIS — E78.00 PURE HYPERCHOLESTEROLEMIA: ICD-10-CM

## 2020-01-17 DIAGNOSIS — I34.0 MILD MITRAL REGURGITATION: ICD-10-CM

## 2020-01-17 DIAGNOSIS — R55 SYNCOPE, UNSPECIFIED SYNCOPE TYPE: ICD-10-CM

## 2020-01-17 DIAGNOSIS — G47.33 OBSTRUCTIVE SLEEP APNEA: ICD-10-CM

## 2020-01-17 DIAGNOSIS — Q25.40 ABNORMAL LEFT AORTIC ARCH: ICD-10-CM

## 2020-01-17 DIAGNOSIS — I10 ESSENTIAL HYPERTENSION: ICD-10-CM

## 2020-01-17 DIAGNOSIS — I35.1 NONRHEUMATIC AORTIC VALVE INSUFFICIENCY: ICD-10-CM

## 2020-01-17 DIAGNOSIS — I07.1 MILD TRICUSPID REGURGITATION: ICD-10-CM

## 2020-01-17 PROCEDURE — 93000 ELECTROCARDIOGRAM COMPLETE: CPT | Performed by: INTERNAL MEDICINE

## 2020-01-17 PROCEDURE — 99204 OFFICE O/P NEW MOD 45 MIN: CPT | Performed by: INTERNAL MEDICINE

## 2020-01-17 RX ORDER — FENOFIBRATE 48 MG/1
48 TABLET, COATED ORAL DAILY
Qty: 90 TABLET | Refills: 3 | Status: SHIPPED | OUTPATIENT
Start: 2020-01-17 | End: 2020-12-21

## 2020-01-17 RX ORDER — ATENOLOL 25 MG/1
25 TABLET ORAL 2 TIMES DAILY
Qty: 180 TABLET | Refills: 3 | Status: SHIPPED | OUTPATIENT
Start: 2020-01-17 | End: 2020-11-20 | Stop reason: SDUPTHER

## 2020-01-17 RX ORDER — ROSUVASTATIN CALCIUM 5 MG/1
TABLET, COATED ORAL
Qty: 90 TABLET | Refills: 3 | Status: SHIPPED | OUTPATIENT
Start: 2020-01-17 | End: 2021-02-08
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
## 2020-01-17 NOTE — PATIENT INSTRUCTIONS
Sleep apnea  I will check with our Sleep Department if there is a way to have year CPAP treatment scheduled for home instead of in the lab, understanding you difficulties in being able to tolerate an in lab experience      Carotid disease  Dr Arthur Barcenas ordered a carotid study in February of 2019, it is still an active order, please call Central scheduling to get this study performed as it has been almost 4 years since her prior study with intermediate disease bilaterally

## 2020-01-21 DIAGNOSIS — G47.33 OBSTRUCTIVE SLEEP APNEA: Primary | ICD-10-CM

## 2020-01-21 NOTE — PROGRESS NOTES
I have spoken with Sleep Medicine, they can accommodate to home study, and if positive, automatic home titration with APAP  I have ordered a home study for her

## 2020-01-22 DIAGNOSIS — I67.89 OTHER CEREBROVASCULAR DISEASE: ICD-10-CM

## 2020-01-22 DIAGNOSIS — I77.9 BILATERAL CAROTID ARTERY DISEASE, UNSPECIFIED TYPE (HCC): Primary | ICD-10-CM

## 2020-01-25 DIAGNOSIS — M19.042 OSTEOARTHRITIS OF FINGERS OF BOTH HANDS: ICD-10-CM

## 2020-01-25 DIAGNOSIS — M19.041 OSTEOARTHRITIS OF FINGERS OF BOTH HANDS: ICD-10-CM

## 2020-01-26 ENCOUNTER — HOSPITAL ENCOUNTER (OUTPATIENT)
Dept: SLEEP CENTER | Facility: HOSPITAL | Age: 75
Discharge: HOME | End: 2020-01-26
Attending: INTERNAL MEDICINE
Payer: MEDICARE

## 2020-01-26 DIAGNOSIS — G47.33 OBSTRUCTIVE SLEEP APNEA: ICD-10-CM

## 2020-01-26 PROCEDURE — G0399 HOME SLEEP TEST/TYPE 3 PORTA: HCPCS

## 2020-01-27 ENCOUNTER — TELEPHONE (OUTPATIENT)
Dept: SLEEP CENTER | Facility: CLINIC | Age: 75
End: 2020-01-27

## 2020-01-27 RX ORDER — CELECOXIB 200 MG/1
CAPSULE ORAL
Qty: 30 CAPSULE | Refills: 5 | Status: SHIPPED | OUTPATIENT
Start: 2020-01-27 | End: 2020-05-04 | Stop reason: SDUPTHER

## 2020-01-27 NOTE — PROGRESS NOTES
Home Sleep Study Documentation    Pre-Sleep Home Study:    Set-up and instructions performed by: MM    Technician performed demonstration for Patient: yes    Return demonstration performed by Patient: yes    Written instructions provided to Patient: yes    Patient signed consent form: yes        Post-Sleep Home Study:    Additional comments by Patient: none    Home Sleep Study Failed:no:    Failure reason: N/A    Reported or Detected: N/A    Scored by: Michoacano Melendez CRT, RPSGT

## 2020-01-27 NOTE — TELEPHONE ENCOUNTER
----- Message from Ruben Finley MD sent at 1/24/2020  6:15 PM EST -----  Approved  ----- Message -----  From: Natalie Kolb: 1/24/2020   8:24 AM EST  To: Sleep Medicine [facility], #    PLEASE REVIEW FOR APPROVAL OR DENIAL AND WHYPLEASE REVIEW FOR APPROVAL OR DENIAL AND WHY

## 2020-01-28 ENCOUNTER — TELEPHONE (OUTPATIENT)
Dept: SLEEP CENTER | Facility: CLINIC | Age: 75
End: 2020-01-28

## 2020-01-28 DIAGNOSIS — G47.33 OSA (OBSTRUCTIVE SLEEP APNEA): Primary | ICD-10-CM

## 2020-01-28 NOTE — TELEPHONE ENCOUNTER
Discussed study results with patient- she does not want to travel to [place] for a consult  Offered her a consult in [facility] but Tuesdays do not work for her schedule  She will talk with her cardiologist and call back to schedule a consult if he recommends it   Office # provided

## 2020-01-28 NOTE — TELEPHONE ENCOUNTER
----- Message from Saumya Carrasco MD sent at 1/28/2020  8:28 AM EST -----  APAP 4 to 20 cm   Thanks

## 2020-02-18 ENCOUNTER — OFFICE VISIT (OUTPATIENT)
Dept: GASTROENTEROLOGY | Facility: CLINIC | Age: 75
End: 2020-02-18
Payer: MEDICARE

## 2020-02-18 VITALS
DIASTOLIC BLOOD PRESSURE: 72 MMHG | HEART RATE: 65 BPM | BODY MASS INDEX: 28 KG/M2 | SYSTOLIC BLOOD PRESSURE: 120 MMHG | HEIGHT: 64 IN | WEIGHT: 164 LBS

## 2020-02-18 DIAGNOSIS — K58.0 IRRITABLE BOWEL SYNDROME WITH DIARRHEA: Primary | ICD-10-CM

## 2020-02-18 DIAGNOSIS — Z12.11 COLON CANCER SCREENING: ICD-10-CM

## 2020-02-18 DIAGNOSIS — K29.60 EROSIVE GASTRITIS: ICD-10-CM

## 2020-02-18 DIAGNOSIS — F45.8 ANXIETY HYPERVENTILATION: ICD-10-CM

## 2020-02-18 DIAGNOSIS — F41.9 ANXIETY HYPERVENTILATION: ICD-10-CM

## 2020-02-18 PROCEDURE — 3008F BODY MASS INDEX DOCD: CPT | Performed by: INTERNAL MEDICINE

## 2020-02-18 PROCEDURE — 99214 OFFICE O/P EST MOD 30 MIN: CPT | Performed by: INTERNAL MEDICINE

## 2020-02-18 PROCEDURE — 3074F SYST BP LT 130 MM HG: CPT | Performed by: INTERNAL MEDICINE

## 2020-02-18 PROCEDURE — 3078F DIAST BP <80 MM HG: CPT | Performed by: INTERNAL MEDICINE

## 2020-02-18 PROCEDURE — 4040F PNEUMOC VAC/ADMIN/RCVD: CPT | Performed by: INTERNAL MEDICINE

## 2020-02-18 PROCEDURE — 1160F RVW MEDS BY RX/DR IN RCRD: CPT | Performed by: INTERNAL MEDICINE

## 2020-02-18 PROCEDURE — 1036F TOBACCO NON-USER: CPT | Performed by: INTERNAL MEDICINE

## 2020-02-18 RX ORDER — CLONAZEPAM 0.5 MG/1
0.5 TABLET ORAL 2 TIMES DAILY PRN
Qty: 60 TABLET | Refills: 0 | Status: SHIPPED | OUTPATIENT
Start: 2020-02-18 | End: 2020-04-06 | Stop reason: SDUPTHER

## 2020-02-18 NOTE — PROGRESS NOTES
[id number] - Outpatient Follow-up Note  Galen Albrecht 77 y o  female MRN: [id number]  Encounter: [id number]    ASSESSMENT AND PLAN:      1  Irritable bowel syndrome with diarrhea  Right upper quadrant abdominal pain  - continue Bentyl 20 mg at bedtime  - try hyoscyamine (LEVSIN/SL) 0 125 mg SL tablet; Take 1 tablet (0 125 mg total) by mouth every 6 (six) hours as needed for cramping  Dispense: 10 tablet; Refill: 10  - start fiber supplementation daily  Use magnesium every other day  2  Erosive gastritis  NSAID related? Taking pantoprazole as needed    3  Colon cancer screening  Last colonoscopy June 2015  Due for follow-up 2025      Followup Appointment:  6 months  ______________________________________________________________________    Chief Complaint   Patient presents with    Change in Bowel Habits    Abdominal Pain     HPI:  The patient returns today for follow-up  She was evaluated secondary to intermittent upper abdominal pain felt to be secondary to gastritis and irritable bowel syndrome  She was treated with Bentyl twice a day and pantoprazole  She reports that she has only been able to tolerate the Bentyl at bedtime because it makes her too sleepy when she tries to take it during the day  She reports she had been doing well from an abdominal pain standpoint until last month when had 2 episodes of severe right upper quadrant abdominal pain at night  She took 2 extra Bentyl if her in our her symptoms got better  She denies any upper GI symptoms and has only been taking the pantoprazole as needed  From a bowel standpoint she was taking the magnesium consistently but thought her stools were a little looser  She tried to stop but then had issues with constipation  She reports incomplete evacuation  She denies any rectal bleeding  She denies any lower quadrant abdominal pain  Her weight is stable      Historical Information   Past Medical History: Diagnosis Date    Anxiety disorder     Arthralgia     Arthritis     Asthma     Chronic lumbar radiculopathy     last assessed: 16    Chronic respiratory failure ([address] )     Colon cancer screening 2019    Last colonoscopy -15    Depression     Dysfunction of eustachian tube     Dyslipidemia     Emphysema lung (HCC)     Fatigue     HTN (hypertension) 10/29/2014    Hypercholesterolemia     Hypertension     Irritable bowel syndrome with diarrhea 2019    Lumbosacral stenosis     last assessed: 16    TRAE (obstructive sleep apnea)     Pancreatitis     resolved: 17    Pneumonia     PTSD (post-traumatic stress disorder)      Past Surgical History:   Procedure Laterality Date    CHOLECYSTECTOMY      EGD  04/15/2019    GALLBLADDER SURGERY      HYSTERECTOMY      pt thinks she still has one ovary, done over 20 yrs ago   [address]  2016    3, 4, 5    MOUTH SURGERY      MULTIPLE TOOTH EXTRACTIONS N/A 2018    TONSILLECTOMY       Social History     Substance and Sexual Activity   Alcohol Use Yes    Frequency: 4 or more times a week    Drinks per session: 1 or 2    Comment: 4 drinks every night     Social History     Substance and Sexual Activity   Drug Use No     Social History     Tobacco Use   Smoking Status Never Smoker   Smokeless Tobacco Never Used     Family History   Problem Relation Age of Onset    Depression Mother         panic attacks    Lung cancer Mother     Mental illness Mother     Substance Abuse Mother         CIGS    Coronary artery disease Father         high # of paternal family members  in their 46s    Substance Abuse Father         CIGS    Depression Sister         panic attacks    Hypertension Sister     Depression Other         panic attacks    Heart disease Family     Hypertension Family     Breast cancer additional onset Daughter     Colon cancer Neg Hx     Colon polyps Neg Hx          Current Outpatient Medications:     aspirin (ASPIRIN ADULT LOW DOSE) 81 mg EC tablet    atenolol (TENORMIN) 25 mg tablet    celecoxib (CeleBREX) 200 mg capsule    Cholecalciferol (VITAMIN D3) 2000 units capsule    clonazePAM (KlonoPIN) 0 5 mg tablet    co-enzyme Q-10 30 MG capsule    dicyclomine (BENTYL) 10 mg capsule    fenofibrate (TRICOR) 48 mg tablet    losartan (COZAAR) 25 mg tablet    magnesium oxide (MAG-OX) 400 mg    Misc Natural Products (T-RELIEF CBD+13) SUBL    rosuvastatin (CRESTOR) 5 mg tablet    ascorbic acid (VITAMIN C) 250 mg tablet    hyoscyamine (LEVSIN/SL) 0 125 mg SL tablet    pantoprazole (PROTONIX) 40 mg tablet    traMADol (ULTRAM) 50 mg tablet  Allergies   Allergen Reactions    Antihistamines, Diphenhydramine-Type     Bupropion     Clarithromycin     Codeine Other (See Comments)     increased HR    Gabapentin     Meloxicam Nausea Only and Visual Disturbance     Other reaction(s): Numbness  Action Taken: med stopped ; Category: Allergy;     Ondansetron     Pravastatin     Propoxyphene Other (See Comments)     increased HR     Reviewed medications and allergies and updated as indicated    PHYSICAL EXAM:    Blood pressure 120/72, pulse 65, height 5' 4" (1 626 m), weight 74 4 kg (164 lb)  Body mass index is 28 15 kg/m²  General Appearance: NAD, cooperative, alert  Eyes: Anicteric, PERRLA, EOMI  ENT:  Normocephalic, atraumatic, normal mucosa  Neck:  Supple, symmetrical, trachea midline  Resp:  Clear to auscultation bilaterally; no rales, rhonchi or wheezing; respirations unlabored   CV:  S1 S2, Regular rate and rhythm; no murmur, rub, or gallop  GI:  Soft, non-tender, non-distended; normal bowel sounds; no masses, no organomegaly   Rectal: Deferred  Musculoskeletal: No cyanosis, clubbing or edema  Normal ROM    Skin:  No jaundice, rashes, or lesions   Heme/Lymph: No palpable cervical lymphadenopathy  Psych: Normal affect, good eye contact  Neuro: No gross deficits, AAOx3    Lab Results: Lab Results   Component Value Date    WBC 4 65 12/11/2019    HGB 13 7 12/11/2019    HCT 41 9 12/11/2019    MCV 91 12/11/2019     12/11/2019     Lab Results   Component Value Date     07/15/2016    K 4 1 12/11/2019     (H) 12/11/2019    CO2 26 12/11/2019    BUN 19 12/11/2019    CREATININE 0 71 12/11/2019    GLUF 104 (H) 12/11/2019    CALCIUM 9 6 12/11/2019    AST 23 12/11/2019    ALT 36 12/11/2019    ALKPHOS 60 12/11/2019    PROT 6 7 07/15/2016    BILITOT 0 6 07/15/2016    EGFR 85 12/11/2019     No results found for: IRON, TIBC, FERRITIN  Lab Results   Component Value Date    LIPASE 160 07/31/2018       Radiology Results:   No results found

## 2020-02-18 NOTE — LETTER
February 18, 2020     Jb Diana, [address] [place] [address]    Patient: Robi Tarango   YOB: 1945   Date of Visit: 2/18/2020       Dear Dr Stephany Zamarripa: Thank you for referring Alexia Shannon to me for evaluation  Below are my notes for this consultation  If you have questions, please do not hesitate to call me  I look forward to following your patient along with you  Sincerely,        Raya Dent MD        CC: No Recipients  Raya Dent MD  2/18/2020  2:09 PM  Sign at close encounter  [facility] - Outpatient Follow-up Note  Robi Tarango 77 y o  female MRN: [id number]  Encounter: [id number]    ASSESSMENT AND PLAN:      1  Irritable bowel syndrome with diarrhea  Right upper quadrant abdominal pain  - continue Bentyl 20 mg at bedtime  - try hyoscyamine (LEVSIN/SL) 0 125 mg SL tablet; Take 1 tablet (0 125 mg total) by mouth every 6 (six) hours as needed for cramping  Dispense: 10 tablet; Refill: 10  - start fiber supplementation daily  Use magnesium every other day  2  Erosive gastritis  NSAID related? Taking pantoprazole as needed    3  Colon cancer screening  Last colonoscopy June 2015  Due for follow-up 2025      Followup Appointment:  6 months  ______________________________________________________________________    Chief Complaint   Patient presents with    Change in Bowel Habits    Abdominal Pain     HPI:  The patient returns today for follow-up  She was evaluated secondary to intermittent upper abdominal pain felt to be secondary to gastritis and irritable bowel syndrome  She was treated with Bentyl twice a day and pantoprazole  She reports that she has only been able to tolerate the Bentyl at bedtime because it makes her too sleepy when she tries to take it during the day    She reports she had been doing well from an abdominal pain standpoint until last month when had 2 episodes of severe right upper quadrant abdominal pain at night  She took 2 extra Bentyl if her in our her symptoms got better  She denies any upper GI symptoms and has only been taking the pantoprazole as needed  From a bowel standpoint she was taking the magnesium consistently but thought her stools were a little looser  She tried to stop but then had issues with constipation  She reports incomplete evacuation  She denies any rectal bleeding  She denies any lower quadrant abdominal pain  Her weight is stable      Historical Information   Past Medical History:   Diagnosis Date    Anxiety disorder     Arthralgia     Arthritis     Asthma     Chronic lumbar radiculopathy     last assessed: 12/20/16    Chronic respiratory failure ([address] )     Colon cancer screening 4/11/2019    Last colonoscopy 2014-15    Depression     Dysfunction of eustachian tube     Dyslipidemia     Emphysema lung (HCC)     Fatigue     HTN (hypertension) 10/29/2014    Hypercholesterolemia     Hypertension     Irritable bowel syndrome with diarrhea 4/11/2019    Lumbosacral stenosis     last assessed: 9/20/16    TRAE (obstructive sleep apnea)     Pancreatitis     resolved: 9/20/17    Pneumonia     PTSD (post-traumatic stress disorder)      Past Surgical History:   Procedure Laterality Date    CHOLECYSTECTOMY      EGD  04/15/2019    GALLBLADDER SURGERY      HYSTERECTOMY      pt thinks she still has one ovary, done over 20 yrs ago   [address]  12/2016    3, 4, 5    MOUTH SURGERY      MULTIPLE TOOTH EXTRACTIONS N/A 2018    TONSILLECTOMY       Social History     Substance and Sexual Activity   Alcohol Use Yes    Frequency: 4 or more times a week    Drinks per session: 1 or 2    Comment: 4 drinks every night     Social History     Substance and Sexual Activity   Drug Use No     Social History     Tobacco Use   Smoking Status Never Smoker   Smokeless Tobacco Never Used     Family History   Problem Relation Age of Onset    Depression Mother         panic attacks    Lung cancer Mother     Mental illness Mother     Substance Abuse Mother         CIGS    Coronary artery disease Father         high # of paternal family members  in their 46s    Substance Abuse Father         CIGS    Depression Sister         panic attacks    Hypertension Sister     Depression Other         panic attacks    Heart disease Family     Hypertension Family     Breast cancer additional onset Daughter     Colon cancer Neg Hx     Colon polyps Neg Hx          Current Outpatient Medications:     aspirin (ASPIRIN ADULT LOW DOSE) 81 mg EC tablet    atenolol (TENORMIN) 25 mg tablet    celecoxib (CeleBREX) 200 mg capsule    Cholecalciferol (VITAMIN D3) 2000 units capsule    clonazePAM (KlonoPIN) 0 5 mg tablet    co-enzyme Q-10 30 MG capsule    dicyclomine (BENTYL) 10 mg capsule    fenofibrate (TRICOR) 48 mg tablet    losartan (COZAAR) 25 mg tablet    magnesium oxide (MAG-OX) 400 mg    Misc Natural Products (T-RELIEF CBD+13) SUBL    rosuvastatin (CRESTOR) 5 mg tablet    ascorbic acid (VITAMIN C) 250 mg tablet    hyoscyamine (LEVSIN/SL) 0 125 mg SL tablet    pantoprazole (PROTONIX) 40 mg tablet    traMADol (ULTRAM) 50 mg tablet  Allergies   Allergen Reactions    Antihistamines, Diphenhydramine-Type     Bupropion     Clarithromycin     Codeine Other (See Comments)     increased HR    Gabapentin     Meloxicam Nausea Only and Visual Disturbance     Other reaction(s): Numbness  Action Taken: med stopped ; Category: Allergy;     Ondansetron     Pravastatin     Propoxyphene Other (See Comments)     increased HR     Reviewed medications and allergies and updated as indicated    PHYSICAL EXAM:    Blood pressure 120/72, pulse 65, height 5' 4" (1 626 m), weight 74 4 kg (164 lb)  Body mass index is 28 15 kg/m²  General Appearance: NAD, cooperative, alert  Eyes: Anicteric, PERRLA, EOMI  ENT:  Normocephalic, atraumatic, normal mucosa      Neck:  Supple, symmetrical, trachea midline  Resp:  Clear to auscultation bilaterally; no rales, rhonchi or wheezing; respirations unlabored   CV:  S1 S2, Regular rate and rhythm; no murmur, rub, or gallop  GI:  Soft, non-tender, non-distended; normal bowel sounds; no masses, no organomegaly   Rectal: Deferred  Musculoskeletal: No cyanosis, clubbing or edema  Normal ROM  Skin:  No jaundice, rashes, or lesions   Heme/Lymph: No palpable cervical lymphadenopathy  Psych: Normal affect, good eye contact  Neuro: No gross deficits, AAOx3    Lab Results:   Lab Results   Component Value Date    WBC 4 65 12/11/2019    HGB 13 7 12/11/2019    HCT 41 9 12/11/2019    MCV 91 12/11/2019     12/11/2019     Lab Results   Component Value Date     07/15/2016    K 4 1 12/11/2019     (H) 12/11/2019    CO2 26 12/11/2019    BUN 19 12/11/2019    CREATININE 0 71 12/11/2019    GLUF 104 (H) 12/11/2019    CALCIUM 9 6 12/11/2019    AST 23 12/11/2019    ALT 36 12/11/2019    ALKPHOS 60 12/11/2019    PROT 6 7 07/15/2016    BILITOT 0 6 07/15/2016    EGFR 85 12/11/2019     No results found for: IRON, TIBC, FERRITIN  Lab Results   Component Value Date    LIPASE 160 07/31/2018       Radiology Results:   No results found

## 2020-02-25 ENCOUNTER — HOSPITAL ENCOUNTER (OUTPATIENT)
Dept: ULTRASOUND IMAGING | Facility: CLINIC | Age: 75
Discharge: HOME | End: 2020-02-25
Payer: MEDICARE

## 2020-02-25 DIAGNOSIS — I77.9 BILATERAL CAROTID ARTERY DISEASE, UNSPECIFIED TYPE (HCC): ICD-10-CM

## 2020-02-25 DIAGNOSIS — I67.89 OTHER CEREBROVASCULAR DISEASE: ICD-10-CM

## 2020-02-25 PROCEDURE — 93880 EXTRACRANIAL BILAT STUDY: CPT

## 2020-02-25 PROCEDURE — 93880 EXTRACRANIAL BILAT STUDY: CPT | Performed by: SURGERY

## 2020-04-06 DIAGNOSIS — F45.8 ANXIETY HYPERVENTILATION: ICD-10-CM

## 2020-04-06 DIAGNOSIS — F41.9 ANXIETY HYPERVENTILATION: ICD-10-CM

## 2020-04-06 RX ORDER — CLONAZEPAM 0.5 MG/1
0.5 TABLET ORAL 2 TIMES DAILY PRN
Qty: 60 TABLET | Refills: 0 | Status: SHIPPED | OUTPATIENT
Start: 2020-05-04 | End: 2020-05-04 | Stop reason: SDUPTHER

## 2020-04-22 DIAGNOSIS — I35.1 NONRHEUMATIC AORTIC VALVE INSUFFICIENCY: Primary | ICD-10-CM

## 2020-04-22 DIAGNOSIS — G47.33 OBSTRUCTIVE SLEEP APNEA: ICD-10-CM

## 2020-04-27 DIAGNOSIS — I10 ESSENTIAL HYPERTENSION: Primary | ICD-10-CM

## 2020-04-27 RX ORDER — LOSARTAN POTASSIUM 25 MG/1
25 TABLET ORAL 2 TIMES DAILY
Qty: 180 TABLET | Refills: 3 | Status: SHIPPED | OUTPATIENT
Start: 2020-04-27 | End: 2020-05-05 | Stop reason: SDUPTHER

## 2020-05-04 DIAGNOSIS — F45.8 ANXIETY HYPERVENTILATION: ICD-10-CM

## 2020-05-04 DIAGNOSIS — F41.9 ANXIETY HYPERVENTILATION: ICD-10-CM

## 2020-05-04 DIAGNOSIS — M19.041 OSTEOARTHRITIS OF FINGERS OF BOTH HANDS: ICD-10-CM

## 2020-05-04 DIAGNOSIS — M19.042 OSTEOARTHRITIS OF FINGERS OF BOTH HANDS: ICD-10-CM

## 2020-05-04 RX ORDER — CELECOXIB 200 MG/1
200 CAPSULE ORAL
Qty: 30 CAPSULE | Refills: 5 | Status: SHIPPED | OUTPATIENT
Start: 2020-05-04 | End: 2020-10-05 | Stop reason: ALTCHOICE

## 2020-05-04 RX ORDER — CLONAZEPAM 0.5 MG/1
0.5 TABLET ORAL 2 TIMES DAILY PRN
Qty: 60 TABLET | Refills: 0 | Status: SHIPPED | OUTPATIENT
Start: 2020-05-04 | End: 2020-08-31 | Stop reason: SDUPTHER

## 2020-05-05 DIAGNOSIS — I10 ESSENTIAL HYPERTENSION: ICD-10-CM

## 2020-05-05 RX ORDER — LOSARTAN POTASSIUM 25 MG/1
25 TABLET ORAL 2 TIMES DAILY
Qty: 180 TABLET | Refills: 3 | Status: SHIPPED | OUTPATIENT
Start: 2020-05-05 | End: 2020-10-05 | Stop reason: ALTCHOICE

## 2020-05-19 ENCOUNTER — OFFICE VISIT (OUTPATIENT)
Dept: FAMILY MEDICINE CLINIC | Facility: HOSPITAL | Age: 75
End: 2020-05-19
Payer: MEDICARE

## 2020-05-19 VITALS
OXYGEN SATURATION: 96 % | TEMPERATURE: 98.2 F | SYSTOLIC BLOOD PRESSURE: 114 MMHG | DIASTOLIC BLOOD PRESSURE: 68 MMHG | HEIGHT: 64 IN | WEIGHT: 171.4 LBS | HEART RATE: 72 BPM | BODY MASS INDEX: 29.26 KG/M2

## 2020-05-19 DIAGNOSIS — I88.9 CERVICAL LYMPHADENITIS: ICD-10-CM

## 2020-05-19 DIAGNOSIS — E28.39 MENOPAUSE OVARIAN FAILURE: ICD-10-CM

## 2020-05-19 DIAGNOSIS — Z12.39 SCREENING FOR BREAST CANCER: ICD-10-CM

## 2020-05-19 DIAGNOSIS — Z00.00 MEDICARE ANNUAL WELLNESS VISIT, SUBSEQUENT: Primary | ICD-10-CM

## 2020-05-19 DIAGNOSIS — Z80.3 FAMILY HX-BREAST MALIGNANCY: ICD-10-CM

## 2020-05-19 DIAGNOSIS — I10 ESSENTIAL HYPERTENSION: ICD-10-CM

## 2020-05-19 DIAGNOSIS — M79.10 MYALGIA: ICD-10-CM

## 2020-05-19 DIAGNOSIS — J44.9 CHRONIC BRONCHITIS WITH EMPHYSEMA (HCC): ICD-10-CM

## 2020-05-19 PROBLEM — M48.061 LUMBAR SPINAL STENOSIS: Status: ACTIVE | Noted: 2020-05-19

## 2020-05-19 PROCEDURE — 3074F SYST BP LT 130 MM HG: CPT | Performed by: INTERNAL MEDICINE

## 2020-05-19 PROCEDURE — 3078F DIAST BP <80 MM HG: CPT | Performed by: INTERNAL MEDICINE

## 2020-05-19 PROCEDURE — 99214 OFFICE O/P EST MOD 30 MIN: CPT | Performed by: INTERNAL MEDICINE

## 2020-05-19 PROCEDURE — 3008F BODY MASS INDEX DOCD: CPT | Performed by: INTERNAL MEDICINE

## 2020-05-19 PROCEDURE — 1160F RVW MEDS BY RX/DR IN RCRD: CPT | Performed by: INTERNAL MEDICINE

## 2020-05-19 PROCEDURE — 1170F FXNL STATUS ASSESSED: CPT | Performed by: INTERNAL MEDICINE

## 2020-05-19 PROCEDURE — 1036F TOBACCO NON-USER: CPT | Performed by: INTERNAL MEDICINE

## 2020-05-19 PROCEDURE — 1125F AMNT PAIN NOTED PAIN PRSNT: CPT | Performed by: INTERNAL MEDICINE

## 2020-05-19 PROCEDURE — 4040F PNEUMOC VAC/ADMIN/RCVD: CPT | Performed by: INTERNAL MEDICINE

## 2020-05-19 RX ORDER — PROPRANOLOL/HYDROCHLOROTHIAZID 40 MG-25MG
TABLET ORAL DAILY
COMMUNITY

## 2020-05-19 RX ORDER — ALBUTEROL SULFATE 90 UG/1
2 AEROSOL, METERED RESPIRATORY (INHALATION) EVERY 6 HOURS PRN
Qty: 1 INHALER | Refills: 0 | Status: SHIPPED | OUTPATIENT
Start: 2020-05-19 | End: 2020-06-18

## 2020-05-20 ENCOUNTER — TELEPHONE (OUTPATIENT)
Dept: SURGICAL ONCOLOGY | Facility: CLINIC | Age: 75
End: 2020-05-20

## 2020-05-21 ENCOUNTER — APPOINTMENT (OUTPATIENT)
Dept: LAB | Facility: CLINIC | Age: 75
End: 2020-05-21
Payer: MEDICARE

## 2020-05-21 DIAGNOSIS — R73.01 ELEVATED FASTING GLUCOSE: ICD-10-CM

## 2020-05-21 LAB
ANION GAP SERPL CALCULATED.3IONS-SCNC: 4 MMOL/L (ref 4–13)
BUN SERPL-MCNC: 23 MG/DL (ref 5–25)
CALCIUM SERPL-MCNC: 8.8 MG/DL (ref 8.3–10.1)
CHLORIDE SERPL-SCNC: 106 MMOL/L (ref 100–108)
CO2 SERPL-SCNC: 27 MMOL/L (ref 21–32)
CREAT SERPL-MCNC: 0.68 MG/DL (ref 0.6–1.3)
EST. AVERAGE GLUCOSE BLD GHB EST-MCNC: 114 MG/DL
GFR SERPL CREATININE-BSD FRML MDRD: 86 ML/MIN/1.73SQ M
GLUCOSE P FAST SERPL-MCNC: 104 MG/DL (ref 65–99)
HBA1C MFR BLD: 5.6 %
POTASSIUM SERPL-SCNC: 3.9 MMOL/L (ref 3.5–5.3)
SODIUM SERPL-SCNC: 137 MMOL/L (ref 136–145)

## 2020-05-21 PROCEDURE — 83036 HEMOGLOBIN GLYCOSYLATED A1C: CPT

## 2020-05-21 PROCEDURE — 80048 BASIC METABOLIC PNL TOTAL CA: CPT

## 2020-05-21 PROCEDURE — 36415 COLL VENOUS BLD VENIPUNCTURE: CPT

## 2020-05-27 ENCOUNTER — HOSPITAL ENCOUNTER (OUTPATIENT)
Dept: CT IMAGING | Facility: HOSPITAL | Age: 75
Discharge: HOME | End: 2020-05-27
Attending: INTERNAL MEDICINE
Payer: MEDICARE

## 2020-05-27 DIAGNOSIS — I88.9 CERVICAL LYMPHADENITIS: ICD-10-CM

## 2020-05-27 PROCEDURE — 70491 CT SOFT TISSUE NECK W/DYE: CPT

## 2020-05-27 RX ADMIN — IOHEXOL 85 ML: 350 INJECTION, SOLUTION INTRAVENOUS at 13:54

## 2020-05-29 ENCOUNTER — TELEPHONE (OUTPATIENT)
Dept: FAMILY MEDICINE CLINIC | Facility: HOSPITAL | Age: 75
End: 2020-05-29

## 2020-07-14 DIAGNOSIS — M54.42 CHRONIC BILATERAL LOW BACK PAIN WITH LEFT-SIDED SCIATICA: ICD-10-CM

## 2020-07-14 DIAGNOSIS — G89.29 CHRONIC BILATERAL LOW BACK PAIN WITH LEFT-SIDED SCIATICA: ICD-10-CM

## 2020-07-14 RX ORDER — TRAMADOL HYDROCHLORIDE 50 MG/1
50 TABLET ORAL 2 TIMES DAILY
Qty: 60 TABLET | Refills: 0 | Status: SHIPPED | OUTPATIENT
Start: 2020-07-14 | End: 2020-08-31 | Stop reason: SDUPTHER

## 2020-07-21 ENCOUNTER — TELEMEDICINE (OUTPATIENT)
Dept: FAMILY MEDICINE CLINIC | Facility: HOSPITAL | Age: 75
End: 2020-07-21
Payer: MEDICARE

## 2020-07-21 ENCOUNTER — TELEPHONE (OUTPATIENT)
Dept: FAMILY MEDICINE CLINIC | Facility: HOSPITAL | Age: 75
End: 2020-07-21

## 2020-07-21 VITALS
BODY MASS INDEX: 27.82 KG/M2 | DIASTOLIC BLOOD PRESSURE: 83 MMHG | SYSTOLIC BLOOD PRESSURE: 149 MMHG | WEIGHT: 167 LBS | TEMPERATURE: 97.4 F | HEART RATE: 72 BPM | HEIGHT: 65 IN

## 2020-07-21 DIAGNOSIS — Z20.828 EXPOSURE TO SARS-ASSOCIATED CORONAVIRUS: ICD-10-CM

## 2020-07-21 DIAGNOSIS — Z20.828 EXPOSURE TO SARS-ASSOCIATED CORONAVIRUS: Primary | ICD-10-CM

## 2020-07-21 PROCEDURE — U0003 INFECTIOUS AGENT DETECTION BY NUCLEIC ACID (DNA OR RNA); SEVERE ACUTE RESPIRATORY SYNDROME CORONAVIRUS 2 (SARS-COV-2) (CORONAVIRUS DISEASE [COVID-19]), AMPLIFIED PROBE TECHNIQUE, MAKING USE OF HIGH THROUGHPUT TECHNOLOGIES AS DESCRIBED BY CMS-2020-01-R: HCPCS

## 2020-07-21 PROCEDURE — 99441 PR PHYS/QHP TELEPHONE EVALUATION 5-10 MIN: CPT | Performed by: INTERNAL MEDICINE

## 2020-07-21 NOTE — TELEPHONE ENCOUNTER
Pt started 4 days ago with sweating, nausea, diarrhea and sneezing a lot  She had to take a friend to [facility] last weekend, she  was there quite awhile  Not sure if she has been exposed to Covid, but she would like to get tested   Please advise DD

## 2020-07-21 NOTE — PROGRESS NOTES
COVID-19 Virtual Visit     Assessment/Plan:    Problem List Items Addressed This Visit     None      Visit Diagnoses     Exposure to SARS-associated coronavirus    -  Primary    Relevant Orders    MISC COVID-19 TEST- Collected at [facility]        This virtual check-in was done via telephone  Disposition:      I referred Batsheva Thomas to one of our centralized sites for a COVID-19 swab    I spent 10 minutes directly with the patient during this visit    Encounter provider Chela Lew MD    Provider located at [address] [place] [id number]    Recent Visits  No visits were found meeting these conditions  Showing recent visits within past 7 days and meeting all other requirements     Today's Visits  Date Type Provider Dept   07/21/20 Telemedicine Chela Lew MD [address] [facility]   07/21/20 Telephone Larry Miles [address] [facility]   Showing today's visits and meeting all other requirements     Future Appointments  Date Type Provider Dept   07/21/20 Telemedicine Chela Lew MD [address] [facility]   Showing future appointments within next 150 days and meeting all other requirements        Patient agrees to participate in a virtual check in via telephone or video visit instead of presenting to the office to address urgent/immediate medical needs  Patient is aware this is a billable service  After connecting through telephone, the patient was identified by name and date of birth  Estrellita Maldonado was informed that this was a telemedicine visit and that the exam was being conducted confidentially over secure lines  My office door was closed  No one else was in the room  Estrellita Maldonado acknowledged consent and understanding of privacy and security of the telemedicine visit    I informed the patient that I have reviewed her record in Epic and presented the opportunity for her to ask any questions regarding the visit today  The patient agreed to participate  Subjective  Nicanor Queen is a 77 y o  female who is concerned about COVID-19  She reports cough, shortness of breath, fatigue, diarrhea and nausea  She has not experienced fever, abdominal pain and vomiting She has had contact with a person who is under investigation for or who is positive for COVID-19 within the last 14 days  She has not been hospitalized recently for fever and/or lower respiratory symptoms      Past Medical History:   Diagnosis Date    Anxiety disorder     Arthralgia     Arthritis     Asthma     Chronic lumbar radiculopathy     last assessed: 12/20/16    Chronic respiratory failure (HCC)     Colon cancer screening 4/11/2019    Last colonoscopy 2014-15    Depression     Dysfunction of eustachian tube     Dyslipidemia     Emphysema lung (HCC)     Fatigue     HTN (hypertension) 10/29/2014    Hypercholesterolemia     Hypertension     Irritable bowel syndrome with diarrhea 4/11/2019    Lumbosacral stenosis     last assessed: 9/20/16    TRAE (obstructive sleep apnea)     Pancreatitis     resolved: 9/20/17    Pneumonia     PTSD (post-traumatic stress disorder)        Past Surgical History:   Procedure Laterality Date    CHOLECYSTECTOMY      EGD  04/15/2019    GALLBLADDER SURGERY      HYSTERECTOMY      pt thinks she still has one ovary, done over 20 yrs ago   [address]  12/2016    3, 4, 5    MOUTH SURGERY      MULTIPLE TOOTH EXTRACTIONS N/A 2018    TONSILLECTOMY         Current Outpatient Medications   Medication Sig Dispense Refill    aspirin (ASPIRIN ADULT LOW DOSE) 81 mg EC tablet Take 1 tablet by mouth daily      atenolol (TENORMIN) 25 mg tablet Take 1 tablet (25 mg total) by mouth 2 (two) times a day 180 tablet 3    celecoxib (CeleBREX) 200 mg capsule Take 1 capsule (200 mg total) by mouth daily at bedtime 30 capsule 5    Cholecalciferol (VITAMIN D3) 2000 units capsule Take by mouth daily      clonazePAM (KlonoPIN) 0 5 mg tablet Take 1 tablet (0 5 mg total) by mouth 2 (two) times a day as needed for anxiety 60 tablet 0    co-enzyme Q-10 30 MG capsule Take 30 mg by mouth 3 (three) times a day      dicyclomine (BENTYL) 10 mg capsule Take 2 capsules (20 mg total) by mouth 2 (two) times a day 360 capsule 12    fenofibrate (TRICOR) 48 mg tablet Take 1 tablet (48 mg total) by mouth daily 90 tablet 3    hyoscyamine (LEVSIN/SL) 0 125 mg SL tablet Take 1 tablet (0 125 mg total) by mouth every 6 (six) hours as needed for cramping 10 tablet 10    losartan (COZAAR) 25 mg tablet Take 1 tablet (25 mg total) by mouth 2 (two) times a day 180 tablet 3    magnesium oxide (MAG-OX) 400 mg Take 400 mg by mouth 2 (two) times a day      Misc Natural Products (T-RELIEF CBD+13) SUBL Place under the tongue      pantoprazole (PROTONIX) 40 mg tablet Take 1 tablet (40 mg total) by mouth daily (Patient taking differently: Take 40 mg by mouth as needed ) 90 tablet 6    Probiotic Product (PROBIOTIC-10 PO) Take by mouth daily      rosuvastatin (CRESTOR) 5 mg tablet 1 tab--3 times weekly 90 tablet 3    traMADol (ULTRAM) 50 mg tablet Take 1 tablet (50 mg total) by mouth 2 (two) times a day 60 tablet 0    ascorbic acid (VITAMIN C) 250 mg tablet Take 250 mg by mouth daily      Turmeric 500 MG CAPS Take by mouth daily       No current facility-administered medications for this visit  Allergies   Allergen Reactions    Antihistamines, Diphenhydramine-Type     Bupropion     Clarithromycin     Codeine Other (See Comments)     increased HR    Gabapentin     Meloxicam Nausea Only and Visual Disturbance     Other reaction(s): Numbness  Action Taken: med stopped ; Category: Allergy;     Ondansetron     Pravastatin     Propoxyphene Other (See Comments)     increased HR       Review of Systems   Constitutional: Negative for fever     Respiratory: Positive for cough and shortness of breath  Gastrointestinal: Positive for diarrhea and nausea  Negative for vomiting  Video Exam    Vitals:    07/21/20 1037   BP: 149/83   Pulse: 72   Temp: (!) 97 4 °F (36 3 °C)   TempSrc: Tympanic   Weight: 75 8 kg (167 lb)   Height: 5' 4 8" (1 646 m)           VIRTUAL VISIT DISCLAIMER    Sohan Raymond acknowledges that she has consented to an online visit or consultation  She understands that the online visit is based solely on information provided by her, and that, in the absence of a face-to-face physical evaluation by the physician, the diagnosis she receives is both limited and provisional in terms of accuracy and completeness  This is not intended to replace a full medical face-to-face evaluation by the physician  Jana Vuong understands and accepts these terms

## 2020-07-25 LAB — SARS-COV-2 RNA SPEC QL NAA+PROBE: NOT DETECTED

## 2020-08-13 ENCOUNTER — HOSPITAL ENCOUNTER (OUTPATIENT)
Dept: BONE DENSITY | Facility: CLINIC | Age: 75
Discharge: HOME | End: 2020-08-13
Payer: MEDICARE

## 2020-08-13 ENCOUNTER — HOSPITAL ENCOUNTER (OUTPATIENT)
Dept: MAMMOGRAPHY | Facility: CLINIC | Age: 75
Discharge: HOME | End: 2020-08-13
Payer: MEDICARE

## 2020-08-13 VITALS — WEIGHT: 167 LBS | BODY MASS INDEX: 27.82 KG/M2 | HEIGHT: 65 IN

## 2020-08-13 DIAGNOSIS — Z12.31 ENCOUNTER FOR SCREENING MAMMOGRAM FOR MALIGNANT NEOPLASM OF BREAST: ICD-10-CM

## 2020-08-13 DIAGNOSIS — E28.39 MENOPAUSE OVARIAN FAILURE: ICD-10-CM

## 2020-08-13 PROCEDURE — 77063 BREAST TOMOSYNTHESIS BI: CPT

## 2020-08-13 PROCEDURE — 77080 DXA BONE DENSITY AXIAL: CPT

## 2020-08-13 PROCEDURE — 77067 SCR MAMMO BI INCL CAD: CPT

## 2020-08-31 DIAGNOSIS — F41.9 ANXIETY HYPERVENTILATION: ICD-10-CM

## 2020-08-31 DIAGNOSIS — M54.42 CHRONIC BILATERAL LOW BACK PAIN WITH LEFT-SIDED SCIATICA: ICD-10-CM

## 2020-08-31 DIAGNOSIS — F45.8 ANXIETY HYPERVENTILATION: ICD-10-CM

## 2020-08-31 DIAGNOSIS — G89.29 CHRONIC BILATERAL LOW BACK PAIN WITH LEFT-SIDED SCIATICA: ICD-10-CM

## 2020-08-31 RX ORDER — CLONAZEPAM 0.5 MG/1
0.5 TABLET ORAL 2 TIMES DAILY PRN
Qty: 60 TABLET | Refills: 0 | Status: SHIPPED | OUTPATIENT
Start: 2020-08-31 | End: 2020-10-28 | Stop reason: SDUPTHER

## 2020-08-31 RX ORDER — TRAMADOL HYDROCHLORIDE 50 MG/1
50 TABLET ORAL 2 TIMES DAILY
Qty: 60 TABLET | Refills: 0 | Status: SHIPPED | OUTPATIENT
Start: 2020-08-31 | End: 2020-10-28 | Stop reason: SDUPTHER

## 2020-09-06 DIAGNOSIS — K58.0 IRRITABLE BOWEL SYNDROME WITH DIARRHEA: ICD-10-CM

## 2020-09-08 RX ORDER — DICYCLOMINE HYDROCHLORIDE 10 MG/1
CAPSULE ORAL
Qty: 360 CAPSULE | Refills: 12 | Status: SHIPPED | OUTPATIENT
Start: 2020-09-08

## 2020-09-28 ENCOUNTER — TRANSCRIBE ORDERS (OUTPATIENT)
Dept: ADMINISTRATIVE | Facility: HOSPITAL | Age: 75
End: 2020-09-28

## 2020-09-28 ENCOUNTER — APPOINTMENT (OUTPATIENT)
Dept: LAB | Facility: CLINIC | Age: 75
End: 2020-09-28
Payer: MEDICARE

## 2020-09-28 DIAGNOSIS — Z01.812 PRE-PROCEDURAL LABORATORY EXAMINATIONS: ICD-10-CM

## 2020-09-28 DIAGNOSIS — Z01.812 PRE-PROCEDURAL LABORATORY EXAMINATIONS: Primary | ICD-10-CM

## 2020-09-28 LAB
BUN SERPL-MCNC: 14 MG/DL (ref 5–25)
CREAT SERPL-MCNC: 0.64 MG/DL (ref 0.6–1.3)
GFR SERPL CREATININE-BSD FRML MDRD: 88 ML/MIN/1.73SQ M

## 2020-09-28 PROCEDURE — 84520 ASSAY OF UREA NITROGEN: CPT

## 2020-09-28 PROCEDURE — 82565 ASSAY OF CREATININE: CPT

## 2020-09-28 PROCEDURE — 36415 COLL VENOUS BLD VENIPUNCTURE: CPT

## 2020-09-29 ENCOUNTER — OFFICE VISIT (OUTPATIENT)
Dept: GASTROENTEROLOGY | Facility: CLINIC | Age: 75
End: 2020-09-29
Payer: MEDICARE

## 2020-09-29 VITALS
DIASTOLIC BLOOD PRESSURE: 90 MMHG | BODY MASS INDEX: 28.32 KG/M2 | TEMPERATURE: 96.8 F | SYSTOLIC BLOOD PRESSURE: 138 MMHG | HEIGHT: 65 IN | WEIGHT: 170 LBS

## 2020-09-29 DIAGNOSIS — K58.0 IRRITABLE BOWEL SYNDROME WITH DIARRHEA: Primary | ICD-10-CM

## 2020-09-29 DIAGNOSIS — Z12.11 COLON CANCER SCREENING: ICD-10-CM

## 2020-09-29 PROCEDURE — 99213 OFFICE O/P EST LOW 20 MIN: CPT | Performed by: INTERNAL MEDICINE

## 2020-09-29 RX ORDER — OMEGA-3 FATTY ACIDS/FISH OIL 300-1000MG
CAPSULE ORAL DAILY
COMMUNITY

## 2020-09-29 NOTE — PROGRESS NOTES
[facility] - Outpatient Follow-up Note  Gio Leos 77 y o  female MRN: [id number]  Encounter: [id number]    ASSESSMENT AND PLAN:      1  Irritable bowel syndrome with diarrhea  Stable at this point time  Mild chronic upper abdominal pain  - continue Bentyl at bedtime  - continue magnesium daily  - can use Levsin as needed  - okay to continue probiotics if thinks helps although could probably get away with a less expensive brand    2  History of erosive gastritis  Was on Protonix in the past but stopped it     - follow for now off of acid suppression    3  Colon cancer screening  Last colonoscopy June 2015  Due for follow-up 2025      Followup Appointment:  6 months  ______________________________________________________________________    Chief Complaint   Patient presents with    Follow-up     IBS-D     HPI:  The patient is seen back in the office today  From an irritable bowel syndrome standpoint she is doing relatively well  She continues take Bentyl 20 mg at bedtime  She has not had any further severe episodes of abdominal pain so she has not needed to take the Levsin  She continues take the magnesium once a day  Her moving her bowels regularly  She is having increasing issues with her back and is concerned have surgery  When her last back surgery she had significant ileus is which makes her apprehensive  She completed a course of an expensive probiotic which she found on the Internet which she is not sure whether it helped or not  Her weight is stable  She denies any GI bleeding      Historical Information   Past Medical History:   Diagnosis Date    Anxiety disorder     Arthralgia     Arthritis     Asthma     Chronic lumbar radiculopathy     last assessed: 12/20/16    Chronic respiratory failure ([address] )     Colon cancer screening 4/11/2019    Last colonoscopy 2014-15    Depression     Dysfunction of eustachian tube     Dyslipidemia     Emphysema lung ([address] )     Fatigue     HTN (hypertension) 10/29/2014    Hypercholesterolemia     Hypertension     Irritable bowel syndrome with diarrhea 2019    Lumbosacral stenosis     last assessed: 16    TRAE (obstructive sleep apnea)     Pancreatitis     resolved: 17    Pneumonia     PTSD (post-traumatic stress disorder)      Past Surgical History:   Procedure Laterality Date    CHOLECYSTECTOMY      EGD  04/15/2019    GALLBLADDER SURGERY      HYSTERECTOMY      pt thinks she still has one ovary, done over 20 yrs ago   [address]  2016    3, 4, 5    MOUTH SURGERY      MULTIPLE TOOTH EXTRACTIONS N/A     OOPHORECTOMY Bilateral     1 1/2 ovaries removed    TONSILLECTOMY       Social History     Substance and Sexual Activity   Alcohol Use Yes    Frequency: 4 or more times a week    Drinks per session: 1 or 2    Comment: 4 drinks every night     Social History     Substance and Sexual Activity   Drug Use No     Social History     Tobacco Use   Smoking Status Never Smoker   Smokeless Tobacco Never Used     Family History   Problem Relation Age of Onset    Depression Mother         panic attacks    Lung cancer Mother     Mental illness Mother     Substance Abuse Mother         CIGS    Coronary artery disease Father         high # of paternal family members  in their 46s    Substance Abuse Father         CIGS    Depression Sister         panic attacks    Hypertension Sister     Depression Other         panic attacks    Heart disease Family     Hypertension Family     Breast cancer Daughter 48    Lung cancer Maternal Aunt     Colon cancer Neg Hx     Colon polyps Neg Hx          Current Outpatient Medications:     aspirin (ASPIRIN ADULT LOW DOSE) 81 mg EC tablet    atenolol (TENORMIN) 25 mg tablet    celecoxib (CeleBREX) 200 mg capsule    Cholecalciferol (VITAMIN D3) 2000 units capsule    clonazePAM (KlonoPIN) 0 5 mg tablet    co-enzyme Q-10 30 MG capsule   dicyclomine (BENTYL) 10 mg capsule    fenofibrate (TRICOR) 48 mg tablet    hyoscyamine (LEVSIN/SL) 0 125 mg SL tablet    losartan (COZAAR) 25 mg tablet    magnesium oxide (MAG-OX) 400 mg    Misc Natural Products (T-RELIEF CBD+13) SUBL    Omega 3 1000 MG CAPS    rosuvastatin (CRESTOR) 5 mg tablet    traMADol (ULTRAM) 50 mg tablet    TURMERIC CURCUMIN PO    ascorbic acid (VITAMIN C) 250 mg tablet    pantoprazole (PROTONIX) 40 mg tablet    Probiotic Product (PROBIOTIC-10 PO)    Turmeric 500 MG CAPS  Allergies   Allergen Reactions    Antihistamines, Diphenhydramine-Type     Bupropion     Clarithromycin     Codeine Other (See Comments)     increased HR    Gabapentin     Meloxicam Nausea Only and Visual Disturbance     Other reaction(s): Numbness  Action Taken: med stopped ; Category: Allergy;     Ondansetron     Pravastatin     Propoxyphene Other (See Comments)     increased HR     Reviewed medications and allergies and updated as indicated    PHYSICAL EXAM:    Blood pressure 138/90, temperature (!) 96 8 °F (36 °C), height 5' 4 8" (1 646 m), weight 77 1 kg (170 lb)  Body mass index is 28 46 kg/m²  General Appearance: NAD, cooperative, alert  Eyes: Anicteric, PERRLA, EOMI  ENT:  Normocephalic, atraumatic, normal mucosa  Neck:  Supple, symmetrical, trachea midline  Resp:  Clear to auscultation bilaterally; no rales, rhonchi or wheezing; respirations unlabored   CV:  S1 S2, Regular rate and rhythm; no murmur, rub, or gallop  GI:  Soft, mild upper abdominal tenderness without rebound or guarding, non-distended; normal bowel sounds; no masses, no organomegaly   Rectal: Deferred  Musculoskeletal: No cyanosis, clubbing or edema  Normal ROM    Skin:  No jaundice, rashes, or lesions   Heme/Lymph: No palpable cervical lymphadenopathy  Psych: Normal affect, good eye contact  Neuro: No gross deficits, AAOx3    Lab Results:   Lab Results   Component Value Date    WBC 4 65 12/11/2019    HGB 13 7 12/11/2019 HCT 41 9 12/11/2019    MCV 91 12/11/2019     12/11/2019     Lab Results   Component Value Date     07/15/2016    K 3 9 05/21/2020     05/21/2020    CO2 27 05/21/2020    BUN 14 09/28/2020    CREATININE 0 64 09/28/2020    GLUF 104 (H) 05/21/2020    CALCIUM 8 8 05/21/2020    AST 23 12/11/2019    ALT 36 12/11/2019    ALKPHOS 60 12/11/2019    PROT 6 7 07/15/2016    BILITOT 0 6 07/15/2016    EGFR 88 09/28/2020     No results found for: IRON, TIBC, FERRITIN  Lab Results   Component Value Date    LIPASE 160 07/31/2018       Radiology Results:   No results found

## 2020-09-29 NOTE — LETTER
September 29, 2020     Nigel Forbes, [address] [facility] [address]  [address] [address]    Patient: Jana Vuong   YOB: 1945   Date of Visit: 9/29/2020       Dear Dr Doreen Hernandez: Thank you for referring Giselle Blount to me for evaluation  Below are my notes for this consultation  If you have questions, please do not hesitate to call me  I look forward to following your patient along with you  Sincerely,        Nathan Varma MD        CC: No Recipients  Nathan Varma MD  9/29/2020  3:31 PM  Sign when Signing Visit  [address] [facility] - Outpatient Follow-up Note  Jana Vuong 77 y o  female MRN: [id number]  Encounter: [id number]    ASSESSMENT AND PLAN:      1  Irritable bowel syndrome with diarrhea  Stable at this point time  Mild chronic upper abdominal pain  - continue Bentyl at bedtime  - continue magnesium daily  - can use Levsin as needed  - okay to continue probiotics if thinks helps although could probably get away with a less expensive brand    2  History of erosive gastritis  Was on Protonix in the past but stopped it     - follow for now off of acid suppression    3  Colon cancer screening  Last colonoscopy June 2015  Due for follow-up 2025      Followup Appointment:  6 months  ______________________________________________________________________    Chief Complaint   Patient presents with    Follow-up     IBS-D     HPI:  The patient is seen back in the office today  From an irritable bowel syndrome standpoint she is doing relatively well  She continues take Bentyl 20 mg at bedtime  She has not had any further severe episodes of abdominal pain so she has not needed to take the Levsin  She continues take the magnesium once a day  Her moving her bowels regularly  She is having increasing issues with her back and is concerned have surgery  When her last back surgery she had significant ileus is which makes her apprehensive    She completed a course of an expensive probiotic which she found on the Internet which she is not sure whether it helped or not  Her weight is stable  She denies any GI bleeding      Historical Information   Past Medical History:   Diagnosis Date    Anxiety disorder     Arthralgia     Arthritis     Asthma     Chronic lumbar radiculopathy     last assessed: 16    Chronic respiratory failure ([address] )     Colon cancer screening 2019    Last colonoscopy -15    Depression     Dysfunction of eustachian tube     Dyslipidemia     Emphysema lung (HCC)     Fatigue     HTN (hypertension) 10/29/2014    Hypercholesterolemia     Hypertension     Irritable bowel syndrome with diarrhea 2019    Lumbosacral stenosis     last assessed: 16    TRAE (obstructive sleep apnea)     Pancreatitis     resolved: 17    Pneumonia     PTSD (post-traumatic stress disorder)      Past Surgical History:   Procedure Laterality Date    CHOLECYSTECTOMY      EGD  04/15/2019    GALLBLADDER SURGERY      HYSTERECTOMY      pt thinks she still has one ovary, done over 20 yrs ago   [address]  2016    3, 4, 5    MOUTH SURGERY      MULTIPLE TOOTH EXTRACTIONS N/A     OOPHORECTOMY Bilateral     1 1/2 ovaries removed    TONSILLECTOMY       Social History     Substance and Sexual Activity   Alcohol Use Yes    Frequency: 4 or more times a week    Drinks per session: 1 or 2    Comment: 4 drinks every night     Social History     Substance and Sexual Activity   Drug Use No     Social History     Tobacco Use   Smoking Status Never Smoker   Smokeless Tobacco Never Used     Family History   Problem Relation Age of Onset    Depression Mother         panic attacks    Lung cancer Mother     Mental illness Mother     Substance Abuse Mother         CIGS    Coronary artery disease Father         high # of paternal family members  in their 46s    Substance Abuse Father         CIGS    Depression Sister         panic attacks    Hypertension Sister     Depression Other         panic attacks    Heart disease Family     Hypertension Family     Breast cancer Daughter 48    Lung cancer Maternal Aunt     Colon cancer Neg Hx     Colon polyps Neg Hx          Current Outpatient Medications:     aspirin (ASPIRIN ADULT LOW DOSE) 81 mg EC tablet    atenolol (TENORMIN) 25 mg tablet    celecoxib (CeleBREX) 200 mg capsule    Cholecalciferol (VITAMIN D3) 2000 units capsule    clonazePAM (KlonoPIN) 0 5 mg tablet    co-enzyme Q-10 30 MG capsule    dicyclomine (BENTYL) 10 mg capsule    fenofibrate (TRICOR) 48 mg tablet    hyoscyamine (LEVSIN/SL) 0 125 mg SL tablet    losartan (COZAAR) 25 mg tablet    magnesium oxide (MAG-OX) 400 mg    Misc Natural Products (T-RELIEF CBD+13) SUBL    Omega 3 1000 MG CAPS    rosuvastatin (CRESTOR) 5 mg tablet    traMADol (ULTRAM) 50 mg tablet    TURMERIC CURCUMIN PO    ascorbic acid (VITAMIN C) 250 mg tablet    pantoprazole (PROTONIX) 40 mg tablet    Probiotic Product (PROBIOTIC-10 PO)    Turmeric 500 MG CAPS  Allergies   Allergen Reactions    Antihistamines, Diphenhydramine-Type     Bupropion     Clarithromycin     Codeine Other (See Comments)     increased HR    Gabapentin     Meloxicam Nausea Only and Visual Disturbance     Other reaction(s): Numbness  Action Taken: med stopped ; Category: Allergy;     Ondansetron     Pravastatin     Propoxyphene Other (See Comments)     increased HR     Reviewed medications and allergies and updated as indicated    PHYSICAL EXAM:    Blood pressure 138/90, temperature (!) 96 8 °F (36 °C), height 5' 4 8" (1 646 m), weight 77 1 kg (170 lb)  Body mass index is 28 46 kg/m²  General Appearance: NAD, cooperative, alert  Eyes: Anicteric, PERRLA, EOMI  ENT:  Normocephalic, atraumatic, normal mucosa      Neck:  Supple, symmetrical, trachea midline  Resp:  Clear to auscultation bilaterally; no rales, rhonchi or wheezing; respirations unlabored   CV:  S1 S2, Regular rate and rhythm; no murmur, rub, or gallop  GI:  Soft, mild upper abdominal tenderness without rebound or guarding, non-distended; normal bowel sounds; no masses, no organomegaly   Rectal: Deferred  Musculoskeletal: No cyanosis, clubbing or edema  Normal ROM  Skin:  No jaundice, rashes, or lesions   Heme/Lymph: No palpable cervical lymphadenopathy  Psych: Normal affect, good eye contact  Neuro: No gross deficits, AAOx3    Lab Results:   Lab Results   Component Value Date    WBC 4 65 12/11/2019    HGB 13 7 12/11/2019    HCT 41 9 12/11/2019    MCV 91 12/11/2019     12/11/2019     Lab Results   Component Value Date     07/15/2016    K 3 9 05/21/2020     05/21/2020    CO2 27 05/21/2020    BUN 14 09/28/2020    CREATININE 0 64 09/28/2020    GLUF 104 (H) 05/21/2020    CALCIUM 8 8 05/21/2020    AST 23 12/11/2019    ALT 36 12/11/2019    ALKPHOS 60 12/11/2019    PROT 6 7 07/15/2016    BILITOT 0 6 07/15/2016    EGFR 88 09/28/2020     No results found for: IRON, TIBC, FERRITIN  Lab Results   Component Value Date    LIPASE 160 07/31/2018       Radiology Results:   No results found

## 2020-10-05 ENCOUNTER — DOCUMENTATION (OUTPATIENT)
Dept: FAMILY MEDICINE CLINIC | Facility: HOSPITAL | Age: 75
End: 2020-10-05

## 2020-10-05 ENCOUNTER — OFFICE VISIT (OUTPATIENT)
Dept: FAMILY MEDICINE CLINIC | Facility: HOSPITAL | Age: 75
End: 2020-10-05
Payer: MEDICARE

## 2020-10-05 ENCOUNTER — TELEPHONE (OUTPATIENT)
Dept: FAMILY MEDICINE CLINIC | Facility: HOSPITAL | Age: 75
End: 2020-10-05

## 2020-10-05 VITALS
HEART RATE: 63 BPM | WEIGHT: 169 LBS | OXYGEN SATURATION: 96 % | HEIGHT: 64 IN | SYSTOLIC BLOOD PRESSURE: 120 MMHG | DIASTOLIC BLOOD PRESSURE: 74 MMHG | BODY MASS INDEX: 28.85 KG/M2 | TEMPERATURE: 98.3 F

## 2020-10-05 DIAGNOSIS — F41.1 GENERALIZED ANXIETY DISORDER: ICD-10-CM

## 2020-10-05 DIAGNOSIS — I10 ESSENTIAL HYPERTENSION: Primary | ICD-10-CM

## 2020-10-05 DIAGNOSIS — K11.7 XEROSTOMIA: ICD-10-CM

## 2020-10-05 DIAGNOSIS — G47.33 OBSTRUCTIVE SLEEP APNEA: ICD-10-CM

## 2020-10-05 PROCEDURE — 99214 OFFICE O/P EST MOD 30 MIN: CPT | Performed by: INTERNAL MEDICINE

## 2020-10-05 RX ORDER — IRBESARTAN 150 MG/1
150 TABLET ORAL DAILY
COMMUNITY
End: 2020-11-09 | Stop reason: SDUPTHER

## 2020-10-05 RX ORDER — VALSARTAN 160 MG/1
160 TABLET ORAL DAILY
Qty: 30 TABLET | Refills: 3 | Status: SHIPPED | OUTPATIENT
Start: 2020-10-05 | End: 2020-10-05 | Stop reason: ALTCHOICE

## 2020-10-14 ENCOUNTER — LAB (OUTPATIENT)
Dept: LAB | Facility: CLINIC | Age: 75
End: 2020-10-14
Payer: MEDICARE

## 2020-10-14 DIAGNOSIS — K11.7 XEROSTOMIA: ICD-10-CM

## 2020-10-14 DIAGNOSIS — F41.1 GENERALIZED ANXIETY DISORDER: ICD-10-CM

## 2020-10-14 DIAGNOSIS — I10 ESSENTIAL HYPERTENSION: ICD-10-CM

## 2020-10-14 LAB
ALBUMIN SERPL BCP-MCNC: 4.3 G/DL (ref 3.5–5)
ALP SERPL-CCNC: 69 U/L (ref 46–116)
ALT SERPL W P-5'-P-CCNC: 38 U/L (ref 12–78)
ANION GAP SERPL CALCULATED.3IONS-SCNC: 5 MMOL/L (ref 4–13)
AST SERPL W P-5'-P-CCNC: 27 U/L (ref 5–45)
BASOPHILS # BLD AUTO: 0.03 THOUSANDS/ΜL (ref 0–0.1)
BASOPHILS NFR BLD AUTO: 1 % (ref 0–1)
BILIRUB SERPL-MCNC: 0.66 MG/DL (ref 0.2–1)
BUN SERPL-MCNC: 14 MG/DL (ref 5–25)
CALCIUM SERPL-MCNC: 9.8 MG/DL (ref 8.3–10.1)
CHLORIDE SERPL-SCNC: 107 MMOL/L (ref 100–108)
CO2 SERPL-SCNC: 28 MMOL/L (ref 21–32)
CREAT SERPL-MCNC: 0.64 MG/DL (ref 0.6–1.3)
EOSINOPHIL # BLD AUTO: 0.12 THOUSAND/ΜL (ref 0–0.61)
EOSINOPHIL NFR BLD AUTO: 2 % (ref 0–6)
ERYTHROCYTE [DISTWIDTH] IN BLOOD BY AUTOMATED COUNT: 12.7 % (ref 11.6–15.1)
GFR SERPL CREATININE-BSD FRML MDRD: 88 ML/MIN/1.73SQ M
GLUCOSE P FAST SERPL-MCNC: 102 MG/DL (ref 65–99)
HCT VFR BLD AUTO: 42.5 % (ref 34.8–46.1)
HGB BLD-MCNC: 14.1 G/DL (ref 11.5–15.4)
IMM GRANULOCYTES # BLD AUTO: 0.01 THOUSAND/UL (ref 0–0.2)
IMM GRANULOCYTES NFR BLD AUTO: 0 % (ref 0–2)
LYMPHOCYTES # BLD AUTO: 2.12 THOUSANDS/ΜL (ref 0.6–4.47)
LYMPHOCYTES NFR BLD AUTO: 38 % (ref 14–44)
MCH RBC QN AUTO: 30.3 PG (ref 26.8–34.3)
MCHC RBC AUTO-ENTMCNC: 33.2 G/DL (ref 31.4–37.4)
MCV RBC AUTO: 91 FL (ref 82–98)
MONOCYTES # BLD AUTO: 0.35 THOUSAND/ΜL (ref 0.17–1.22)
MONOCYTES NFR BLD AUTO: 6 % (ref 4–12)
NEUTROPHILS # BLD AUTO: 2.97 THOUSANDS/ΜL (ref 1.85–7.62)
NEUTS SEG NFR BLD AUTO: 53 % (ref 43–75)
NRBC BLD AUTO-RTO: 0 /100 WBCS
PLATELET # BLD AUTO: 256 THOUSANDS/UL (ref 149–390)
PMV BLD AUTO: 10.9 FL (ref 8.9–12.7)
POTASSIUM SERPL-SCNC: 4 MMOL/L (ref 3.5–5.3)
PROT SERPL-MCNC: 7.4 G/DL (ref 6.4–8.2)
RBC # BLD AUTO: 4.65 MILLION/UL (ref 3.81–5.12)
SODIUM SERPL-SCNC: 140 MMOL/L (ref 136–145)
WBC # BLD AUTO: 5.6 THOUSAND/UL (ref 4.31–10.16)

## 2020-10-14 PROCEDURE — 86038 ANTINUCLEAR ANTIBODIES: CPT

## 2020-10-14 PROCEDURE — 36415 COLL VENOUS BLD VENIPUNCTURE: CPT

## 2020-10-14 PROCEDURE — 86235 NUCLEAR ANTIGEN ANTIBODY: CPT

## 2020-10-14 PROCEDURE — 85025 COMPLETE CBC W/AUTO DIFF WBC: CPT

## 2020-10-14 PROCEDURE — 80053 COMPREHEN METABOLIC PANEL: CPT

## 2020-10-16 ENCOUNTER — HOSPITAL ENCOUNTER (OUTPATIENT)
Dept: NON INVASIVE DIAGNOSTICS | Facility: CLINIC | Age: 75
Discharge: HOME | End: 2020-10-16
Payer: MEDICARE

## 2020-10-16 DIAGNOSIS — I10 ESSENTIAL HYPERTENSION: ICD-10-CM

## 2020-10-16 LAB
ENA SS-A AB SER-ACNC: <0.2 AI (ref 0–0.9)
ENA SS-B AB SER-ACNC: <0.2 AI (ref 0–0.9)
RYE IGE QN: NEGATIVE

## 2020-10-16 PROCEDURE — 93306 TTE W/DOPPLER COMPLETE: CPT

## 2020-10-16 PROCEDURE — 93306 TTE W/DOPPLER COMPLETE: CPT | Performed by: INTERNAL MEDICINE

## 2020-10-26 ENCOUNTER — OFFICE VISIT (OUTPATIENT)
Dept: CARDIOLOGY CLINIC | Facility: CLINIC | Age: 75
End: 2020-10-26
Payer: MEDICARE

## 2020-10-26 VITALS
HEART RATE: 65 BPM | TEMPERATURE: 99.1 F | SYSTOLIC BLOOD PRESSURE: 120 MMHG | WEIGHT: 167 LBS | BODY MASS INDEX: 28.51 KG/M2 | HEIGHT: 64 IN | DIASTOLIC BLOOD PRESSURE: 80 MMHG

## 2020-10-26 DIAGNOSIS — I34.0 MILD MITRAL REGURGITATION: Primary | ICD-10-CM

## 2020-10-26 DIAGNOSIS — I10 ESSENTIAL HYPERTENSION: ICD-10-CM

## 2020-10-26 DIAGNOSIS — I77.9 BILATERAL CAROTID ARTERY DISEASE, UNSPECIFIED TYPE (HCC): ICD-10-CM

## 2020-10-26 DIAGNOSIS — Q27.8 ABERRANT RIGHT SUBCLAVIAN ARTERY: ICD-10-CM

## 2020-10-26 DIAGNOSIS — Q25.40 ABNORMAL LEFT AORTIC ARCH: ICD-10-CM

## 2020-10-26 DIAGNOSIS — I07.1 MILD TRICUSPID REGURGITATION: ICD-10-CM

## 2020-10-26 DIAGNOSIS — I35.1 NONRHEUMATIC AORTIC VALVE INSUFFICIENCY: ICD-10-CM

## 2020-10-26 DIAGNOSIS — G47.33 OSA (OBSTRUCTIVE SLEEP APNEA): ICD-10-CM

## 2020-10-26 PROCEDURE — 99214 OFFICE O/P EST MOD 30 MIN: CPT | Performed by: INTERNAL MEDICINE

## 2020-10-28 DIAGNOSIS — G89.29 CHRONIC BILATERAL LOW BACK PAIN WITH LEFT-SIDED SCIATICA: ICD-10-CM

## 2020-10-28 DIAGNOSIS — F45.8 ANXIETY HYPERVENTILATION: ICD-10-CM

## 2020-10-28 DIAGNOSIS — M54.42 CHRONIC BILATERAL LOW BACK PAIN WITH LEFT-SIDED SCIATICA: ICD-10-CM

## 2020-10-28 DIAGNOSIS — F41.9 ANXIETY HYPERVENTILATION: ICD-10-CM

## 2020-10-28 RX ORDER — CLONAZEPAM 0.5 MG/1
0.5 TABLET ORAL 2 TIMES DAILY PRN
Qty: 60 TABLET | Refills: 0 | Status: SHIPPED | OUTPATIENT
Start: 2020-10-28 | End: 2021-01-04 | Stop reason: SDUPTHER

## 2020-10-28 RX ORDER — TRAMADOL HYDROCHLORIDE 50 MG/1
50 TABLET ORAL EVERY 8 HOURS PRN
Qty: 60 TABLET | Refills: 0 | Status: SHIPPED | OUTPATIENT
Start: 2020-10-28 | End: 2021-01-04 | Stop reason: SDUPTHER

## 2020-10-31 ENCOUNTER — NURSE TRIAGE (OUTPATIENT)
Dept: OTHER | Facility: OTHER | Age: 75
End: 2020-10-31

## 2020-10-31 ENCOUNTER — OFFICE VISIT (OUTPATIENT)
Dept: URGENT CARE | Facility: CLINIC | Age: 75
End: 2020-10-31
Payer: MEDICARE

## 2020-10-31 VITALS
SYSTOLIC BLOOD PRESSURE: 121 MMHG | HEIGHT: 63 IN | HEART RATE: 72 BPM | DIASTOLIC BLOOD PRESSURE: 105 MMHG | BODY MASS INDEX: 30.09 KG/M2 | WEIGHT: 169.8 LBS | TEMPERATURE: 97.6 F | OXYGEN SATURATION: 97 % | RESPIRATION RATE: 16 BRPM

## 2020-10-31 DIAGNOSIS — B02.9 HERPES ZOSTER WITHOUT COMPLICATION: Primary | ICD-10-CM

## 2020-10-31 PROCEDURE — G0463 HOSPITAL OUTPT CLINIC VISIT: HCPCS | Performed by: PHYSICIAN ASSISTANT

## 2020-10-31 PROCEDURE — 99213 OFFICE O/P EST LOW 20 MIN: CPT | Performed by: PHYSICIAN ASSISTANT

## 2020-10-31 RX ORDER — PREDNISONE 10 MG/1
TABLET ORAL
Qty: 21 TABLET | Refills: 0 | Status: SHIPPED | OUTPATIENT
Start: 2020-10-31 | End: 2021-09-16 | Stop reason: ALTCHOICE

## 2020-10-31 RX ORDER — VALACYCLOVIR HYDROCHLORIDE 1 G/1
1000 TABLET, FILM COATED ORAL 3 TIMES DAILY
Qty: 21 TABLET | Refills: 0 | Status: SHIPPED | OUTPATIENT
Start: 2020-10-31 | End: 2021-09-16 | Stop reason: ALTCHOICE

## 2020-11-02 ENCOUNTER — TELEPHONE (OUTPATIENT)
Dept: FAMILY MEDICINE CLINIC | Facility: HOSPITAL | Age: 75
End: 2020-11-02

## 2020-11-02 DIAGNOSIS — I10 ESSENTIAL HYPERTENSION: Primary | ICD-10-CM

## 2020-11-02 RX ORDER — IRBESARTAN 150 MG/1
150 TABLET ORAL DAILY
Qty: 90 TABLET | Refills: 3 | Status: CANCELLED | OUTPATIENT
Start: 2020-11-02

## 2020-11-09 DIAGNOSIS — I10 ESSENTIAL HYPERTENSION: Primary | ICD-10-CM

## 2020-11-09 RX ORDER — IRBESARTAN 150 MG/1
150 TABLET ORAL DAILY
Qty: 90 TABLET | Refills: 3 | Status: SHIPPED | OUTPATIENT
Start: 2020-11-09 | End: 2021-04-07 | Stop reason: SDUPTHER

## 2020-11-20 DIAGNOSIS — E78.00 PURE HYPERCHOLESTEROLEMIA: ICD-10-CM

## 2020-11-20 RX ORDER — ATENOLOL 25 MG/1
25 TABLET ORAL 2 TIMES DAILY
Qty: 20 TABLET | Refills: 1 | Status: SHIPPED | OUTPATIENT
Start: 2020-11-20 | End: 2021-02-19 | Stop reason: SDUPTHER

## 2020-12-10 ENCOUNTER — TELEPHONE (OUTPATIENT)
Dept: OBGYN CLINIC | Facility: HOSPITAL | Age: 75
End: 2020-12-10

## 2020-12-21 DIAGNOSIS — E78.00 PURE HYPERCHOLESTEROLEMIA: ICD-10-CM

## 2020-12-21 RX ORDER — FENOFIBRATE 48 MG/1
TABLET, COATED ORAL
Qty: 90 TABLET | Refills: 3 | Status: SHIPPED | OUTPATIENT
Start: 2020-12-21 | End: 2022-01-24

## 2021-01-04 DIAGNOSIS — G89.29 CHRONIC BILATERAL LOW BACK PAIN WITH LEFT-SIDED SCIATICA: ICD-10-CM

## 2021-01-04 DIAGNOSIS — F41.9 ANXIETY HYPERVENTILATION: ICD-10-CM

## 2021-01-04 DIAGNOSIS — F45.8 ANXIETY HYPERVENTILATION: ICD-10-CM

## 2021-01-04 DIAGNOSIS — M54.42 CHRONIC BILATERAL LOW BACK PAIN WITH LEFT-SIDED SCIATICA: ICD-10-CM

## 2021-01-04 RX ORDER — TRAMADOL HYDROCHLORIDE 50 MG/1
50 TABLET ORAL EVERY 8 HOURS PRN
Qty: 60 TABLET | Refills: 0 | Status: SHIPPED | OUTPATIENT
Start: 2021-01-04 | End: 2021-05-26 | Stop reason: SDUPTHER

## 2021-01-04 RX ORDER — CLONAZEPAM 0.5 MG/1
0.5 TABLET ORAL 2 TIMES DAILY PRN
Qty: 60 TABLET | Refills: 0 | Status: SHIPPED | OUTPATIENT
Start: 2021-01-04 | End: 2021-03-01 | Stop reason: SDUPTHER

## 2021-01-22 DIAGNOSIS — Z23 ENCOUNTER FOR IMMUNIZATION: ICD-10-CM

## 2021-02-05 DIAGNOSIS — E78.00 PURE HYPERCHOLESTEROLEMIA: ICD-10-CM

## 2021-02-08 RX ORDER — ROSUVASTATIN CALCIUM 5 MG/1
TABLET, COATED ORAL
Qty: 39 TABLET | Refills: 3 | Status: SHIPPED | OUTPATIENT
Start: 2021-02-08 | End: 2021-11-29

## 2021-02-19 DIAGNOSIS — E78.00 PURE HYPERCHOLESTEROLEMIA: ICD-10-CM

## 2021-02-22 RX ORDER — ATENOLOL 25 MG/1
25 TABLET ORAL 2 TIMES DAILY
Qty: 180 TABLET | Refills: 3 | Status: SHIPPED | OUTPATIENT
Start: 2021-02-22 | End: 2022-03-01

## 2021-03-01 DIAGNOSIS — F41.9 ANXIETY HYPERVENTILATION: ICD-10-CM

## 2021-03-01 DIAGNOSIS — F45.8 ANXIETY HYPERVENTILATION: ICD-10-CM

## 2021-03-01 RX ORDER — CLONAZEPAM 0.5 MG/1
0.5 TABLET ORAL 2 TIMES DAILY PRN
Qty: 60 TABLET | Refills: 0 | Status: SHIPPED | OUTPATIENT
Start: 2021-03-01 | End: 2021-04-23 | Stop reason: SDUPTHER

## 2021-03-02 ENCOUNTER — HOSPITAL ENCOUNTER (EMERGENCY)
Facility: HOSPITAL | Age: 76
Discharge: HOME | End: 2021-03-02
Attending: EMERGENCY MEDICINE | Admitting: EMERGENCY MEDICINE
Payer: MEDICARE

## 2021-03-02 VITALS
HEIGHT: 63 IN | TEMPERATURE: 100.3 F | OXYGEN SATURATION: 94 % | BODY MASS INDEX: 28.7 KG/M2 | RESPIRATION RATE: 20 BRPM | SYSTOLIC BLOOD PRESSURE: 178 MMHG | DIASTOLIC BLOOD PRESSURE: 86 MMHG | WEIGHT: 162 LBS | HEART RATE: 100 BPM

## 2021-03-02 DIAGNOSIS — T50.Z95A: Primary | ICD-10-CM

## 2021-03-02 DIAGNOSIS — R51.9 HEADACHE: ICD-10-CM

## 2021-03-02 DIAGNOSIS — R11.0 NAUSEA: ICD-10-CM

## 2021-03-02 PROCEDURE — 96372 THER/PROPH/DIAG INJ SC/IM: CPT

## 2021-03-02 PROCEDURE — 99283 EMERGENCY DEPT VISIT LOW MDM: CPT

## 2021-03-02 PROCEDURE — 99284 EMERGENCY DEPT VISIT MOD MDM: CPT | Performed by: PHYSICIAN ASSISTANT

## 2021-03-02 RX ORDER — METOCLOPRAMIDE 10 MG/1
10 TABLET ORAL EVERY 6 HOURS
Qty: 15 TABLET | Refills: 0 | Status: SHIPPED | OUTPATIENT
Start: 2021-03-02 | End: 2021-09-16

## 2021-03-02 RX ORDER — ACETAMINOPHEN 325 MG/1
650 TABLET ORAL ONCE
Status: COMPLETED | OUTPATIENT
Start: 2021-03-02 | End: 2021-03-02

## 2021-03-02 RX ORDER — METOCLOPRAMIDE HYDROCHLORIDE 5 MG/ML
10 INJECTION INTRAMUSCULAR; INTRAVENOUS ONCE
Status: COMPLETED | OUTPATIENT
Start: 2021-03-02 | End: 2021-03-02

## 2021-03-02 RX ORDER — CLONAZEPAM 0.5 MG/1
0.5 TABLET ORAL ONCE
Status: COMPLETED | OUTPATIENT
Start: 2021-03-02 | End: 2021-03-02

## 2021-03-02 RX ADMIN — CLONAZEPAM 0.5 MG: 0.5 TABLET ORAL at 11:39

## 2021-03-02 RX ADMIN — METOCLOPRAMIDE HYDROCHLORIDE 10 MG: 5 INJECTION INTRAMUSCULAR; INTRAVENOUS at 10:27

## 2021-03-02 RX ADMIN — ACETAMINOPHEN 650 MG: 325 TABLET, FILM COATED ORAL at 11:39

## 2021-03-02 NOTE — DISCHARGE INSTRUCTIONS
Rest, increase fluids  Tylenol for headaches/body aches  Reglan as needed for nausea  Follow up with family doctor if no improvement in 2-3 days

## 2021-03-02 NOTE — ED PROVIDER NOTES
History  Chief Complaint   Patient presents with    Medical Problem     patient presents to the ED withj c/o reaction from second covid vaccine that she got yesterday, states chills, diahrrea, vomitting, and headache      Patient is a 76 y/o F with h/o anxiety, depression, HTN, that presents to the ED with nausea, myalgias, fever since getting the second dose of covid vaccine yesterday  She states about 1 5 hours after getting the vaccine yesterday she started feeling nauseated  SHe states she has a headache and cannot take her meds because she feels so nauseated  She has not vomited  She states she had 3 episodes of diarrhea yesterday  History provided by:  Patient  Medical Problem  Severity:  Moderate  Onset quality:  Sudden  Duration:  2 days  Timing:  Constant  Progression:  Unchanged  Chronicity:  New  Context:  Patient with myalgias, headaches, chills, nausea after receiving second covid vaccine yesterday  Relieved by:  Nothing  Worsened by:  Nothing  Ineffective treatments:  None tried  Associated symptoms: diarrhea, fever, headaches, myalgias and nausea    Associated symptoms: no abdominal pain, no chest pain, no congestion, no cough, no rash, no rhinorrhea, no shortness of breath, no sore throat, no vomiting and no wheezing        Prior to Admission Medications   Prescriptions Last Dose Informant Patient Reported? Taking?    Cholecalciferol (VITAMIN D3) 2000 units capsule  Self Yes No   Sig: Take by mouth daily   Misc Natural Products (T-RELIEF CBD+13) SUBL  Self Yes No   Sig: Place under the tongue   Omega 3 1000 MG CAPS  Self Yes No   Sig: Take by mouth daily   Probiotic Product (PROBIOTIC-10 PO)  Self Yes No   Sig: Take by mouth daily   TURMERIC CURCUMIN PO  Self Yes No   Sig: Take by mouth   Turmeric 500 MG CAPS  Self Yes No   Sig: Take by mouth daily   ascorbic acid (VITAMIN C) 250 mg tablet  Self Yes No   Sig: Take 250 mg by mouth daily   aspirin (ASPIRIN ADULT LOW DOSE) 81 mg EC tablet  Self Yes No   Sig: Take 1 tablet by mouth daily   atenolol (TENORMIN) 25 mg tablet   No No   Sig: Take 1 tablet (25 mg total) by mouth 2 (two) times a day   clonazePAM (KlonoPIN) 0 5 mg tablet   No No   Sig: Take 1 tablet (0 5 mg total) by mouth 2 (two) times a day as needed for anxiety   co-enzyme Q-10 30 MG capsule  Self Yes No   Sig: Take 30 mg by mouth daily    dicyclomine (BENTYL) 10 mg capsule  Self No No   Sig: TAKE 2 CAPSULES BY MOUTH  TWO TIMES A DAY   fenofibrate (TRICOR) 48 mg tablet   No No   Sig: TAKE 1 TABLET BY MOUTH  DAILY   hyoscyamine (LEVSIN/SL) 0 125 mg SL tablet  Self No No   Sig: Take 1 tablet (0 125 mg total) by mouth every 6 (six) hours as needed for cramping   Patient not taking: Reported on 10/26/2020   irbesartan (AVAPRO) 150 mg tablet   No No   Sig: Take 1 tablet (150 mg total) by mouth daily diovan not available   magnesium oxide (MAG-OX) 400 mg  Self Yes No   Sig: Take 400 mg by mouth 2 (two) times a day   pantoprazole (PROTONIX) 40 mg tablet  Self No No   Sig: Take 1 tablet (40 mg total) by mouth daily   Patient not taking: Reported on 10/26/2020   predniSONE 10 mg tablet   No No   Sig: Take 6 tabs on day one, then 5 tabs on day two, 4 tabs on day three, 3 tabs on day four, 2 tabs on day five, and 1 tab on day 6   rosuvastatin (CRESTOR) 5 mg tablet   No No   Sig: TAKE 1 TABLET BY MOUTH 3  TIMES WEEKLY   traMADol (ULTRAM) 50 mg tablet   No No   Sig: Take 1 tablet (50 mg total) by mouth every 8 (eight) hours as needed for moderate pain   valACYclovir (VALTREX) 1,000 mg tablet   No No   Sig: Take 1 tablet (1,000 mg total) by mouth 3 (three) times a day for 7 days      Facility-Administered Medications: None       Past Medical History:   Diagnosis Date    Anxiety disorder     Arthralgia     Arthritis     Asthma     Chronic lumbar radiculopathy     last assessed: 12/20/16    Chronic respiratory failure (HCC)     Colon cancer screening 4/11/2019    Last colonoscopy 2014-15    Depression  Dysfunction of eustachian tube     Dyslipidemia     Emphysema lung (HCC)     Fatigue     HTN (hypertension) 10/29/2014    Hypercholesterolemia     Hypertension     Irritable bowel syndrome with diarrhea 2019    Lumbosacral stenosis     last assessed: 16    TRAE (obstructive sleep apnea)     Pancreatitis     resolved: 17    Pneumonia     PTSD (post-traumatic stress disorder)        Past Surgical History:   Procedure Laterality Date    CHOLECYSTECTOMY      EGD  04/15/2019    GALLBLADDER SURGERY      HYSTERECTOMY      pt thinks she still has one ovary, done over 20 yrs ago   [address]  2016    3, 4, 5    MOUTH SURGERY      MULTIPLE TOOTH EXTRACTIONS N/A     OOPHORECTOMY Bilateral     1 / ovaries removed    TONSILLECTOMY         Family History   Problem Relation Age of Onset    Depression Mother         panic attacks    Lung cancer Mother     Mental illness Mother     Substance Abuse Mother         CIGS    Coronary artery disease Father         high # of paternal family members  in their 46s    Substance Abuse Father         CIGS    Depression Sister         panic attacks    Hypertension Sister     Depression Other         panic attacks    Heart disease Family     Hypertension Family     Breast cancer Daughter 48    Lung cancer Maternal Aunt     Colon cancer Neg Hx     Colon polyps Neg Hx      I have reviewed and agree with the history as documented      E-Cigarette/Vaping    E-Cigarette Use Never User      E-Cigarette/Vaping Substances    Nicotine No     THC No     CBD No     Flavoring No     Other No     Unknown No      Social History     Tobacco Use    Smoking status: Never Smoker    Smokeless tobacco: Never Used   Substance Use Topics    Alcohol use: Yes     Frequency: 4 or more times a week     Drinks per session: 1 or 2     Comment: 4 drinks every night    Drug use: No       Review of Systems   Constitutional: Positive for appetite change, chills and fever  HENT: Negative for congestion, facial swelling, rhinorrhea, sore throat, trouble swallowing and voice change  Respiratory: Negative for cough, shortness of breath and wheezing  Cardiovascular: Negative for chest pain  Gastrointestinal: Positive for diarrhea and nausea  Negative for abdominal pain and vomiting  Musculoskeletal: Positive for myalgias  Skin: Negative for color change and rash  Neurological: Positive for dizziness, light-headedness and headaches  Negative for weakness  Psychiatric/Behavioral: Negative for confusion  All other systems reviewed and are negative  Physical Exam  Physical Exam  Vitals signs and nursing note reviewed  Constitutional:       General: She is not in acute distress  Appearance: Normal appearance  She is well-developed and well-groomed  She is not ill-appearing or diaphoretic  HENT:      Head: Normocephalic and atraumatic  Right Ear: Hearing normal       Left Ear: Hearing normal       Nose: Nose normal       Comments: Voice normal       Mouth/Throat:      Pharynx: Oropharynx is clear  No pharyngeal swelling or uvula swelling  Eyes:      Conjunctiva/sclera: Conjunctivae normal    Neck:      Musculoskeletal: Normal range of motion  Cardiovascular:      Rate and Rhythm: Normal rate and regular rhythm  Heart sounds: Normal heart sounds  Pulmonary:      Effort: Pulmonary effort is normal       Breath sounds: Normal breath sounds  No wheezing, rhonchi or rales  Musculoskeletal: Normal range of motion  Right lower leg: No edema  Left lower leg: No edema  Skin:     General: Skin is warm and dry  Coloration: Skin is not pale  Findings: No rash  Neurological:      General: No focal deficit present  Mental Status: She is alert and oriented to person, place, and time  Psychiatric:         Mood and Affect: Mood is anxious  Behavior: Behavior is cooperative           Vital Signs  ED Triage Vitals [03/02/21 1004]   Temperature Pulse Respirations Blood Pressure SpO2   100 3 °F (37 9 °C) 100 20 (!) 178/86 94 %      Temp Source Heart Rate Source Patient Position - Orthostatic VS BP Location FiO2 (%)   Temporal Monitor -- -- --      Pain Score       --           Vitals:    03/02/21 1004   BP: (!) 178/86   Pulse: 100         Visual Acuity      ED Medications  Medications   metoclopramide (REGLAN) injection 10 mg (10 mg Intramuscular Given 3/2/21 1027)   acetaminophen (TYLENOL) tablet 650 mg (650 mg Oral Given 3/2/21 1139)   clonazePAM (KlonoPIN) tablet 0 5 mg (0 5 mg Oral Given 3/2/21 1139)       Diagnostic Studies  Results Reviewed     None                 No orders to display              Procedures  Procedures         ED Course  ED Course as of Mar 02 1735   Tue Mar 02, 2021   1108 Patient states she is feeling Short of breath, relative states she has not taken any of her anxiety meds today  1206 Patient tolerating po, feeling a little better  SBIRT 20yo+      Most Recent Value   SBIRT (24 yo +)   In order to provide better care to our patients, we are screening all of our patients for alcohol and drug use  Would it be okay to ask you these screening questions? No Filed at: 03/02/2021 1052   Initial Alcohol Screen: US AUDIT-C    1  How often do you have a drink containing alcohol?  0 Filed at: 03/02/2021 1052   2  How many drinks containing alcohol do you have on a typical day you are drinking? 0 Filed at: 03/02/2021 1052   3a  Male UNDER 65: How often do you have five or more drinks on one occasion? 0 Filed at: 03/02/2021 1052   3b  FEMALE Any Age, or MALE 65+: How often do you have 4 or more drinks on one occassion? 0 Filed at: 03/02/2021 1052   Audit-C Score  0 Filed at: 03/02/2021 1052   [place]: How many times in the past year have you    Used an illegal drug or used a prescription medication for non-medical reasons?   Never Filed at: 03/02/2021 1052                    MDM  Number of Diagnoses or Management Options  Adverse reaction to vaccine: minor  Headache: minor  Nausea: minor  Diagnosis management comments: Patient with adverse reaction to covid vaccine, improved with reglan, advised f/u with PCP if symptoms continue  Patient Progress  Patient progress: stable      Disposition  Final diagnoses: Adverse reaction to vaccine   Nausea   Headache     Time reflects when diagnosis was documented in both MDM as applicable and the Disposition within this note     Time User Action Codes Description Comment    3/2/2021 12:07 PM Roxianne Dunks Add [T50  Z95A] Adverse reaction to vaccine     3/2/2021 12:08 PM Roxianne Dunks Add [R11 0] Nausea     3/2/2021 12:08 PM Roxianne Dunks Add [G44 009] Cluster headache, not intractable, unspecified chronicity pattern     3/2/2021 12:08 PM Roxianne Dunks Remove [G44 009] Cluster headache, not intractable, unspecified chronicity pattern     3/2/2021 12:08 PM Roxianne Dunks Add [R51 9] Headache       ED Disposition     ED Disposition Condition Date/Time Comment    Discharge Stable Tue Mar 2, 2021 12:06 PM Gretel Raymond discharge to home/self care              Follow-up Information     Follow up With Specialties Details Why Prisca Cheng 104, DO Internal Medicine Call in 2 days For recheck [place]  [address]  [phone]            Discharge Medication List as of 3/2/2021 12:08 PM      START taking these medications    Details   metoclopramide (REGLAN) 10 mg tablet Take 1 tablet (10 mg total) by mouth every 6 (six) hours, Starting Tue 3/2/2021, Normal         CONTINUE these medications which have NOT CHANGED    Details   ascorbic acid (VITAMIN C) 250 mg tablet Take 250 mg by mouth daily, Historical Med      aspirin (ASPIRIN ADULT LOW DOSE) 81 mg EC tablet Take 1 tablet by mouth daily, Starting Tue 2/7/2017, Historical Med      atenolol (TENORMIN) 25 mg tablet Take 1 tablet (25 mg total) by mouth 2 (two) times a day, Starting Mon 2/22/2021, Normal      Cholecalciferol (VITAMIN D3) 2000 units capsule Take by mouth daily, Historical Med      clonazePAM (KlonoPIN) 0 5 mg tablet Take 1 tablet (0 5 mg total) by mouth 2 (two) times a day as needed for anxiety, Starting Mon 3/1/2021, Normal      co-enzyme Q-10 30 MG capsule Take 30 mg by mouth daily , Historical Med      dicyclomine (BENTYL) 10 mg capsule TAKE 2 CAPSULES BY MOUTH  TWO TIMES A DAY, Normal      fenofibrate (TRICOR) 48 mg tablet TAKE 1 TABLET BY MOUTH  DAILY, Normal      hyoscyamine (LEVSIN/SL) 0 125 mg SL tablet Take 1 tablet (0 125 mg total) by mouth every 6 (six) hours as needed for cramping, Starting Tue 2/18/2020, Normal      irbesartan (AVAPRO) 150 mg tablet Take 1 tablet (150 mg total) by mouth daily diovan not available, Starting Mon 11/9/2020, Normal      magnesium oxide (MAG-OX) 400 mg Take 400 mg by mouth 2 (two) times a day, Historical Med      Misc Natural Products (T-RELIEF CBD+13) SUBL Place under the tongue, Historical Med      Omega 3 1000 MG CAPS Take by mouth daily, Historical Med      pantoprazole (PROTONIX) 40 mg tablet Take 1 tablet (40 mg total) by mouth daily, Starting Wed 10/9/2019, Normal      predniSONE 10 mg tablet Take 6 tabs on day one, then 5 tabs on day two, 4 tabs on day three, 3 tabs on day four, 2 tabs on day five, and 1 tab on day 6, Normal      Probiotic Product (PROBIOTIC-10 PO) Take by mouth daily, Historical Med      rosuvastatin (CRESTOR) 5 mg tablet TAKE 1 TABLET BY MOUTH 3  TIMES WEEKLY, Normal      traMADol (ULTRAM) 50 mg tablet Take 1 tablet (50 mg total) by mouth every 8 (eight) hours as needed for moderate pain, Starting Mon 1/4/2021, Normal      !!  Turmeric 500 MG CAPS Take by mouth daily, Historical Med      !! TURMERIC CURCUMIN PO Take by mouth, Historical Med      valACYclovir (VALTREX) 1,000 mg tablet Take 1 tablet (1,000 mg total) by mouth 3 (three) times a day for 7 days, Starting Sat 10/31/2020, Until Sat 11/7/2020, Normal       !! - Potential duplicate medications found  Please discuss with provider  No discharge procedures on file      PDMP Review       Value Time User    PDMP Reviewed  Yes 3/1/2021 12:28 PM Sofia Reyes DO          ED Provider  Electronically Signed by           Cesar Guillermo PA-C  03/02/21 1732

## 2021-04-02 ENCOUNTER — OFFICE VISIT (OUTPATIENT)
Dept: GASTROENTEROLOGY | Facility: CLINIC | Age: 76
End: 2021-04-02
Payer: MEDICARE

## 2021-04-02 VITALS
WEIGHT: 166 LBS | SYSTOLIC BLOOD PRESSURE: 124 MMHG | DIASTOLIC BLOOD PRESSURE: 92 MMHG | BODY MASS INDEX: 29.41 KG/M2 | HEIGHT: 63 IN | HEART RATE: 57 BPM

## 2021-04-02 DIAGNOSIS — K58.0 IRRITABLE BOWEL SYNDROME WITH DIARRHEA: Primary | ICD-10-CM

## 2021-04-02 DIAGNOSIS — K29.60 EROSIVE GASTRITIS: ICD-10-CM

## 2021-04-02 DIAGNOSIS — Z12.11 COLON CANCER SCREENING: ICD-10-CM

## 2021-04-02 PROCEDURE — 99213 OFFICE O/P EST LOW 20 MIN: CPT | Performed by: INTERNAL MEDICINE

## 2021-04-02 NOTE — PROGRESS NOTES
[id number] - Outpatient Follow-up Note  Deedee Cormier 77 y o  female MRN: [id number]  Encounter: [id number]    ASSESSMENT AND PLAN:      1  Irritable bowel syndrome with diarrhea   doing well taking magnesium every other day and probiotics daily  Rarely has to use Bentyl or Levsin as needed for abdominal pain  -  Continue current regimen    2  Erosive gastritis   history of erosive gastritis with nausea and vomiting  Exacerbated recently after 2nd COVID vaccine  Was on pantoprazole in the past but no acid suppression recently  -  Follow for now off acid suppression  -  Can use Reglan as needed    3  Colon cancer screening    Last colonoscopy June 2015  Due for follow-up 2025      Followup Appointment: Six months  ______________________________________________________________________    Chief Complaint   Patient presents with    Irritable Bowel Syndrome     follow up  HPI:   The patient is seen back in the office today  From irritable bowel syndrome she is doing well  She is apprehensive that she may develop ileus again  This occurred postoperatively and she is scheduled for a back injection  She takes magnesium every other day and takes probiotics daily  She occasionally uses Bentyl or Levsin for abdominal pain  She had her 2nd COVID vaccine and developed significant amount of vomiting  She end of the emergency room ultimately  She was treated with Reglan since she is reportedly allergic to Zofran but she cannot remember what the allergy is  This has fully resolved although again she is getting scheduled for back injections and she is afraid this will exacerbate her nausea and vomiting      Historical Information   Past Medical History:   Diagnosis Date    Anxiety disorder     Arthralgia     Arthritis     Asthma     Chronic lumbar radiculopathy     last assessed: 12/20/16    Chronic respiratory failure (HCC)     Colon cancer screening 4/11/2019    Last colonoscopy 2014-15    Depression     Dysfunction of eustachian tube     Dyslipidemia     Emphysema lung (HCC)     Fatigue     HTN (hypertension) 10/29/2014    Hypercholesterolemia     Hypertension     Irritable bowel syndrome with diarrhea 2019    Lumbosacral stenosis     last assessed: 16    TRAE (obstructive sleep apnea)     Pancreatitis     resolved: 17    Pneumonia     PTSD (post-traumatic stress disorder)      Past Surgical History:   Procedure Laterality Date    CHOLECYSTECTOMY      EGD  04/15/2019    GALLBLADDER SURGERY      HYSTERECTOMY      pt thinks she still has one ovary, done over 20 yrs ago   [address]  2016    3, 4, 5    MOUTH SURGERY      MULTIPLE TOOTH EXTRACTIONS N/A     OOPHORECTOMY Bilateral     1 1/2 ovaries removed    TONSILLECTOMY       Social History     Substance and Sexual Activity   Alcohol Use Yes    Frequency: 4 or more times a week    Drinks per session: 1 or 2    Comment: 4 drinks every night     Social History     Substance and Sexual Activity   Drug Use No     Social History     Tobacco Use   Smoking Status Never Smoker   Smokeless Tobacco Never Used     Family History   Problem Relation Age of Onset    Depression Mother         panic attacks    Lung cancer Mother     Mental illness Mother     Substance Abuse Mother         CIGS    Coronary artery disease Father         high # of paternal family members  in their 46s    Substance Abuse Father         CIGS    Depression Sister         panic attacks    Hypertension Sister     Depression Other         panic attacks    Heart disease Family     Hypertension Family     Breast cancer Daughter 48    Lung cancer Maternal Aunt     Colon cancer Neg Hx     Colon polyps Neg Hx          Current Outpatient Medications:     ascorbic acid (VITAMIN C) 250 mg tablet    aspirin (ASPIRIN ADULT LOW DOSE) 81 mg EC tablet    atenolol (TENORMIN) 25 mg tablet    Cholecalciferol (VITAMIN D3) 2000 units capsule    clonazePAM (KlonoPIN) 0 5 mg tablet    co-enzyme Q-10 30 MG capsule    dicyclomine (BENTYL) 10 mg capsule    fenofibrate (TRICOR) 48 mg tablet    hyoscyamine (LEVSIN/SL) 0 125 mg SL tablet    irbesartan (AVAPRO) 150 mg tablet    magnesium oxide (MAG-OX) 400 mg    Misc Natural Products (T-RELIEF CBD+13) SUBL    Omega 3 1000 MG CAPS    Probiotic Product (PROBIOTIC-10 PO)    rosuvastatin (CRESTOR) 5 mg tablet    traMADol (ULTRAM) 50 mg tablet    Turmeric 500 MG CAPS    TURMERIC CURCUMIN PO    metoclopramide (REGLAN) 10 mg tablet    predniSONE 10 mg tablet    valACYclovir (VALTREX) 1,000 mg tablet  Allergies   Allergen Reactions    Antihistamines, Diphenhydramine-Type     Bupropion     Clarithromycin     Codeine Other (See Comments)     increased HR    Gabapentin     Meloxicam Nausea Only and Visual Disturbance     Other reaction(s): Numbness  Action Taken: med stopped ; Category: Allergy;     Ondansetron     Pravastatin     Propoxyphene Other (See Comments)     increased HR     Reviewed medications and allergies and updated as indicated    PHYSICAL EXAM:    Blood pressure 124/92, pulse 57, height 5' 3" (1 6 m), weight 75 3 kg (166 lb)  Body mass index is 29 41 kg/m²  General Appearance: NAD, cooperative, alert  Eyes: Anicteric, PERRLA, EOMI  ENT:  Normocephalic, atraumatic, normal mucosa  Neck:  Supple, symmetrical, trachea midline  Resp:  Clear to auscultation bilaterally; no rales, rhonchi or wheezing; respirations unlabored   CV:  S1 S2, Regular rate and rhythm; no murmur, rub, or gallop  GI:  Soft,  Mild epigastric tenderness without rebound or guarding, non-distended; normal bowel sounds; no masses, no organomegaly   Rectal: Deferred  Musculoskeletal: No cyanosis, clubbing or edema  Normal ROM    Skin:  No jaundice, rashes, or lesions   Heme/Lymph: No palpable cervical lymphadenopathy  Psych: Normal affect, good eye contact  Neuro: No gross deficits, AAOx3    Lab Results:   Lab Results   Component Value Date    WBC 5 60 10/14/2020    HGB 14 1 10/14/2020    HCT 42 5 10/14/2020    MCV 91 10/14/2020     10/14/2020     Lab Results   Component Value Date     07/15/2016    K 4 0 10/14/2020     10/14/2020    CO2 28 10/14/2020    BUN 14 10/14/2020    CREATININE 0 64 10/14/2020    GLUF 102 (H) 10/14/2020    CALCIUM 9 8 10/14/2020    AST 27 10/14/2020    ALT 38 10/14/2020    ALKPHOS 69 10/14/2020    PROT 6 7 07/15/2016    BILITOT 0 6 07/15/2016    EGFR 88 10/14/2020       Lab Results   Component Value Date    LIPASE 160 07/31/2018       Radiology Results:   No results found

## 2021-04-02 NOTE — LETTER
April 2, 2021     Dulce Smith, [address]  [address] [address]    Patient: Belkis Purdy   YOB: 1945   Date of Visit: 4/2/2021       Dear Dr Deja Albarado: Thank you for referring Jatinder Guzmán to me for evaluation  Below are my notes for this consultation  If you have questions, please do not hesitate to call me  I look forward to following your patient along with you  Sincerely,        Yelitza Yeh MD        CC: No Recipients  Yelitza Yeh MD  4/2/2021  2:11 PM  Sign when Signing Visit  [facility] - Outpatient Follow-up Note  Belkis Purdy 77 y o  female MRN: [id number]  Encounter: [id number]    ASSESSMENT AND PLAN:      1  Irritable bowel syndrome with diarrhea   doing well taking magnesium every other day and probiotics daily  Rarely has to use Bentyl or Levsin as needed for abdominal pain  -  Continue current regimen    2  Erosive gastritis   history of erosive gastritis with nausea and vomiting  Exacerbated recently after 2nd COVID vaccine  Was on pantoprazole in the past but no acid suppression recently  -  Follow for now off acid suppression  -  Can use Reglan as needed    3  Colon cancer screening    Last colonoscopy June 2015  Due for follow-up 2025      Followup Appointment: Six months  ______________________________________________________________________    Chief Complaint   Patient presents with    Irritable Bowel Syndrome     follow up  HPI:   The patient is seen back in the office today  From irritable bowel syndrome she is doing well  She is apprehensive that she may develop ileus again  This occurred postoperatively and she is scheduled for a back injection  She takes magnesium every other day and takes probiotics daily  She occasionally uses Bentyl or Levsin for abdominal pain  She had her 2nd COVID vaccine and developed significant amount of vomiting    She end of the emergency room ultimately  She was treated with Reglan since she is reportedly allergic to Zofran but she cannot remember what the allergy is  This has fully resolved although again she is getting scheduled for back injections and she is afraid this will exacerbate her nausea and vomiting      Historical Information   Past Medical History:   Diagnosis Date    Anxiety disorder     Arthralgia     Arthritis     Asthma     Chronic lumbar radiculopathy     last assessed: 12/20/16    Chronic respiratory failure ([address] )     Colon cancer screening 4/11/2019    Last colonoscopy 2014-15    Depression     Dysfunction of eustachian tube     Dyslipidemia     Emphysema lung (HCC)     Fatigue     HTN (hypertension) 10/29/2014    Hypercholesterolemia     Hypertension     Irritable bowel syndrome with diarrhea 4/11/2019    Lumbosacral stenosis     last assessed: 9/20/16    TRAE (obstructive sleep apnea)     Pancreatitis     resolved: 9/20/17    Pneumonia     PTSD (post-traumatic stress disorder)      Past Surgical History:   Procedure Laterality Date    CHOLECYSTECTOMY      EGD  04/15/2019    GALLBLADDER SURGERY      HYSTERECTOMY      pt thinks she still has one ovary, done over 20 yrs ago   [address]  12/2016    3, 4, 5    MOUTH SURGERY      MULTIPLE TOOTH EXTRACTIONS N/A 2018    OOPHORECTOMY Bilateral     1 1/2 ovaries removed    TONSILLECTOMY       Social History     Substance and Sexual Activity   Alcohol Use Yes    Frequency: 4 or more times a week    Drinks per session: 1 or 2    Comment: 4 drinks every night     Social History     Substance and Sexual Activity   Drug Use No     Social History     Tobacco Use   Smoking Status Never Smoker   Smokeless Tobacco Never Used     Family History   Problem Relation Age of Onset    Depression Mother         panic attacks    Lung cancer Mother     Mental illness Mother     Substance Abuse Mother         CIGS    Coronary artery disease Father         high # of paternal family members  in their 46s    Substance Abuse Father         CIGS    Depression Sister         panic attacks    Hypertension Sister     Depression Other         panic attacks    Heart disease Family     Hypertension Family     Breast cancer Daughter 48    Lung cancer Maternal Aunt     Colon cancer Neg Hx     Colon polyps Neg Hx          Current Outpatient Medications:     ascorbic acid (VITAMIN C) 250 mg tablet    aspirin (ASPIRIN ADULT LOW DOSE) 81 mg EC tablet    atenolol (TENORMIN) 25 mg tablet    Cholecalciferol (VITAMIN D3) 2000 units capsule    clonazePAM (KlonoPIN) 0 5 mg tablet    co-enzyme Q-10 30 MG capsule    dicyclomine (BENTYL) 10 mg capsule    fenofibrate (TRICOR) 48 mg tablet    hyoscyamine (LEVSIN/SL) 0 125 mg SL tablet    irbesartan (AVAPRO) 150 mg tablet    magnesium oxide (MAG-OX) 400 mg    Misc Natural Products (T-RELIEF CBD+13) SUBL    Omega 3 1000 MG CAPS    Probiotic Product (PROBIOTIC-10 PO)    rosuvastatin (CRESTOR) 5 mg tablet    traMADol (ULTRAM) 50 mg tablet    Turmeric 500 MG CAPS    TURMERIC CURCUMIN PO    metoclopramide (REGLAN) 10 mg tablet    predniSONE 10 mg tablet    valACYclovir (VALTREX) 1,000 mg tablet  Allergies   Allergen Reactions    Antihistamines, Diphenhydramine-Type     Bupropion     Clarithromycin     Codeine Other (See Comments)     increased HR    Gabapentin     Meloxicam Nausea Only and Visual Disturbance     Other reaction(s): Numbness  Action Taken: med stopped ; Category: Allergy;     Ondansetron     Pravastatin     Propoxyphene Other (See Comments)     increased HR     Reviewed medications and allergies and updated as indicated    PHYSICAL EXAM:    Blood pressure 124/92, pulse 57, height 5' 3" (1 6 m), weight 75 3 kg (166 lb)  Body mass index is 29 41 kg/m²  General Appearance: NAD, cooperative, alert  Eyes: Anicteric, PERRLA, EOMI  ENT:  Normocephalic, atraumatic, normal mucosa      Neck:  Supple, symmetrical, trachea midline  Resp:  Clear to auscultation bilaterally; no rales, rhonchi or wheezing; respirations unlabored   CV:  S1 S2, Regular rate and rhythm; no murmur, rub, or gallop  GI:  Soft,  Mild epigastric tenderness without rebound or guarding, non-distended; normal bowel sounds; no masses, no organomegaly   Rectal: Deferred  Musculoskeletal: No cyanosis, clubbing or edema  Normal ROM  Skin:  No jaundice, rashes, or lesions   Heme/Lymph: No palpable cervical lymphadenopathy  Psych: Normal affect, good eye contact  Neuro: No gross deficits, AAOx3    Lab Results:   Lab Results   Component Value Date    WBC 5 60 10/14/2020    HGB 14 1 10/14/2020    HCT 42 5 10/14/2020    MCV 91 10/14/2020     10/14/2020     Lab Results   Component Value Date     07/15/2016    K 4 0 10/14/2020     10/14/2020    CO2 28 10/14/2020    BUN 14 10/14/2020    CREATININE 0 64 10/14/2020    GLUF 102 (H) 10/14/2020    CALCIUM 9 8 10/14/2020    AST 27 10/14/2020    ALT 38 10/14/2020    ALKPHOS 69 10/14/2020    PROT 6 7 07/15/2016    BILITOT 0 6 07/15/2016    EGFR 88 10/14/2020       Lab Results   Component Value Date    LIPASE 160 07/31/2018       Radiology Results:   No results found

## 2021-04-07 DIAGNOSIS — I10 ESSENTIAL HYPERTENSION: ICD-10-CM

## 2021-04-07 RX ORDER — IRBESARTAN 150 MG/1
150 TABLET ORAL DAILY
Qty: 90 TABLET | Refills: 3 | Status: SHIPPED | OUTPATIENT
Start: 2021-04-07 | End: 2021-07-23

## 2021-04-23 DIAGNOSIS — F45.8 ANXIETY HYPERVENTILATION: ICD-10-CM

## 2021-04-23 DIAGNOSIS — F41.9 ANXIETY HYPERVENTILATION: ICD-10-CM

## 2021-04-23 RX ORDER — CLONAZEPAM 0.5 MG/1
0.5 TABLET ORAL 2 TIMES DAILY PRN
Qty: 60 TABLET | Refills: 0 | Status: SHIPPED | OUTPATIENT
Start: 2021-04-23 | End: 2021-05-26 | Stop reason: SDUPTHER

## 2021-05-26 ENCOUNTER — OFFICE VISIT (OUTPATIENT)
Dept: FAMILY MEDICINE CLINIC | Facility: HOSPITAL | Age: 76
End: 2021-05-26
Payer: MEDICARE

## 2021-05-26 VITALS
HEART RATE: 64 BPM | HEIGHT: 63 IN | OXYGEN SATURATION: 98 % | DIASTOLIC BLOOD PRESSURE: 72 MMHG | BODY MASS INDEX: 28.81 KG/M2 | SYSTOLIC BLOOD PRESSURE: 130 MMHG | WEIGHT: 162.6 LBS

## 2021-05-26 DIAGNOSIS — I10 ESSENTIAL HYPERTENSION: ICD-10-CM

## 2021-05-26 DIAGNOSIS — R51.9 RIGHT FACIAL PAIN: ICD-10-CM

## 2021-05-26 DIAGNOSIS — I77.9 BILATERAL CAROTID ARTERY DISEASE, UNSPECIFIED TYPE (HCC): ICD-10-CM

## 2021-05-26 DIAGNOSIS — F45.8 ANXIETY HYPERVENTILATION: ICD-10-CM

## 2021-05-26 DIAGNOSIS — G89.29 CHRONIC BILATERAL LOW BACK PAIN WITH LEFT-SIDED SCIATICA: ICD-10-CM

## 2021-05-26 DIAGNOSIS — G47.33 OBSTRUCTIVE SLEEP APNEA: ICD-10-CM

## 2021-05-26 DIAGNOSIS — Z00.00 MEDICARE ANNUAL WELLNESS VISIT, SUBSEQUENT: Primary | ICD-10-CM

## 2021-05-26 DIAGNOSIS — F41.9 ANXIETY HYPERVENTILATION: ICD-10-CM

## 2021-05-26 DIAGNOSIS — J44.9 CHRONIC BRONCHITIS WITH EMPHYSEMA (HCC): ICD-10-CM

## 2021-05-26 DIAGNOSIS — M54.42 CHRONIC BILATERAL LOW BACK PAIN WITH LEFT-SIDED SCIATICA: ICD-10-CM

## 2021-05-26 PROCEDURE — G0439 PPPS, SUBSEQ VISIT: HCPCS | Performed by: INTERNAL MEDICINE

## 2021-05-26 RX ORDER — CEFUROXIME AXETIL 250 MG/1
250 TABLET ORAL EVERY 12 HOURS SCHEDULED
Qty: 14 TABLET | Refills: 0 | Status: SHIPPED | OUTPATIENT
Start: 2021-05-26 | End: 2021-06-02

## 2021-05-26 RX ORDER — TRAMADOL HYDROCHLORIDE 50 MG/1
50 TABLET ORAL EVERY 8 HOURS PRN
Qty: 60 TABLET | Refills: 0 | Status: SHIPPED | OUTPATIENT
Start: 2021-05-26 | End: 2021-09-16 | Stop reason: SDUPTHER

## 2021-05-26 RX ORDER — FLUTICASONE PROPIONATE 50 MCG
1 SPRAY, SUSPENSION (ML) NASAL DAILY
Qty: 1 G | Refills: 1 | Status: SHIPPED | OUTPATIENT
Start: 2021-05-26 | End: 2021-09-16 | Stop reason: SDUPTHER

## 2021-05-26 RX ORDER — CLONAZEPAM 0.5 MG/1
0.5 TABLET ORAL 2 TIMES DAILY PRN
Qty: 60 TABLET | Refills: 0 | Status: SHIPPED | OUTPATIENT
Start: 2021-05-26 | End: 2021-08-31 | Stop reason: SDUPTHER

## 2021-05-26 NOTE — PATIENT INSTRUCTIONS
Medicare Preventive Visit Patient Instructions  Thank you for completing your Welcome to Medicare Visit or Medicare Annual Wellness Visit today  Your next wellness visit will be due in one year (5/27/2022)  The screening/preventive services that you may require over the next 5-10 years are detailed below  Some tests may not apply to you based off risk factors and/or age  Screening tests ordered at today's visit but not completed yet may show as past due  Also, please note that scanned in results may not display below  Preventive Screenings:  Service Recommendations Previous Testing/Comments   Colorectal Cancer Screening  * Colonoscopy    * Fecal Occult Blood Test (FOBT)/Fecal Immunochemical Test (FIT)  * Fecal DNA/Cologuard Test  * Flexible Sigmoidoscopy Age: 54-65 years old   Colonoscopy: every 10 years (may be performed more frequently if at higher risk)  OR  FOBT/FIT: every 1 year  OR  Cologuard: every 3 years  OR  Sigmoidoscopy: every 5 years  Screening may be recommended earlier than age 48 if at higher risk for colorectal cancer  Also, an individualized decision between you and your healthcare provider will decide whether screening between the ages of 74-80 would be appropriate  Colonoscopy: 06/04/2015  FOBT/FIT: Not on file  Cologuard: Not on file  Sigmoidoscopy: Not on file    Screening Current     Breast Cancer Screening Age: 36 years old  Frequency: every 1-2 years  Not required if history of left and right mastectomy Mammogram: 08/13/2020    Screening Current   Cervical Cancer Screening Between the ages of 21-29, pap smear recommended once every 3 years  Between the ages of 33-67, can perform pap smear with HPV co-testing every 5 years     Recommendations may differ for women with a history of total hysterectomy, cervical cancer, or abnormal pap smears in past  Pap Smear: Not on file    Screening Not Indicated   Hepatitis C Screening Once for adults born between 1945 and 1965  More frequently in patients at high risk for Hepatitis C Hep C Antibody: 04/13/2019    Screening Current   Diabetes Screening 1-2 times per year if you're at risk for diabetes or have pre-diabetes Fasting glucose: 102 mg/dL   A1C: 5 6 %    Screening Current   Cholesterol Screening Once every 5 years if you don't have a lipid disorder  May order more often based on risk factors  Lipid panel: 12/11/2019    Screening Not Indicated  History Lipid Disorder     Other Preventive Screenings Covered by Medicare:  1  Abdominal Aortic Aneurysm (AAA) Screening: covered once if your at risk  You're considered to be at risk if you have a family history of AAA  2  Lung Cancer Screening: covers low dose CT scan once per year if you meet all of the following conditions: (1) Age 50-69; (2) No signs or symptoms of lung cancer; (3) Current smoker or have quit smoking within the last 15 years; (4) You have a tobacco smoking history of at least 30 pack years (packs per day multiplied by number of years you smoked); (5) You get a written order from a healthcare provider  3  Glaucoma Screening: covered annually if you're considered high risk: (1) You have diabetes OR (2) Family history of glaucoma OR (3)  aged 48 and older OR (3)  American aged 72 and older  3  Osteoporosis Screening: covered every 2 years if you meet one of the following conditions: (1) You're estrogen deficient and at risk for osteoporosis based off medical history and other findings; (2) Have a vertebral abnormality; (3) On glucocorticoid therapy for more than 3 months; (4) Have primary hyperparathyroidism; (5) On osteoporosis medications and need to assess response to drug therapy  · Last bone density test (DXA Scan): 08/13/2020   5  HIV Screening: covered annually if you're between the age of 15-65  Also covered annually if you are younger than 13 and older than 72 with risk factors for HIV infection   For pregnant patients, it is covered up to 3 times per pregnancy  Immunizations:  Immunization Recommendations   Influenza Vaccine Annual influenza vaccination during flu season is recommended for all persons aged >= 6 months who do not have contraindications   Pneumococcal Vaccine (Prevnar and Pneumovax)  * Prevnar = PCV13  * Pneumovax = PPSV23   Adults 25-60 years old: 1-3 doses may be recommended based on certain risk factors  Adults 72 years old: Prevnar (PCV13) vaccine recommended followed by Pneumovax (PPSV23) vaccine  If already received PPSV23 since turning 65, then PCV13 recommended at least one year after PPSV23 dose  Hepatitis B Vaccine 3 dose series if at intermediate or high risk (ex: diabetes, end stage renal disease, liver disease)   Tetanus (Td) Vaccine - COST NOT COVERED BY MEDICARE PART B Following completion of primary series, a booster dose should be given every 10 years to maintain immunity against tetanus  Td may also be given as tetanus wound prophylaxis  Tdap Vaccine - COST NOT COVERED BY MEDICARE PART B Recommended at least once for all adults  For pregnant patients, recommended with each pregnancy  Shingles Vaccine (Shingrix) - COST NOT COVERED BY MEDICARE PART B  2 shot series recommended in those aged 48 and above     Health Maintenance Due:      Topic Date Due    Colorectal Cancer Screening  06/04/2025    Hepatitis C Screening  Completed     Immunizations Due:      Topic Date Due    DTaP,Tdap,and Td Vaccines (1 - Tdap) 01/10/1966     Advance Directives   What are advance directives? Advance directives are legal documents that state your wishes and plans for medical care  These plans are made ahead of time in case you lose your ability to make decisions for yourself  Advance directives can apply to any medical decision, such as the treatments you want, and if you want to donate organs  What are the types of advance directives? There are many types of advance directives, and each state has rules about how to use them   You may choose a combination of any of the following:  · Living will: This is a written record of the treatment you want  You can also choose which treatments you do not want, which to limit, and which to stop at a certain time  This includes surgery, medicine, IV fluid, and tube feedings  · Durable power of  for healthcare [facility]): This is a written record that states who you want to make healthcare choices for you when you are unable to make them for yourself  This person, called a proxy, is usually a family member or a friend  You may choose more than 1 proxy  · Do not resuscitate (DNR) order:  A DNR order is used in case your heart stops beating or you stop breathing  It is a request not to have certain forms of treatment, such as CPR  A DNR order may be included in other types of advance directives  · Medical directive: This covers the care that you want if you are in a coma, near death, or unable to make decisions for yourself  You can list the treatments you want for each condition  Treatment may include pain medicine, surgery, blood transfusions, dialysis, IV or tube feedings, and a ventilator (breathing machine)  · Values history: This document has questions about your views, beliefs, and how you feel and think about life  This information can help others choose the care that you would choose  Why are advance directives important? An advance directive helps you control your care  Although spoken wishes may be used, it is better to have your wishes written down  Spoken wishes can be misunderstood, or not followed  Treatments may be given even if you do not want them  An advance directive may make it easier for your family to make difficult choices about your care  Urinary Incontinence   Urinary incontinence (UI)  is when you lose control of your bladder  UI develops because your bladder cannot store or empty urine properly   The 3 most common types of UI are stress incontinence, urge incontinence, or both  Medicines:   · May be given to help strengthen your bladder control  Report any side effects of medication to your healthcare provider  Do pelvic muscle exercises often:  Your pelvic muscles help you stop urinating  Squeeze these muscles tight for 5 seconds, then relax for 5 seconds  Gradually work up to squeezing for 10 seconds  Do 3 sets of 15 repetitions a day, or as directed  This will help strengthen your pelvic muscles and improve bladder control  Train your bladder:  Go to the bathroom at set times, such as every 2 hours, even if you do not feel the urge to go  You can also try to hold your urine when you feel the urge to go  For example, hold your urine for 5 minutes when you feel the urge to go  As that becomes easier, hold your urine for 10 minutes  Self-care:   · Keep a UI record  Write down how often you leak urine and how much you leak  Make a note of what you were doing when you leaked urine  · Drink liquids as directed  You may need to limit the amount of liquid you drink to help control your urine leakage  Do not drink any liquid right before you go to bed  Limit or do not have drinks that contain caffeine or alcohol  · Prevent constipation  Eat a variety of high-fiber foods  Good examples are high-fiber cereals, beans, vegetables, and whole-grain breads  Walking is the best way to trigger your intestines to have a bowel movement  · Exercise regularly and maintain a healthy weight  Weight loss and exercise will decrease pressure on your bladder and help you control your leakage  · Use a catheter as directed  to help empty your bladder  A catheter is a tiny, plastic tube that is put into your bladder to drain your urine  · Go to behavior therapy as directed  Behavior therapy may be used to help you learn to control your urge to urinate      Weight Management   Why it is important to manage your weight:  Being overweight increases your risk of health conditions such as heart disease, high blood pressure, type 2 diabetes, and certain types of cancer  It can also increase your risk for osteoarthritis, sleep apnea, and other respiratory problems  Aim for a slow, steady weight loss  Even a small amount of weight loss can lower your risk of health problems  How to lose weight safely:  A safe and healthy way to lose weight is to eat fewer calories and get regular exercise  You can lose up about 1 pound a week by decreasing the number of calories you eat by 500 calories each day  Healthy meal plan for weight management:  A healthy meal plan includes a variety of foods, contains fewer calories, and helps you stay healthy  A healthy meal plan includes the following:  · Eat whole-grain foods more often  A healthy meal plan should contain fiber  Fiber is the part of grains, fruits, and vegetables that is not broken down by your body  Whole-grain foods are healthy and provide extra fiber in your diet  Some examples of whole-grain foods are whole-wheat breads and pastas, oatmeal, brown rice, and bulgur  · Eat a variety of vegetables every day  Include dark, leafy greens such as spinach, kale, leandro greens, and mustard greens  Eat yellow and orange vegetables such as carrots, sweet potatoes, and winter squash  · Eat a variety of fruits every day  Choose fresh or canned fruit (canned in its own juice or light syrup) instead of juice  Fruit juice has very little or no fiber  · Eat low-fat dairy foods  Drink fat-free (skim) milk or 1% milk  Eat fat-free yogurt and low-fat cottage cheese  Try low-fat cheeses such as mozzarella and other reduced-fat cheeses  · Choose meat and other protein foods that are low in fat  Choose beans or other legumes such as split peas or lentils  Choose fish, skinless poultry (chicken or turkey), or lean cuts of red meat (beef or pork)  Before you cook meat or poultry, cut off any visible fat  · Use less fat and oil    Try baking foods instead of frying them  Add less fat, such as margarine, sour cream, regular salad dressing and mayonnaise to foods  Eat fewer high-fat foods  Some examples of high-fat foods include french fries, doughnuts, ice cream, and cakes  · Eat fewer sweets  Limit foods and drinks that are high in sugar  This includes candy, cookies, regular soda, and sweetened drinks  Exercise:  Exercise at least 30 minutes per day on most days of the week  Some examples of exercise include walking, biking, dancing, and swimming  You can also fit in more physical activity by taking the stairs instead of the elevator or parking farther away from stores  Ask your healthcare provider about the best exercise plan for you  Narcotic (Opioid) Safety    Use narcotics safely:  · Take prescribed narcotics exactly as directed  · Do not give narcotics to others or take narcotics that belong to someone else  · Do not mix narcotics without medicines or alcohol  · Do not drive or operate heavy machinery after you take the narcotic  · Monitor for side effects and notify your healthcare provider if you experienced side effects such as nausea, sleepiness, itching, or trouble thinking clearly  Manage constipation:    Constipation is the most common side effect of narcotic medicine  Constipation is when you have hard, dry bowel movements, or you go longer than usual between bowel movements  Tell your healthcare provider about all changes in your bowel movements while you are taking narcotics  He or she may recommend laxative medicine to help you have a bowel movement  He or she may also change the kind of narcotic you are taking, or change when you take it  The following are more ways you can prevent or relieve constipation:    · Drink liquids as directed  You may need to drink extra liquids to help soften and move your bowels  Ask how much liquid to drink each day and which liquids are best for you  · Eat high-fiber foods    This may help decrease constipation by adding bulk to your bowel movements  High-fiber foods include fruits, vegetables, whole-grain breads and cereals, and beans  Your healthcare provider or dietitian can help you create a high-fiber meal plan  Your provider may also recommend a fiber supplement if you cannot get enough fiber from food  · Exercise regularly  Regular physical activity can help stimulate your intestines  Walking is a good exercise to prevent or relieve constipation  Ask which exercises are best for you  · Schedule a time each day to have a bowel movement  This may help train your body to have regular bowel movements  Bend forward while you are on the toilet to help move the bowel movement out  Sit on the toilet for at least 10 minutes, even if you do not have a bowel movement  Store narcotics safely:   · Store narcotics where others cannot easily get them  Keep them in a locked cabinet or secure area  Do not  keep them in a purse or other bag you carry with you  A person may be looking for something else and find the narcotics  · Make sure narcotics are stored out of the reach of children  A child can easily overdose on narcotics  Narcotics may look like candy to a small child  The best way to dispose of narcotics: The laws vary by country and area  In the [place], the best way is to return the narcotics through a take-back program  This program is offered by the [employer]  The following are options for using the program:  · Take the narcotics to a TRAE collection site  The site is often a law enforcement center  Call your local law enforcement center for scheduled take-back days in your area  You will be given information on where to go if the collection site is in a different location  · Take the narcotics to an approved pharmacy or hospital   A pharmacy or hospital may be set up as a collection site  You will need to ask if it is a TRAE collection site if you were not directed there   A pharmacy or doctor's office may not be able to take back narcotics unless it is a TRAE site  · Use a mail-back system  This means you are given containers to put the narcotics into  You will then mail them in the containers  · Use a take-back drop box  This is a place to leave the narcotics at any time  People and animals will not be able to get into the box  Your local law enforcement agency can tell you where to find a drop box in your area  Other ways to manage pain:   · Ask your healthcare provider about non-narcotic medicines to control pain  Nonprescription medicines include NSAIDs (such as ibuprofen) and acetaminophen  Prescription medicines include muscle relaxers, antidepressants, and steroids  · Pain may be managed without any medicines  Some ways to relieve pain include massage, aromatherapy, or meditation  Physical or occupational therapy may also help  For more information:   · Drug Enforcement Administration  [address] [place] [address] [address]  Phone: 1- 276 - 878-4918  Web Address: [facility]/drug_disposal/    · [address]  and Drug Administration  [facility] , [address] [place] [address]  Phone: 7- 621 - 281-2420  Web Address: http://MePlease/     © Copyright [employer] 2018 Information is for End User's use only and may not be sold, redistributed or otherwise used for commercial purposes   All illustrations and images included in CareNotes® are the copyrighted property of A D A M , Inc  or [address] [place] [address]

## 2021-05-26 NOTE — PROGRESS NOTES
Assessment and Plan:     Problem List Items Addressed This Visit     None        BMI Counseling: Body mass index is 28 8 kg/m²  The BMI is above normal  Nutrition recommendations include encouraging healthy choices of fruits and vegetables and increasing intake of lean protein  Preventive health issues were discussed with patient, and age appropriate screening tests were ordered as noted in patient's After Visit Summary  Personalized health advice and appropriate referrals for health education or preventive services given if needed, as noted in patient's After Visit Summary  History of Present Illness:     Patient presents for Welcome to Medicare visit  Patient Care Team:  Elena Nuno DO as PCP - General  DO Kei Culver MD Hortense Read, MD Jeanine Corners, DO Annita Congress, CRNP     Review of Systems:     Review of Systems   Constitutional: Positive for fever  Had fever and severe vomiting after second covid shot   HENT: Positive for postnasal drip  Right facial pain since last week-seen by Dentist yesterday- had xrays which were negative  Feels like pain shooting into right eye- no blurred vision     had eye exam on May 6th- having some flloaters which have improved  Had changed eye drops for dry eyes with no issues      has chronic post nasal drip- does not blow nose- unsure if disocolation, no fever or chills      Problem List:     Patient Active Problem List   Diagnosis    Allergic rhinitis    Anxiety disorder    Chronic bronchitis with emphysema (HCC)    Chronic low back pain    Essential hypertension    Myofascial pain syndrome    Neurogenic claudication    Obstructive sleep apnea    Panic attack    Xerostomia    Symmetrical polyarthritis    Vitamin D deficiency    Bilateral carotid artery disease ([address]    Colon cancer screening    Irritable bowel syndrome with diarrhea    Erosive gastritis    Class 1 obesity without serious comorbidity in adult    Syncope    Abnormal left aortic arch    Aberrant right subclavian artery    Nonrheumatic aortic valve insufficiency    Mild mitral regurgitation    Mild tricuspid regurgitation    Lumbar spinal stenosis    Family hx-breast malignancy      Past Medical and Surgical History:     Past Medical History:   Diagnosis Date    Anxiety disorder     Arthralgia     Arthritis     Asthma     Chronic lumbar radiculopathy     last assessed: 16    Chronic respiratory failure ([address] )     Colon cancer screening 2019    Last colonoscopy -15    Depression     Dysfunction of eustachian tube     Dyslipidemia     Emphysema lung (HCC)     Fatigue     HTN (hypertension) 10/29/2014    Hypercholesterolemia     Hypertension     Irritable bowel syndrome with diarrhea 2019    Lumbosacral stenosis     last assessed: 16    TRAE (obstructive sleep apnea)     Pancreatitis     resolved: 17    Pneumonia     PTSD (post-traumatic stress disorder)      Past Surgical History:   Procedure Laterality Date    CHOLECYSTECTOMY      EGD  04/15/2019    GALLBLADDER SURGERY      HYSTERECTOMY      pt thinks she still has one ovary, done over 20 yrs ago   [address]  2016    3, 4, 5    MOUTH SURGERY      MULTIPLE TOOTH EXTRACTIONS N/A     OOPHORECTOMY Bilateral     1 1/2 ovaries removed    TONSILLECTOMY        Family History:     Family History   Problem Relation Age of Onset    Depression Mother         panic attacks    Lung cancer Mother     Mental illness Mother     Substance Abuse Mother         CIGS    Coronary artery disease Father         high # of paternal family members  in their 46s    Substance Abuse Father         CIGS    Depression Sister         panic attacks    Hypertension Sister     Depression Other         panic attacks    Heart disease Family     Hypertension Family     Breast cancer Daughter 48    Lung cancer Maternal Aunt     Colon cancer Neg Hx  Colon polyps Neg Hx       Social History:     E-Cigarette/Vaping    E-Cigarette Use Never User      E-Cigarette/Vaping Substances    Nicotine No     THC No     CBD No     Flavoring No     Other No     Unknown No      Social History     Socioeconomic History    Marital status:      Spouse name: None    Number of children: None    Years of education: None    Highest education level: None   Occupational History    Occupation: retired   Social Needs    Financial resource strain: None    Food insecurity     Worry: None     Inability: None    Transportation needs     Medical: None     Non-medical: None   Tobacco Use    Smoking status: Never Smoker    Smokeless tobacco: Never Used   Substance and Sexual Activity    Alcohol use: Yes     Frequency: 4 or more times a week     Drinks per session: 1 or 2     Comment: 4 drinks every night    Drug use: No    Sexual activity: Not Currently   Lifestyle    Physical activity     Days per week: None     Minutes per session: None    Stress: None   Relationships    Social connections     Talks on phone: None     Gets together: None     Attends [employer] service: None     Active member of club or organization: None     Attends meetings of clubs or organizations: None     Relationship status: None    Intimate partner violence     Fear of current or ex partner: None     Emotionally abused: None     Physically abused: None     Forced sexual activity: None   Other Topics Concern    None   Social History Narrative    Person living alone    FEELS SAFE AT HOME    SEES DENTIST REG    HAS LIVING WILL    Wears seatbelt      Medications and Allergies:     Current Outpatient Medications   Medication Sig Dispense Refill    ascorbic acid (VITAMIN C) 250 mg tablet Take 250 mg by mouth daily      aspirin (ASPIRIN ADULT LOW DOSE) 81 mg EC tablet Take 1 tablet by mouth daily      atenolol (TENORMIN) 25 mg tablet Take 1 tablet (25 mg total) by mouth 2 (two) times a day 180 tablet 3    Cholecalciferol (VITAMIN D3) 2000 units capsule Take by mouth daily      clonazePAM (KlonoPIN) 0 5 mg tablet Take 1 tablet (0 5 mg total) by mouth 2 (two) times a day as needed for anxiety 60 tablet 0    co-enzyme Q-10 30 MG capsule Take 30 mg by mouth daily       dicyclomine (BENTYL) 10 mg capsule TAKE 2 CAPSULES BY MOUTH  TWO TIMES A DAY (Patient taking differently: Take 10 mg by mouth 2 (two) times a day as needed ) 360 capsule 12    fenofibrate (TRICOR) 48 mg tablet TAKE 1 TABLET BY MOUTH  DAILY 90 tablet 3    hyoscyamine (LEVSIN/SL) 0 125 mg SL tablet Take 1 tablet (0 125 mg total) by mouth every 6 (six) hours as needed for cramping 10 tablet 10    irbesartan (AVAPRO) 150 mg tablet Take 1 tablet (150 mg total) by mouth daily 90 tablet 3    magnesium oxide (MAG-OX) 400 mg Take 400 mg by mouth every other day       metoclopramide (REGLAN) 10 mg tablet Take 1 tablet (10 mg total) by mouth every 6 (six) hours 15 tablet 0    Misc Natural Products (T-RELIEF CBD+13) SUBL Place under the tongue      Omega 3 1000 MG CAPS Take by mouth daily      predniSONE 10 mg tablet Take 6 tabs on day one, then 5 tabs on day two, 4 tabs on day three, 3 tabs on day four, 2 tabs on day five, and 1 tab on day 6 21 tablet 0    Probiotic Product (PROBIOTIC-10 PO) Take by mouth daily      rosuvastatin (CRESTOR) 5 mg tablet TAKE 1 TABLET BY MOUTH 3  TIMES WEEKLY 39 tablet 3    traMADol (ULTRAM) 50 mg tablet Take 1 tablet (50 mg total) by mouth every 8 (eight) hours as needed for moderate pain 60 tablet 0    Turmeric 500 MG CAPS Take by mouth daily      TURMERIC CURCUMIN PO Take by mouth      valACYclovir (VALTREX) 1,000 mg tablet Take 1 tablet (1,000 mg total) by mouth 3 (three) times a day for 7 days 21 tablet 0     No current facility-administered medications for this visit        Allergies   Allergen Reactions    Antihistamines, Diphenhydramine-Type     Bupropion     Clarithromycin     Codeine Other (See Comments)     increased HR    Gabapentin     Meloxicam Nausea Only and Visual Disturbance     Other reaction(s): Numbness  Action Taken: med stopped ; Category: Allergy;     Ondansetron     Pravastatin     Propoxyphene Other (See Comments)     increased HR      Immunizations:     Immunization History   Administered Date(s) Administered    INFLUENZA 09/25/2019    Influenza Quadrivalent Preservative Free 3 years and older IM 11/03/2014    Influenza Split High Dose Preservative Free IM 12/17/2015, 10/06/2016, 09/20/2017    Influenza, high dose seasonal 0 7 mL 10/15/2018, 10/15/2020    Influenza, seasonal, injectable 09/12/2013    Pneumococcal Conjugate 13-Valent 12/30/2015, 07/27/2017    Pneumococcal Polysaccharide PPV23 01/01/2011, 08/09/2019    SARS-CoV-2 / COVID-19 mRNA IM ([facility]) 01/25/2021, 03/01/2021    Td (adult), adsorbed 06/06/2011      Health Maintenance:         Topic Date Due    Colorectal Cancer Screening  06/04/2025    Hepatitis C Screening  Completed         Topic Date Due    DTaP,Tdap,and Td Vaccines (1 - Tdap) 01/10/1966      Medicare Screening Tests and Risk Assessments:     Holland Kan is here for her Subsequent Wellness visit  Health Risk Assessment:   Patient rates overall health as fair  Patient feels that their physical health rating is slightly better  Patient is satisfied with their life  Eyesight was rated as same  Hearing was rated as same  Patient feels that their emotional and mental health rating is same  Patients states they are never, rarely angry  Patient states they are always unusually tired/fatigued  Pain experienced in the last 7 days has been a lot  Patient's pain rating has been 8/10  Patient states that she has experienced weight loss or gain in last 6 months  Lost weight     Depression Screening:   PHQ-2 Score: 0      Fall Risk Screening:    In the past year, patient has experienced: no history of falling in past year      Urinary Incontinence Screening:   Patient has leaked urine accidently in the last six months  Home Safety:  Patient has trouble with stairs inside or outside of their home  Patient has working smoke alarms and has working carbon monoxide detector  Home safety hazards include: none  Nutrition:   Current diet is Regular  Discussed  Decreasing to one drink per day max and not taking if she is using her tramadol for pain as this is a risky combination    Medications:   Patient is currently taking over-the-counter supplements  OTC medications include: see medication list  Patient is able to manage medications  Activities of Daily Living (ADLs)/Instrumental Activities of Daily Living (IADLs):   Walk and transfer into and out of bed and chair?: Yes  Dress and groom yourself?: Yes    Bathe or shower yourself?: Yes    Feed yourself?  Yes  Do your laundry/housekeeping?: Yes  Manage your money, pay your bills and track your expenses?: Yes  Make your own meals?: Yes    Do your own shopping?: Yes    Previous Hospitalizations:   Any hospitalizations or ED visits within the last 12 months?: Yes    How many hospitalizations have you had in the last year?: 3-4    Cognitive Screening:   Provider or family/friend/caregiver concerned regarding cognition?: No    PREVENTIVE SCREENINGS      Cardiovascular Screening:    General: Screening Not Indicated, History Lipid Disorder and Risks and Benefits Discussed    Due for: Lipid Panel      Diabetes Screening:     General: Screening Current      Colorectal Cancer Screening:     General: Screening Current      Breast Cancer Screening:     General: Screening Current    Due for: Mammogram        Cervical Cancer Screening:    General: Screening Not Indicated      Osteoporosis Screening:    General: Risks and Benefits Discussed and Screening Current      Abdominal Aortic Aneurysm (AAA) Screening:        General: Risks and Benefits Discussed and Screening Not Indicated      Lung Cancer Screening:     General: Screening Not Indicated      Hepatitis C Screening:    General: Screening Current    Screening, Brief Intervention, and Referral to Treatment (SBIRT)    Screening  Typical number of drinks in a day: 4  Typical number of drinks in a week: 28  Interpretation: Risky drinking behavior  AUDIT-C Screenin) How often did you have a drink containing alcohol in the past year? 4 or more times a week  2) How many drinks did you have on a typical day when you were drinking in the past year? 1 to 2    Single Item Drug Screening:  How often have you used an illegal drug (including marijuana) or a prescription medication for non-medical reasons in the past year? never    Single Item Drug Screen Score: 0  Interpretation: Negative screen for possible drug use disorder    Review of Current Opioid Use    Opioid Risk Tool (ORT) Interpretation: Complete Opioid Risk Tool (ORT)    No exam data present     Physical Exam:     /72   Pulse 64   Ht 5' 3" (1 6 m)   Wt 73 8 kg (162 lb 9 6 oz)   LMP  (LMP Unknown)   SpO2 98%   BMI 28 80 kg/m²     Physical Exam  Vitals signs and nursing note reviewed  Constitutional:       Appearance: She is not ill-appearing  HENT:      Right Ear: Tympanic membrane normal       Left Ear: Tympanic membrane normal       Nose: Congestion present  Comments: Some tenderness on right maxillary region     Mouth/Throat:      Pharynx: No oropharyngeal exudate or posterior oropharyngeal erythema  Eyes:      General: No scleral icterus  Extraocular Movements: Extraocular movements intact  Pupils: Pupils are equal, round, and reactive to light  Cardiovascular:      Rate and Rhythm: Normal rate and regular rhythm  Pulses: Normal pulses  Heart sounds: Normal heart sounds  No murmur  Pulmonary:      Breath sounds: No wheezing, rhonchi or rales  Abdominal:      General: Abdomen is flat  There is no distension  Palpations: Abdomen is soft  Tenderness:  There is no abdominal tenderness  Musculoskeletal:         General: Tenderness present  No swelling  Skin:     Coloration: Skin is not jaundiced  Findings: No erythema  Neurological:      Mental Status: She is alert            [facility], DO

## 2021-06-07 ENCOUNTER — HOSPITAL ENCOUNTER (OUTPATIENT)
Dept: CT IMAGING | Facility: HOSPITAL | Age: 76
Discharge: HOME | End: 2021-06-07
Attending: INTERNAL MEDICINE
Payer: MEDICARE

## 2021-06-07 DIAGNOSIS — R51.9 RIGHT FACIAL PAIN: ICD-10-CM

## 2021-06-07 PROCEDURE — 70486 CT MAXILLOFACIAL W/O DYE: CPT

## 2021-06-07 PROCEDURE — G1004 CDSM NDSC: HCPCS

## 2021-07-16 ENCOUNTER — APPOINTMENT (OUTPATIENT)
Dept: LAB | Facility: CLINIC | Age: 76
End: 2021-07-16
Payer: MEDICARE

## 2021-07-16 DIAGNOSIS — I10 ESSENTIAL HYPERTENSION: ICD-10-CM

## 2021-07-16 LAB
ALBUMIN SERPL BCP-MCNC: 3.7 G/DL (ref 3.5–5)
ALP SERPL-CCNC: 58 U/L (ref 46–116)
ALT SERPL W P-5'-P-CCNC: 25 U/L (ref 12–78)
ANION GAP SERPL CALCULATED.3IONS-SCNC: 6 MMOL/L (ref 4–13)
AST SERPL W P-5'-P-CCNC: 18 U/L (ref 5–45)
BASOPHILS # BLD AUTO: 0.02 THOUSANDS/ΜL (ref 0–0.1)
BASOPHILS NFR BLD AUTO: 0 % (ref 0–1)
BILIRUB SERPL-MCNC: 0.63 MG/DL (ref 0.2–1)
BUN SERPL-MCNC: 19 MG/DL (ref 5–25)
CALCIUM SERPL-MCNC: 9.1 MG/DL (ref 8.3–10.1)
CHLORIDE SERPL-SCNC: 107 MMOL/L (ref 100–108)
CHOLEST SERPL-MCNC: 185 MG/DL (ref 50–200)
CO2 SERPL-SCNC: 25 MMOL/L (ref 21–32)
CREAT SERPL-MCNC: 0.62 MG/DL (ref 0.6–1.3)
EOSINOPHIL # BLD AUTO: 0.17 THOUSAND/ΜL (ref 0–0.61)
EOSINOPHIL NFR BLD AUTO: 3 % (ref 0–6)
ERYTHROCYTE [DISTWIDTH] IN BLOOD BY AUTOMATED COUNT: 13.1 % (ref 11.6–15.1)
GFR SERPL CREATININE-BSD FRML MDRD: 88 ML/MIN/1.73SQ M
GLUCOSE P FAST SERPL-MCNC: 93 MG/DL (ref 65–99)
HCT VFR BLD AUTO: 41.6 % (ref 34.8–46.1)
HDLC SERPL-MCNC: 65 MG/DL
HGB BLD-MCNC: 13.6 G/DL (ref 11.5–15.4)
IMM GRANULOCYTES # BLD AUTO: 0.02 THOUSAND/UL (ref 0–0.2)
IMM GRANULOCYTES NFR BLD AUTO: 0 % (ref 0–2)
LDLC SERPL CALC-MCNC: 99 MG/DL (ref 0–100)
LYMPHOCYTES # BLD AUTO: 2.18 THOUSANDS/ΜL (ref 0.6–4.47)
LYMPHOCYTES NFR BLD AUTO: 40 % (ref 14–44)
MCH RBC QN AUTO: 30.5 PG (ref 26.8–34.3)
MCHC RBC AUTO-ENTMCNC: 32.7 G/DL (ref 31.4–37.4)
MCV RBC AUTO: 93 FL (ref 82–98)
MONOCYTES # BLD AUTO: 0.29 THOUSAND/ΜL (ref 0.17–1.22)
MONOCYTES NFR BLD AUTO: 5 % (ref 4–12)
NEUTROPHILS # BLD AUTO: 2.84 THOUSANDS/ΜL (ref 1.85–7.62)
NEUTS SEG NFR BLD AUTO: 52 % (ref 43–75)
NRBC BLD AUTO-RTO: 0 /100 WBCS
PLATELET # BLD AUTO: 237 THOUSANDS/UL (ref 149–390)
PMV BLD AUTO: 10.5 FL (ref 8.9–12.7)
POTASSIUM SERPL-SCNC: 3.8 MMOL/L (ref 3.5–5.3)
PROT SERPL-MCNC: 6.8 G/DL (ref 6.4–8.2)
RBC # BLD AUTO: 4.46 MILLION/UL (ref 3.81–5.12)
SODIUM SERPL-SCNC: 138 MMOL/L (ref 136–145)
TRIGL SERPL-MCNC: 107 MG/DL
WBC # BLD AUTO: 5.52 THOUSAND/UL (ref 4.31–10.16)

## 2021-07-16 PROCEDURE — 80053 COMPREHEN METABOLIC PANEL: CPT

## 2021-07-16 PROCEDURE — 36415 COLL VENOUS BLD VENIPUNCTURE: CPT

## 2021-07-16 PROCEDURE — 85025 COMPLETE CBC W/AUTO DIFF WBC: CPT

## 2021-07-16 PROCEDURE — 80061 LIPID PANEL: CPT

## 2021-07-23 ENCOUNTER — OFFICE VISIT (OUTPATIENT)
Dept: CARDIOLOGY CLINIC | Facility: CLINIC | Age: 76
End: 2021-07-23
Payer: MEDICARE

## 2021-07-23 VITALS
BODY MASS INDEX: 28.88 KG/M2 | HEART RATE: 66 BPM | SYSTOLIC BLOOD PRESSURE: 122 MMHG | DIASTOLIC BLOOD PRESSURE: 72 MMHG | WEIGHT: 163 LBS | OXYGEN SATURATION: 97 % | HEIGHT: 63 IN

## 2021-07-23 DIAGNOSIS — I10 ESSENTIAL HYPERTENSION: Primary | ICD-10-CM

## 2021-07-23 DIAGNOSIS — G47.33 OBSTRUCTIVE SLEEP APNEA: ICD-10-CM

## 2021-07-23 PROCEDURE — 99215 OFFICE O/P EST HI 40 MIN: CPT | Performed by: INTERNAL MEDICINE

## 2021-07-23 PROCEDURE — 93000 ELECTROCARDIOGRAM COMPLETE: CPT | Performed by: INTERNAL MEDICINE

## 2021-07-23 RX ORDER — LOSARTAN POTASSIUM 100 MG/1
100 TABLET ORAL DAILY
Qty: 90 TABLET | Refills: 3 | Status: SHIPPED | OUTPATIENT
Start: 2021-07-23 | End: 2022-07-05

## 2021-07-23 RX ORDER — METHOCARBAMOL 750 MG/1
TABLET, FILM COATED ORAL
COMMUNITY
Start: 2021-06-09
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
## 2021-07-30 ENCOUNTER — TELEPHONE (OUTPATIENT)
Dept: CARDIOLOGY CLINIC | Facility: CLINIC | Age: 76
End: 2021-07-30

## 2021-08-31 DIAGNOSIS — F45.8 ANXIETY HYPERVENTILATION: ICD-10-CM

## 2021-08-31 DIAGNOSIS — F41.9 ANXIETY HYPERVENTILATION: ICD-10-CM

## 2021-08-31 RX ORDER — CLONAZEPAM 0.5 MG/1
0.5 TABLET ORAL 2 TIMES DAILY PRN
Qty: 60 TABLET | Refills: 0 | Status: SHIPPED | OUTPATIENT
Start: 2021-08-31 | End: 2021-10-29 | Stop reason: SDUPTHER

## 2021-09-16 ENCOUNTER — OFFICE VISIT (OUTPATIENT)
Dept: FAMILY MEDICINE CLINIC | Facility: HOSPITAL | Age: 76
End: 2021-09-16
Payer: MEDICARE

## 2021-09-16 ENCOUNTER — HOSPITAL ENCOUNTER (OUTPATIENT)
Dept: RADIOLOGY | Facility: HOSPITAL | Age: 76
Discharge: HOME | End: 2021-09-16
Attending: STUDENT IN AN ORGANIZED HEALTH CARE EDUCATION/TRAINING PROGRAM
Payer: MEDICARE

## 2021-09-16 VITALS
HEIGHT: 63 IN | HEART RATE: 64 BPM | OXYGEN SATURATION: 96 % | SYSTOLIC BLOOD PRESSURE: 130 MMHG | BODY MASS INDEX: 28.88 KG/M2 | DIASTOLIC BLOOD PRESSURE: 80 MMHG | WEIGHT: 163 LBS

## 2021-09-16 DIAGNOSIS — M25.561 ACUTE PAIN OF RIGHT KNEE: Primary | ICD-10-CM

## 2021-09-16 DIAGNOSIS — M54.42 CHRONIC BILATERAL LOW BACK PAIN WITH LEFT-SIDED SCIATICA: ICD-10-CM

## 2021-09-16 DIAGNOSIS — R51.9 RIGHT FACIAL PAIN: ICD-10-CM

## 2021-09-16 DIAGNOSIS — G89.29 CHRONIC BILATERAL LOW BACK PAIN WITH LEFT-SIDED SCIATICA: ICD-10-CM

## 2021-09-16 DIAGNOSIS — M25.561 ACUTE PAIN OF RIGHT KNEE: ICD-10-CM

## 2021-09-16 PROCEDURE — 99213 OFFICE O/P EST LOW 20 MIN: CPT | Performed by: STUDENT IN AN ORGANIZED HEALTH CARE EDUCATION/TRAINING PROGRAM

## 2021-09-16 PROCEDURE — 73564 X-RAY EXAM KNEE 4 OR MORE: CPT

## 2021-09-16 RX ORDER — TRAMADOL HYDROCHLORIDE 50 MG/1
50 TABLET ORAL EVERY 8 HOURS PRN
Qty: 60 TABLET | Refills: 0 | Status: SHIPPED | OUTPATIENT
Start: 2021-09-16 | End: 2021-11-26 | Stop reason: ALTCHOICE

## 2021-09-16 RX ORDER — FLUTICASONE PROPIONATE 50 MCG
1 SPRAY, SUSPENSION (ML) NASAL DAILY
Qty: 1 G | Refills: 5 | Status: SHIPPED | OUTPATIENT
Start: 2021-09-16

## 2021-09-16 NOTE — PROGRESS NOTES
[facility],     Assessment/Plan:      Diagnosis ICD-10-CM Associated Orders   1  Acute pain of right knee  M25 561 XR knee 4+ vw right injury     Diclofenac Sodium (VOLTAREN) 1 %   2  Chronic bilateral low back pain with left-sided sciatica  M54 42 traMADol (ULTRAM) 50 mg tablet    G89 29       X-ray of knee ordered, will reach out to patient with results   Refill provided for her tramadol, and Voltaren topical gel ordered  Advised resting it, elevating it, and she can use a compression sleeve at times if she would like  Return if symptoms worsen or fail to improve   Patient may call or return to office with any questions or concerns  ______________________________________________________________________  Subjective:     Patient ID: Mague Smoker is a 77 y o  female  HPI   Chief Complaint   Patient presents with    Knee Pain     PT c/o right knee pain - PT is all the way down leg - has pain severe in the hip and ankle  Back is also starting to hurt  She is using a cane to walk  Pain started three weeks ago  she had trouble getting up when gardening  Mague Smoker is a 60-year-old female with a history of hypertension, IBS, chronic bronchitis, TRAE, gastritis who presents today for right knee pain  No prior imaging  Patient has had surgery in the past and that is also involved - seeing Cosme Guaman  [employer] she went down to plant bulbs, and got stuck, unable to get up  Happened 2 5 weeks ago  Pain worse on medial side, and down front leg  No prior knee pain  Hip pain on back right side  No swelling  No bruising  Severe over the last four days, not able to get around today, using cane for balance & back        The following portions of the patient's history were reviewed and updated as appropriate: allergies, current medications, past medical history, past surgical history and problem list     Review of Systems   Constitutional: Negative for chills, fatigue and fever  Respiratory: Negative for cough and shortness of breath  Cardiovascular: Negative for chest pain and leg swelling  Gastrointestinal: Negative for diarrhea and nausea  Musculoskeletal:        See HPI   Skin: Negative for rash and wound  Objective:      Vitals:    09/16/21 1422   BP: 130/80   Pulse: 64   SpO2: 96%      Physical Exam  Vitals reviewed  Constitutional:       General: She is not in acute distress  Appearance: Normal appearance  She is well-developed  She is not ill-appearing  HENT:      Head: Normocephalic and atraumatic  Eyes:      General: No scleral icterus  Right eye: No discharge  Left eye: No discharge  Cardiovascular:      Rate and Rhythm: Normal rate  Pulmonary:      Effort: Pulmonary effort is normal  No respiratory distress  Breath sounds: No stridor  Musculoskeletal:      Cervical back: Normal range of motion  Right lower leg: No edema  Left lower leg: No edema  Skin:     General: Skin is warm  Neurological:      Mental Status: She is alert and oriented to person, place, and time  Coordination: Coordination normal       Gait: Gait abnormal (Uses cane)  Psychiatric:         Mood and Affect: Mood normal          Behavior: Behavior normal          Thought Content: Thought content normal          Judgment: Judgment normal        RIGHT KNEE:  Erythema: no  Swelling: no  Increased Warmth: no  Tenderness: medial knee  Flexion: intact  Extension: intact  Patellar Apprehension: negative  Patellar Grind Ortiz's: negative  Lachman's: negative  Drawer: negative  Varus laxity: negative  Valgus laxity: negative      Portions of the record may have been created with voice recognition software  Occasional wrong word or "sound alike" substitutions may have occurred due to the inherent limitations of voice recognition software   Please review the chart carefully and recognize, using context, where substitutions/typographical errors may have occurred

## 2021-10-14 ENCOUNTER — TELEPHONE (OUTPATIENT)
Dept: FAMILY MEDICINE CLINIC | Facility: HOSPITAL | Age: 76
End: 2021-10-14

## 2021-10-14 DIAGNOSIS — M25.561 ACUTE PAIN OF RIGHT KNEE: Primary | ICD-10-CM

## 2021-10-29 DIAGNOSIS — F45.8 ANXIETY HYPERVENTILATION: ICD-10-CM

## 2021-10-29 DIAGNOSIS — F41.9 ANXIETY HYPERVENTILATION: ICD-10-CM

## 2021-10-29 RX ORDER — CLONAZEPAM 0.5 MG/1
0.5 TABLET ORAL 2 TIMES DAILY PRN
Qty: 60 TABLET | Refills: 0 | Status: SHIPPED | OUTPATIENT
Start: 2021-10-29 | End: 2022-01-03 | Stop reason: SDUPTHER

## 2021-11-12 ENCOUNTER — TELEPHONE (OUTPATIENT)
Dept: CARDIOLOGY CLINIC | Facility: CLINIC | Age: 76
End: 2021-11-12

## 2021-11-26 ENCOUNTER — HOSPITAL ENCOUNTER (OUTPATIENT)
Dept: RADIOLOGY | Facility: HOSPITAL | Age: 76
Discharge: HOME | End: 2021-11-26
Attending: INTERNAL MEDICINE
Payer: MEDICARE

## 2021-11-26 ENCOUNTER — OFFICE VISIT (OUTPATIENT)
Dept: FAMILY MEDICINE CLINIC | Facility: HOSPITAL | Age: 76
End: 2021-11-26
Payer: MEDICARE

## 2021-11-26 VITALS
DIASTOLIC BLOOD PRESSURE: 78 MMHG | HEIGHT: 63 IN | BODY MASS INDEX: 29.73 KG/M2 | HEART RATE: 70 BPM | WEIGHT: 167.8 LBS | OXYGEN SATURATION: 94 % | SYSTOLIC BLOOD PRESSURE: 124 MMHG

## 2021-11-26 DIAGNOSIS — M54.50 CHRONIC BILATERAL LOW BACK PAIN WITHOUT SCIATICA: ICD-10-CM

## 2021-11-26 DIAGNOSIS — M79.604 RIGHT LEG PAIN: Primary | ICD-10-CM

## 2021-11-26 DIAGNOSIS — Z79.899 MEDICAL MARIJUANA USE: ICD-10-CM

## 2021-11-26 DIAGNOSIS — M48.061 SPINAL STENOSIS OF LUMBAR REGION, UNSPECIFIED WHETHER NEUROGENIC CLAUDICATION PRESENT: ICD-10-CM

## 2021-11-26 DIAGNOSIS — J44.9 CHRONIC BRONCHITIS WITH EMPHYSEMA (HCC): ICD-10-CM

## 2021-11-26 DIAGNOSIS — R13.10 DYSPHAGIA, UNSPECIFIED TYPE: ICD-10-CM

## 2021-11-26 DIAGNOSIS — G95.19 NEUROGENIC CLAUDICATION (HCC): ICD-10-CM

## 2021-11-26 DIAGNOSIS — G89.29 CHRONIC BILATERAL LOW BACK PAIN WITHOUT SCIATICA: ICD-10-CM

## 2021-11-26 DIAGNOSIS — I10 ESSENTIAL HYPERTENSION: ICD-10-CM

## 2021-11-26 PROCEDURE — 71046 X-RAY EXAM CHEST 2 VIEWS: CPT

## 2021-11-26 PROCEDURE — 99215 OFFICE O/P EST HI 40 MIN: CPT | Performed by: INTERNAL MEDICINE

## 2021-11-26 RX ORDER — TIOTROPIUM BROMIDE 18 UG/1
18 CAPSULE ORAL; RESPIRATORY (INHALATION) DAILY
Qty: 90 CAPSULE | Refills: 3 | Status: SHIPPED | OUTPATIENT
Start: 2021-11-26 | End: 2022-04-29 | Stop reason: ALTCHOICE

## 2021-11-29 DIAGNOSIS — E78.00 PURE HYPERCHOLESTEROLEMIA: ICD-10-CM

## 2021-11-29 RX ORDER — ROSUVASTATIN CALCIUM 5 MG/1
TABLET, COATED ORAL
Qty: 39 TABLET | Refills: 3 | Status: SHIPPED | OUTPATIENT
Start: 2021-11-29

## 2021-12-15 ENCOUNTER — HOSPITAL ENCOUNTER (OUTPATIENT)
Dept: PULMONOLOGY | Facility: HOSPITAL | Age: 76
Discharge: HOME | End: 2021-12-15
Attending: INTERNAL MEDICINE
Payer: MEDICARE

## 2021-12-15 DIAGNOSIS — J44.9 CHRONIC BRONCHITIS WITH EMPHYSEMA (HCC): ICD-10-CM

## 2021-12-15 PROCEDURE — 94726 PLETHYSMOGRAPHY LUNG VOLUMES: CPT

## 2021-12-15 PROCEDURE — 94060 EVALUATION OF WHEEZING: CPT | Performed by: INTERNAL MEDICINE

## 2021-12-15 PROCEDURE — 94729 DIFFUSING CAPACITY: CPT | Performed by: INTERNAL MEDICINE

## 2021-12-15 PROCEDURE — 94060 EVALUATION OF WHEEZING: CPT

## 2021-12-15 PROCEDURE — 94760 N-INVAS EAR/PLS OXIMETRY 1: CPT

## 2021-12-15 PROCEDURE — 94729 DIFFUSING CAPACITY: CPT

## 2021-12-15 PROCEDURE — 94726 PLETHYSMOGRAPHY LUNG VOLUMES: CPT | Performed by: INTERNAL MEDICINE

## 2021-12-15 RX ORDER — ALBUTEROL SULFATE 2.5 MG/3ML
2.5 SOLUTION RESPIRATORY (INHALATION) EVERY 6 HOURS PRN
Status: DISCONTINUED | OUTPATIENT
Start: 2021-12-15 | End: 2021-12-19 | Stop reason: HOSPADM

## 2021-12-15 RX ADMIN — ALBUTEROL SULFATE 2.5 MG: 2.5 SOLUTION RESPIRATORY (INHALATION) at 12:43

## 2021-12-29 ENCOUNTER — HOSPITAL ENCOUNTER (OUTPATIENT)
Dept: RADIOLOGY | Facility: HOSPITAL | Age: 76
Discharge: HOME | End: 2021-12-29
Attending: INTERNAL MEDICINE
Payer: MEDICARE

## 2021-12-29 DIAGNOSIS — R13.10 DYSPHAGIA, UNSPECIFIED TYPE: ICD-10-CM

## 2021-12-29 PROCEDURE — 74230 X-RAY XM SWLNG FUNCJ C+: CPT

## 2021-12-29 PROCEDURE — 92611 MOTION FLUOROSCOPY/SWALLOW: CPT

## 2021-12-29 NOTE — PROCEDURES
Video Swallow Study      Patient Name: Ariel Silva  VHKJN'W Date: 12/29/2021        Past Medical History  Past Medical History:   Diagnosis Date    Anxiety disorder     Arthralgia     Arthritis     Asthma     Chronic lumbar radiculopathy     last assessed: 12/20/16    Chronic respiratory failure ([address] )     Colon cancer screening 4/11/2019    Last colonoscopy 2014-15    Depression     Dysfunction of eustachian tube     Dyslipidemia     Emphysema lung (HCC)     Fatigue     HTN (hypertension) 10/29/2014    Hypercholesterolemia     Hypertension     Irritable bowel syndrome with diarrhea 4/11/2019    Lumbosacral stenosis     last assessed: 9/20/16    TRAE (obstructive sleep apnea)     Pancreatitis     resolved: 9/20/17    Pneumonia     PTSD (post-traumatic stress disorder)         Past Surgical History  Past Surgical History:   Procedure Laterality Date    CHOLECYSTECTOMY      EGD  04/15/2019    GALLBLADDER SURGERY      HYSTERECTOMY      pt thinks she still has one ovary, done over 20 yrs ago   [address]  12/2016    3, 4, 5    MOUTH SURGERY      MULTIPLE TOOTH EXTRACTIONS N/A 2018    OOPHORECTOMY Bilateral     1 1/2 ovaries removed    TONSILLECTOMY         Video Barium Swallow Study    Summary:  Images are on PACS for review  Pt presents w/ [place] oropharyngeal swallow skill  No significant oral difficulties  Swallows are prompt  Hyo-laryngeal excursion, epiglottic inversion, and pharyngeal constriction WFL  No significant pharyngeal retention  No penetration or aspiration on today's study  Brief screening of the esophagus revealed retention and ? Slow motility  Retention cleared w/ liquid wash of thin, pill passed easily to stomach  Recommendations:  Diet: Regular, add moisture as needed   Liquids: Thin   Meds: Whole w/ liquids   Strategies:  Alternate liquids/solids  Upright position  F/u ST tx: Not at this time   Reflux Precautions  Consider consult with:  GI  Results reviewed with: pt  If a dedicated assessment of the esophagus is desired, consider esophagram/barium swallow or EGD  H&P/Admit info, pertinent provider notes/procedures/studies/PMH:(copied and placed in this report)  Per Danica Verduzco DO 11/26/21:  " Patient ID: Usman Win is a 77 y o  female     1  Right knee and lower leg pain-in September  had been in garden and unable to get up-pushed up off the knee and had severe pain and right lower leg pain persists- seen here by Dr Jose Arriola and had xray done, was given a cream with increased anxiety issues and she thought it was a reaction to that so she stopped it  Was sent prior to that to a marijuana doctor about her chronic pain from her pain doctor-now on a cream daily and a tincture also in pm- that has helped somewhat with that pain  - will refer to ortho- she reports she is unable to do pt as it is too difficult to get walk- using cane for ambulation and sitting with right leg in air  Her pain management team stopped her tramadol and is using the marijuana products instead  2  Anxiety issues -and severe depression issues- on clonazepam-- [facility]  I offered her the name of our counselor here in office Bran Garcia as well-  3  Dysphagia worsened after bx was done in mouth for lesion- developed swallowing problems since then- will order a videoswallow evaluation and may need gi evaluation- seems to be worse with liquids rather than solids        The following portions of the patient's history were reviewed and updated as appropriate: allergies, current medications and problem list       Review of Systems   HENT: Positive for congestion, postnasal drip and trouble swallowing  Some sinus pain over past month   Respiratory: Positive for cough and shortness of breath  Musculoskeletal: Positive for arthralgias, back pain and joint swelling     All other systems reviewed and are negative "    Today, pt reports oropharyngeal dysphagia has slightly improved and she has reduced oral pain  Pt denies weight loss or respiratory symptoms associated w/ dysphagia  Pt describes occasional regurgitation while drinking, "as if there's no more space for anything so it comes back up " Pt denies coughing/choking w/ PO  Patient's goal:  None stated     Goals:  Pt will tolerate least restrictive diet w/out s/s aspiration or oral/pharyngeal difficulties  Previous VBS:  No    Current Diet:  Regular w/ thin     Dentition:  Adequate    O2 requirement:  RA    Oral mech:  Strength and ROM: WFL     Vocal Quality/Speech:  Clear/adequate    Cognitive status:  Awake, alert, oriented    Consistencies administered: Barium laden applesauce, banana, chicken, soft solid, hard solid, thin liquids, 13mm barium pill whole w/ thin  Liquids were administered by cup and straw  Pt was seated laterally at 90 degrees  Oral stage:  WNL  Lip closure: wfl   Mastication: wfl   Bolus formation: wfl   Bolus control: wfl   Transfer: wfl   Residue: no     Pharyngeal stage:   WNL  Swallow promptness: prompt   Spill to valleculae: no   Spill to pyriforms: no   Epiglottic inversion: wfl   Laryngeal excursion: wfl   Pharyngeal constriction: wfl   Vallecular retention: no   Pyriform retention: no   PPW coating: no   Osteophytes: no   CP prominence: no   Retropulsion from prominence: no   Transient penetration: no   Epiglottic undercoat: no   Penetration: no   Aspiration: no   Strategies: NA  Response to aspiration: NA    Screening of Esophageal stage:  Dysmotility: -   Slow motility: +   Retropulsion: -   Tertiary contractions: -   Reflux: -   Retention: +   Stricture: -   LES: -

## 2022-01-03 ENCOUNTER — HOSPITAL ENCOUNTER (OUTPATIENT)
Dept: MRI IMAGING | Facility: HOSPITAL | Age: 77
Discharge: HOME | End: 2022-01-03
Attending: INTERNAL MEDICINE
Payer: MEDICARE

## 2022-01-03 DIAGNOSIS — F41.9 ANXIETY HYPERVENTILATION: ICD-10-CM

## 2022-01-03 DIAGNOSIS — F45.8 ANXIETY HYPERVENTILATION: ICD-10-CM

## 2022-01-03 DIAGNOSIS — M79.604 RIGHT LEG PAIN: ICD-10-CM

## 2022-01-03 PROCEDURE — G1004 CDSM NDSC: HCPCS

## 2022-01-03 PROCEDURE — 73721 MRI JNT OF LWR EXTRE W/O DYE: CPT

## 2022-01-03 RX ORDER — CLONAZEPAM 0.5 MG/1
0.5 TABLET ORAL 2 TIMES DAILY PRN
Qty: 60 TABLET | Refills: 0 | Status: SHIPPED | OUTPATIENT
Start: 2022-01-03 | End: 2022-03-02 | Stop reason: SDUPTHER

## 2022-01-06 ENCOUNTER — OFFICE VISIT (OUTPATIENT)
Dept: OBGYN CLINIC | Facility: CLINIC | Age: 77
End: 2022-01-06
Payer: MEDICARE

## 2022-01-06 ENCOUNTER — APPOINTMENT (OUTPATIENT)
Dept: RADIOLOGY | Facility: CLINIC | Age: 77
End: 2022-01-06
Payer: MEDICARE

## 2022-01-06 VITALS
WEIGHT: 171.1 LBS | HEIGHT: 63 IN | SYSTOLIC BLOOD PRESSURE: 128 MMHG | BODY MASS INDEX: 30.32 KG/M2 | DIASTOLIC BLOOD PRESSURE: 84 MMHG

## 2022-01-06 DIAGNOSIS — M17.11 PRIMARY OSTEOARTHRITIS OF RIGHT KNEE: Primary | ICD-10-CM

## 2022-01-06 DIAGNOSIS — M79.604 RIGHT LEG PAIN: ICD-10-CM

## 2022-01-06 DIAGNOSIS — M25.461 EFFUSION OF RIGHT KNEE: ICD-10-CM

## 2022-01-06 DIAGNOSIS — M23.321 DEGENERATIVE TEAR OF POSTERIOR HORN OF MEDIAL MENISCUS OF RIGHT KNEE: ICD-10-CM

## 2022-01-06 PROCEDURE — 20610 DRAIN/INJ JOINT/BURSA W/O US: CPT | Performed by: ORTHOPAEDIC SURGERY

## 2022-01-06 PROCEDURE — 99213 OFFICE O/P EST LOW 20 MIN: CPT | Performed by: ORTHOPAEDIC SURGERY

## 2022-01-06 PROCEDURE — 73560 X-RAY EXAM OF KNEE 1 OR 2: CPT

## 2022-01-06 RX ORDER — BETAMETHASONE SODIUM PHOSPHATE AND BETAMETHASONE ACETATE 3; 3 MG/ML; MG/ML
6 INJECTION, SUSPENSION INTRA-ARTICULAR; INTRALESIONAL; INTRAMUSCULAR; SOFT TISSUE
Status: COMPLETED | OUTPATIENT
Start: 2022-01-06 | End: 2022-01-06

## 2022-01-06 RX ORDER — BUPIVACAINE HYDROCHLORIDE 2.5 MG/ML
1 INJECTION, SOLUTION EPIDURAL; INFILTRATION; INTRACAUDAL
Status: COMPLETED | OUTPATIENT
Start: 2022-01-06 | End: 2022-01-06

## 2022-01-06 RX ORDER — LIDOCAINE HYDROCHLORIDE 10 MG/ML
2 INJECTION, SOLUTION INFILTRATION; PERINEURAL
Status: COMPLETED | OUTPATIENT
Start: 2022-01-06 | End: 2022-01-06

## 2022-01-06 RX ADMIN — BUPIVACAINE HYDROCHLORIDE 1 ML: 2.5 INJECTION, SOLUTION EPIDURAL; INFILTRATION; INTRACAUDAL at 12:39

## 2022-01-06 RX ADMIN — BETAMETHASONE SODIUM PHOSPHATE AND BETAMETHASONE ACETATE 6 MG: 3; 3 INJECTION, SUSPENSION INTRA-ARTICULAR; INTRALESIONAL; INTRAMUSCULAR; SOFT TISSUE at 12:13

## 2022-01-06 RX ADMIN — LIDOCAINE HYDROCHLORIDE 2 ML: 10 INJECTION, SOLUTION INFILTRATION; PERINEURAL at 12:39

## 2022-01-06 NOTE — PROGRESS NOTES
Assessment:     1  Primary osteoarthritis of right knee    2  Effusion of right knee    3  Degenerative tear of posterior horn of medial meniscus of right knee    4  Right leg pain        Plan:     Problem List Items Addressed This Visit        Musculoskeletal and Integument    Primary osteoarthritis of right knee - Primary    Relevant Medications    betamethasone acetate-betamethasone sodium phosphate (CELESTONE) injection 6 mg (Completed)    lidocaine (XYLOCAINE) 1 % injection 2 mL (Completed)    bupivacaine (PF) (MARCAINE) 0 25 % injection 1 mL (Completed)    Other Relevant Orders    Large joint arthrocentesis: R knee (Completed)    Effusion of right knee    Relevant Medications    betamethasone acetate-betamethasone sodium phosphate (CELESTONE) injection 6 mg (Completed)    lidocaine (XYLOCAINE) 1 % injection 2 mL (Completed)    bupivacaine (PF) (MARCAINE) 0 25 % injection 1 mL (Completed)    Other Relevant Orders    Large joint arthrocentesis: R knee (Completed)    Degenerative tear of posterior horn of medial meniscus of right knee       Other    Right leg pain    Relevant Orders    XR knee 1 or 2 vw right        - Findings consistent with right knee osteoarthritis with degenerative medial meniscus tear and effusion  Discussed findings and treatment options with the patient  - New xrays of the right knee were obtained today and reviewed noting that she has osteoarthritis  MRI was reviewed as well showing a degenerative medial meniscal tear in the posterior horn  Patient does not appear to be symptomatic with the torn meniscus at this point  Most of her symptoms radiated to the arthritis and the fusion   - Encouraged to use her stationary bike to help strengthen her thigh muscles  - Keep the knee moving    - She can use her topical cream    - Use an ice pack for 10-15 mins today  - non operative treatments were discussed with the patient that included localized cortisone injection into the right knee today  Patient wished to proceed with an aspiration and injection, which she tolerated well with good pain relief  20 cc of clear yellow fluid was aspirated  - The cortisone injection takes about 5-7 days to take effect, you may have pain before it kicks in   - All patient's questions were answered to her satisfaction  This note is created using dictation transcription  It may contain typographical errors, grammatical errors, improperly dictated words, background noise and other errors  Subjective:     Patient ID: Reny Jaimes is a 77 y o  female  Chief Complaint:  Patient is here for a consultation for her right knee pain referred by Her PCP, Dr Mceknna York  She has been having right knee and leg pain since the beginning of September, when got up from a kneeling position while gardening and felt that the knee got stuck and she could not lift herself  She was seen at an urgent care ambulating with a cane and then ultimately follow up with her PCP , who ordered an MRI in November  She notes that she has no locking, but has had some episodes of giving out  She feels that the pain is a deep ache on the medial aspect of the knee that does go down the anterior leg  Most of the discomfort she is feeling is aching at nighttime  She has pain with pivoting and stair climbing  She has been limiting her motions  She uses a cane, she uses topical CBD lotion and a tincture prescribed by her pain management physician to wean her off Tramadol  Allergy:  Allergies   Allergen Reactions    Antihistamines, Diphenhydramine-Type     Bupropion     Clarithromycin     Codeine Other (See Comments)     increased HR    Gabapentin     Meloxicam Nausea Only and Visual Disturbance     Other reaction(s): Numbness  Action Taken: med stopped ; Category:  Allergy;     Ondansetron     Pravastatin     Propoxyphene Other (See Comments)     increased HR     Medications:  all current active meds have been reviewed  Past Medical History:  Past Medical History:   Diagnosis Date    Anxiety disorder     Arthralgia     Arthritis     Asthma     Chronic lumbar radiculopathy     last assessed: 16    Chronic respiratory failure ([address] )     Colon cancer screening 2019    Last colonoscopy -15    Depression     Dysfunction of eustachian tube     Dyslipidemia     Emphysema lung (HCC)     Fatigue     HTN (hypertension) 10/29/2014    Hypercholesterolemia     Hypertension     Irritable bowel syndrome with diarrhea 2019    Lumbosacral stenosis     last assessed: 16    TRAE (obstructive sleep apnea)     Pancreatitis     resolved: 17    Pneumonia     PTSD (post-traumatic stress disorder)      Past Surgical History:  Past Surgical History:   Procedure Laterality Date    CHOLECYSTECTOMY      EGD  04/15/2019    GALLBLADDER SURGERY      HYSTERECTOMY      pt thinks she still has one ovary, done over 20 yrs ago   [address]  2016    3, 4, 5    MOUTH SURGERY      MULTIPLE TOOTH EXTRACTIONS N/A     OOPHORECTOMY Bilateral     1 1/2 ovaries removed    TONSILLECTOMY       Family History:  Family History   Problem Relation Age of Onset    Depression Mother         panic attacks    Lung cancer Mother     Mental illness Mother     Substance Abuse Mother         CIGS    Coronary artery disease Father         high # of paternal family members  in their 46s    Substance Abuse Father         CIGS    Depression Sister         panic attacks    Hypertension Sister     Depression Other         panic attacks    Heart disease Family     Hypertension Family     Breast cancer Daughter 48    Lung cancer Maternal Aunt     Colon cancer Neg Hx     Colon polyps Neg Hx      Social History:  Social History     Substance and Sexual Activity   Alcohol Use Yes    Comment: 4 drinks every night     Social History     Substance and Sexual Activity   Drug Use No     Social History     Tobacco Use Smoking Status Never Smoker   Smokeless Tobacco Never Used     Review of Systems   Constitutional: Negative for chills, fever and unexpected weight change  HENT: Negative for hearing loss, nosebleeds and sore throat  Eyes: Negative for pain, redness and visual disturbance  Respiratory: Negative for cough, shortness of breath and wheezing  Cardiovascular: Negative for chest pain, palpitations and leg swelling  Gastrointestinal: Negative for abdominal pain and nausea  Genitourinary: Negative for dyspareunia, dysuria and frequency  Musculoskeletal: Positive for arthralgias (Right knee), gait problem (Antalgic, use a cane) and joint swelling (Right knee)  Skin: Negative for rash and wound  Neurological: Negative for dizziness, numbness and headaches  Psychiatric/Behavioral: Negative for decreased concentration and suicidal ideas  The patient is not nervous/anxious  Objective:  BP Readings from Last 1 Encounters:   01/06/22 128/84      Wt Readings from Last 1 Encounters:   01/06/22 77 6 kg (171 lb 1 6 oz)      BMI:   Estimated body mass index is 30 31 kg/m² as calculated from the following:    Height as of this encounter: 5' 3" (1 6 m)  Weight as of this encounter: 77 6 kg (171 lb 1 6 oz)  BSA:   Estimated body surface area is 1 81 meters squared as calculated from the following:    Height as of this encounter: 5' 3" (1 6 m)  Weight as of this encounter: 77 6 kg (171 lb 1 6 oz)  Physical Exam  Vitals and nursing note reviewed  Constitutional:       Appearance: Normal appearance  She is well-developed  HENT:      Head: Normocephalic and atraumatic  Right Ear: External ear normal       Left Ear: External ear normal    Eyes:      Extraocular Movements: Extraocular movements intact  Conjunctiva/sclera: Conjunctivae normal    Pulmonary:      Effort: Pulmonary effort is normal    Musculoskeletal:      Cervical back: Neck supple        Right knee: Effusion (Grade 2) present  Instability Tests: Medial Aravind test negative and lateral Aravind test negative  Skin:     General: Skin is warm and dry  Neurological:      Mental Status: She is alert and oriented to person, place, and time  Deep Tendon Reflexes: Reflexes are normal and symmetric  Psychiatric:         Mood and Affect: Mood normal          Behavior: Behavior normal        Right Knee Exam     Muscle Strength   The patient has normal right knee strength  Tenderness   Right knee tenderness location: posterior medial joint line  Range of Motion   Extension: 0   Flexion: 100     Tests   Aravind:  Medial - negative Lateral - negative  Varus: negative Valgus: negative  Patellar apprehension: negative    Other   Erythema: absent  Sensation: normal  Pulse: present  Swelling: mild  Effusion: effusion (Grade 2) present    Comments:  Calf is soft and nontender          Large joint arthrocentesis: R knee  Universal Protocol:  Consent: Verbal consent obtained    Risks and benefits: risks, benefits and alternatives were discussed  Consent given by: patient  Patient understanding: patient states understanding of the procedure being performed  Site marked: the operative site was marked  Patient identity confirmed: verbally with patient    Supporting Documentation  Indications: pain   Procedure Details  Location: knee - R knee  Preparation: Patient was prepped and draped in the usual sterile fashion  Needle size: 18 G  Ultrasound guidance: no  Approach: Superolateral   Medications administered: 6 mg betamethasone acetate-betamethasone sodium phosphate 6 (3-3) mg/mL; 2 mL lidocaine 1 %; 1 mL bupivacaine (PF) 0 25 %    Aspirate amount: 20 mL  Aspirate: yellow and clear    Patient tolerance: patient tolerated the procedure well with no immediate complications  Dressing:  Sterile dressing applied    5 cc 1% lidocaine was used for local anesthetic        I have personally reviewed pertinent films in PACS and my interpretation is Right knee x-ray show medial joint narrowing and patellofemoral joint osteoarthritis  No soft tissue calcification  MRI of the right knee show effusion and increased signal activity in the posterior horn of the medial meniscus       Scribe Attestation    I,:  Iron Paula am acting as a scribe while in the presence of the attending physician :       I,:  Дмитрий Stuart MD personally performed the services described in this documentation    as scribed in my presence :

## 2022-01-06 NOTE — PATIENT INSTRUCTIONS
- Encouraged to use her stationary bike to help strengthen her thigh muscles  - Keep the knee moving    - She can use her topical cream    - Use an ice pack for 10-15 mins today  - The cortisone injection takes about 5-7 days to take effect, you may have pain before it kicks in    - New xrays of the right knee were obtained today and reviewed noting that she has severe patellofemoral arthritis   MRI was reviewed as well showing a degenerative medial meniscal tear

## 2022-01-23 DIAGNOSIS — E78.00 PURE HYPERCHOLESTEROLEMIA: ICD-10-CM

## 2022-01-24 RX ORDER — FENOFIBRATE 48 MG/1
TABLET, COATED ORAL
Qty: 90 TABLET | Refills: 3 | Status: SHIPPED | OUTPATIENT
Start: 2022-01-24

## 2022-02-18 ENCOUNTER — TELEPHONE (OUTPATIENT)
Dept: CARDIOLOGY CLINIC | Facility: CLINIC | Age: 77
End: 2022-02-18

## 2022-02-18 NOTE — TELEPHONE ENCOUNTER
P/c'd , her bp has been elevated  She had an episode yesterday were she awoken from a nap and was flush, hot to touch, severe HA,and dry heaves  That is when she started checking BP, it was 209/98  HR 99  Throughout the day and today it has been elevated, 156//103 Hr 973-90  She did take an extra Atenolol and clonazepam about 2 am which resulted in lower her bp to 156/89  Gave her appt for Monday to see Claudia Macias  She does not regularly check her BP  She does not have HA or dry heaves now, never had a fever  Taking :Losartan 100 mg qd               Atenolol 25 mg bid    Dr Sharita Velasquez pt  Do you want to make any changes to her medication        Please advise

## 2022-02-21 ENCOUNTER — OFFICE VISIT (OUTPATIENT)
Dept: CARDIOLOGY CLINIC | Facility: CLINIC | Age: 77
End: 2022-02-21
Payer: MEDICARE

## 2022-02-21 VITALS
DIASTOLIC BLOOD PRESSURE: 100 MMHG | HEIGHT: 63 IN | HEART RATE: 77 BPM | WEIGHT: 170.8 LBS | OXYGEN SATURATION: 97 % | SYSTOLIC BLOOD PRESSURE: 150 MMHG | BODY MASS INDEX: 30.26 KG/M2

## 2022-02-21 DIAGNOSIS — E78.5 DYSLIPIDEMIA: ICD-10-CM

## 2022-02-21 DIAGNOSIS — G47.33 OSA (OBSTRUCTIVE SLEEP APNEA): Primary | ICD-10-CM

## 2022-02-21 DIAGNOSIS — I10 HYPERTENSION, UNSPECIFIED TYPE: ICD-10-CM

## 2022-02-21 PROCEDURE — 99215 OFFICE O/P EST HI 40 MIN: CPT | Performed by: INTERNAL MEDICINE

## 2022-02-21 RX ORDER — HYDROCHLOROTHIAZIDE 12.5 MG/1
12.5 TABLET ORAL DAILY
Qty: 30 TABLET | Refills: 3 | Status: SHIPPED | OUTPATIENT
Start: 2022-02-21 | End: 2022-05-05 | Stop reason: SDUPTHER

## 2022-02-21 NOTE — PROGRESS NOTES
Cardiology Follow Up    Latrell Bañuelos  1945  [id number]  [facility] [place] [address]  [phone]    1  Hypertension, unspecified type  hydrochlorothiazide (HYDRODIURIL) 12 5 mg tablet    Basic metabolic panel    Magnesium   2  Bilateral carotid artery disease, unspecified type (HCC)         Interval History: This past Thursday night Burak Lovell was cooking all day  She fell asleep on the couch and woke up at 11 pm with a HA and flushed face /104  She called her sister took Klonopin and extra atenolol BP came down to 174/92  Her SBP was running 150's- 144  Walks her dog daily and uses her pedal machine while watching TV  Burak Lovell denies CP, palpitations, lightheadedness or dizziness  She denies eating foods from a package  Admits to leg pants in her thighs being titer  Denies ankle edema  HPI:  Hypertension  Dyslipidemia 7/16/21 , , HDL 65, LDL 99   CAD [facility] 2007 non obstructive CAD  TRAE not compliant with CPAP unable to tolerate, due to PTSD  COPD from second hand smoke  Mild carotid stenosis   Hx of Neurogenic syncope 2007      10/06/20 TTE:  Left ventricular systolic function normal LVEF 60% no regional wall motion abnormality  Grade 1 diastolic dysfunction  Mild aortic valve regurgitation, mild tricuspid valve regurgitation    Patient Active Problem List   Diagnosis    Allergic rhinitis    Anxiety disorder    Chronic bronchitis with emphysema (HCC)    Chronic low back pain    Essential hypertension    Myofascial pain syndrome    Obstructive sleep apnea    Panic attack    Xerostomia    Symmetrical polyarthritis    Vitamin D deficiency    Bilateral carotid artery disease ([address] )    Colon cancer screening    Irritable bowel syndrome with diarrhea    Erosive gastritis    Class 1 obesity without serious comorbidity in adult    Syncope    Abnormal left aortic arch    Aberrant right subclavian artery    Nonrheumatic aortic valve insufficiency    Mild mitral regurgitation    Mild tricuspid regurgitation    Lumbar spinal stenosis    Family hx-breast malignancy    Dysphagia    Right leg pain    Primary osteoarthritis of right knee    Effusion of right knee    Degenerative tear of posterior horn of medial meniscus of right knee     Past Medical History:   Diagnosis Date    Anxiety disorder     Arthralgia     Arthritis     Asthma     Chronic lumbar radiculopathy     last assessed: 12/20/16    Chronic respiratory failure ([address] )     Colon cancer screening 4/11/2019    Last colonoscopy 2014-15    Depression     Dysfunction of eustachian tube     Dyslipidemia     Emphysema lung (HCC)     Fatigue     HTN (hypertension) 10/29/2014    Hypercholesterolemia     Hypertension     Irritable bowel syndrome with diarrhea 4/11/2019    Lumbosacral stenosis     last assessed: 9/20/16    TRAE (obstructive sleep apnea)     Pancreatitis     resolved: 9/20/17    Pneumonia     PTSD (post-traumatic stress disorder)      Social History     Socioeconomic History    Marital status:      Spouse name: Not on file    Number of children: Not on file    Years of education: Not on file    Highest education level: Not on file   Occupational History    Occupation: retired   Tobacco Use    Smoking status: Never Smoker    Smokeless tobacco: Never Used   Vaping Use    Vaping Use: Never used   Substance and Sexual Activity    Alcohol use: Yes     Comment: 4 drinks every night    Drug use: No    Sexual activity: Not Currently   Other Topics Concern    Not on file   Social History Narrative    Person living alone    FEELS SAFE AT HOME    SEES [facility]    HAS LIVING WILL    Wears seatbelt     Social Determinants of Health     Financial Resource Strain: Not on file   Food Insecurity: Not on file   Transportation Needs: Not on file   Physical Activity: Not on file   Stress: Not on file   Social Connections: Not on file   Intimate Partner Violence: Not on file   Housing Stability: Not on file      Family History   Problem Relation Age of Onset    Depression Mother         panic attacks    Lung cancer Mother     Mental illness Mother     Substance Abuse Mother         CIGS    Coronary artery disease Father         high # of paternal family members  in their 46s    Substance Abuse Father         CIGS    Depression Sister         panic attacks    Hypertension Sister     Depression Other         panic attacks    Heart disease Family     Hypertension Family     Breast cancer Daughter 48    Lung cancer Maternal Aunt     Colon cancer Neg Hx     Colon polyps Neg Hx      Past Surgical History:   Procedure Laterality Date    CHOLECYSTECTOMY      EGD  04/15/2019    GALLBLADDER SURGERY      HYSTERECTOMY      pt thinks she still has one ovary, done over 20 yrs ago   [address]  2016    3, 4, 5    MOUTH SURGERY      MULTIPLE TOOTH EXTRACTIONS N/A     OOPHORECTOMY Bilateral     1 1/2 ovaries removed    TONSILLECTOMY         Current Outpatient Medications:     ascorbic acid (VITAMIN C) 250 mg tablet, Take 250 mg by mouth daily, Disp: , Rfl:     atenolol (TENORMIN) 25 mg tablet, Take 1 tablet (25 mg total) by mouth 2 (two) times a day, Disp: 180 tablet, Rfl: 3    Cholecalciferol (VITAMIN D3) 2000 units capsule, Take by mouth daily, Disp: , Rfl:     clonazePAM (KlonoPIN) 0 5 mg tablet, Take 1 tablet (0 5 mg total) by mouth 2 (two) times a day as needed for anxiety, Disp: 60 tablet, Rfl: 0    co-enzyme Q-10 30 MG capsule, Take 30 mg by mouth daily , Disp: , Rfl:     dicyclomine (BENTYL) 10 mg capsule, TAKE 2 CAPSULES BY MOUTH  TWO TIMES A DAY (Patient taking differently: Take 10 mg by mouth 2 (two) times a day as needed ), Disp: 360 capsule, Rfl: 12    fenofibrate (TRICOR) 48 mg tablet, TAKE 1 TABLET BY MOUTH  DAILY, Disp: 90 tablet, Rfl: 3    fluticasone (FLONASE) 50 mcg/act nasal spray, 1 spray into each nostril daily, Disp: 1 g, Rfl: 5    losartan (COZAAR) 100 MG tablet, Take 1 tablet (100 mg total) by mouth daily, Disp: 90 tablet, Rfl: 3    magnesium oxide (MAG-OX) 400 mg, Take 400 mg by mouth every other day , Disp: , Rfl:     Omega 3 1000 MG CAPS, Take by mouth daily, Disp: , Rfl:     Probiotic Product (PROBIOTIC-10 PO), Take by mouth daily, Disp: , Rfl:     rosuvastatin (CRESTOR) 5 mg tablet, TAKE 1 TABLET BY MOUTH 3  TIMES WEEKLY, Disp: 39 tablet, Rfl: 3    Turmeric 500 MG CAPS, Take by mouth daily , Disp: , Rfl:     methocarbamol (ROBAXIN) 750 mg tablet, TAKE 1 TABLET BY MOUTH 3 TIMES DAILY AS NEEDED FOR SPASMS (Patient not taking: Reported on 7/23/2021), Disp: , Rfl:     tiotropium (Spiriva HandiHaler) 18 mcg inhalation capsule, Place 1 capsule (18 mcg total) into inhaler and inhale daily (Patient not taking: Reported on 2/21/2022 ), Disp: 90 capsule, Rfl: 3  Allergies   Allergen Reactions    Antihistamines, Diphenhydramine-Type     Bupropion     Clarithromycin     Codeine Other (See Comments)     increased HR    Gabapentin     Meloxicam Nausea Only and Visual Disturbance     Other reaction(s): Numbness  Action Taken: med stopped ; Category: Allergy;     Ondansetron     Pravastatin     Propoxyphene Other (See Comments)     increased HR       Labs:  No visits with results within 2 Month(s) from this visit     Latest known visit with results is:   Appointment on 07/16/2021   Component Date Value    WBC 07/16/2021 5 52     RBC 07/16/2021 4 46     Hemoglobin 07/16/2021 13 6     Hematocrit 07/16/2021 41 6     MCV 07/16/2021 93     MCH 07/16/2021 30 5     MCHC 07/16/2021 32 7     RDW 07/16/2021 13 1     MPV 07/16/2021 10 5     Platelets 83/44/7894 237     nRBC 07/16/2021 0     Neutrophils Relative 07/16/2021 52     Immat GRANS % 07/16/2021 0     Lymphocytes Relative 07/16/2021 40     Monocytes Relative 07/16/2021 5  Eosinophils Relative 07/16/2021 3     Basophils Relative 07/16/2021 0     Neutrophils Absolute 07/16/2021 2 84     Immature Grans Absolute 07/16/2021 0 02     Lymphocytes Absolute 07/16/2021 2 18     Monocytes Absolute 07/16/2021 0 29     Eosinophils Absolute 07/16/2021 0 17     Basophils Absolute 07/16/2021 0 02     Sodium 07/16/2021 138     Potassium 07/16/2021 3 8     Chloride 07/16/2021 107     CO2 07/16/2021 25     ANION GAP 07/16/2021 6     BUN 07/16/2021 19     Creatinine 07/16/2021 0 62     Glucose, Fasting 07/16/2021 93     Calcium 07/16/2021 9 1     AST 07/16/2021 18     ALT 07/16/2021 25     Alkaline Phosphatase 07/16/2021 58     Total Protein 07/16/2021 6 8     Albumin 07/16/2021 3 7     Total Bilirubin 07/16/2021 0 63     eGFR 07/16/2021 88     Cholesterol 07/16/2021 185     Triglycerides 07/16/2021 107     HDL, Direct 07/16/2021 65     LDL Calculated 07/16/2021 99      Imaging: No results found  Review of Systems:  Review of Systems   Constitutional: Positive for fatigue  Musculoskeletal: Positive for arthralgias and myalgias  All other systems reviewed and are negative  Physical Exam:  Physical Exam  Vitals reviewed  Constitutional:       Appearance: She is obese  Eyes:      Pupils: Pupils are equal, round, and reactive to light  Cardiovascular:      Rate and Rhythm: Normal rate and regular rhythm  Pulses: Normal pulses  Heart sounds: Normal heart sounds  Pulmonary:      Effort: Pulmonary effort is normal       Breath sounds: Normal breath sounds  Abdominal:      General: Bowel sounds are normal       Palpations: Abdomen is soft  Musculoskeletal:         General: Normal range of motion  Cervical back: Normal range of motion and neck supple  Skin:     General: Skin is warm and dry  Capillary Refill: Capillary refill takes less than 2 seconds  Neurological:      General: No focal deficit present        Mental Status: She is alert and oriented to person, place, and time  Psychiatric:         Mood and Affect: Mood normal          Behavior: Behavior normal          Discussion/Summary:  1  Hypertension- RUE sitting 150/90,   Start HCTZ 12 5 mg daily  Will up titrate for goal systolic blood pressure of 140  Continue on Losartan 100 mg daily, atenolol 25 mg b i d  I have reinforced 2 g sodium diet provided handout information in reading food labels to consume low-sodium foods  BMP in one week  continue with home blood pressure monitor  2  Dyslipidemia 7/16/21 , , HDL 65, LDL 99   Continue on Tricor 48 mg daily, Crestor 5 mg  3 times a week, heart healthy diet  3   TRAE not compliant with CPAP unable to tolerate, due to PTSD

## 2022-02-21 NOTE — PATIENT INSTRUCTIONS
2gm sodium low fat low cholesterol diet, eating fresh is best, fresh fruit, fresh vegetables, lean protein     Start HCTZ 12 5mg daily in am    BMP in one week      continue with home blood pressure monitoring

## 2022-02-24 DIAGNOSIS — E78.00 PURE HYPERCHOLESTEROLEMIA: ICD-10-CM

## 2022-03-01 RX ORDER — ATENOLOL 25 MG/1
TABLET ORAL
Qty: 180 TABLET | Refills: 3 | Status: SHIPPED | OUTPATIENT
Start: 2022-03-01

## 2022-03-02 DIAGNOSIS — F45.8 ANXIETY HYPERVENTILATION: ICD-10-CM

## 2022-03-02 DIAGNOSIS — F41.9 ANXIETY HYPERVENTILATION: ICD-10-CM

## 2022-03-03 ENCOUNTER — OFFICE VISIT (OUTPATIENT)
Dept: OBGYN CLINIC | Facility: CLINIC | Age: 77
End: 2022-03-03
Payer: MEDICARE

## 2022-03-03 VITALS
WEIGHT: 168 LBS | HEIGHT: 63 IN | DIASTOLIC BLOOD PRESSURE: 72 MMHG | BODY MASS INDEX: 29.77 KG/M2 | SYSTOLIC BLOOD PRESSURE: 120 MMHG

## 2022-03-03 DIAGNOSIS — M23.321 DEGENERATIVE TEAR OF POSTERIOR HORN OF MEDIAL MENISCUS OF RIGHT KNEE: Primary | ICD-10-CM

## 2022-03-03 PROCEDURE — 99213 OFFICE O/P EST LOW 20 MIN: CPT | Performed by: ORTHOPAEDIC SURGERY

## 2022-03-03 RX ORDER — CHLORHEXIDINE GLUCONATE 4 G/100ML
SOLUTION TOPICAL DAILY PRN
Status: CANCELLED | OUTPATIENT
Start: 2022-03-03

## 2022-03-03 RX ORDER — CHLORHEXIDINE GLUCONATE 0.12 MG/ML
15 RINSE ORAL ONCE
Status: CANCELLED | OUTPATIENT
Start: 2022-03-03 | End: 2022-03-03

## 2022-03-03 NOTE — ASSESSMENT & PLAN NOTE
Findings consistent with right knee degenerative medial mensicus tear  Findings and treatment options were discussed with the patient  Xochitl Barnett continues to have mechanical symptoms in her right knee despite cortisone injection and OTC medications  Dr Heladio Tanner recommends right knee arthroscopy with partial medial meniscectomy  Risks, benefits and complications of the surgery were discussed in detail by Dr Heladio Tanner and informed consent was obtained  Dr Heladio Tanner discussed that she may continue to have some symptoms from osteoarthritis in her knee after surgery  She also may need physical therapy after the surgery to improve range of motion strength  All questions were answered to patient's satisfaction

## 2022-03-03 NOTE — PROGRESS NOTES
Assessment:     1  Degenerative tear of posterior horn of medial meniscus of right knee        Plan:  The patient was seen and examined by Dr Holland Lundy and myself  Problem List Items Addressed This Visit        Musculoskeletal and Integument    Degenerative tear of posterior horn of medial meniscus of right knee - Primary     Findings consistent with right knee degenerative medial mensicus tear  Findings and treatment options were discussed with the patient  Santana Friedman continues to have mechanical symptoms in her right knee despite cortisone injection and OTC medications  Dr Holland Lundy recommends right knee arthroscopy with partial medial meniscectomy  Risks, benefits and complications of the surgery were discussed in detail by Dr Holland Lundy and informed consent was obtained  Dr Holland Lundy discussed that she may continue to have some symptoms from osteoarthritis in her knee after surgery  She also may need physical therapy after the surgery to improve range of motion strength  All questions were answered to patient's satisfaction  Relevant Orders    Case request operating room: ARTHROSCOPY  KNEE WITH PARTIAL MEDIAL MENISCECTOMY (Completed)    Ambulatory referral to Internal Medicine    Comprehensive metabolic panel    CBC and differential    EKG 12 lead    XR chest pa & lateral            Subjective:     Patient ID: Jana Vuong is a 69 y o  female  Chief Complaint: This is a 69-year-old white female with right knee osteoarthritis and degenerative medial meniscus tear  She was last seen 6 weeks ago and given an aspiration and cortisone injection to the right knee joint  She did feel improvement overall, but states she continued to have sharp pain over the medial aspect of the knee  She has had episodes of her knee buckling  She ambulates with use of a cane  She has increased sharp pain deep squatting or kneeling      Allergy:  Allergies   Allergen Reactions    Antihistamines, Diphenhydramine-Type     Bupropion     Clarithromycin     Codeine Other (See Comments)     increased HR    Gabapentin     Meloxicam Nausea Only and Visual Disturbance     Other reaction(s): Numbness  Action Taken: med stopped ; Category:  Allergy;     Ondansetron     Pravastatin     Propoxyphene Other (See Comments)     increased HR     Medications:  all current active meds have been reviewed  Past Medical History:  Past Medical History:   Diagnosis Date    Anxiety disorder     Arthralgia     Arthritis     Asthma     Chronic lumbar radiculopathy     last assessed: 16    Chronic respiratory failure ([address] )     Colon cancer screening 2019    Last colonoscopy 2014-15    Depression     Dysfunction of eustachian tube     Dyslipidemia     Emphysema lung (HCC)     Fatigue     HTN (hypertension) 10/29/2014    Hypercholesterolemia     Hypertension     Irritable bowel syndrome with diarrhea 2019    Lumbosacral stenosis     last assessed: 16    TRAE (obstructive sleep apnea)     Pancreatitis     resolved: 17    Pneumonia     PTSD (post-traumatic stress disorder)      Past Surgical History:  Past Surgical History:   Procedure Laterality Date    CHOLECYSTECTOMY      EGD  04/15/2019    GALLBLADDER SURGERY      HYSTERECTOMY      pt thinks she still has one ovary, done over 20 yrs ago   [address]  2016    3, 4, 5    MOUTH SURGERY      MULTIPLE TOOTH EXTRACTIONS N/A     OOPHORECTOMY Bilateral     1 1/2 ovaries removed    TONSILLECTOMY       Family History:  Family History   Problem Relation Age of Onset    Depression Mother         panic attacks    Lung cancer Mother     Mental illness Mother     Substance Abuse Mother         CIGS    Coronary artery disease Father         high # of paternal family members  in their 46s    Substance Abuse Father         CIGS    Depression Sister         panic attacks    Hypertension Sister     Depression Other         panic attacks    Heart disease Family     Hypertension Family     Breast cancer Daughter 48    Lung cancer Maternal Aunt     Colon cancer Neg Hx     Colon polyps Neg Hx      Social History:  Social History     Substance and Sexual Activity   Alcohol Use Yes    Comment: 4 drinks every night     Social History     Substance and Sexual Activity   Drug Use No     Social History     Tobacco Use   Smoking Status Never Smoker   Smokeless Tobacco Never Used     Review of Systems   Constitutional: Negative for chills, fever and unexpected weight change  HENT: Negative for hearing loss, nosebleeds and sore throat  Eyes: Negative for pain, redness and visual disturbance  Respiratory: Negative for cough, shortness of breath and wheezing  Cardiovascular: Negative for chest pain, palpitations and leg swelling  Gastrointestinal: Negative for abdominal pain and nausea  Genitourinary: Negative for dyspareunia, dysuria and frequency  Musculoskeletal: Positive for arthralgias (Right knee), gait problem (Antalgic, use a cane) and joint swelling (Right knee)  Skin: Negative for rash and wound  Neurological: Negative for dizziness, numbness and headaches  Psychiatric/Behavioral: Negative for decreased concentration and suicidal ideas  The patient is not nervous/anxious  Objective:  BP Readings from Last 1 Encounters:   03/03/22 120/72      Wt Readings from Last 1 Encounters:   03/03/22 76 2 kg (168 lb)      BMI:   Estimated body mass index is 29 76 kg/m² as calculated from the following:    Height as of this encounter: 5' 3" (1 6 m)  Weight as of this encounter: 76 2 kg (168 lb)  BSA:   Estimated body surface area is 1 8 meters squared as calculated from the following:    Height as of this encounter: 5' 3" (1 6 m)  Weight as of this encounter: 76 2 kg (168 lb)  Physical Exam  Vitals and nursing note reviewed  Constitutional:       Appearance: Normal appearance  She is well-developed     HENT:      Head: Normocephalic and atraumatic  Right Ear: External ear normal       Left Ear: External ear normal    Eyes:      Extraocular Movements: Extraocular movements intact  Conjunctiva/sclera: Conjunctivae normal    Cardiovascular:      Rate and Rhythm: Normal rate and regular rhythm  Heart sounds: Normal heart sounds  No murmur heard  Pulmonary:      Effort: Pulmonary effort is normal  No respiratory distress  Breath sounds: Normal breath sounds  Musculoskeletal:      Cervical back: Neck supple  Right knee: Effusion (trace) present  Instability Tests: Medial Aravind test positive  Lateral Aravind test negative  Skin:     General: Skin is warm and dry  Neurological:      Mental Status: She is alert and oriented to person, place, and time  Deep Tendon Reflexes: Reflexes are normal and symmetric  Psychiatric:         Mood and Affect: Mood normal          Behavior: Behavior normal        Right Knee Exam     Muscle Strength   The patient has normal right knee strength  Tenderness   The patient is experiencing tenderness in the medial joint line  Range of Motion   Extension: 0   Flexion: 120     Tests   Aravind:  Medial - positive Lateral - negative  Varus: negative Valgus: negative  Patellar apprehension: negative    Other   Erythema: absent  Sensation: normal  Pulse: present  Swelling: mild  Effusion: effusion (trace) present    Comments:  Patellofemoral crepitation          Procedures  No new imaging

## 2022-03-04 ENCOUNTER — APPOINTMENT (OUTPATIENT)
Dept: LAB | Facility: CLINIC | Age: 77
End: 2022-03-04
Payer: MEDICARE

## 2022-03-04 LAB
ANION GAP SERPL CALCULATED.3IONS-SCNC: 7 MMOL/L (ref 4–13)
BUN SERPL-MCNC: 31 MG/DL (ref 5–25)
CALCIUM SERPL-MCNC: 10.6 MG/DL (ref 8.3–10.1)
CHLORIDE SERPL-SCNC: 107 MMOL/L (ref 100–108)
CO2 SERPL-SCNC: 25 MMOL/L (ref 21–32)
CREAT SERPL-MCNC: 0.76 MG/DL (ref 0.6–1.3)
GFR SERPL CREATININE-BSD FRML MDRD: 76 ML/MIN/1.73SQ M
GLUCOSE P FAST SERPL-MCNC: 115 MG/DL (ref 65–99)
MAGNESIUM SERPL-MCNC: 2 MG/DL (ref 1.6–2.6)
POTASSIUM SERPL-SCNC: 3.9 MMOL/L (ref 3.5–5.3)
SODIUM SERPL-SCNC: 139 MMOL/L (ref 136–145)

## 2022-03-04 PROCEDURE — 80048 BASIC METABOLIC PNL TOTAL CA: CPT | Performed by: NURSE PRACTITIONER

## 2022-03-04 PROCEDURE — 83735 ASSAY OF MAGNESIUM: CPT | Performed by: NURSE PRACTITIONER

## 2022-03-04 PROCEDURE — 36415 COLL VENOUS BLD VENIPUNCTURE: CPT | Performed by: NURSE PRACTITIONER

## 2022-03-04 RX ORDER — CLONAZEPAM 0.5 MG/1
0.5 TABLET ORAL 2 TIMES DAILY PRN
Qty: 60 TABLET | Refills: 0 | Status: SHIPPED | OUTPATIENT
Start: 2022-03-04 | End: 2022-04-15 | Stop reason: SDUPTHER

## 2022-03-28 ENCOUNTER — OFFICE VISIT (OUTPATIENT)
Dept: CARDIOLOGY CLINIC | Facility: CLINIC | Age: 77
End: 2022-03-28
Payer: MEDICARE

## 2022-03-28 VITALS
BODY MASS INDEX: 29.95 KG/M2 | WEIGHT: 169 LBS | HEIGHT: 63 IN | HEART RATE: 62 BPM | SYSTOLIC BLOOD PRESSURE: 146 MMHG | OXYGEN SATURATION: 95 % | DIASTOLIC BLOOD PRESSURE: 78 MMHG

## 2022-03-28 DIAGNOSIS — I10 ESSENTIAL HYPERTENSION: ICD-10-CM

## 2022-03-28 DIAGNOSIS — I07.1 MILD TRICUSPID REGURGITATION: ICD-10-CM

## 2022-03-28 DIAGNOSIS — I35.1 NONRHEUMATIC AORTIC VALVE INSUFFICIENCY: Primary | ICD-10-CM

## 2022-03-28 DIAGNOSIS — I34.0 MILD MITRAL REGURGITATION: ICD-10-CM

## 2022-03-28 PROCEDURE — 99213 OFFICE O/P EST LOW 20 MIN: CPT | Performed by: INTERNAL MEDICINE
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
## 2022-04-15 DIAGNOSIS — F45.8 ANXIETY HYPERVENTILATION: ICD-10-CM

## 2022-04-15 DIAGNOSIS — F41.9 ANXIETY HYPERVENTILATION: ICD-10-CM

## 2022-04-18 ENCOUNTER — OFFICE VISIT (OUTPATIENT)
Dept: URGENT CARE | Facility: CLINIC | Age: 77
End: 2022-04-18
Payer: MEDICARE

## 2022-04-18 VITALS
TEMPERATURE: 97.5 F | HEIGHT: 63 IN | HEART RATE: 87 BPM | OXYGEN SATURATION: 95 % | RESPIRATION RATE: 18 BRPM | WEIGHT: 166 LBS | BODY MASS INDEX: 29.41 KG/M2

## 2022-04-18 DIAGNOSIS — J40 BRONCHITIS: Primary | ICD-10-CM

## 2022-04-18 DIAGNOSIS — R05.9 COUGH: ICD-10-CM

## 2022-04-18 DIAGNOSIS — R06.2 WHEEZING: ICD-10-CM

## 2022-04-18 PROCEDURE — G0463 HOSPITAL OUTPT CLINIC VISIT: HCPCS | Performed by: PHYSICIAN ASSISTANT

## 2022-04-18 PROCEDURE — 99213 OFFICE O/P EST LOW 20 MIN: CPT | Performed by: PHYSICIAN ASSISTANT

## 2022-04-18 RX ORDER — BENZONATATE 100 MG/1
100 CAPSULE ORAL 3 TIMES DAILY PRN
Qty: 20 CAPSULE | Refills: 0 | Status: SHIPPED | OUTPATIENT
Start: 2022-04-18

## 2022-04-18 RX ORDER — CLONAZEPAM 0.5 MG/1
0.5 TABLET ORAL 2 TIMES DAILY PRN
Qty: 60 TABLET | Refills: 0 | Status: SHIPPED | OUTPATIENT
Start: 2022-04-18 | End: 2022-06-22 | Stop reason: SDUPTHER

## 2022-04-18 RX ORDER — TIOTROPIUM BROMIDE 18 UG/1
18 CAPSULE ORAL; RESPIRATORY (INHALATION) DAILY
Qty: 30 CAPSULE | Refills: 0 | Status: SHIPPED | OUTPATIENT
Start: 2022-04-18 | End: 2022-06-22

## 2022-04-18 RX ORDER — AZITHROMYCIN 250 MG/1
TABLET, FILM COATED ORAL
Qty: 6 TABLET | Refills: 0 | Status: SHIPPED | OUTPATIENT
Start: 2022-04-18 | End: 2022-04-22

## 2022-04-18 NOTE — PROGRESS NOTES
[facility]        NAME: Max Castrejon is a 69 y o  female  : 1945    MRN: [id number]  DATE: 2022  TIME: 12:03 PM    Assessment and Plan   No primary diagnosis found  No diagnosis found  Patient Instructions       Follow up with PCP in 3-5 days  Proceed to  ER if symptoms worsen  Chief Complaint     Chief Complaint   Patient presents with    Cough     Pt reports shortness of breath with productive cough for approx 2 days  Denies any fever           History of Present Illness       HPI    Review of Systems   Review of Systems      Current Medications       Current Outpatient Medications:     ascorbic acid (VITAMIN C) 250 mg tablet, Take 250 mg by mouth daily, Disp: , Rfl:     atenolol (TENORMIN) 25 mg tablet, TAKE 1 TABLET BY MOUTH TWICE DAILY, Disp: 180 tablet, Rfl: 3    Cholecalciferol (VITAMIN D3) 2000 units capsule, Take by mouth daily, Disp: , Rfl:     clonazePAM (KlonoPIN) 0 5 mg tablet, Take 1 tablet (0 5 mg total) by mouth 2 (two) times a day as needed for anxiety, Disp: 60 tablet, Rfl: 0    co-enzyme Q-10 30 MG capsule, Take 30 mg by mouth daily , Disp: , Rfl:     dicyclomine (BENTYL) 10 mg capsule, TAKE 2 CAPSULES BY MOUTH  TWO TIMES A DAY (Patient taking differently: Take 10 mg by mouth 2 (two) times a day as needed ), Disp: 360 capsule, Rfl: 12    fenofibrate (TRICOR) 48 mg tablet, TAKE 1 TABLET BY MOUTH  DAILY, Disp: 90 tablet, Rfl: 3    fluticasone (FLONASE) 50 mcg/act nasal spray, 1 spray into each nostril daily, Disp: 1 g, Rfl: 5    hydrochlorothiazide (HYDRODIURIL) 12 5 mg tablet, Take 1 tablet (12 5 mg total) by mouth daily, Disp: 30 tablet, Rfl: 3    losartan (COZAAR) 100 MG tablet, Take 1 tablet (100 mg total) by mouth daily, Disp: 90 tablet, Rfl: 3    magnesium oxide (MAG-OX) 400 mg, Take 400 mg by mouth every other day , Disp: , Rfl:     methocarbamol (ROBAXIN) 750 mg tablet, TAKE 1 TABLET BY MOUTH 3 TIMES DAILY AS NEEDED FOR SPASMS, Disp: , Rfl:     Omega 3 1000 MG CAPS, Take by mouth daily, Disp: , Rfl:     Probiotic Product (PROBIOTIC-10 PO), Take by mouth daily, Disp: , Rfl:     rosuvastatin (CRESTOR) 5 mg tablet, TAKE 1 TABLET BY MOUTH 3  TIMES WEEKLY, Disp: 39 tablet, Rfl: 3    Turmeric 500 MG CAPS, Take by mouth daily , Disp: , Rfl:     tiotropium (Spiriva HandiHaler) 18 mcg inhalation capsule, Place 1 capsule (18 mcg total) into inhaler and inhale daily (Patient not taking: Reported on 2/21/2022 ), Disp: 90 capsule, Rfl: 3    Current Allergies     Allergies as of 04/18/2022 - Reviewed 04/18/2022   Allergen Reaction Noted    Antihistamines, diphenhydramine-type  06/05/2013    Bupropion  06/05/2013    Clarithromycin  06/05/2013    Codeine Other (See Comments) 06/05/2013    Gabapentin  10/26/2016    Meloxicam Nausea Only and Visual Disturbance 07/18/2016    Ondansetron  11/30/2016    Pravastatin  06/05/2013    Propoxyphene Other (See Comments) 06/05/2013            The following portions of the patient's history were reviewed and updated as appropriate: allergies, current medications, past family history, past medical history, past social history, past surgical history and problem list      Past Medical History:   Diagnosis Date    Anxiety disorder     Arthralgia     Arthritis     Asthma     Chronic lumbar radiculopathy     last assessed: 12/20/16    Chronic respiratory failure ([facility] [address]     Colon cancer screening 4/11/2019    Last colonoscopy 2014-15    Depression     Dysfunction of eustachian tube     Dyslipidemia     Emphysema lung (HCC)     Fatigue     HTN (hypertension) 10/29/2014    Hypercholesterolemia     Hypertension     Irritable bowel syndrome with diarrhea 4/11/2019    Lumbosacral stenosis     last assessed: 9/20/16    TRAE (obstructive sleep apnea)     Pancreatitis     resolved: 9/20/17    Pneumonia     PTSD (post-traumatic stress disorder)        Past Surgical History:   Procedure Laterality Date    CHOLECYSTECTOMY      EGD  04/15/2019    GALLBLADDER SURGERY      HYSTERECTOMY      pt thinks she still has one ovary, done over 20 yrs ago   [address]  2016    3, 4, 5    MOUTH SURGERY      MULTIPLE TOOTH EXTRACTIONS N/A     OOPHORECTOMY Bilateral     1 1/2 ovaries removed    TONSILLECTOMY         Family History   Problem Relation Age of Onset    Depression Mother         panic attacks    Lung cancer Mother     Mental illness Mother     Substance Abuse Mother         CIGS    Coronary artery disease Father         high # of paternal family members  in their 46s    Substance Abuse Father         CIGS    Depression Sister         panic attacks    Hypertension Sister     Depression Other         panic attacks    Heart disease Family     Hypertension Family     Breast cancer Daughter 48    Lung cancer Maternal Aunt     Colon cancer Neg Hx     Colon polyps Neg Hx          Medications have been verified  Objective   Pulse 87   Temp 97 5 °F (36 4 °C)   Resp 18   Ht 5' 3" (1 6 m)   Wt 75 3 kg (166 lb)   LMP  (LMP Unknown)   SpO2 95%   BMI 29 41 kg/m²   No LMP recorded (lmp unknown)  Patient has had a hysterectomy         Physical Exam     Physical Exam

## 2022-04-18 NOTE — PATIENT INSTRUCTIONS
Acute Bronchitis   AMBULATORY CARE:   Acute bronchitis  is swelling and irritation in your lungs  It is usually caused by a virus and most often happens in the winter  Bronchitis may also be caused by bacteria or by a chemical irritant, such as smoke  Common symptoms include the following:   · Cough that lasts up to 3 weeks, stuffy nose    · Hoarseness, sore throat    · A fever and chills    · Feeling more tired than usual, and body aches    · Wheezing or pain when you breathe or cough    Seek care immediately if:   · You cough up blood  · Your lips or fingernails turn blue  · You feel like you are not getting enough air when you breathe  Call your doctor if:   · Your symptoms do not go away or get worse, even after treatment  · Your cough does not get better within 4 weeks  · You have questions or concerns about your condition or care  Medicines: You may  need any of the following:  · Cough suppressants  decrease your urge to cough  · Decongestants  help loosen mucus in your lungs and make it easier to cough up  This can help you breathe easier  · Inhalers  may be given  Your healthcare provider may give you one or more inhalers to help you breathe easier and cough less  An inhaler gives your medicine to open your airways  Ask your healthcare provider to show you how to use your inhaler correctly  · Antibiotics  may be given for up to 5 days if your bronchitis is caused by bacteria  · Acetaminophen  decreases pain and fever  It is available without a doctor's order  Ask how much to take and how often to take it  Follow directions  Read the labels of all other medicines you are using to see if they also contain acetaminophen, or ask your doctor or pharmacist  Acetaminophen can cause liver damage if not taken correctly  Do not use more than 4 grams (4,000 milligrams) total of acetaminophen in one day  · NSAIDs  help decrease swelling and pain or fever   This medicine is available with or without a doctor's order  NSAIDs can cause stomach bleeding or kidney problems in certain people  If you take blood thinner medicine, always ask your healthcare provider if NSAIDs are safe for you  Always read the medicine label and follow directions  · Take your medicine as directed  Contact your healthcare provider if you think your medicine is not helping or if you have side effects  Tell him of her if you are allergic to any medicine  Keep a list of the medicines, vitamins, and herbs you take  Include the amounts, and when and why you take them  Bring the list or the pill bottles to follow-up visits  Carry your medicine list with you in case of an emergency  Self-care:   · Drink liquids as directed  You may need to drink more liquids than usual to stay hydrated  Ask how much liquid to drink each day and which liquids are best for you  · Use a cool mist humidifier  to increase air moisture in your home  This may make it easier for you to breathe and help decrease your cough  · Get more rest   Rest helps your body to heal  Slowly start to do more each day  Rest when you feel it is needed  · Avoid irritants in the air  Avoid chemicals, fumes, and dust  Wear a face mask if you must work around dust or fumes  Stay inside on days when air pollution levels are high  If you have allergies, stay inside when pollen counts are high  Do not use aerosol products, such as spray-on deodorant, bug spray, and hair spray  · Do not smoke or be around others who are smoking  Nicotine and other chemicals in cigarettes and cigars can cause lung damage  Ask your healthcare provider for information if you currently smoke and need help to quit  E-cigarettes or smokeless tobacco still contain nicotine  Talk to your healthcare provider before you use these products  Prevent acute bronchitis:       · Ask about vaccines you may need    Get a flu vaccine each year as soon as recommended, usually in September or October  Ask your healthcare provider if you should also get a pneumonia or COVID-19 vaccine  Your healthcare provider can tell you if you should also get other vaccines, and when to get them  · Prevent the spread of germs  You can decrease your risk for acute bronchitis and other illnesses by doing the following:    ? Wash your hands often with soap and water  Carry germ-killing hand lotion or gel with you  You can use the lotion or gel to clean your hands when soap and water are not available  ? Do not touch your eyes, nose, or mouth unless you have washed your hands first     ? Always cover your mouth when you cough to prevent the spread of germs  It is best to cough into a tissue or your shirt sleeve instead of into your hand  Ask those around you to cover their mouths when they cough  ? Try to avoid people who have a cold or the flu  If you are sick, stay away from others as much as possible  Follow up with your doctor as directed:  Write down questions you have so you will remember to ask them during your follow-up visits  © Copyright [employer] 2022 Information is for End User's use only and may not be sold, redistributed or otherwise used for commercial purposes  All illustrations and images included in CareNotes® are the copyrighted property of [email or web address]  or [facility] Raissa Campos   The above information is an  only  It is not intended as medical advice for individual conditions or treatments  Talk to your doctor, nurse or pharmacist before following any medical regimen to see if it is safe and effective for you

## 2022-04-21 RX ORDER — ALBUTEROL SULFATE 90 UG/1
2 AEROSOL, METERED RESPIRATORY (INHALATION) EVERY 6 HOURS PRN
Qty: 18 G | Refills: 0 | Status: SHIPPED | OUTPATIENT
Start: 2022-04-21

## 2022-04-29 ENCOUNTER — TELEPHONE (OUTPATIENT)
Dept: FAMILY MEDICINE CLINIC | Facility: HOSPITAL | Age: 77
End: 2022-04-29

## 2022-04-29 NOTE — TELEPHONE ENCOUNTER
I have ordered a COVID test to be done at urgent care-I have added Ceftin 250 b i d   For 7 days and she had just completed a course of azithromycin  If she is not improving have her put in for an appointment next week

## 2022-05-05 DIAGNOSIS — I10 HYPERTENSION, UNSPECIFIED TYPE: ICD-10-CM

## 2022-05-05 RX ORDER — HYDROCHLOROTHIAZIDE 12.5 MG/1
12.5 TABLET ORAL DAILY
Qty: 30 TABLET | Refills: 3 | Status: SHIPPED | OUTPATIENT
Start: 2022-05-05

## 2022-06-17 ENCOUNTER — RA CDI HCC (OUTPATIENT)
Dept: OTHER | Facility: HOSPITAL | Age: 77
End: 2022-06-17

## 2022-06-17 NOTE — PROGRESS NOTES
[address]  coding opportunities       Chart reviewed, no opportunity found: CHART REVIEWED, NO OPPORTUNITY FOUND        Patients Insurance     Medicare Insurance: Medicare

## 2022-06-22 ENCOUNTER — OFFICE VISIT (OUTPATIENT)
Dept: FAMILY MEDICINE CLINIC | Facility: HOSPITAL | Age: 77
End: 2022-06-22
Payer: MEDICARE

## 2022-06-22 VITALS
HEIGHT: 63 IN | BODY MASS INDEX: 29.95 KG/M2 | HEART RATE: 77 BPM | WEIGHT: 169 LBS | DIASTOLIC BLOOD PRESSURE: 70 MMHG | SYSTOLIC BLOOD PRESSURE: 110 MMHG | OXYGEN SATURATION: 97 %

## 2022-06-22 DIAGNOSIS — R73.09 ELEVATED GLUCOSE: ICD-10-CM

## 2022-06-22 DIAGNOSIS — I77.9 BILATERAL CAROTID ARTERY DISEASE, UNSPECIFIED TYPE (HCC): ICD-10-CM

## 2022-06-22 DIAGNOSIS — J44.9 CHRONIC BRONCHITIS WITH EMPHYSEMA (HCC): ICD-10-CM

## 2022-06-22 DIAGNOSIS — F41.9 ANXIETY HYPERVENTILATION: ICD-10-CM

## 2022-06-22 DIAGNOSIS — F45.8 ANXIETY HYPERVENTILATION: ICD-10-CM

## 2022-06-22 DIAGNOSIS — Z12.31 ENCOUNTER FOR SCREENING MAMMOGRAM FOR MALIGNANT NEOPLASM OF BREAST: ICD-10-CM

## 2022-06-22 DIAGNOSIS — Z00.00 MEDICARE ANNUAL WELLNESS VISIT, SUBSEQUENT: Primary | ICD-10-CM

## 2022-06-22 PROCEDURE — G0439 PPPS, SUBSEQ VISIT: HCPCS | Performed by: INTERNAL MEDICINE

## 2022-06-22 RX ORDER — SACCHAROMYCES BOULARDII 250 MG
250 CAPSULE ORAL 2 TIMES DAILY
COMMUNITY

## 2022-06-22 RX ORDER — CLONAZEPAM 0.5 MG/1
0.5 TABLET ORAL 2 TIMES DAILY PRN
Qty: 60 TABLET | Refills: 0 | Status: SHIPPED | OUTPATIENT
Start: 2022-06-22

## 2022-06-22 NOTE — PROGRESS NOTES
Assessment and Plan:     Problem List Items Addressed This Visit    None     Visit Diagnoses     Medicare annual wellness visit, subsequent    -  Primary        BMI Counseling: Body mass index is 29 94 kg/m²  The BMI is above normal  Nutrition recommendations include encouraging healthy choices of fruits and vegetables, moderation in carbohydrate intake and reducing intake of saturated and trans fat  Exercise recommendations include exercising 3-5 times per week  Rationale for BMI follow-up plan is due to patient being overweight or obese  Walks 20 minutes daily   doing gardening    Depression Screening and Follow-up Plan: Patient was screened for depression during today's encounter  They screened negative with a PHQ-2 score of 0  Preventive health issues were discussed with patient, and age appropriate screening tests were ordered as noted in patient's After Visit Summary  Personalized health advice and appropriate referrals for health education or preventive services given if needed, as noted in patient's After Visit Summary  History of Present Illness:     Patient presents for a Medicare Wellness Visit    Here fro medicare wellness   need klonazapam- uses at bedtime and prn    on medical marijuana- using miracle lotion on right knee- has helped  And using tongue drops for back pain flares     Patient Care Team:  Adrián Guillaume DO as PCP - General  DO Elaine Fairbanks MD Wylene Rides, MD Mikey Grate, DO  [employer], [place]     Review of Systems:     Review of Systems   Constitutional: Positive for fatigue  Tired  When working in garden- some breathing issues   Musculoskeletal: Positive for arthralgias and joint swelling  Right knee pain   All other systems reviewed and are negative         Problem List:     Patient Active Problem List   Diagnosis    Allergic rhinitis    Anxiety disorder    Chronic bronchitis with emphysema (HCC)    Chronic low back pain    Essential hypertension    Myofascial pain syndrome    Obstructive sleep apnea    Panic attack    Xerostomia    Symmetrical polyarthritis    Vitamin D deficiency    Bilateral carotid artery disease (HCC)    Colon cancer screening    Irritable bowel syndrome with diarrhea    Erosive gastritis    Class 1 obesity without serious comorbidity in adult    Syncope    Abnormal left aortic arch    Aberrant right subclavian artery    Nonrheumatic aortic valve insufficiency    Mild mitral regurgitation    Mild tricuspid regurgitation    Lumbar spinal stenosis    Family hx-breast malignancy    Dysphagia    Right leg pain    Primary osteoarthritis of right knee    Effusion of right knee    Degenerative tear of posterior horn of medial meniscus of right knee      Past Medical and Surgical History:     Past Medical History:   Diagnosis Date    Anxiety disorder     Arthralgia     Arthritis     Asthma     Chronic lumbar radiculopathy     last assessed: 12/20/16    Chronic respiratory failure (HCC)     Colon cancer screening 4/11/2019    Last colonoscopy 2014-15    Depression     Dysfunction of eustachian tube     Dyslipidemia     Emphysema lung (HCC)     Fatigue     HTN (hypertension) 10/29/2014    Hypercholesterolemia     Hypertension     Irritable bowel syndrome with diarrhea 4/11/2019    Lumbosacral stenosis     last assessed: 9/20/16    TRAE (obstructive sleep apnea)     Pancreatitis     resolved: 9/20/17    Pneumonia     PTSD (post-traumatic stress disorder)      Past Surgical History:   Procedure Laterality Date    CHOLECYSTECTOMY      EGD  04/15/2019    GALLBLADDER SURGERY      HYSTERECTOMY      pt thinks she still has one ovary, done over 20 yrs ago   [address]  12/2016    3, 4, 5    MOUTH SURGERY      MULTIPLE TOOTH EXTRACTIONS N/A 2018    OOPHORECTOMY Bilateral     1 1/2 ovaries removed    TONSILLECTOMY        Family History:     Family History   Problem Relation Age of Onset    Depression Mother         panic attacks    Lung cancer Mother     Mental illness Mother     Substance Abuse Mother         CIGS    Coronary artery disease Father         high # of paternal family members  in their 46s    Substance Abuse Father         CIGS    Depression Sister         panic attacks    Hypertension Sister     Depression Other         panic attacks    Heart disease Family     Hypertension Family     Breast cancer Daughter 48    Lung cancer Maternal Aunt     Colon cancer Neg Hx     Colon polyps Neg Hx       Social History:     Social History     Socioeconomic History    Marital status:      Spouse name: None    Number of children: None    Years of education: None    Highest education level: None   Occupational History    Occupation: retired   Tobacco Use    Smoking status: Never Smoker    Smokeless tobacco: Never Used   Vaping Use    Vaping Use: Never used   Substance and Sexual Activity    Alcohol use: Yes     Comment: 4 drinks every night    Drug use: No    Sexual activity: Not Currently   Other Topics Concern    None   Social History Narrative    Person living alone    FEELS SAFE AT HOME    SEES DENTIST REG    HAS LIVING WILL    Wears seatbelt     Social Determinants of Health     Financial Resource Strain: Not on file   Food Insecurity: Not on file   Transportation Needs: Not on file   Physical Activity: Not on file   Stress: Not on file   Social Connections: Not on file   Intimate Partner Violence: Not on file   Housing Stability: Not on file      Medications and Allergies:     Current Outpatient Medications   Medication Sig Dispense Refill    albuterol (Ventolin HFA) 90 mcg/act inhaler Inhale 2 puffs every 6 (six) hours as needed for wheezing 18 g 0    ascorbic acid (VITAMIN C) 250 mg tablet Take 250 mg by mouth daily      atenolol (TENORMIN) 25 mg tablet TAKE 1 TABLET BY MOUTH TWICE DAILY 180 tablet 3    Cholecalciferol (VITAMIN D3) 2000 units capsule Take by mouth daily      clonazePAM (KlonoPIN) 0 5 mg tablet Take 1 tablet (0 5 mg total) by mouth 2 (two) times a day as needed for anxiety 60 tablet 0    co-enzyme Q-10 30 MG capsule Take 30 mg by mouth daily       dicyclomine (BENTYL) 10 mg capsule TAKE 2 CAPSULES BY MOUTH  TWO TIMES A DAY (Patient taking differently: Take 10 mg by mouth 2 (two) times a day as needed) 360 capsule 12    fenofibrate (TRICOR) 48 mg tablet TAKE 1 TABLET BY MOUTH  DAILY 90 tablet 3    fluticasone (FLONASE) 50 mcg/act nasal spray 1 spray into each nostril daily 1 g 5    hydrochlorothiazide (HYDRODIURIL) 12 5 mg tablet Take 1 tablet (12 5 mg total) by mouth daily 30 tablet 3    losartan (COZAAR) 100 MG tablet Take 1 tablet (100 mg total) by mouth daily 90 tablet 3    magnesium oxide (MAG-OX) 400 mg Take 400 mg by mouth every other day       Omega 3 1000 MG CAPS Take by mouth daily      Probiotic Product (PROBIOTIC-10 PO) Take by mouth daily      rosuvastatin (CRESTOR) 5 mg tablet TAKE 1 TABLET BY MOUTH 3  TIMES WEEKLY 39 tablet 3    saccharomyces boulardii (FLORASTOR) 250 mg capsule Take 250 mg by mouth 2 (two) times a day      benzonatate (TESSALON PERLES) 100 mg capsule Take 1 capsule (100 mg total) by mouth 3 (three) times a day as needed for cough (Patient not taking: Reported on 6/22/2022) 20 capsule 0    methocarbamol (ROBAXIN) 750 mg tablet TAKE 1 TABLET BY MOUTH 3 TIMES DAILY AS NEEDED FOR SPASMS (Patient not taking: Reported on 6/22/2022)      tiotropium (Spiriva HandiHaler) 18 mcg inhalation capsule Place 1 capsule (18 mcg total) into inhaler and inhale daily (Patient not taking: Reported on 6/22/2022) 30 capsule 0    Turmeric 500 MG CAPS Take by mouth daily  (Patient not taking: Reported on 6/22/2022)       No current facility-administered medications for this visit       Allergies   Allergen Reactions    Antihistamines, Diphenhydramine-Type     Bupropion     Clarithromycin     Codeine Other (See Comments) increased HR    Gabapentin     Meloxicam Nausea Only and Visual Disturbance     Other reaction(s): Numbness  Action Taken: med stopped ; Category: Allergy;     Ondansetron     Pravastatin     Propoxyphene Other (See Comments)     increased HR      Immunizations:     Immunization History   Administered Date(s) Administered    COVID-19 MODERNA VACC 0 5 ML IM 01/25/2021, 03/01/2021    INFLUENZA 09/25/2019    Influenza Quadrivalent Preservative Free 3 years and older IM 11/03/2014    Influenza Split High Dose Preservative Free IM 12/17/2015, 10/06/2016, 09/20/2017    Influenza, high dose seasonal 0 7 mL 10/15/2018, 10/15/2020    Influenza, seasonal, injectable 09/12/2013    Pneumococcal Conjugate 13-Valent 12/30/2015, 07/27/2017    Pneumococcal Polysaccharide PPV23 01/01/2011, 08/09/2019    Td (adult), adsorbed 06/06/2011      Health Maintenance:         Topic Date Due    Breast Cancer Screening: Mammogram  08/13/2021    Hepatitis C Screening  Completed         Topic Date Due    DTaP,Tdap,and Td Vaccines (1 - Tdap) 06/07/2011    COVID-19 Vaccine (3 - Booster for Moderna series) 08/01/2021    Influenza Vaccine (Season Ended) 09/01/2022      Medicare Screening Tests and Risk Assessments:     Kitty Corral is here for her Subsequent Wellness visit  Health Risk Assessment:   Patient rates overall health as fair  Patient feels that their physical health rating is slightly worse  Patient is very satisfied with their life  Eyesight was rated as same  Hearing was rated as same  Patient feels that their emotional and mental health rating is same  Patients states they are never, rarely angry  Patient states they are always unusually tired/fatigued  Pain experienced in the last 7 days has been none  Patient states that she has experienced no weight loss or gain in last 6 months  Fall Risk Screening:    In the past year, patient has experienced: no history of falling in past year      Urinary Incontinence Screening:   Patient has leaked urine accidently in the last six months  Home Safety:  Patient has trouble with stairs inside or outside of their home  Patient has working smoke alarms and has working carbon monoxide detector  Home safety hazards include: none  Nutrition:   Current diet is Regular  Medications:   Patient is currently taking over-the-counter supplements  OTC medications include: see medication list  Patient is able to manage medications  Activities of Daily Living (ADLs)/Instrumental Activities of Daily Living (IADLs):   Walk and transfer into and out of bed and chair?: Yes  Dress and groom yourself?: Yes    Bathe or shower yourself?: Yes    Feed yourself? Yes  Do your laundry/housekeeping?: Yes  Manage your money, pay your bills and track your expenses?: Yes  Make your own meals?: Yes    Do your own shopping?: Yes    Previous Hospitalizations:   Any hospitalizations or ED visits within the last 12 months?: Yes      Hospitalization Comments:      PREVENTIVE SCREENINGS      Cardiovascular Screening:    General: Screening Not Indicated and History Lipid Disorder      Diabetes Screening:     General: Screening Current      Breast Cancer Screening:     General: Screening Current      Cervical Cancer Screening:    General: Screening Not Indicated      Lung Cancer Screening:     General: Screening Not Indicated      Hepatitis C Screening:    General: Screening Current    Screening, Brief Intervention, and Referral to Treatment (SBIRT)    Screening      AUDIT-C Screenin) How often did you have a drink containing alcohol in the past year? 4 or more times a week  2) How many drinks did you have on a typical day when you were drinking in the past year?  3 to 4  3) How often did you have 6 or more drinks on one occasion in the past year? never    AUDIT-C Score: 4  Interpretation: Score 3-12 (female): POSITIVE screen for alcohol misuse    AUDIT Screenin) How often during the last year have you found that you were not able to stop drinking once you had started? 0 - never  5) How often during the last year have you failed to do what was normally expected from you because of drinking? 0 - never  6) How often during the last year have you needed a first drink in the morning to get yourself going after a heavy drinking session? 0 - never  7) How often during the last year have you had a feeling of guilt or remorse after drinking? 0 - never  8) How often during the last year have you been unable to remember what happened the night before because you had been drinking? 0 - never  9) Have you or someone else been injured as a result of your drinking? 0 - no  10) Has a relative or friend or a doctor or another health worker been concerned about your drinking or suggested you cut down? 0 - no    AUDIT Score: 4  Interpretation: Low risk alcohol consumption    Single Item Drug Screening:  How often have you used an illegal drug (including marijuana) or a prescription medication for non-medical reasons in the past year? never    Single Item Drug Screen Score: 0  Interpretation: Negative screen for possible drug use disorder    No exam data present     Physical Exam:     /70   Pulse 77   Ht 5' 3" (1 6 m)   Wt 76 7 kg (169 lb)   LMP  (LMP Unknown)   SpO2 97%   BMI 29 94 kg/m²     Physical Exam  Vitals and nursing note reviewed  HENT:      Head: Normocephalic  Right Ear: Tympanic membrane and external ear normal       Left Ear: Tympanic membrane and external ear normal       Nose: No congestion  Mouth/Throat:      Mouth: Mucous membranes are moist       Pharynx: No oropharyngeal exudate or posterior oropharyngeal erythema  Eyes:      General: No scleral icterus  Right eye: No discharge  Left eye: No discharge  Conjunctiva/sclera: Conjunctivae normal    Cardiovascular:      Rate and Rhythm: Regular rhythm  Bradycardia present     Pulmonary:      Breath sounds: Rhonchi present  Abdominal:      General: Abdomen is flat  Palpations: Abdomen is soft  Tenderness: There is no abdominal tenderness  Musculoskeletal:         General: Tenderness present  Cervical back: No tenderness  Comments: Right knee medial and posterior swelling-    Lymphadenopathy:      Cervical: No cervical adenopathy  Skin:     Coloration: Skin is not jaundiced  Findings: No erythema or rash  Neurological:      General: No focal deficit present  Mental Status: She is alert and oriented to person, place, and time     Psychiatric:         Behavior: Behavior normal       Comments:          Medical form signed for controlled  Substances signed by patient and counseling given  Diomedes Whyte DO

## 2022-06-22 NOTE — PATIENT INSTRUCTIONS
Do labs in September hba1c, cmp and cbc      Medicare Preventive Visit Patient Instructions  Thank you for completing your Welcome to Medicare Visit or Medicare Annual Wellness Visit today  Your next wellness visit will be due in one year (6/23/2023)  The screening/preventive services that you may require over the next 5-10 years are detailed below  Some tests may not apply to you based off risk factors and/or age  Screening tests ordered at today's visit but not completed yet may show as past due  Also, please note that scanned in results may not display below  Preventive Screenings:  Service Recommendations Previous Testing/Comments   Colorectal Cancer Screening  * Colonoscopy    * Fecal Occult Blood Test (FOBT)/Fecal Immunochemical Test (FIT)  * Fecal DNA/Cologuard Test  * Flexible Sigmoidoscopy Age: 54-65 years old   Colonoscopy: every 10 years (may be performed more frequently if at higher risk)  OR  FOBT/FIT: every 1 year  OR  Cologuard: every 3 years  OR  Sigmoidoscopy: every 5 years  Screening may be recommended earlier than age 48 if at higher risk for colorectal cancer  Also, an individualized decision between you and your healthcare provider will decide whether screening between the ages of 74-80 would be appropriate  Colonoscopy: 06/04/2015  FOBT/FIT: Not on file  Cologuard: Not on file  Sigmoidoscopy: Not on file          Breast Cancer Screening Age: 36 years old  Frequency: every 1-2 years  Not required if history of left and right mastectomy Mammogram: 08/13/2020    Screening Current   Cervical Cancer Screening Between the ages of 21-29, pap smear recommended once every 3 years  Between the ages of 33-67, can perform pap smear with HPV co-testing every 5 years     Recommendations may differ for women with a history of total hysterectomy, cervical cancer, or abnormal pap smears in past  Pap Smear: Not on file    Screening Not Indicated   Hepatitis C Screening Once for adults born between 1945 and 1965  More frequently in patients at high risk for Hepatitis C Hep C Antibody: 04/13/2019    Screening Current   Diabetes Screening 1-2 times per year if you're at risk for diabetes or have pre-diabetes Fasting glucose: 115 mg/dL   A1C: 5 6 %    Screening Current   Cholesterol Screening Once every 5 years if you don't have a lipid disorder  May order more often based on risk factors  Lipid panel: 07/16/2021    Screening Not Indicated  History Lipid Disorder     Other Preventive Screenings Covered by Medicare:  Abdominal Aortic Aneurysm (AAA) Screening: covered once if your at risk  You're considered to be at risk if you have a family history of AAA  Lung Cancer Screening: covers low dose CT scan once per year if you meet all of the following conditions: (1) Age 50-69; (2) No signs or symptoms of lung cancer; (3) Current smoker or have quit smoking within the last 15 years; (4) You have a tobacco smoking history of at least 30 pack years (packs per day multiplied by number of years you smoked); (5) You get a written order from a healthcare provider  Glaucoma Screening: covered annually if you're considered high risk: (1) You have diabetes OR (2) Family history of glaucoma OR (3)  aged 48 and older OR (3)  American aged 72 and older  Osteoporosis Screening: covered every 2 years if you meet one of the following conditions: (1) You're estrogen deficient and at risk for osteoporosis based off medical history and other findings; (2) Have a vertebral abnormality; (3) On glucocorticoid therapy for more than 3 months; (4) Have primary hyperparathyroidism; (5) On osteoporosis medications and need to assess response to drug therapy  Last bone density test (DXA Scan): 08/13/2020  HIV Screening: covered annually if you're between the age of 12-76  Also covered annually if you are younger than 13 and older than 72 with risk factors for HIV infection   For pregnant patients, it is covered up to 3 times per pregnancy  Immunizations:  Immunization Recommendations   Influenza Vaccine Annual influenza vaccination during flu season is recommended for all persons aged >= 6 months who do not have contraindications   Pneumococcal Vaccine (Prevnar and Pneumovax)  * Prevnar = PCV13  * Pneumovax = PPSV23   Adults 25-60 years old: 1-3 doses may be recommended based on certain risk factors  Adults 72 years old: Prevnar (PCV13) vaccine recommended followed by Pneumovax (PPSV23) vaccine  If already received PPSV23 since turning 65, then PCV13 recommended at least one year after PPSV23 dose  Hepatitis B Vaccine 3 dose series if at intermediate or high risk (ex: diabetes, end stage renal disease, liver disease)   Tetanus (Td) Vaccine - COST NOT COVERED BY MEDICARE PART B Following completion of primary series, a booster dose should be given every 10 years to maintain immunity against tetanus  Td may also be given as tetanus wound prophylaxis  Tdap Vaccine - COST NOT COVERED BY MEDICARE PART B Recommended at least once for all adults  For pregnant patients, recommended with each pregnancy  Shingles Vaccine (Shingrix) - COST NOT COVERED BY MEDICARE PART B  2 shot series recommended in those aged 48 and above     Health Maintenance Due:      Topic Date Due    Breast Cancer Screening: Mammogram  08/13/2021    Hepatitis C Screening  Completed     Immunizations Due:      Topic Date Due    DTaP,Tdap,and Td Vaccines (1 - Tdap) 06/07/2011    COVID-19 Vaccine (3 - Booster for Moderna series) 08/01/2021    Influenza Vaccine (Season Ended) 09/01/2022     Advance Directives   What are advance directives? Advance directives are legal documents that state your wishes and plans for medical care  These plans are made ahead of time in case you lose your ability to make decisions for yourself  Advance directives can apply to any medical decision, such as the treatments you want, and if you want to donate organs     What are the types of advance directives? There are many types of advance directives, and each state has rules about how to use them  You may choose a combination of any of the following:  Living will: This is a written record of the treatment you want  You can also choose which treatments you do not want, which to limit, and which to stop at a certain time  This includes surgery, medicine, IV fluid, and tube feedings  Durable power of  for healthcare [facility] [place]): This is a written record that states who you want to make healthcare choices for you when you are unable to make them for yourself  This person, called a proxy, is usually a family member or a friend  You may choose more than 1 proxy  Do not resuscitate (DNR) order:  A DNR order is used in case your heart stops beating or you stop breathing  It is a request not to have certain forms of treatment, such as CPR  A DNR order may be included in other types of advance directives  Medical directive: This covers the care that you want if you are in a coma, near death, or unable to make decisions for yourself  You can list the treatments you want for each condition  Treatment may include pain medicine, surgery, blood transfusions, dialysis, IV or tube feedings, and a ventilator (breathing machine)  Values history: This document has questions about your views, beliefs, and how you feel and think about life  This information can help others choose the care that you would choose  Why are advance directives important? An advance directive helps you control your care  Although spoken wishes may be used, it is better to have your wishes written down  Spoken wishes can be misunderstood, or not followed  Treatments may be given even if you do not want them  An advance directive may make it easier for your family to make difficult choices about your care  Urinary Incontinence   Urinary incontinence (UI)  is when you lose control of your bladder   UI develops because your bladder cannot store or empty urine properly  The 3 most common types of UI are stress incontinence, urge incontinence, or both  Medicines:   May be given to help strengthen your bladder control  Report any side effects of medication to your healthcare provider  Do pelvic muscle exercises often:  Your pelvic muscles help you stop urinating  Squeeze these muscles tight for 5 seconds, then relax for 5 seconds  Gradually work up to squeezing for 10 seconds  Do 3 sets of 15 repetitions a day, or as directed  This will help strengthen your pelvic muscles and improve bladder control  Train your bladder:  Go to the bathroom at set times, such as every 2 hours, even if you do not feel the urge to go  You can also try to hold your urine when you feel the urge to go  For example, hold your urine for 5 minutes when you feel the urge to go  As that becomes easier, hold your urine for 10 minutes  Self-care:   Keep a UI record  Write down how often you leak urine and how much you leak  Make a note of what you were doing when you leaked urine  Drink liquids as directed  You may need to limit the amount of liquid you drink to help control your urine leakage  Do not drink any liquid right before you go to bed  Limit or do not have drinks that contain caffeine or alcohol  Prevent constipation  Eat a variety of high-fiber foods  Good examples are high-fiber cereals, beans, vegetables, and whole-grain breads  Walking is the best way to trigger your intestines to have a bowel movement  Exercise regularly and maintain a healthy weight  Weight loss and exercise will decrease pressure on your bladder and help you control your leakage  Use a catheter as directed  to help empty your bladder  A catheter is a tiny, plastic tube that is put into your bladder to drain your urine  Go to behavior therapy as directed  Behavior therapy may be used to help you learn to control your urge to urinate      Weight Management   Why it is important to manage your weight:  Being overweight increases your risk of health conditions such as heart disease, high blood pressure, type 2 diabetes, and certain types of cancer  It can also increase your risk for osteoarthritis, sleep apnea, and other respiratory problems  Aim for a slow, steady weight loss  Even a small amount of weight loss can lower your risk of health problems  How to lose weight safely:  A safe and healthy way to lose weight is to eat fewer calories and get regular exercise  You can lose up about 1 pound a week by decreasing the number of calories you eat by 500 calories each day  Healthy meal plan for weight management:  A healthy meal plan includes a variety of foods, contains fewer calories, and helps you stay healthy  A healthy meal plan includes the following:  Eat whole-grain foods more often  A healthy meal plan should contain fiber  Fiber is the part of grains, fruits, and vegetables that is not broken down by your body  Whole-grain foods are healthy and provide extra fiber in your diet  Some examples of whole-grain foods are whole-wheat breads and pastas, oatmeal, brown rice, and bulgur  Eat a variety of vegetables every day  Include dark, leafy greens such as spinach, kale, leandro greens, and mustard greens  Eat yellow and orange vegetables such as carrots, sweet potatoes, and winter squash  Eat a variety of fruits every day  Choose fresh or canned fruit (canned in its own juice or light syrup) instead of juice  Fruit juice has very little or no fiber  Eat low-fat dairy foods  Drink fat-free (skim) milk or 1% milk  Eat fat-free yogurt and low-fat cottage cheese  Try low-fat cheeses such as mozzarella and other reduced-fat cheeses  Choose meat and other protein foods that are low in fat  Choose beans or other legumes such as split peas or lentils  Choose fish, skinless poultry (chicken or turkey), or lean cuts of red meat (beef or pork)   Before you cook meat or poultry, cut off any visible fat  Use less fat and oil  Try baking foods instead of frying them  Add less fat, such as margarine, sour cream, regular salad dressing and mayonnaise to foods  Eat fewer high-fat foods  Some examples of high-fat foods include french fries, doughnuts, ice cream, and cakes  Eat fewer sweets  Limit foods and drinks that are high in sugar  This includes candy, cookies, regular soda, and sweetened drinks  Exercise:  Exercise at least 30 minutes per day on most days of the week  Some examples of exercise include walking, biking, dancing, and swimming  You can also fit in more physical activity by taking the stairs instead of the elevator or parking farther away from stores  Ask your healthcare provider about the best exercise plan for you  Alcohol Use and Your Health    Drinking too much can harm your health  Excessive alcohol use leads to about 88,000 death in the [place] each year, and shortens the life of those who diet by almost 30 years  Further, excessive drinking cost the economy $249 billion in 2010  Most excessive drinkers are not alcohol dependent  Excessive alcohol use has immediate effects that increase the risk of many harmful health conditions  These are most often the result of binge drinking  Over time, excessive alcohol use can lead to the development of chronic diseases and other series health problems  What is considered a "drink"? Excessive alcohol use includes:  Binge Drinking: For women, 4 or more drinks consumed on one occasion  For men, 5 or more drinks consumed on one occasion  Heavy Drinking: For women, 8 or more drinks per week  For men, 15 or more drinks per week  Any alcohol used by pregnant women  Any alcohol used by those under the age of 21 years    If you choose to drink, do so in moderation:  Do not drink at all if you are under the age of 24, or if you are or may be pregnant, or have health problems that could be made worse by drinking    For women, up to 1 drink per day  For men, up to 2 drinks a day    No one should begin drinking or drink more frequently based on potential health benefits    Short-Term Health Risks:  Injuries: motor vehicle crashes, falls, drownings, burns  Violence: homicide, suicide, sexual assault, intimate partner violence  Alcohol poisoning  Reproductive health: risky sexual behaviors, unintended prengnacy, sexually transmitted diseases, miscarriage, stillbirth, fetal alcohol syndrome    Long-Term Health Risks:  Chronic diseases: high blood pressure, heart disease, stroke, liver disease, digestive problems  Cancers: breast, mouth and throat, liver, colon  Learning and memory problems: dementia, poor school performance  Mental health: depression, anxiety, insomnia  Social problems: lost productivity, family problems, unemployment  Alcohol dependence    For support and more information:  Substance Abuse and [address] , [address] [place]  Web Address: https://[employer]/    Alcoholics Anonymous        Web Address: http://[employer]/    https://www cdc gov/alcohol/fact-sheets/alcohol-use htm     © Copyright [employer] 2018 Information is for End User's use only and may not be sold, redistributed or otherwise used for commercial purposes   All illustrations and images included in CareNotes® are the copyrighted property of [employer]  or [address] [place], [place] [address]pe

## 2022-07-05 DIAGNOSIS — I10 ESSENTIAL HYPERTENSION: ICD-10-CM

## 2022-07-05 RX ORDER — LOSARTAN POTASSIUM 100 MG/1
TABLET ORAL
Qty: 90 TABLET | Refills: 3 | Status: SHIPPED | OUTPATIENT
Start: 2022-07-05

## 2022-07-27 ENCOUNTER — TELEPHONE (OUTPATIENT)
Dept: GASTROENTEROLOGY | Facility: CLINIC | Age: 77
End: 2022-07-27

## 2022-07-27 NOTE — TELEPHONE ENCOUNTER
May be she is impacted  Would have her give herself a Fleet's enema  If no better should go to the emergency room to exclude bowel obstruction or significant fecal impaction  Needs office visit    Has not had colonoscopy since 2015 so probably should have

## 2022-07-27 NOTE — TELEPHONE ENCOUNTER
Pt called through the service, she normally has IBS with diarrhea but last week that changed  She typically has a diarrhea bowel movement every am and had one last on 7/21 Friday she started with severe gas so tried a gas pill X 1 with no relief  She then lost her appetite  7/23 and 7/24 she had small dark stools and no bowel movement since then  She has some back aches, bloating, burping and gas but is lessening as is only eating a very minimal amount  (Yesterday was 1/2 cup of yogurt)  Her BP yesterday was 176/92 and 154/92  She denies any nausea, vomiting, fever or abdominal pain other than gas pains  She does have a history of an ileus post op and has concerns of reoccurrence  Discussed ER evaluation but states would just sit for hours with no assistance so would prefer Dr Jarad Mayes recommendations  She does not take any bowel medications presently  She has a meeting today from 10:30-12 [facility] but will be available otherwise  [phone]

## 2022-07-27 NOTE — TELEPHONE ENCOUNTER
Pt notified of Dr Laurita Serrano recommendations  She is agreeable  She is on her way out for a meeting so requested she call back soon to make an apt

## 2022-08-10 ENCOUNTER — HOSPITAL ENCOUNTER (OUTPATIENT)
Dept: MAMMOGRAPHY | Facility: CLINIC | Age: 77
Discharge: HOME | End: 2022-08-10

## 2022-08-10 DIAGNOSIS — Z12.31 ENCOUNTER FOR SCREENING MAMMOGRAM FOR MALIGNANT NEOPLASM OF BREAST: ICD-10-CM

## 2022-08-15 DIAGNOSIS — F45.8 ANXIETY HYPERVENTILATION: ICD-10-CM

## 2022-08-15 DIAGNOSIS — F41.9 ANXIETY HYPERVENTILATION: ICD-10-CM

## 2022-08-15 RX ORDER — CLONAZEPAM 0.5 MG/1
0.5 TABLET ORAL 2 TIMES DAILY PRN
Qty: 60 TABLET | Refills: 0 | Status: SHIPPED | OUTPATIENT
Start: 2022-08-15 | End: 2022-10-12 | Stop reason: SDUPTHER

## 2022-08-24 ENCOUNTER — HOSPITAL ENCOUNTER (OUTPATIENT)
Dept: ULTRASOUND IMAGING | Facility: CLINIC | Age: 77
Discharge: HOME | End: 2022-08-24
Payer: MEDICARE

## 2022-08-24 ENCOUNTER — HOSPITAL ENCOUNTER (OUTPATIENT)
Dept: MAMMOGRAPHY | Facility: CLINIC | Age: 77
Discharge: HOME | End: 2022-08-24
Payer: MEDICARE

## 2022-08-24 DIAGNOSIS — N64.4 BREAST PAIN: ICD-10-CM

## 2022-08-24 PROCEDURE — 76642 ULTRASOUND BREAST LIMITED: CPT

## 2022-08-24 PROCEDURE — 77066 DX MAMMO INCL CAD BI: CPT

## 2022-08-24 PROCEDURE — G0279 TOMOSYNTHESIS, MAMMO: HCPCS

## 2022-08-24 NOTE — PROGRESS NOTES
Met with patient and Dr Rox Bardales  regarding recommendation for;    ___x__ RIGHT ______LEFT      ___x__Ultrasound guided  ______Stereotactic breast biopsy  __X___Verbalized understanding        Blood thinners:  No: _x____ Yes: ______ What:                 Biopsy teaching sheet given:  Yes: ___X___ No: ________    Pt given contact information and adv to call with any questions/needs

## 2022-09-07 ENCOUNTER — TELEPHONE (OUTPATIENT)
Dept: FAMILY MEDICINE CLINIC | Facility: HOSPITAL | Age: 77
End: 2022-09-07

## 2022-09-07 NOTE — TELEPHONE ENCOUNTER
Patient received a letter from the [facility] stating that she needed to contact her PCP about results details and discussing what the next steps are  Patient has biopsy scheduled for September 12  She is unsure as to why she received this letter and wants more clarification from the office

## 2022-09-07 NOTE — TELEPHONE ENCOUNTER
This is a copy of the letter that was sent to her from the [facility]  She has a biopsy scheduled already  Is there anything more she needs to do at this time? August 24, 2022     MRN: [id number]       Phone:[phone]      Dear Ms Raymond,     Your breast imaging exam performed on August 24, 2022 shows the need for further evaluation  The results of your examination have been sent to Jina Garrido DO and will be a part of your medical record here at [facility]  It is important that you call your health care provider to discuss the details of this report and decide together what the next steps of your medical care should be  You can access your report and other health care information online through our patient portal by logging in to [employer]  If you change health care providers or go to a different facility for a mammogram, you should tell them where and when this study was done  Thank you for choosing [email or web address] for your imaging needs      Sincerely,    [address] [place] [address]

## 2022-09-12 ENCOUNTER — HOSPITAL ENCOUNTER (OUTPATIENT)
Dept: MAMMOGRAPHY | Facility: CLINIC | Age: 77
Discharge: HOME | End: 2022-09-12
Payer: MEDICARE

## 2022-09-12 ENCOUNTER — HOSPITAL ENCOUNTER (OUTPATIENT)
Dept: ULTRASOUND IMAGING | Facility: CLINIC | Age: 77
Discharge: HOME | End: 2022-09-12
Payer: MEDICARE

## 2022-09-12 VITALS — DIASTOLIC BLOOD PRESSURE: 61 MMHG | HEART RATE: 64 BPM | SYSTOLIC BLOOD PRESSURE: 117 MMHG

## 2022-09-12 DIAGNOSIS — R92.8 ABNORMAL MAMMOGRAM: ICD-10-CM

## 2022-09-12 DIAGNOSIS — R92.8 ABNORMAL ULTRASOUND OF BREAST: ICD-10-CM

## 2022-09-12 PROCEDURE — 88360 TUMOR IMMUNOHISTOCHEM/MANUAL: CPT | Performed by: PATHOLOGY

## 2022-09-12 PROCEDURE — 88305 TISSUE EXAM BY PATHOLOGIST: CPT | Performed by: PATHOLOGY

## 2022-09-12 PROCEDURE — A4648 IMPLANTABLE TISSUE MARKER: HCPCS

## 2022-09-12 PROCEDURE — 19285 PERQ DEV BREAST 1ST US IMAG: CPT

## 2022-09-12 PROCEDURE — 19083 BX BREAST 1ST LESION US IMAG: CPT

## 2022-09-12 PROCEDURE — 88342 IMHCHEM/IMCYTCHM 1ST ANTB: CPT | Performed by: PATHOLOGY

## 2022-09-12 PROCEDURE — 88341 IMHCHEM/IMCYTCHM EA ADD ANTB: CPT | Performed by: PATHOLOGY

## 2022-09-12 RX ORDER — LIDOCAINE HYDROCHLORIDE 10 MG/ML
5 INJECTION, SOLUTION EPIDURAL; INFILTRATION; INTRACAUDAL; PERINEURAL ONCE
Status: COMPLETED | OUTPATIENT
Start: 2022-09-12 | End: 2022-09-12

## 2022-09-12 RX ADMIN — LIDOCAINE HYDROCHLORIDE 5 ML: 10 INJECTION, SOLUTION EPIDURAL; INFILTRATION; INTRACAUDAL; PERINEURAL at 15:03

## 2022-09-12 NOTE — DISCHARGE INSTR - OTHER ORDERS
POST LARGE CORE BREAST BIOPSY PATIENT INFORMATION      Place an ice pack inside your bra over the top of the dressing every hour for 20 minutes (20 minutes on, 60 minutes off)  Do this until bedtime  Do not shower or bathe until the following morning  After bathing, you may remove the bandaid  You may bathe your breast carefully with the steri-strips in place  Be careful not to loosen them  The steri-strips will fall off in 3-5 days  You may have mild discomfort, and you may have some bruising where the needle entered the skin  This should clear within 5-7 days  If you need medicine for discomfort, take acetaminophen products such as Tylenol  You may also take Advil or Motrin products  DO NOT use Aspirin for the first 24 hours  Do not participate in strenuous activities such as-tennis, aerobics, weight lifting, etc  for 24 hours  Refrain from swimming/soaking for 72 hours  Wearing a bra for sleeping may be more comfortable for the first 24-48 hours after your biopsy  Watch for continued bleeding, pain or fever over 101  If any of these symptoms occur, please contact our breast nurse navigator at the location where your biopsy was performed  During normal business hours (7:30am - 4:00pm) please call the nurse navigator at the site     where your procedure was performed:       [address]: [phone] or [phone] [address]: [phone] or [phone]     [address]: [address] [facility]: [address]: [phone]        After 4pm - please call your physician or go to the nearest Emergency Department location  The final results of your biopsy are usually available within one week

## 2022-09-12 NOTE — PROGRESS NOTES
Procedure type:    __x___ultrasound guided _____stereotactic    Breast:    _____Left __x___Right    Location: 10 oclock 7 cmfn    Needle: 12g Radha    # of passes: 3    Clip: 7 5 cm Luis AngelKesson     Performed by: Dr Sepideh Hernandez held for 5 minutes by: Jyoti Waggoner Strips:    __x___yes _____no    Band aid:    __x___yes_____no    Tape and guaze:    _____yes __x___no    Tolerated procedure:    _x____yes _____no

## 2022-09-12 NOTE — PROGRESS NOTES
Patient arrived via:    ___x__ambulatory w/ cane    _____wheelchair    _____stretcher      Domestic violence screen    __x____negative______positive    Breast Implants:    _______yes ___x_____no

## 2022-09-12 NOTE — PROGRESS NOTES
Ice pack given:    ___x__yes _____no    Discharge instructions signed by patient:    __x___yes _____no    Discharge instructions given to patient:    __x___yes _____no    Discharged via:    __x___ambulatory    _____wheelchair    _____stretcher    Stable on discharge:    ___x__yes ____no

## 2022-09-13 NOTE — PROGRESS NOTES
Post procedure call completed    Bleeding: _____yes __X___no    Pain: _____yes ___X___no    Redness/Swelling: ______yes ___X___no    Band aid removed: _____yes ___X__no (discussed removing when she showers)    Steri-Strips intact: ___X___yes _____no (discussed with patient to remove steri strips on 9/17/2022  if they have not come off on their own)    Pt with no questions at this time, adv will call when results available, adv to call with any questions or concerns, has name/# for contact

## 2022-09-14 DIAGNOSIS — C50.911 MALIGNANT NEOPLASM OF RIGHT FEMALE BREAST, UNSPECIFIED ESTROGEN RECEPTOR STATUS, UNSPECIFIED SITE OF BREAST (HCC): Primary | ICD-10-CM

## 2022-09-14 PROBLEM — C50.919 BREAST CANCER (HCC): Status: ACTIVE | Noted: 2022-09-14

## 2022-09-14 PROCEDURE — 88341 IMHCHEM/IMCYTCHM EA ADD ANTB: CPT | Performed by: PATHOLOGY

## 2022-09-14 PROCEDURE — 88342 IMHCHEM/IMCYTCHM 1ST ANTB: CPT | Performed by: PATHOLOGY

## 2022-09-14 PROCEDURE — 88305 TISSUE EXAM BY PATHOLOGIST: CPT | Performed by: PATHOLOGY

## 2022-09-14 NOTE — PROGRESS NOTES
Pt's breast biopsy came back positive for invasive lobular carcinoma  I spoke with her and explained the results to the best of my abilities to her   I referred her to Dr Clinton Noel

## 2022-09-15 ENCOUNTER — PATIENT OUTREACH (OUTPATIENT)
Dept: HEMATOLOGY ONCOLOGY | Facility: CLINIC | Age: 77
End: 2022-09-15

## 2022-09-15 ENCOUNTER — TELEPHONE (OUTPATIENT)
Dept: MAMMOGRAPHY | Facility: CLINIC | Age: 77
End: 2022-09-15

## 2022-09-15 DIAGNOSIS — C50.011 MALIGNANT NEOPLASM OF NIPPLE OF RIGHT BREAST IN FEMALE, UNSPECIFIED ESTROGEN RECEPTOR STATUS (HCC): Primary | ICD-10-CM

## 2022-09-15 NOTE — TELEPHONE ENCOUNTER
[facility]    Diagnosis: Invasive lobular carcinoma    Is this diagnosis cancer (Y/N):YES    Biopsy Date: 9/12/2022    Does the patient have another biopsy pending:  If so, when:    Preferred provider: Dr Domenico Austin     Preferred location:    Any requests for dates/times: asap    Any additional information: please call today after 2pm      Please advise when appointment is made

## 2022-09-15 NOTE — PROGRESS NOTES
Intake received, chart reviewed for need of external records  Pathology completed:Yes, completed on 9-  Imaging completed: Diagnostic Mammogram & Ultrasound done on 8-  All records needed are in patients chart  No further action required of Care Coordination team at this time

## 2022-09-15 NOTE — PROGRESS NOTES
Nurse navigator attempted to contact patient for general assessment  Left message with direct contact info  Breast Oncology Nurse Navigator      Called patient for initial outreach from nurse navigator  Introduced myself and my role  Pt lives at home alone with limited support  Pt does have a sister that is able to help transport as necessary      History of mother and father both smoked significantly in the house, both passed of lung cancer  Pt also has a daughter that reportedly had double mastectomy due to breast cancer and was BRCA+  Referal sent to genetics  Pt is a nonsmoker  Information regarding cancer support community provided  Patient has [employer] and my contact information and knows she can reach out with questions  General assessment completed  Spoke for >18 minutes

## 2022-09-16 ENCOUNTER — TELEPHONE (OUTPATIENT)
Dept: HEMATOLOGY ONCOLOGY | Facility: CLINIC | Age: 77
End: 2022-09-16

## 2022-09-16 ENCOUNTER — PATIENT OUTREACH (OUTPATIENT)
Dept: HEMATOLOGY ONCOLOGY | Facility: CLINIC | Age: 77
End: 2022-09-16

## 2022-09-16 DIAGNOSIS — C50.411 MALIGNANT NEOPLASM OF UPPER-OUTER QUADRANT OF RIGHT FEMALE BREAST, UNSPECIFIED ESTROGEN RECEPTOR STATUS (HCC): Primary | ICD-10-CM

## 2022-09-16 NOTE — TELEPHONE ENCOUNTER
Intake received  Pt has active medicare A & B  Pt also has active secondary ins thru [employer]   Pt outreach not needed at this time

## 2022-09-16 NOTE — PROGRESS NOTES
MSW received a referral from the Breast Navigator  Pt to be seen by Oncology on 9/29 and MSW will plan to make outreach after this date to complete a psychosocial assessment and a Distress Thermometer 
yes

## 2022-09-19 ENCOUNTER — TELEPHONE (OUTPATIENT)
Dept: GENETICS | Facility: CLINIC | Age: 77
End: 2022-09-19

## 2022-09-19 NOTE — TELEPHONE ENCOUNTER
I introduced myself to Christina Husain and let her know that her nurse navigator reached out to the cancer risk and genetics program on her behalf  I reviewed the following with Christina Husain:    Delroy Bustillos While the majority of cancer occurs by chance, approximately 5-10% of breast cancer has an underlying genetic cause  Genetic testing is available which can determine if there is an underlying genetic cause to your cancer  Understanding if there is an underlying genetic cause can:  o Provide your surgeon with additional information to help with surgical decisions, treatment decisions and eligibility for clinical trials  o It can determine if you have an increased risk for any additional cancers  o Help family members understand their cancer risk   We work closely with the [place] [address] breast surgeons and are reaching out to see if you have interest in genetic testing  The reason we are reaching out at this time is that this result may help your surgeon determine the appropriate type of surgery (i e  lumpectomy vs mastectomy)  This test is not a requirement but can take 5-10 days to complete so we would like to start the process as soon as possible so the results are ready for your appointment with your surgeon   If you are interested in genetic testing, I can collect your family history and initiate genetic testing for you     Patient elected to pursue testing      Diagnosis Details:  o Invasive Lobular Carcinoma  o Right  o Hormone receptors pending   Personal History:  o Do you have a personal history of any other cancer? No  - If yes type/age of diagnosis:    Family history:   o Do you have Ashkenazi Restorationism ancestry? No  - If yes, maternal, paternal, or both?  o Do you have any children? Yes  - How many sons? 2  - How many daughters? 1  - Do any of your children have a history of cancer? Yes  - If yes type/age of diagnosis:   Daughter - Breast Cancer age 48    o Do you have any brothers or sisters? Yes  - How many brothers? 0  - How many sisters? 1   - Are they from the same parents? Yes  - If no how maternal/paternal half-siblings:  - Do any of your brothers or sisters have a history of cancer? No  - If yes who and the type/age of diagnosis:           Do you have nieces or nephews? No    o Does your mother have a history of cancer? Yes  - If yes, cancer type and age of diagnosis: Lung Cancer age 72; Breast cancer age 79  - Is your mother still living? No  - Age/Age of death: 76    o Thinking about your mother's family (aunts, uncles, cousins, grandparents) is there anyone with a history of cancer? Yes  - If yes, list relationship, cancer type and age of diagnosis:  Maternal Aunt - Breast Cancer age 52 ()  MGF - Lung Cancer age 46s ()  MGM - GYN related Cancer age 68 ()    o Does your father have a history of cancer? No  - If yes, cancer type and age of diagnosis:  - Is your father still living? No  - Age/age of death: 59    o Thinking about your father's family (aunts, uncles, cousins, grandparents) is there anyone with a history of cancer? No  - If yes, list relationship, cancer type and age of diagnosis:       Types of Results- positive, negative, VUS  o Positive result- may explain personal diagnosis/family history  Can give surgeon information on treatment plan, inform future screening/management or tell a person about other possible risks  Positive results can initiate testing for other family members who may be at risk (children, siblings, etc)  o Negative result- does not give an explanation  Surgical/treatment plan will be based on clinical presentation and will be part of discussion with surgeon  Negative result cannot be passed down to children, but they are still at elevated risk  o Uncertain result- common, but treated like a negative result clinically  90% are downgraded over time   [facility]'s billing policy   o Most insurance plans cover the cost of genetic testing  The out-of-pocket cost varies due to the differences in deductibles, co-payments and co-insurance defined by individual plans but 90% of people pay $100 or less for a genetic test     A blood kit will be mailed to you overnight  Please take the blood kit along with packet of paperwork to any [facility] lab to have your blood drawn  We have genetic counselors available, if you have any additional questions or would like to speak with them we can schedule you a 15 minute appointment  Plan:   A blood kit will be mailed out on 9/20/2022 along with information on genetic testing  and the lab's billing policy  Genetic Testing Preformed: [facility] Breast Cancer STAT Panel (9 genes): SINDI, BRCA1, BRCA2, CDH1, CHEK2, PALB2, PTEN, STK11, and TP53 with reflex to [facility] Core Cancer Panel (27 additional genes): APC, SINDI, AXIN2 BARD1, BRCA1, BRCA2, BRIP1, BMPR1A, CDH1, CDK4, CDKN2A, CHEK2, DICER1, EPCAM, GREM1, HOXB13, MLH1, MSH2, MSH3, MSH6, MUTYH, NBN, NF1, NTHL1, PALB2, PMS2, POLD1, POLE, PTEN, RAD51C, RAD51D, RECQL SMAD4, SMARCA4, STK11, TP53    Initial results will take approximately 5-12 days to return     Additional results may take up to 2-3 weeks to complete once test is started  Patient does not have any further questions, declined meeting with genetic counselor    When your results are ready, someone from the genetics team will call you, review the results, and contact your breast surgeon  You will be contacted with any type of result- positive, negative, or uncertain

## 2022-09-20 ENCOUNTER — DOCUMENTATION (OUTPATIENT)
Dept: GENETICS | Facility: CLINIC | Age: 77
End: 2022-09-20

## 2022-09-22 ENCOUNTER — APPOINTMENT (OUTPATIENT)
Dept: LAB | Facility: CLINIC | Age: 77
End: 2022-09-22
Payer: MEDICARE

## 2022-09-22 DIAGNOSIS — C50.011 PAGET'S DISEASE OF THE BREAST, RIGHT (HCC): ICD-10-CM

## 2022-09-22 DIAGNOSIS — Z80.3 FAMILY HISTORY OF MALIGNANT NEOPLASM OF BREAST: ICD-10-CM

## 2022-09-22 PROCEDURE — 36415 COLL VENOUS BLD VENIPUNCTURE: CPT

## 2022-09-29 ENCOUNTER — CONSULT (OUTPATIENT)
Dept: SURGICAL ONCOLOGY | Facility: CLINIC | Age: 77
End: 2022-09-29
Payer: MEDICARE

## 2022-09-29 VITALS
DIASTOLIC BLOOD PRESSURE: 60 MMHG | OXYGEN SATURATION: 97 % | TEMPERATURE: 97.5 F | RESPIRATION RATE: 16 BRPM | BODY MASS INDEX: 30.48 KG/M2 | HEIGHT: 63 IN | WEIGHT: 172 LBS | HEART RATE: 66 BPM | SYSTOLIC BLOOD PRESSURE: 120 MMHG

## 2022-09-29 DIAGNOSIS — C50.411 MALIGNANT NEOPLASM OF UPPER-OUTER QUADRANT OF RIGHT BREAST IN FEMALE, ESTROGEN RECEPTOR POSITIVE (HCC): Primary | ICD-10-CM

## 2022-09-29 DIAGNOSIS — Z17.0 MALIGNANT NEOPLASM OF UPPER-OUTER QUADRANT OF RIGHT BREAST IN FEMALE, ESTROGEN RECEPTOR POSITIVE (HCC): Primary | ICD-10-CM

## 2022-09-29 DIAGNOSIS — Z84.81 FAMILY HISTORY OF GENE MUTATION: ICD-10-CM

## 2022-09-29 DIAGNOSIS — C50.911 MALIGNANT NEOPLASM OF RIGHT FEMALE BREAST, UNSPECIFIED ESTROGEN RECEPTOR STATUS, UNSPECIFIED SITE OF BREAST (HCC): ICD-10-CM

## 2022-09-29 PROCEDURE — 99205 OFFICE O/P NEW HI 60 MIN: CPT | Performed by: SURGERY

## 2022-09-29 RX ORDER — TRAMADOL HYDROCHLORIDE 50 MG/1
50 TABLET ORAL EVERY 8 HOURS PRN
Qty: 9 TABLET | Refills: 0 | Status: SHIPPED | OUTPATIENT
Start: 2022-09-29

## 2022-09-29 RX ORDER — CEFAZOLIN SODIUM 1 G/50ML
1000 SOLUTION INTRAVENOUS
Status: CANCELLED | OUTPATIENT
Start: 2022-10-21

## 2022-09-29 NOTE — PROGRESS NOTES
Breast Consultation-Surgical Oncology     [facility]  [facility] [address] [facility] [address]    Name:  Ariel Silva  YOB: 1945  MRN:  [id number]    Assessment/Plan   Diagnoses and all orders for this visit:    Malignant neoplasm of upper-outer quadrant of right breast in female, estrogen receptor positive ([facility] [address] )  -     traMADol (ULTRAM) 50 mg tablet; Take 1 tablet (50 mg total) by mouth every 8 (eight) hours as needed for moderate pain    Malignant neoplasm of right female breast, unspecified estrogen receptor status, unspecified site of breast ([facility] [address] )  -     Ambulatory Referral to Surgical Oncology    Family history of gene mutation    Other orders  -     Reason for no Pharmacologic VTE Prophylaxis; Standing  -     Apply SCD or Foot pumps; Standing  -     ceFAZolin (ANCEF) IVPB (premix in dextrose) 1,000 mg 50 mL            HPI: Ariel Silva is a 69y o  year old female who presents with newly diagnosed carcinoma of the right breast   She stated that she had some discomfort in the breast around the nipple area but denied any masses pre mammogram   She states that she can feel an area of concern post biopsy  She reports family history of breast cancer most notably in her daughter who was diagnosed at the age of 52 and is a known gene mutation carrier of CHEK2  There is also ovarian cancer in the family  The patient herself has already had conversation with our genetics team and has submitted blood for her own personal genetic testing  Surgical treatment to date consisted of not applicable      Oncology History:    Oncology History   Malignant neoplasm of upper-outer quadrant of right breast in female, estrogen receptor positive ([facility] )   9/14/2022 Initial Diagnosis    Malignant neoplasm of upper-outer quadrant of right breast in female, estrogen receptor positive ([facility] )     9/29/2022 -  Cancer Staged    Staging form: Breast, AJCC 8th Edition  - Clinical stage from 2022: Stage IA (cT1c, cN0, cM0, G2, ER+, NE+, HER2-) - Signed by Simone Quintana MD on 2022  Stage prefix: Initial diagnosis  Method of lymph node assessment: Clinical  Histologic grading system: 3 grade system           Pertinent reproductive history:  Age at menarche:    OB History        3    Para   3    Term   3            AB        Living           SAB        IAB        Ectopic        Multiple        Live Births               Obstetric Comments   Age at menarche 16  Age at first pregnancy 25  Used HRT for 15 years               Problem List:   Patient Active Problem List   Diagnosis    Allergic rhinitis    Anxiety disorder    Chronic bronchitis with emphysema (HCC)    Chronic low back pain    Essential hypertension    Myofascial pain syndrome    Obstructive sleep apnea    Panic attack    Xerostomia    Symmetrical polyarthritis    Vitamin D deficiency    Bilateral carotid artery disease (HCC)    Colon cancer screening    Irritable bowel syndrome with diarrhea    Erosive gastritis    Class 1 obesity without serious comorbidity in adult    Syncope    Abnormal left aortic arch    Aberrant right subclavian artery    Nonrheumatic aortic valve insufficiency    Mild mitral regurgitation    Mild tricuspid regurgitation    Lumbar spinal stenosis    Family hx-breast malignancy    Dysphagia    Right leg pain    Primary osteoarthritis of right knee    Effusion of right knee    Degenerative tear of posterior horn of medial meniscus of right knee    Malignant neoplasm of upper-outer quadrant of right breast in female, estrogen receptor positive ([facility] [address] )    Family history of gene mutation     Past Medical History:   Diagnosis Date    Anxiety     Anxiety disorder     Arthralgia     Arthritis     Asthma     Basal cell carcinoma     Breast cancer ([address] ) [facility]    Chronic lumbar radiculopathy     last assessed: 16    Chronic respiratory failure ([facility] [address] )     Colon cancer screening 2019    Last colonoscopy -15    Depression     Dysfunction of eustachian tube     Dyslipidemia     Emphysema lung (HCC)     Fatigue     HTN (hypertension) 10/29/2014    Hypercholesterolemia     Hypertension     Irritable bowel syndrome with diarrhea 2019    Lumbosacral stenosis     last assessed: 16    TRAE (obstructive sleep apnea)     Pancreatitis     resolved: 17    Pneumonia     PTSD (post-traumatic stress disorder)      Past Surgical History:   Procedure Laterality Date    BREAST BIOPSY Right 2022    [address]    CHOLECYSTECTOMY      EGD  04/15/2019    GALLBLADDER SURGERY      HYSTERECTOMY      pt thinks she still has one ovary, done over 20 yrs ago   [address]  2016    3, 4, 5    MOUTH SURGERY      MULTIPLE TOOTH EXTRACTIONS N/A     OOPHORECTOMY Bilateral     1 1/2 ovaries removed    TONSILLECTOMY      US BREAST FLORENCIO  NEEDLE LOC RIGHT Right 2022    US GUIDED BREAST BIOPSY RIGHT COMPLETE Right 2022     Family History   Problem Relation Age of Onset    Breast cancer Mother 61    Depression Mother         panic attacks    Lung cancer Mother     Mental illness Mother     Substance Abuse Mother         CIGS    Coronary artery disease Father         high # of paternal family members  in their 46s    Substance Abuse Father         CIGS    Depression Sister         panic attacks    Hypertension Sister     Breast cancer Daughter 48        CHEK2 positve    Breast cancer Maternal Aunt 36    Lung cancer Maternal Aunt     Ovarian cancer Maternal Grandmother 79    Depression Other         panic attacks    Colon cancer Other         age at dx unk    Heart disease Family     Hypertension Family     Colon polyps Neg Hx      Social History     Socioeconomic History    Marital status:      Spouse name: Not on file    Number of children: Not on file    Years of education: Not on file    Highest education level: Not on file   Occupational History    Occupation: retired   Tobacco Use    Smoking status: Never Smoker    Smokeless tobacco: Never Used   Vaping Use    Vaping Use: Never used   Substance and Sexual Activity    Alcohol use: Yes     Comment: 4 drinks every night    Drug use: No    Sexual activity: Not Currently   Other Topics Concern    Not on file   Social History Narrative    Person living alone    FEELS SAFE AT HOME    SEES DENTIST REG    HAS LIVING WILL    Wears seatbelt     Social Determinants of Health     Financial Resource Strain: Not on file   Food Insecurity: Not on file   Transportation Needs: Not on file   Physical Activity: Not on file   Stress: Not on file   Social Connections: Not on file   Intimate Partner Violence: Not on file   Housing Stability: Not on file     Current Outpatient Medications   Medication Sig Dispense Refill    albuterol (Ventolin HFA) 90 mcg/act inhaler Inhale 2 puffs every 6 (six) hours as needed for wheezing 18 g 0    ascorbic acid (VITAMIN C) 250 mg tablet Take 250 mg by mouth daily      atenolol (TENORMIN) 25 mg tablet TAKE 1 TABLET BY MOUTH TWICE DAILY 180 tablet 3    Cholecalciferol (VITAMIN D3) 2000 units capsule Take by mouth daily      clonazePAM (KlonoPIN) 0 5 mg tablet Take 1 tablet (0 5 mg total) by mouth 2 (two) times a day as needed for anxiety 60 tablet 0    co-enzyme Q-10 30 MG capsule Take 30 mg by mouth daily       dicyclomine (BENTYL) 10 mg capsule TAKE 2 CAPSULES BY MOUTH  TWO TIMES A DAY (Patient taking differently: Take 10 mg by mouth 2 (two) times a day as needed) 360 capsule 12    fenofibrate (TRICOR) 48 mg tablet TAKE 1 TABLET BY MOUTH  DAILY 90 tablet 3    fluticasone (FLONASE) 50 mcg/act nasal spray 1 spray into each nostril daily 1 g 5    hydrochlorothiazide (HYDRODIURIL) 12 5 mg tablet Take 1 tablet (12 5 mg total) by mouth daily 30 tablet 3    losartan (COZAAR) 100 MG tablet TAKE 1 TABLET BY MOUTH  DAILY 90 tablet 3    magnesium oxide (MAG-OX) 400 mg Take 400 mg by mouth every other day       Omega 3 1000 MG CAPS Take by mouth daily      Probiotic Product (PROBIOTIC-10 PO) Take by mouth daily      rosuvastatin (CRESTOR) 5 mg tablet TAKE 1 TABLET BY MOUTH 3  TIMES WEEKLY 39 tablet 3    saccharomyces boulardii (FLORASTOR) 250 mg capsule Take 250 mg by mouth 2 (two) times a day      traMADol (ULTRAM) 50 mg tablet Take 1 tablet (50 mg total) by mouth every 8 (eight) hours as needed for moderate pain 9 tablet 0    benzonatate (TESSALON PERLES) 100 mg capsule Take 1 capsule (100 mg total) by mouth 3 (three) times a day as needed for cough (Patient not taking: No sig reported) 20 capsule 0    methocarbamol (ROBAXIN) 750 mg tablet TAKE 1 TABLET BY MOUTH 3 TIMES DAILY AS NEEDED FOR SPASMS (Patient not taking: No sig reported)      tiotropium (Spiriva HandiHaler) 18 mcg inhalation capsule Place 1 capsule (18 mcg total) into inhaler and inhale daily (Patient not taking: No sig reported) 30 capsule 0    Turmeric 500 MG CAPS Take by mouth daily  (Patient not taking: No sig reported)       No current facility-administered medications for this visit  Allergies   Allergen Reactions    Antihistamines, Diphenhydramine-Type     Bupropion     Clarithromycin     Codeine Other (See Comments)     increased HR    Gabapentin     Meloxicam Nausea Only and Visual Disturbance     Other reaction(s): Numbness  Action Taken: med stopped ; Category: Allergy;     Ondansetron     Pravastatin     Propoxyphene Other (See Comments)     increased HR         The following portions of the patient's history were reviewed and updated as appropriate: allergies, current medications, past family history, past medical history, past social history, past surgical history and problem list     Review of Systems:  Review of Systems   Constitutional: Positive for fatigue  Negative for appetite change and fever     HENT: Positive for postnasal drip and trouble swallowing  Eyes: Positive for pain, discharge and visual disturbance  Respiratory: Positive for chest tightness and shortness of breath  Cardiovascular: Positive for palpitations  Gastrointestinal: Positive for abdominal distention and abdominal pain  Endocrine: Negative  Genitourinary: Positive for urgency  Musculoskeletal: Positive for back pain, gait problem and joint swelling  Negative for arthralgias and myalgias  Skin: Negative  Allergic/Immunologic: Negative  Neurological: Positive for tremors, syncope and weakness  Hematological: Negative  Negative for adenopathy  Does not bruise/bleed easily  Psychiatric/Behavioral: Positive for decreased concentration  The patient is nervous/anxious  Physical Exam:  Physical Exam  Constitutional:       General: She is not in acute distress  Appearance: Normal appearance  She is well-developed  HENT:      Head: Normocephalic and atraumatic  Cardiovascular:      Heart sounds: Normal heart sounds  Pulmonary:      Breath sounds: Normal breath sounds  Chest:   Breasts:      Right: Mass ( nodularity upper outer) and skin change (Well-healed biopsy site upper outer) present  No inverted nipple, nipple discharge, tenderness, axillary adenopathy or supraclavicular adenopathy  Left: No inverted nipple, mass, nipple discharge, skin change, tenderness, axillary adenopathy or supraclavicular adenopathy  Abdominal:      Palpations: Abdomen is soft  Musculoskeletal:      Right lower leg: No edema  Left lower leg: No edema  Lymphadenopathy:      Upper Body:      Right upper body: No supraclavicular, axillary or pectoral adenopathy  Left upper body: No supraclavicular, axillary or pectoral adenopathy  Neurological:      Mental Status: She is alert and oriented to person, place, and time     Psychiatric:         Mood and Affect: Mood normal          Laboratory:  09/12/2022 core biopsy right breast 1100 hours    Pathology revealed: invasive lobular carcinoma    Histologic grade: moderately differentiated     Angiolymphatic invasion:  absent    Tumor node status: Negative    Hormone receptor status:  ER/TX 95%, HER2 is negative        Imagin2022 bilateral 3D diagnostic mammogram was a BI-RADS five secondary to an irregular mass in the 1100 hours axis of the right breast with a sonographic correlate measuring 1 8 cm, left side was benign    2022 post biopsy mammogram is concordant, FLORENCIO reflector in place        Discussion/Summary:  67-year-old female who presents with a newly diagnosed carcinoma of the right breast   This is a clinical stage I A T1c N0 grade 2 ER positive HER2 negative invasive lobular carcinoma  I discussed these findings with her  We reviewed the multimodality treatment of breast cancer to include surgery, radiation and medical therapy  She does have family history of both breast and ovarian cancer  Her daughter had breast cancer at the age of 52 and is a known CHEK2 mutation carrier  The patient herself submitted blood for testing which is currently pending  We did discuss the implications of a positive genetic test   We talked about the options of a lumpectomy versus bilateral mastectomy  The patient states that at her age she would prefer to do the lumpectomy if possible  She is a candidate for this based on exam and imaging  I did inform her that if her genetic testing were negative and her pathology report shows widely clean margins with clean nodes, she may be able to forego radiation therapy; however, if she were a gene carrier we may indeed recommend postop radiation  Regardless she will meet with Medical Oncology in the postop setting ring to discuss adjuvant therapy likely hormone therapy only; however this will depend on her final pathology and obviously the Medical Oncology recommendations    All of her questions were answered today   Consent was signed in the office  She will be scheduled for surgery in the near term

## 2022-09-29 NOTE — H&P (VIEW-ONLY)
Breast Consultation-Surgical Oncology     [facility]  [facility] [address]  [facility] [id number]    Name:  Marilin Teresa  YOB: 1945  MRN:  [id number]    Assessment/Plan   Diagnoses and all orders for this visit:    Malignant neoplasm of upper-outer quadrant of right breast in female, estrogen receptor positive ([facility] [address] )  -     traMADol (ULTRAM) 50 mg tablet; Take 1 tablet (50 mg total) by mouth every 8 (eight) hours as needed for moderate pain    Malignant neoplasm of right female breast, unspecified estrogen receptor status, unspecified site of breast ([facility] [address] )  -     Ambulatory Referral to Surgical Oncology    Family history of gene mutation    Other orders  -     Reason for no Pharmacologic VTE Prophylaxis; Standing  -     Apply SCD or Foot pumps; Standing  -     ceFAZolin (ANCEF) IVPB (premix in dextrose) 1,000 mg 50 mL            HPI: Marilin Teresa is a 69y o  year old female who presents with newly diagnosed carcinoma of the right breast   She stated that she had some discomfort in the breast around the nipple area but denied any masses pre mammogram   She states that she can feel an area of concern post biopsy  She reports family history of breast cancer most notably in her daughter who was diagnosed at the age of 52 and is a known gene mutation carrier of CHEK2  There is also ovarian cancer in the family  The patient herself has already had conversation with our genetics team and has submitted blood for her own personal genetic testing  Surgical treatment to date consisted of not applicable      Oncology History:    Oncology History   Malignant neoplasm of upper-outer quadrant of right breast in female, estrogen receptor positive ([facility] [address] )   9/14/2022 Initial Diagnosis    Malignant neoplasm of upper-outer quadrant of right breast in female, estrogen receptor positive ([facility] [address] )     9/29/2022 -  Cancer Staged    Staging form: Breast, AJCC 8th Edition  - Clinical stage from 2022: Stage IA (cT1c, cN0, cM0, G2, ER+, ID+, HER2-) - Signed by Angel Pulliam MD on 2022  Stage prefix: Initial diagnosis  Method of lymph node assessment: Clinical  Histologic grading system: 3 grade system           Pertinent reproductive history:  Age at menarche:    OB History        3    Para   3    Term   3            AB        Living           SAB        IAB        Ectopic        Multiple        Live Births               Obstetric Comments   Age at menarche 16  Age at first pregnancy 25  Used HRT for 15 years               Problem List:   Patient Active Problem List   Diagnosis   • Allergic rhinitis   • Anxiety disorder   • Chronic bronchitis with emphysema (HCC)   • Chronic low back pain   • Essential hypertension   • Myofascial pain syndrome   • Obstructive sleep apnea   • Panic attack   • Xerostomia   • Symmetrical polyarthritis   • Vitamin D deficiency   • Bilateral carotid artery disease (HCC)   • Colon cancer screening   • Irritable bowel syndrome with diarrhea   • Erosive gastritis   • Class 1 obesity without serious comorbidity in adult   • Syncope   • Abnormal left aortic arch   • Aberrant right subclavian artery   • Nonrheumatic aortic valve insufficiency   • Mild mitral regurgitation   • Mild tricuspid regurgitation   • Lumbar spinal stenosis   • Family hx-breast malignancy   • Dysphagia   • Right leg pain   • Primary osteoarthritis of right knee   • Effusion of right knee   • Degenerative tear of posterior horn of medial meniscus of right knee   • Malignant neoplasm of upper-outer quadrant of right breast in female, estrogen receptor positive ([facility] [address] )   • Family history of gene mutation     Past Medical History:   Diagnosis Date   • Anxiety    • Anxiety disorder    • Arthralgia    • Arthritis    • Asthma    • Basal cell carcinoma    • Breast cancer ([facility] [address] ) 2022    ILC   • Chronic lumbar radiculopathy     last assessed: 16   • Chronic respiratory failure ([facility] [address] )    • Colon cancer screening 2019    Last colonoscopy -15   • Depression    • Dysfunction of eustachian tube    • Dyslipidemia    • Emphysema lung (HCC)    • Fatigue    • HTN (hypertension) 10/29/2014   • Hypercholesterolemia    • Hypertension    • Irritable bowel syndrome with diarrhea 2019   • Lumbosacral stenosis     last assessed: 16   • TRAE (obstructive sleep apnea)    • Pancreatitis     resolved: 17   • Pneumonia    • PTSD (post-traumatic stress disorder)      Past Surgical History:   Procedure Laterality Date   • BREAST BIOPSY Right 2022    ILC   • CHOLECYSTECTOMY     • EGD  04/15/2019   • GALLBLADDER SURGERY     • HYSTERECTOMY      pt thinks she still has one ovary, done over 20 yrs ago   • [address]  2016    3, 4, 5   • MOUTH SURGERY     • MULTIPLE TOOTH EXTRACTIONS N/A    • OOPHORECTOMY Bilateral     1 1/2 ovaries removed   • TONSILLECTOMY     • US BREAST FLORENCIO  NEEDLE LOC RIGHT Right 2022   • US GUIDED BREAST BIOPSY RIGHT COMPLETE Right 2022     Family History   Problem Relation Age of Onset   • Breast cancer Mother 61   • Depression Mother         panic attacks   • Lung cancer Mother    • Mental illness Mother    • Substance Abuse Mother         CIGS   • Coronary artery disease Father         high # of paternal family members  in their 46s   • Substance Abuse Father         CIGS   • Depression Sister         panic attacks   • Hypertension Sister    • Breast cancer Daughter 48        CHEK2 positve   • Breast cancer Maternal Aunt 40   • Lung cancer Maternal Aunt    • Ovarian cancer Maternal Grandmother 79   • Depression Other         panic attacks   • Colon cancer Other         age at dx unk   • Heart disease Family    • Hypertension Family    • Colon polyps Neg Hx      Social History     Socioeconomic History   • Marital status:      Spouse name: Not on file   • Number of children: Not on file   • Years of education: Not on file   • Highest education level: Not on file   Occupational History   • Occupation: retired   Tobacco Use   • Smoking status: Never Smoker   • Smokeless tobacco: Never Used   Vaping Use   • Vaping Use: Never used   Substance and Sexual Activity   • Alcohol use: Yes     Comment: 4 drinks every night   • Drug use: No   • Sexual activity: Not Currently   Other Topics Concern   • Not on file   Social History Narrative    Person living alone    FEELS SAFE AT HOME    SEES DENTIST REG    HAS LIVING WILL    Wears seatbelt     Social Determinants of Health     Financial Resource Strain: Not on file   Food Insecurity: Not on file   Transportation Needs: Not on file   Physical Activity: Not on file   Stress: Not on file   Social Connections: Not on file   Intimate Partner Violence: Not on file   Housing Stability: Not on file     Current Outpatient Medications   Medication Sig Dispense Refill   • albuterol (Ventolin HFA) 90 mcg/act inhaler Inhale 2 puffs every 6 (six) hours as needed for wheezing 18 g 0   • ascorbic acid (VITAMIN C) 250 mg tablet Take 250 mg by mouth daily     • atenolol (TENORMIN) 25 mg tablet TAKE 1 TABLET BY MOUTH TWICE DAILY 180 tablet 3   • Cholecalciferol (VITAMIN D3) 2000 units capsule Take by mouth daily     • clonazePAM (KlonoPIN) 0 5 mg tablet Take 1 tablet (0 5 mg total) by mouth 2 (two) times a day as needed for anxiety 60 tablet 0   • co-enzyme Q-10 30 MG capsule Take 30 mg by mouth daily      • dicyclomine (BENTYL) 10 mg capsule TAKE 2 CAPSULES BY MOUTH  TWO TIMES A DAY (Patient taking differently: Take 10 mg by mouth 2 (two) times a day as needed) 360 capsule 12   • fenofibrate (TRICOR) 48 mg tablet TAKE 1 TABLET BY MOUTH  DAILY 90 tablet 3   • fluticasone (FLONASE) 50 mcg/act nasal spray 1 spray into each nostril daily 1 g 5   • hydrochlorothiazide (HYDRODIURIL) 12 5 mg tablet Take 1 tablet (12 5 mg total) by mouth daily 30 tablet 3   • losartan (COZAAR) 100 MG tablet TAKE 1 TABLET BY MOUTH  DAILY 90 tablet 3   • magnesium oxide (MAG-OX) 400 mg Take 400 mg by mouth every other day      • Omega 3 1000 MG CAPS Take by mouth daily     • Probiotic Product (PROBIOTIC-10 PO) Take by mouth daily     • rosuvastatin (CRESTOR) 5 mg tablet TAKE 1 TABLET BY MOUTH 3  TIMES WEEKLY 39 tablet 3   • saccharomyces boulardii (FLORASTOR) 250 mg capsule Take 250 mg by mouth 2 (two) times a day     • traMADol (ULTRAM) 50 mg tablet Take 1 tablet (50 mg total) by mouth every 8 (eight) hours as needed for moderate pain 9 tablet 0   • benzonatate (TESSALON PERLES) 100 mg capsule Take 1 capsule (100 mg total) by mouth 3 (three) times a day as needed for cough (Patient not taking: No sig reported) 20 capsule 0   • methocarbamol (ROBAXIN) 750 mg tablet TAKE 1 TABLET BY MOUTH 3 TIMES DAILY AS NEEDED FOR SPASMS (Patient not taking: No sig reported)     • tiotropium (Spiriva HandiHaler) 18 mcg inhalation capsule Place 1 capsule (18 mcg total) into inhaler and inhale daily (Patient not taking: No sig reported) 30 capsule 0   • Turmeric 500 MG CAPS Take by mouth daily  (Patient not taking: No sig reported)       No current facility-administered medications for this visit  Allergies   Allergen Reactions   • Antihistamines, Diphenhydramine-Type    • Bupropion    • Clarithromycin    • Codeine Other (See Comments)     increased HR   • Gabapentin    • Meloxicam Nausea Only and Visual Disturbance     Other reaction(s): Numbness  Action Taken: med stopped ; Category: Allergy;    • Ondansetron    • Pravastatin    • Propoxyphene Other (See Comments)     increased HR         The following portions of the patient's history were reviewed and updated as appropriate: allergies, current medications, past family history, past medical history, past social history, past surgical history and problem list     Review of Systems:  Review of Systems   Constitutional: Positive for fatigue  Negative for appetite change and fever     HENT: Positive for postnasal drip and trouble swallowing  Eyes: Positive for pain, discharge and visual disturbance  Respiratory: Positive for chest tightness and shortness of breath  Cardiovascular: Positive for palpitations  Gastrointestinal: Positive for abdominal distention and abdominal pain  Endocrine: Negative  Genitourinary: Positive for urgency  Musculoskeletal: Positive for back pain, gait problem and joint swelling  Negative for arthralgias and myalgias  Skin: Negative  Allergic/Immunologic: Negative  Neurological: Positive for tremors, syncope and weakness  Hematological: Negative  Negative for adenopathy  Does not bruise/bleed easily  Psychiatric/Behavioral: Positive for decreased concentration  The patient is nervous/anxious  Physical Exam:  Physical Exam  Constitutional:       General: She is not in acute distress  Appearance: Normal appearance  She is well-developed  HENT:      Head: Normocephalic and atraumatic  Cardiovascular:      Heart sounds: Normal heart sounds  Pulmonary:      Breath sounds: Normal breath sounds  Chest:   Breasts:      Right: Mass ( nodularity upper outer) and skin change (Well-healed biopsy site upper outer) present  No inverted nipple, nipple discharge, tenderness, axillary adenopathy or supraclavicular adenopathy  Left: No inverted nipple, mass, nipple discharge, skin change, tenderness, axillary adenopathy or supraclavicular adenopathy  Abdominal:      Palpations: Abdomen is soft  Musculoskeletal:      Right lower leg: No edema  Left lower leg: No edema  Lymphadenopathy:      Upper Body:      Right upper body: No supraclavicular, axillary or pectoral adenopathy  Left upper body: No supraclavicular, axillary or pectoral adenopathy  Neurological:      Mental Status: She is alert and oriented to person, place, and time     Psychiatric:         Mood and Affect: Mood normal          Laboratory:  09/12/2022 core biopsy right breast 1100 hours    Pathology revealed: invasive lobular carcinoma    Histologic grade: moderately differentiated     Angiolymphatic invasion:  absent    Tumor node status: Negative    Hormone receptor status:  ER/NE 95%, HER2 is negative        Imagin2022 bilateral 3D diagnostic mammogram was a BI-RADS five secondary to an irregular mass in the 1100 hours axis of the right breast with a sonographic correlate measuring 1 8 cm, left side was benign    2022 post biopsy mammogram is concordant, FLORENCIO reflector in place        Discussion/Summary:  45-year-old female who presents with a newly diagnosed carcinoma of the right breast   This is a clinical stage I A T1c N0 grade 2 ER positive HER2 negative invasive lobular carcinoma  I discussed these findings with her  We reviewed the multimodality treatment of breast cancer to include surgery, radiation and medical therapy  She does have family history of both breast and ovarian cancer  Her daughter had breast cancer at the age of 52 and is a known CHEK2 mutation carrier  The patient herself submitted blood for testing which is currently pending  We did discuss the implications of a positive genetic test   We talked about the options of a lumpectomy versus bilateral mastectomy  The patient states that at her age she would prefer to do the lumpectomy if possible  She is a candidate for this based on exam and imaging  I did inform her that if her genetic testing were negative and her pathology report shows widely clean margins with clean nodes, she may be able to forego radiation therapy; however, if she were a gene carrier we may indeed recommend postop radiation  Regardless she will meet with Medical Oncology in the postop setting ring to discuss adjuvant therapy likely hormone therapy only; however this will depend on her final pathology and obviously the Medical Oncology recommendations    All of her questions were answered today   Consent was signed in the office  She will be scheduled for surgery in the near term

## 2022-09-30 ENCOUNTER — PATIENT OUTREACH (OUTPATIENT)
Dept: HEMATOLOGY ONCOLOGY | Facility: CLINIC | Age: 77
End: 2022-09-30

## 2022-09-30 NOTE — PROGRESS NOTES
Called patient for follow up after Dr Janett Nelson appointment  Pt understands the conversation and plan at this time, reports being anxious to receive the genetic results  Offered encouragement and reassured pt that NN will be monitoring for results history  Will follow

## 2022-10-03 ENCOUNTER — TELEPHONE (OUTPATIENT)
Dept: GENETICS | Facility: CLINIC | Age: 77
End: 2022-10-03

## 2022-10-03 NOTE — TELEPHONE ENCOUNTER
Genetic Test Result Note:    Summary:    Result: Negative - No Clinically Significant Variants Detected     Test Performed: [facility] Breast Cancer STAT Panel (9 genes): SINDI, BRCA1, BRCA2, CDH1, CHEK2, PALB2, PTEN, STK11, and TP53 with reflex to [facility] Core Cancer Panel (27 additional genes): APC, AXIN2, BARD1, BRIP1, BMPR1A, CDK4, CDKN2A, DICER1, EPCAM, GREM1, HOXB13, MLH1, MSH2, MSH3, MSH6, MUTYH, NBN, NF1, NTHL1, PMS2, POLD1, POLE, RAD51C, RAD51D, RECQL SMAD4, SMARCA4    Today I spoke with Sue Flores over the phone to review the results of her genetic test for hereditary cancer  She underwent genetic testing through our program on 9/19/2022 due to her recent diagnosis of breast cancer  Her results will be sent to her breast surgeon Veto Eisenmenger, MD     Assessment:   A negative result significantly reduces the likelihood that Sue Flores has a hereditary cancer syndrome  However, this testing is unable to completely rule out the presence of hereditary cancer  It remains possible that:  - There is a variant in an area of a gene which was not tested or there is a variant not detectable due to technical limitations of this test      - There is a variant in another gene that was not included in this test or in a gene not known to be linked to cancer or tumors  - A family member has a genetic variant that the patient did not inherit  - The cancer in the family is sporadic and is related to non-hereditary factors  Risks and Testing for Family Members:  Sue Flores was made aware that all of her first-degree relatives are at increased risk to develop breast cancer based on her recent diagnosis and family history of breast cancer   Her family members may also be at increased risk to develop other cancers in her family history, specifically:    Daughter - Breast Cancer age 48    We recommend that her first-degree relatives make their healthcare providers aware of the family history and discuss their options for screening and risk-reduction  At this time we do not recommend testing for Emi Klein 's children based on her negative test result  Emi Klein 's children still need to consider the history of cancer on the other side of their family when determining their risks  If Emi Klein has any affected family members with a cancer diagnosis, especially at a young age, they may still consider genetic testing  Relatives who wish to pursue genetic testing can reach out to the [address] [place] [address] Road ([id number]) to schedule an appointment or visit www [facility] org to identify a local genetic counselor  Plan:     Negative Result: This result does not have surgical implications and surgical options should be discussed with her healthcare provider   Siobhan's breast surgeon, Dr Bhakti Valverde will be made aware of her results

## 2022-10-03 NOTE — TELEPHONE ENCOUNTER
----- Message from Maribel Barron sent at 10/3/2022  9:15 AM EDT -----  GC Completed Chart     Result Type: Negative    Result Delivery: Digital [place] Message    Monthly Review: Does not need monthly review- COMPLETE

## 2022-10-06 ENCOUNTER — OFFICE VISIT (OUTPATIENT)
Dept: LAB | Facility: CLINIC | Age: 77
End: 2022-10-06
Payer: MEDICARE

## 2022-10-06 ENCOUNTER — APPOINTMENT (OUTPATIENT)
Dept: RADIOLOGY | Facility: CLINIC | Age: 77
End: 2022-10-06
Payer: MEDICARE

## 2022-10-06 ENCOUNTER — APPOINTMENT (OUTPATIENT)
Dept: LAB | Facility: CLINIC | Age: 77
End: 2022-10-06
Payer: MEDICARE

## 2022-10-06 DIAGNOSIS — C50.411 MALIGNANT NEOPLASM OF UPPER-OUTER QUADRANT OF RIGHT BREAST IN FEMALE, ESTROGEN RECEPTOR POSITIVE (HCC): ICD-10-CM

## 2022-10-06 DIAGNOSIS — Z17.0 MALIGNANT NEOPLASM OF UPPER-OUTER QUADRANT OF RIGHT BREAST IN FEMALE, ESTROGEN RECEPTOR POSITIVE (HCC): ICD-10-CM

## 2022-10-06 DIAGNOSIS — C50.911 MALIGNANT NEOPLASM OF RIGHT FEMALE BREAST, UNSPECIFIED ESTROGEN RECEPTOR STATUS, UNSPECIFIED SITE OF BREAST (HCC): ICD-10-CM

## 2022-10-06 LAB
ALBUMIN SERPL BCP-MCNC: 3.8 G/DL (ref 3.5–5)
ALP SERPL-CCNC: 51 U/L (ref 46–116)
ALT SERPL W P-5'-P-CCNC: 36 U/L (ref 12–78)
ANION GAP SERPL CALCULATED.3IONS-SCNC: 7 MMOL/L (ref 4–13)
AST SERPL W P-5'-P-CCNC: 22 U/L (ref 5–45)
BASOPHILS # BLD AUTO: 0.03 THOUSANDS/ÂΜL (ref 0–0.1)
BASOPHILS NFR BLD AUTO: 1 % (ref 0–1)
BILIRUB SERPL-MCNC: 0.56 MG/DL (ref 0.2–1)
BILIRUB UR QL STRIP: NEGATIVE
BUN SERPL-MCNC: 16 MG/DL (ref 5–25)
CALCIUM SERPL-MCNC: 9.9 MG/DL (ref 8.3–10.1)
CHLORIDE SERPL-SCNC: 108 MMOL/L (ref 96–108)
CLARITY UR: CLEAR
CO2 SERPL-SCNC: 25 MMOL/L (ref 21–32)
COLOR UR: NORMAL
CREAT SERPL-MCNC: 0.75 MG/DL (ref 0.6–1.3)
EOSINOPHIL # BLD AUTO: 0.14 THOUSAND/ÂΜL (ref 0–0.61)
EOSINOPHIL NFR BLD AUTO: 3 % (ref 0–6)
ERYTHROCYTE [DISTWIDTH] IN BLOOD BY AUTOMATED COUNT: 12.9 % (ref 11.6–15.1)
GFR SERPL CREATININE-BSD FRML MDRD: 77 ML/MIN/1.73SQ M
GLUCOSE SERPL-MCNC: 107 MG/DL (ref 65–140)
GLUCOSE UR STRIP-MCNC: NEGATIVE MG/DL
HCT VFR BLD AUTO: 40.6 % (ref 34.8–46.1)
HGB BLD-MCNC: 13.3 G/DL (ref 11.5–15.4)
HGB UR QL STRIP.AUTO: NEGATIVE
IMM GRANULOCYTES # BLD AUTO: 0.02 THOUSAND/UL (ref 0–0.2)
IMM GRANULOCYTES NFR BLD AUTO: 0 % (ref 0–2)
KETONES UR STRIP-MCNC: NEGATIVE MG/DL
LEUKOCYTE ESTERASE UR QL STRIP: NEGATIVE
LYMPHOCYTES # BLD AUTO: 2.03 THOUSANDS/ÂΜL (ref 0.6–4.47)
LYMPHOCYTES NFR BLD AUTO: 40 % (ref 14–44)
MCH RBC QN AUTO: 30.4 PG (ref 26.8–34.3)
MCHC RBC AUTO-ENTMCNC: 32.8 G/DL (ref 31.4–37.4)
MCV RBC AUTO: 93 FL (ref 82–98)
MONOCYTES # BLD AUTO: 0.33 THOUSAND/ÂΜL (ref 0.17–1.22)
MONOCYTES NFR BLD AUTO: 7 % (ref 4–12)
NEUTROPHILS # BLD AUTO: 2.5 THOUSANDS/ÂΜL (ref 1.85–7.62)
NEUTS SEG NFR BLD AUTO: 49 % (ref 43–75)
NITRITE UR QL STRIP: NEGATIVE
NRBC BLD AUTO-RTO: 0 /100 WBCS
PH UR STRIP.AUTO: 6.5 [PH]
PLATELET # BLD AUTO: 242 THOUSANDS/UL (ref 149–390)
PMV BLD AUTO: 11.3 FL (ref 8.9–12.7)
POTASSIUM SERPL-SCNC: 4 MMOL/L (ref 3.5–5.3)
PROT SERPL-MCNC: 7.3 G/DL (ref 6.4–8.4)
PROT UR STRIP-MCNC: NEGATIVE MG/DL
RBC # BLD AUTO: 4.38 MILLION/UL (ref 3.81–5.12)
SODIUM SERPL-SCNC: 140 MMOL/L (ref 135–147)
SP GR UR STRIP.AUTO: 1.01 (ref 1–1.03)
UROBILINOGEN UR STRIP-ACNC: <2 MG/DL
WBC # BLD AUTO: 5.05 THOUSAND/UL (ref 4.31–10.16)

## 2022-10-06 PROCEDURE — 85025 COMPLETE CBC W/AUTO DIFF WBC: CPT

## 2022-10-06 PROCEDURE — 71046 X-RAY EXAM CHEST 2 VIEWS: CPT

## 2022-10-06 PROCEDURE — 80053 COMPREHEN METABOLIC PANEL: CPT

## 2022-10-06 PROCEDURE — 81003 URINALYSIS AUTO W/O SCOPE: CPT | Performed by: SURGERY

## 2022-10-06 PROCEDURE — 36415 COLL VENOUS BLD VENIPUNCTURE: CPT

## 2022-10-06 PROCEDURE — 93005 ELECTROCARDIOGRAM TRACING: CPT

## 2022-10-08 LAB
ATRIAL RATE: 68 BPM
P AXIS: 14 DEGREES
PR INTERVAL: 168 MS
QRS AXIS: 31 DEGREES
QRSD INTERVAL: 80 MS
QT INTERVAL: 366 MS
QTC INTERVAL: 389 MS
T WAVE AXIS: 32 DEGREES
VENTRICULAR RATE: 68 BPM

## 2022-10-08 PROCEDURE — 93010 ELECTROCARDIOGRAM REPORT: CPT | Performed by: INTERNAL MEDICINE

## 2022-10-10 ENCOUNTER — PATIENT OUTREACH (OUTPATIENT)
Dept: HEMATOLOGY ONCOLOGY | Facility: CLINIC | Age: 77
End: 2022-10-10

## 2022-10-10 NOTE — PROGRESS NOTES
Biopsychosocial and Barriers Assessment    Cancer Diagnosis: Breast Cancer  Home/Cell Phone: [phone]  Emergency Contact: sisterGui  Marital Status:   Interpretation concerns, speaks another language, preferred language: English  Cultural concerns: None  Ability to read or write: Fully Literate    Caregiver/Support: Sister, family  Children: 3 children all live out of state 7 grandchildren  Child/Elder care: no    Housing:  Own Home  Home Setup: 2 story  Lives With: Alone  Daily Living Activities: Enjoys her dog and gardening  Durable Medical Equipment:  none  Ambulation: Fully Ambulatory    [facility]acy[facility] [place]  High co-pays with insurance: No  High co-pays with medication coverage: No  No medication coverage: Pt has a prescription plan through her [employer]    Primary Care Provider: Wei García DO  Hx of [address]: No  Hx of Short term rehab: No  Mental Health Hx: None reported  Substance Abuse Hx:  No  Employment: Retired   Status/Location: NO  Ability to pay bills: Yes  POA/LW/AD: Not discussed  Transportation Plan/Concerns: Pt drives and her sister will take her to appointments as needed  What do you know about your Cancer Diagnosis    What has your doctor told you about your cancer diagnosis: Pt spoke about her mammogram and how she had a biopsy which indicated cancer  Pt is scheduled for a lumpectomy  What has your doctor told you about your cancer treatment: Pt states that there was "mention" of possibly needing radiation after her surgery  What specific concerns do you have about your diagnosis and treatment: Pt is concerned about how she would get to radiation treatment  Have you been made aware of any hair loss associated with treatment: N/A    Additional Comments: MSW received a referral for this pt from the Nurse Navigator  Outreach was made and the Distress Thermometer was completed    The pt was open to the call but not overly engaging in conversation  She answered questions appropriately but did not elaborate on anything  Pt did not speak about her children or grandchildren, other than to say that "everyone is too busy to help"  Pt spoke of her support system as only being her sister, who does not live local   She did not indicate the distance  Pt spoke of her dog and her garden  She reports no financial concerns or concerns with her insurance  MSW explained [employer] and explained that this service would be available should she need treatment  MSW encouraged the pt to reach out if she would like the service but she states she was not able to write down any numbers as she was preparing to nap  MSW mailed contact information to the pt  Emotional support offered  No further concerns a this time

## 2022-10-12 DIAGNOSIS — F45.8 ANXIETY HYPERVENTILATION: ICD-10-CM

## 2022-10-12 DIAGNOSIS — F41.9 ANXIETY HYPERVENTILATION: ICD-10-CM

## 2022-10-12 RX ORDER — CLONAZEPAM 0.5 MG/1
0.5 TABLET ORAL 2 TIMES DAILY PRN
Qty: 60 TABLET | Refills: 0 | Status: SHIPPED | OUTPATIENT
Start: 2022-10-12

## 2022-10-13 NOTE — PRE-PROCEDURE INSTRUCTIONS
Pre-Surgery Instructions:   Medication Instructions   • albuterol (Ventolin HFA) 90 mcg/act inhaler Uses PRN- OK to take day of surgery   • ascorbic acid (VITAMIN C) 250 mg tablet Stop taking 7 days prior to surgery  • atenolol (TENORMIN) 25 mg tablet Take day of surgery  • Cholecalciferol (VITAMIN D3) 2000 units capsule Stop taking 7 days prior to surgery  • clonazePAM (KlonoPIN) 0 5 mg tablet Uses PRN- OK to take day of surgery   • co-enzyme Q-10 30 MG capsule Stop taking 7 days prior to surgery  • dicyclomine (BENTYL) 10 mg capsule Uses PRN- OK to take day of surgery   • fenofibrate (TRICOR) 48 mg tablet Take day of surgery  • fluticasone (FLONASE) 50 mcg/act nasal spray Take day of surgery  • hydrochlorothiazide (HYDRODIURIL) 12 5 mg tablet Hold day of surgery  • losartan (COZAAR) 100 MG tablet Hold day of surgery  • magnesium oxide (MAG-OX) 400 mg Stop taking 7 days prior to surgery  • Omega 3 1000 MG CAPS Stop taking 7 days prior to surgery  • Probiotic Product (PROBIOTIC-10 PO) Hold day of surgery  • rosuvastatin (CRESTOR) 5 mg tablet Takes three times weekly- OK to take as directed   • saccharomyces boulardii (FLORASTOR) 250 mg capsule Hold day of surgery  Reviewed with patient, in detail, instructions from "My Surgical Experience"  Instructed to avoid all  OTC vitamins/supplements and NSAIDS for one week prior to surgery per anesthesia guidelines  Tylenol ok to take PRN  Advised patient that Josep Hernandez will call with surgery arrival time and hospital directions the business day prior to surgery  Advised patient nothing eat or drink after midnight prior to surgery  Instructed to call surgeon's office in meantime with any new illnesses/exposure  Patient verbalized understanding of current visitor restrictions/masking guidelines and advised that he/she can confirm these at time of arrival call with Josep Hernandez       Patient verbalized understanding and knows to call surgeon's office with any additional questions prior to surgery

## 2022-10-16 DIAGNOSIS — E78.00 PURE HYPERCHOLESTEROLEMIA: ICD-10-CM

## 2022-10-19 RX ORDER — ROSUVASTATIN CALCIUM 5 MG/1
TABLET, COATED ORAL
Qty: 39 TABLET | Refills: 3 | Status: SHIPPED | OUTPATIENT
Start: 2022-10-19

## 2022-10-20 ENCOUNTER — ANESTHESIA EVENT (OUTPATIENT)
Dept: PERIOP | Facility: HOSPITAL | Age: 77
End: 2022-10-20
Payer: MEDICARE

## 2022-10-21 ENCOUNTER — APPOINTMENT (OUTPATIENT)
Dept: MAMMOGRAPHY | Facility: HOSPITAL | Age: 77
End: 2022-10-21
Payer: MEDICARE

## 2022-10-21 ENCOUNTER — PATIENT OUTREACH (OUTPATIENT)
Dept: HEMATOLOGY ONCOLOGY | Facility: CLINIC | Age: 77
End: 2022-10-21

## 2022-10-21 ENCOUNTER — HOSPITAL ENCOUNTER (OUTPATIENT)
Dept: NUCLEAR MEDICINE | Facility: HOSPITAL | Age: 77
Discharge: HOME | End: 2022-10-21
Attending: SURGERY
Payer: MEDICARE

## 2022-10-21 ENCOUNTER — HOSPITAL ENCOUNTER (OUTPATIENT)
Facility: HOSPITAL | Age: 77
Setting detail: OUTPATIENT SURGERY
Discharge: HOME | End: 2022-10-21
Attending: SURGERY | Admitting: SURGERY
Payer: MEDICARE

## 2022-10-21 ENCOUNTER — ANESTHESIA (OUTPATIENT)
Dept: PERIOP | Facility: HOSPITAL | Age: 77
End: 2022-10-21
Payer: MEDICARE

## 2022-10-21 VITALS
OXYGEN SATURATION: 93 % | DIASTOLIC BLOOD PRESSURE: 59 MMHG | HEIGHT: 63 IN | BODY MASS INDEX: 30.55 KG/M2 | HEART RATE: 61 BPM | WEIGHT: 172.4 LBS | SYSTOLIC BLOOD PRESSURE: 119 MMHG | TEMPERATURE: 97.6 F | RESPIRATION RATE: 12 BRPM

## 2022-10-21 DIAGNOSIS — C50.411 MALIGNANT NEOPLASM OF UPPER-OUTER QUADRANT OF RIGHT BREAST IN FEMALE, ESTROGEN RECEPTOR POSITIVE (HCC): ICD-10-CM

## 2022-10-21 DIAGNOSIS — Z17.0 MALIGNANT NEOPLASM OF UPPER-OUTER QUADRANT OF RIGHT BREAST IN FEMALE, ESTROGEN RECEPTOR POSITIVE (HCC): ICD-10-CM

## 2022-10-21 DIAGNOSIS — C50.911 MALIGNANT NEOPLASM OF RIGHT FEMALE BREAST, UNSPECIFIED ESTROGEN RECEPTOR STATUS, UNSPECIFIED SITE OF BREAST (HCC): ICD-10-CM

## 2022-10-21 PROBLEM — K91.89 POSTOPERATIVE ILEUS (HCC): Status: ACTIVE | Noted: 2022-10-21

## 2022-10-21 PROBLEM — Z98.890 PONV (POSTOPERATIVE NAUSEA AND VOMITING): Status: ACTIVE | Noted: 2022-10-21

## 2022-10-21 PROBLEM — R11.2 PONV (POSTOPERATIVE NAUSEA AND VOMITING): Status: ACTIVE | Noted: 2022-10-21

## 2022-10-21 PROBLEM — K56.7 POSTOPERATIVE ILEUS (HCC): Status: ACTIVE | Noted: 2022-10-21

## 2022-10-21 PROCEDURE — A9541 TC99M SULFUR COLLOID: HCPCS

## 2022-10-21 PROCEDURE — G1004 CDSM NDSC: HCPCS

## 2022-10-21 PROCEDURE — 78195 LYMPH SYSTEM IMAGING: CPT

## 2022-10-21 RX ORDER — HYDROMORPHONE HCL/PF 1 MG/ML
0.5 SYRINGE (ML) INJECTION
Status: DISCONTINUED | OUTPATIENT
Start: 2022-10-21 | End: 2022-10-21 | Stop reason: HOSPADM

## 2022-10-21 RX ORDER — FENTANYL CITRATE/PF 50 MCG/ML
50 SYRINGE (ML) INJECTION
Status: DISCONTINUED | OUTPATIENT
Start: 2022-10-21 | End: 2022-10-21 | Stop reason: HOSPADM

## 2022-10-21 RX ORDER — ISOSULFAN BLUE 50 MG/5ML
INJECTION, SOLUTION SUBCUTANEOUS AS NEEDED
Status: DISCONTINUED | OUTPATIENT
Start: 2022-10-21 | End: 2022-10-21 | Stop reason: HOSPADM

## 2022-10-21 RX ORDER — LIDOCAINE HYDROCHLORIDE 20 MG/ML
INJECTION, SOLUTION EPIDURAL; INFILTRATION; INTRACAUDAL; PERINEURAL AS NEEDED
Status: DISCONTINUED | OUTPATIENT
Start: 2022-10-21 | End: 2022-10-21

## 2022-10-21 RX ORDER — CEFAZOLIN SODIUM 1 G/50ML
SOLUTION INTRAVENOUS AS NEEDED
Status: DISCONTINUED | OUTPATIENT
Start: 2022-10-21 | End: 2022-10-21

## 2022-10-21 RX ORDER — TRAMADOL HYDROCHLORIDE 50 MG/1
50 TABLET ORAL EVERY 6 HOURS PRN
Status: DISCONTINUED | OUTPATIENT
Start: 2022-10-21 | End: 2022-10-21 | Stop reason: HOSPADM

## 2022-10-21 RX ORDER — PROPOFOL 10 MG/ML
INJECTION, EMULSION INTRAVENOUS AS NEEDED
Status: DISCONTINUED | OUTPATIENT
Start: 2022-10-21 | End: 2022-10-21

## 2022-10-21 RX ORDER — MIDAZOLAM HYDROCHLORIDE 2 MG/2ML
INJECTION, SOLUTION INTRAMUSCULAR; INTRAVENOUS AS NEEDED
Status: DISCONTINUED | OUTPATIENT
Start: 2022-10-21 | End: 2022-10-21

## 2022-10-21 RX ORDER — SODIUM CHLORIDE 9 MG/ML
125 INJECTION, SOLUTION INTRAVENOUS CONTINUOUS
Status: DISCONTINUED | OUTPATIENT
Start: 2022-10-21 | End: 2022-10-21 | Stop reason: HOSPADM

## 2022-10-21 RX ORDER — BUPIVACAINE HYDROCHLORIDE 5 MG/ML
INJECTION, SOLUTION PERINEURAL AS NEEDED
Status: DISCONTINUED | OUTPATIENT
Start: 2022-10-21 | End: 2022-10-21 | Stop reason: HOSPADM

## 2022-10-21 RX ORDER — DEXAMETHASONE SODIUM PHOSPHATE 10 MG/ML
INJECTION, SOLUTION INTRAMUSCULAR; INTRAVENOUS AS NEEDED
Status: DISCONTINUED | OUTPATIENT
Start: 2022-10-21 | End: 2022-10-21

## 2022-10-21 RX ORDER — DEXMEDETOMIDINE HYDROCHLORIDE 100 UG/ML
INJECTION, SOLUTION INTRAVENOUS AS NEEDED
Status: DISCONTINUED | OUTPATIENT
Start: 2022-10-21 | End: 2022-10-21

## 2022-10-21 RX ORDER — CEFAZOLIN SODIUM 1 G/50ML
1000 SOLUTION INTRAVENOUS
Status: DISCONTINUED | OUTPATIENT
Start: 2022-10-21 | End: 2022-10-21 | Stop reason: HOSPADM

## 2022-10-21 RX ORDER — FENTANYL CITRATE 50 UG/ML
INJECTION, SOLUTION INTRAMUSCULAR; INTRAVENOUS AS NEEDED
Status: DISCONTINUED | OUTPATIENT
Start: 2022-10-21 | End: 2022-10-21

## 2022-10-21 RX ORDER — MAGNESIUM HYDROXIDE 1200 MG/15ML
LIQUID ORAL AS NEEDED
Status: DISCONTINUED | OUTPATIENT
Start: 2022-10-21 | End: 2022-10-21 | Stop reason: HOSPADM

## 2022-10-21 RX ORDER — MEPERIDINE HYDROCHLORIDE 25 MG/ML
12.5 INJECTION INTRAMUSCULAR; INTRAVENOUS; SUBCUTANEOUS ONCE AS NEEDED
Status: DISCONTINUED | OUTPATIENT
Start: 2022-10-21 | End: 2022-10-21 | Stop reason: HOSPADM

## 2022-10-21 RX ORDER — PROPOFOL 10 MG/ML
INJECTION, EMULSION INTRAVENOUS CONTINUOUS PRN
Status: DISCONTINUED | OUTPATIENT
Start: 2022-10-21 | End: 2022-10-21

## 2022-10-21 RX ORDER — ONDANSETRON 2 MG/ML
4 INJECTION INTRAMUSCULAR; INTRAVENOUS ONCE AS NEEDED
Status: DISCONTINUED | OUTPATIENT
Start: 2022-10-21 | End: 2022-10-21 | Stop reason: HOSPADM

## 2022-10-21 RX ORDER — PROMETHAZINE HYDROCHLORIDE 25 MG/ML
6.25 INJECTION, SOLUTION INTRAMUSCULAR; INTRAVENOUS ONCE AS NEEDED
Status: DISCONTINUED | OUTPATIENT
Start: 2022-10-21 | End: 2022-10-21 | Stop reason: HOSPADM

## 2022-10-21 RX ORDER — ACETAMINOPHEN 325 MG/1
650 TABLET ORAL EVERY 6 HOURS PRN
Status: DISCONTINUED | OUTPATIENT
Start: 2022-10-21 | End: 2022-10-21 | Stop reason: HOSPADM

## 2022-10-21 RX ADMIN — DEXMEDETOMIDINE HCL 4 MCG: 100 INJECTION INTRAVENOUS at 10:14

## 2022-10-21 RX ADMIN — SODIUM CHLORIDE: 0.9 INJECTION, SOLUTION INTRAVENOUS at 10:22

## 2022-10-21 RX ADMIN — DEXMEDETOMIDINE HCL 4 MCG: 100 INJECTION INTRAVENOUS at 09:27

## 2022-10-21 RX ADMIN — FENTANYL CITRATE 25 MCG: 50 INJECTION INTRAMUSCULAR; INTRAVENOUS at 10:14

## 2022-10-21 RX ADMIN — LIDOCAINE HYDROCHLORIDE 100 MG: 20 INJECTION, SOLUTION EPIDURAL; INFILTRATION; INTRACAUDAL; PERINEURAL at 09:21

## 2022-10-21 RX ADMIN — TRAMADOL HYDROCHLORIDE 50 MG: 50 TABLET, COATED ORAL at 13:12

## 2022-10-21 RX ADMIN — FENTANYL CITRATE 25 MCG: 50 INJECTION INTRAMUSCULAR; INTRAVENOUS at 09:25

## 2022-10-21 RX ADMIN — FENTANYL CITRATE 25 MCG: 50 INJECTION INTRAMUSCULAR; INTRAVENOUS at 09:41

## 2022-10-21 RX ADMIN — PROPOFOL 200 MG: 10 INJECTION, EMULSION INTRAVENOUS at 09:21

## 2022-10-21 RX ADMIN — MIDAZOLAM 2 MG: 1 INJECTION INTRAMUSCULAR; INTRAVENOUS at 09:15

## 2022-10-21 RX ADMIN — DEXMEDETOMIDINE HCL 8 MCG: 100 INJECTION INTRAVENOUS at 10:30

## 2022-10-21 RX ADMIN — CEFAZOLIN SODIUM 1000 MG: 1 SOLUTION INTRAVENOUS at 09:15

## 2022-10-21 RX ADMIN — FENTANYL CITRATE 25 MCG: 50 INJECTION INTRAMUSCULAR; INTRAVENOUS at 09:59

## 2022-10-21 RX ADMIN — PROPOFOL 125 MCG/KG/MIN: 10 INJECTION, EMULSION INTRAVENOUS at 09:22

## 2022-10-21 RX ADMIN — SODIUM CHLORIDE 125 ML/HR: 0.9 INJECTION, SOLUTION INTRAVENOUS at 07:36

## 2022-10-21 RX ADMIN — DEXAMETHASONE SODIUM PHOSPHATE 10 MG: 10 INJECTION, SOLUTION INTRAMUSCULAR; INTRAVENOUS at 09:21

## 2022-10-21 RX ADMIN — DEXMEDETOMIDINE HCL 4 MCG: 100 INJECTION INTRAVENOUS at 09:59

## 2022-10-21 NOTE — INTERVAL H&P NOTE
H&P reviewed  After examining the patient I find no changes in the patients condition since the H&P had been written      Vitals:    10/21/22 0713   BP: 148/73   Pulse: 68   Resp: 16   Temp: 98 3 °F (36 8 °C)   SpO2: 96%

## 2022-10-21 NOTE — ANESTHESIA PREPROCEDURE EVALUATION
Procedure:  LUMPECTOMY BREAST FLORENCIO (Right Breast)  SLN BX, lymphoscintigraphy (Right Axilla)    Relevant Problems   ANESTHESIA   (+) PONV (postoperative nausea and vomiting)      CARDIO   (+) Abnormal left aortic arch   (+) Essential hypertension   (+) Mild mitral regurgitation   (+) Nonrheumatic aortic valve insufficiency      GI/HEPATIC   (+) Dysphagia   (+) Erosive gastritis   (+) Postoperative ileus (HCC)      GYN   (+) Malignant neoplasm of upper-outer quadrant of right breast in female, estrogen receptor positive (HCC)      MUSCULOSKELETAL   (+) Chronic low back pain   (+) Myofascial pain syndrome   (+) Primary osteoarthritis of right knee   (+) Symmetrical polyarthritis      NEURO/PSYCH   (+) Anxiety disorder   (+) Chronic low back pain   (+) Myofascial pain syndrome   (+) Panic attack      PULMONARY   (+) Obstructive sleep apnea             Anesthesia Plan  ASA Score- 3     Anesthesia Type-         Additional Monitors:   Airway Plan:     Comment: Pre treat with antinausea meds IV         Plan Factors-    Induction-     Postoperative Plan-     Informed Consent-

## 2022-10-21 NOTE — ANESTHESIA POSTPROCEDURE EVALUATION
Post-Op Assessment Note    CV Status:  Stable  Pain Score: 2    Pain management: adequate     Mental Status:  Alert and awake   Hydration Status:  Euvolemic   PONV Controlled:  Controlled   Airway Patency:  Patent      Post Op Vitals Reviewed: Yes      Staff: Anesthesiologist         No complications documented      /63 (10/21/22 1130)    Temp      Pulse 58 (10/21/22 1130)   Resp 14 (10/21/22 1130)    SpO2 96 % (10/21/22 1130)

## 2022-10-21 NOTE — OP NOTE
OPERATIVE REPORT  PATIENT NAME: Estrellita Maldonado    :  1945  MRN: [id number]  Pt Location: [facility] OR ROOM 05    SURGERY DATE: 10/21/2022    Surgeon(s) and Role:     * Joey Antoine MD - Primary     * Domenico Fernández PA-C - Assisting    Preop Diagnosis:  Malignant neoplasm of right female breast, unspecified estrogen receptor status, unspecified site of breast ([facility] [address] ) [C50 911]  Malignant neoplasm of upper-outer quadrant of right breast in female, estrogen receptor positive ([facility] [address] ) [C50 411, Z17 0]    Post-Op Diagnosis Codes:     * Malignant neoplasm of right female breast, unspecified estrogen receptor status, unspecified site of breast ([facility] [address] ) [C50 911]     * Malignant neoplasm of upper-outer quadrant of right breast in female, estrogen receptor positive ([facility] [address] ) [C50 411, Z17 0]    Procedure(s) (LRB):  LUMPECTOMY BREAST FLORENCIO (Right)  SLN BX, lymphoscintigraphy (Right)   injection of blue dye  Use of gamma probe  Use of FLORENCIO   Specimen radiograph    Specimen(s):  ID Type Source Tests Collected by Time Destination   1 : Right breast lumpectomy, short superior long lateral Tissue Breast, Right TISSUE EXAM Joey Antoine MD 10/21/2022 9942    2 : new inferior margin right breast, suture marks true margin Tissue Breast, Right TISSUE EXAM Joey Antoine MD 10/21/2022 8640    3 : new lateral margin right breast, suture marks true margin Tissue Breast, Right TISSUE EXAM Joey Antoine MD 10/21/2022 5044    4 : new posterior margin right breast, suture marks true margin Tissue Breast, Right TISSUE EXAM Joey Antoine MD 10/21/2022 6093    5 : sentinel lymph node #1 right axilla  Tissue Lymph Node, [place] TISSUE EXAM Joey Antoine MD 10/21/2022 1004    6 : Right sentinel lymph node #2 Right axilla Tissue Lymph Node, [place] TISSUE EXAM Joey Antoine MD 10/21/2022 1014    7 : Right sentinel lymph node #3 right axilla Tissue Lymph Node, [place] TISSUE EXAM Joey Antoine MD 10/21/2022 1016    8 : Right sentinel lymph node #4, right axilla Tissue Lymph Node, [place] TISSUE EXAM Joey Antoine MD 10/21/2022 1017        Estimated Blood Loss:   Minimal    Drains:  * No LDAs found *    Anesthesia Type:   General    Operative Indications:  Malignant neoplasm of right female breast, unspecified estrogen receptor status, unspecified site of breast ([facility] [address] ) [C50 911]  Malignant neoplasm of upper-outer quadrant of right breast in female, estrogen receptor positive ([address] ) [C50 411, Z17 0]      Operative Findings:  FLORENCIO reflector in specimen    Complications:   None    Procedure and Technique:  80-year-old female who presented with right breast carcinoma  She was counseled on right breast conservation  She had a FLORENCIO reflector placed at the on the biopsy  She presented the day of surgery to the radiology suite and underwent lymphoscintigraphy of the right breast   From there she went to the operating room  She had preoperative antibiotics  She was administered general anesthesia  She had a 5 cc injection of Lymphazurin into the right subareolar plexus  She was prepped and draped in the usual standard fashion  Time-out was performed  Attention was turned to the upper outer right breast   The FLORENCIO probe was used to identify the reflector and roughly the 1100 hours axis  The skin was marked in this location   % Marcaine plain was injected for local anesthesia  Elliptical incision was created through the skin and subcutaneous tissue  Electrocautery was used to dissect margins superior, medial, inferior, lateral and posterior  The FLORENCIO probe was used throughout to help guide dissection  Specimen was marked with a short stitch superior and a long stitch lateral   This was interrogated by the FLORENCIO probe confirm reflector removal   The specimen was also imaged in the operating room revealing the residual density along with FLORENCIO reflector which was closest to the inferior, lateral and posterior margins    Therefore new margins were excised in these locations and sutures were placed on the true margins  All breast specimens were sent to pathology in formalin  Attention was then turned to the right axilla  There was an area of increased radioactive uptake at the lower axillary hairline  Additional 0 5% Marcaine plain was injected for local anesthesia  An incision was created through the skin and subcutaneous tissue  Electrocautery was used to dissect through the remaining subcutaneous tissue and clavipectoral fascia to enter the axillary fat pad  In the mid portion of the axilla was a blue stained and radioactive chain of lymph nodes which were elevated into the wound using an Allis clamp  These were excised completely, divided and submitted separately as sentinel node one, two, three and four of the right axilla  Following removal of this lymph node chain, there were no additional blue-stained, radioactive or palpable nodes  Her wounds were irrigated and hemostasis was achieved  The wounds were then closed in a layered fashion using multiple interrupted 3-0 Monocryl suture and a running 4-0 Monocryl subcuticular stitch  All counts were correct  The skin was cleaned and dried  Surgical glue, fluffs and a bra were applied  Patient was then extubated and taken to recovery in stable condition  A physician assistant was required during the procedure for retraction, tissue handling,dissection and suturing; no residents were available      Patient Disposition:  extubated and stable     [place] Node Biopsy for Breast Cancer  Operation performed with curative intent Yes   Tracer(s) used to identify sentinel nodes in the upfront surgery (non-neoadjuvant) setting Dye and Radioactive tracer   Tracer(s) used to identify sentinel nodes in the neoadjuvant setting N/A   All nodes (colored or non-colored) present at the end of a dye-filled lymphatic channel were removed Yes   All significantly radioactive nodes were removed Yes   All palpably suspicious nodes were removed N/A   Biopsy-proven positive nodes marked with clips prior to chemotherapy were identified and removed N/A            SIGNATURE: Freddie Simmons MD  DATE: October 21, 2022  TIME: 11:08 AM

## 2022-10-21 NOTE — DISCHARGE INSTRUCTIONS
POST-OPERATIVE CARE INSTRUCTIONS       Care after your procedure:   General  Rest and relax for 24 hours, then gradually return to normal activities  Do not preform any heavy lifting or strenuous physical activities for 14 days  Your activity restrictions will be re-evaluated at your post op visit  Drink clear liquids until you are certain there is no nausea, then resume a normal diet  Do not drink alcohol, drive any vehicle, operate mechanical equipment or make critical decisions for at least 24 hours and until you are off any narcotic pain medications  The Incision  Your incision is closed with:   dissolvable stiches just underneath the skin  The incision is also covered with:                          clear waterproof glue  A gauze-pad is covering the wound  Wound care  Remove your gauze-pad after 24 hours  You may then shower using soap and water to clean your incision  Gently dry the wound  You may redress your wound with additional gauze and tape if you choose  A little bruising at the wound site is normal     Medication  Resume all previous medications  Pain Medication Instructions:over the counter tylenol or prescription tramadol if needed          Other (If applicable)  Wear a post-surgical bra around the clock  May use ice to the incision site(s) for the next 24-48 hours, twice daily     Call your  doctor if you have any of the following:  Redness, swelling, heat, drainage, and/or bleeding from your wound  Chills or fever ( above 101' F )  Pain, not relieved with the above medications  If you have any questions or problems call our office [phone]    Follow-up appointment:  As scheduled

## 2022-10-26 ENCOUNTER — PATIENT OUTREACH (OUTPATIENT)
Dept: HEMATOLOGY ONCOLOGY | Facility: CLINIC | Age: 77
End: 2022-10-26

## 2022-10-26 DIAGNOSIS — C50.911 MALIGNANT NEOPLASM OF RIGHT FEMALE BREAST, UNSPECIFIED ESTROGEN RECEPTOR STATUS, UNSPECIFIED SITE OF BREAST (HCC): Primary | ICD-10-CM

## 2022-10-26 NOTE — PROGRESS NOTES
Called to check in with patient post op  Pt reports feeling 'ok' overall, incisions are sore however not red, irritated or draining  Compression bra in place  Reports feeling 'foggy' since anesthesia and fatigued but resting and getting better  Pt asked about upcoming treatments  Discussed medical oncology role in treatment, pt would like to go to [facility] for this appointment  Will schedule for after Dr Alva Ortiz follow up

## 2022-10-26 NOTE — PROGRESS NOTES
A referral was placed for patient to see Medical Oncology   At the request of MARIBELL Brown, patient has been scheduled for the [address] [place] [address] office with Sobeida Bojorquez on 11-9-2022 @ 9:40am   Patient was agreeable to appointment  Patient states she did not completely understand why she was going to Medical Oncology   I explained the reasoning behind this  She understood then and had no further questions or concerns  Patient urged to call me should any arise

## 2022-10-31 ENCOUNTER — TELEPHONE (OUTPATIENT)
Dept: SURGICAL ONCOLOGY | Facility: CLINIC | Age: 77
End: 2022-10-31

## 2022-10-31 NOTE — TELEPHONE ENCOUNTER
Received a phone message from Andi with concerns of swelling, pain and a feeling of warmth to touch in her axilla on operative side  She denies any drainage  She had been using ice to site  Discussed switching to warm moist compresses several times a day with a warm wash cloth  Offered her an appt tomorrow morning with Dr Negra Brar which she declined  Offered to look for another time and she declined  She stated she will just do the warm compresses and see what happens

## 2022-11-07 ENCOUNTER — OFFICE VISIT (OUTPATIENT)
Dept: SURGICAL ONCOLOGY | Facility: CLINIC | Age: 77
End: 2022-11-07

## 2022-11-07 VITALS
HEART RATE: 78 BPM | WEIGHT: 172 LBS | OXYGEN SATURATION: 96 % | TEMPERATURE: 98.2 F | DIASTOLIC BLOOD PRESSURE: 72 MMHG | BODY MASS INDEX: 30.48 KG/M2 | RESPIRATION RATE: 16 BRPM | SYSTOLIC BLOOD PRESSURE: 124 MMHG | HEIGHT: 63 IN

## 2022-11-07 DIAGNOSIS — Z17.0 MALIGNANT NEOPLASM OF UPPER-OUTER QUADRANT OF RIGHT BREAST IN FEMALE, ESTROGEN RECEPTOR POSITIVE (HCC): Primary | ICD-10-CM

## 2022-11-07 DIAGNOSIS — Z13.71 BRCA NEGATIVE: ICD-10-CM

## 2022-11-07 DIAGNOSIS — C50.411 MALIGNANT NEOPLASM OF UPPER-OUTER QUADRANT OF RIGHT BREAST IN FEMALE, ESTROGEN RECEPTOR POSITIVE (HCC): Primary | ICD-10-CM

## 2022-11-09 ENCOUNTER — CONSULT (OUTPATIENT)
Dept: HEMATOLOGY ONCOLOGY | Facility: HOSPITAL | Age: 77
End: 2022-11-09
Attending: SURGERY

## 2022-11-09 VITALS
OXYGEN SATURATION: 97 % | RESPIRATION RATE: 16 BRPM | HEIGHT: 63 IN | DIASTOLIC BLOOD PRESSURE: 90 MMHG | HEART RATE: 77 BPM | TEMPERATURE: 98.2 F | BODY MASS INDEX: 30.12 KG/M2 | SYSTOLIC BLOOD PRESSURE: 140 MMHG | WEIGHT: 170 LBS

## 2022-11-09 DIAGNOSIS — C50.911 MALIGNANT NEOPLASM OF RIGHT FEMALE BREAST, UNSPECIFIED ESTROGEN RECEPTOR STATUS, UNSPECIFIED SITE OF BREAST (HCC): ICD-10-CM

## 2022-11-09 DIAGNOSIS — Z78.0 ASYMPTOMATIC MENOPAUSE: Primary | ICD-10-CM

## 2022-11-09 RX ORDER — ANASTROZOLE 1 MG/1
1 TABLET ORAL DAILY
Qty: 30 TABLET | Refills: 3 | Status: SHIPPED | OUTPATIENT
Start: 2022-11-09

## 2022-11-09 NOTE — TELEPHONE ENCOUNTER
Fax from [facility] requesting clearance for blepharoplasty both upper eyelids with Dr Xu Elliott on 9/27/21      Last OV and EKG on 7/23/21    Letter prepped and routed for approval [Negative] : Genitourinary

## 2022-11-13 NOTE — PROGRESS NOTES
Oncology Outpatient Consult Note  Ryan Woodward 69 y o  female MRN: @ Encounter: [id number]        Date:  11/13/2022        CC:  Newly diagnosed breast cancer      HPI:  Ryan Woodward is seen for initial consultation 11/13/2022 regarding her breast cancer  She underwent a right partial mastectomy for invasive lobular with ductal features, ER 90-95% NE 90-95% Her2 1+ Ki 67 15%, grade 2 - final path was  PT1c(18mm)N1a(2/9)M0  She has a seroma that is slowly getting better  She is here today with her sister, who is a retired nurse  Her other medical problems include COPD, HTN, HLD, hx of ileus after a surgery, TRAE, A/D, fatty liver, and a remote history of pancreatitis  She has already had genetic testing and that was negative  She has had some diarrhea since surgery  She denies any CP/SOB  No HA  She new pain  She had a normal DEXA scan in aug 2020  She doesn't smoke but had a lot of second hand smoke exposure  She drinks 3 glasses of wine per day  She is a retired   She lives with her dog  She has had some issues with balance due to back problems but has not had any falls  She uses a cane when she walks  ROS: As stated in history of present illness otherwise her 14 point review of systems today was negative      ECOG PS: 1    Cancer Staging:  Cancer Staging  Malignant neoplasm of upper-outer quadrant of right breast in female, estrogen receptor positive ([facility] [address] )  Staging form: Breast, AJCC 8th Edition  - Clinical stage from 9/29/2022: Stage IA (cT1c, cN0, cM0, G2, ER+, NE+, HER2-) - Signed by Bri Ivory MD on 9/29/2022  Stage prefix: Initial diagnosis  Method of lymph node assessment: Clinical  Histologic grading system: 3 grade system  - Pathologic stage from 11/7/2022: Stage IA (pT1c, pN1a, cM0, G2, ER+, NE+, HER2-) - Signed by Bri Ivory MD on 11/7/2022  Stage prefix: Initial diagnosis  Histologic grading system: 3 grade system      Molecular Testing:     Oncology History Overview Note   10/2022 - right partial mastectomy for invasive lobular with ductal features, ER 90-95% NM 90-95% Her2 1+ Ki 67 15%, grade 2 - pT1c(18mm)N1a(2/9)M0     Malignant neoplasm of upper-outer quadrant of right breast in female, estrogen receptor positive ([facility] [place] [address] )   9/14/2022 Initial Diagnosis    Malignant neoplasm of upper-outer quadrant of right breast in female, estrogen receptor positive ([facility] [place] [address] )     9/29/2022 -  Cancer Staged    Staging form: Breast, AJCC 8th Edition  - Clinical stage from 9/29/2022: Stage IA (cT1c, cN0, cM0, G2, ER+, NM+, HER2-) - Signed by Angel Pulliam MD on 9/29/2022  Stage prefix: Initial diagnosis  Method of lymph node assessment: Clinical  Histologic grading system: 3 grade system       11/7/2022 -  Cancer Staged    Staging form: Breast, AJCC 8th Edition  - Pathologic stage from 11/7/2022: Stage IA (pT1c, pN1a, cM0, G2, ER+, NM+, HER2-) - Signed by Angel Pulliam MD on 11/7/2022  Stage prefix: Initial diagnosis  Histologic grading system: 3 grade system               Test Results:    Imaging: NM lymphatic breast    Result Date: 10/21/2022  Narrative: SENTINEL NODE LYMPHOSCINTIGRAPHY INDICATION: Right breast carcinoma FINDINGS: 0 537 mCi Tc-99m sulfur colloid (0 6 cc volume) was administered in divided doses by Dr Jes George in the right periareolar region  Scintigraphic images were obtained over the right hemithorax and axilla in multiple projections  Focal uptake was seen within the right axilla  Using scintigraphic guidance, the corresponding skin site was marked with an indelible marker  The patient was transferred to the operating room in satisfactory condition  Impression: [place] lymph node localized to right axilla  Workstation performed: [id number]     Mammo gabrielle  breast specimen (No Charge)    Result Date: 10/21/2022  Narrative: Surgical specimen Two views of a surgical specimen were provided for non contemporaneous review    GABRIELLE  radiofrequency reflector is noted within the specimen  The findings were not discussed with the surgeon at the time of surgery        Labs:   Lab Results   Component Value Date    WBC 5 05 10/06/2022    HGB 13 3 10/06/2022    HCT 40 6 10/06/2022    MCV 93 10/06/2022     10/06/2022     Lab Results   Component Value Date     07/15/2016    K 4 0 10/06/2022     10/06/2022    CO2 25 10/06/2022    BUN 16 10/06/2022    CREATININE 0 75 10/06/2022    GLUF 115 (H) 03/04/2022    CALCIUM 9 9 10/06/2022    AST 22 10/06/2022    ALT 36 10/06/2022    ALKPHOS 51 10/06/2022    PROT 6 7 07/15/2016    BILITOT 0 6 07/15/2016    EGFR 77 10/06/2022         No results found for: SPEP, UPEP    No results found for: PSA    No results found for: CEA    No results found for: AFP    No results found for: IRON, TIBC, FERRITIN    No results found for: [id number]            Active Problems:   Patient Active Problem List   Diagnosis   • Allergic rhinitis   • Anxiety disorder   • Chronic bronchitis with emphysema (HCC)   • Chronic low back pain   • Essential hypertension   • Myofascial pain syndrome   • Obstructive sleep apnea   • Panic attack   • Xerostomia   • Symmetrical polyarthritis   • Vitamin D deficiency   • Bilateral carotid artery disease (HCC)   • Colon cancer screening   • Irritable bowel syndrome with diarrhea   • Erosive gastritis   • Class 1 obesity without serious comorbidity in adult   • Syncope   • Abnormal left aortic arch   • Aberrant right subclavian artery   • Nonrheumatic aortic valve insufficiency   • Mild mitral regurgitation   • Mild tricuspid regurgitation   • Lumbar spinal stenosis   • Family hx-breast malignancy   • Dysphagia   • Right leg pain   • Primary osteoarthritis of right knee   • Effusion of right knee   • Degenerative tear of posterior horn of medial meniscus of right knee   • Malignant neoplasm of upper-outer quadrant of right breast in female, estrogen receptor positive ([address] )   • Family history of gene mutation   • PONV (postoperative nausea and vomiting)   • Postoperative ileus (HCC)   • BRCA negative       Past Medical History:   Past Medical History:   Diagnosis Date   • Anxiety    • Anxiety disorder    • Arthralgia    • Arthritis    • Asthma    • Basal cell carcinoma    • Chronic lumbar radiculopathy     last assessed: 12/20/16   • Chronic respiratory failure ([facility] [address] )    • Colon cancer screening 04/11/2019    Last colonoscopy 2014-15   • Depression    • Dysfunction of eustachian tube    • Dyslipidemia    • Emphysema lung (HCC)    • Fatigue    • HTN (hypertension) 10/29/2014   • Hypercholesterolemia    • Hypertension    • [facility] unspecified [facility]    • Irritable bowel syndrome with diarrhea 04/11/2019   • Lumbosacral stenosis     last assessed: 9/20/16   • TRAE (obstructive sleep apnea)    • Pancreatitis     resolved: 9/20/17   • Pneumonia    • Post-operative nausea and vomiting    • PTSD (post-traumatic stress disorder)        Surgical History:   Past Surgical History:   Procedure Laterality Date   • BREAST BIOPSY Right 09/12/2022    ILC   • BREAST LUMPECTOMY Right 10/21/2022    Procedure: LUMPECTOMY BREAST FLORENCIO;  Surgeon: Farhan Rodriguez MD;  Location: [facility];  Service: Surgical Oncology   • CHOLECYSTECTOMY     • EGD  04/15/2019   • GALLBLADDER SURGERY     • HYSTERECTOMY      pt thinks she still has one ovary, done over 20 yrs ago   • LUMBAR SPINE SURGERY  12/2016    3, 4, 5   • LYMPH NODE BIOPSY Right 10/21/2022    Procedure: SLN BX, lymphoscintigraphy;  Surgeon: Farhan Rodriguez MD;  Location: [facility];  Service: Surgical Oncology   • MOUTH SURGERY     • MULTIPLE TOOTH EXTRACTIONS N/A 2018   • OOPHORECTOMY Bilateral     1 1/2 ovaries removed   • TONSILLECTOMY     • US BREAST FLORENCIO  NEEDLE LOC RIGHT Right 09/12/2022   • US GUIDED BREAST BIOPSY RIGHT COMPLETE Right 09/12/2022       Family History:    Family History   Problem Relation Age of Onset   • Breast cancer Mother 61   • Depression Mother panic attacks   • Lung cancer Mother    • Mental illness Mother    • Substance Abuse Mother         CIGS   • Coronary artery disease Father         high # of paternal family members  in their 46s   • Substance Abuse Father         CIGS   • Depression Sister         panic attacks   • Hypertension Sister    • Breast cancer Daughter 48        CHEK2 positve   • Breast cancer Maternal Aunt 40   • Lung cancer Maternal Aunt    • Ovarian cancer Maternal Grandmother 79   • Depression Other         panic attacks   • Colon cancer Other         age at dx unk   • Heart disease Family    • Hypertension Family    • Colon polyps Neg Hx        Cancer-related family history includes Breast cancer (age of onset: 36) in her maternal aunt; Breast cancer (age of onset: 48) in her daughter; Breast cancer (age of onset: 61) in her mother; Colon cancer in her other; Lung cancer in her maternal aunt and mother; Ovarian cancer (age of onset: 79) in her maternal grandmother  Social History:   Social History     Socioeconomic History   • Marital status:      Spouse name: Not on file   • Number of children: Not on file   • Years of education: Not on file   • Highest education level: Not on file   Occupational History   • Occupation: retired   Tobacco Use   • Smoking status: Never Smoker   • Smokeless tobacco: Never Used   Vaping Use   • Vaping Use: Never used   Substance and Sexual Activity   • Alcohol use:  Yes     Alcohol/week: 4 0 standard drinks     Types: 4 Glasses of wine per week     Comment: 4 drinks every night   • Drug use: Yes     Frequency: 7 0 times per week     Types: Marijuana     Comment: uses topically daily- last used 10/20   • Sexual activity: Not Currently   Other Topics Concern   • Not on file   Social History Narrative    Person living alone    FEELS SAFE AT [address] [place] [address]    Wears seatbelt     Social Determinants of Health     Financial Resource Strain: Not on file   Food Insecurity: Not on file   Transportation Needs: Not on file   Physical Activity: Not on file   Stress: Not on file   Social Connections: Not on file   Intimate Partner Violence: Not on file   Housing Stability: Not on file       Current Medications:   Current Outpatient Medications   Medication Sig Dispense Refill   • albuterol (Ventolin HFA) 90 mcg/act inhaler Inhale 2 puffs every 6 (six) hours as needed for wheezing 18 g 0   • anastrozole (ARIMIDEX) 1 mg tablet Take 1 tablet (1 mg total) by mouth daily 30 tablet 3   • ascorbic acid (VITAMIN C) 250 mg tablet Take 250 mg by mouth daily     • atenolol (TENORMIN) 25 mg tablet TAKE 1 TABLET BY MOUTH TWICE DAILY 180 tablet 3   • Cholecalciferol (VITAMIN D3) 2000 units capsule Take by mouth daily     • clonazePAM (KlonoPIN) 0 5 mg tablet Take 1 tablet (0 5 mg total) by mouth 2 (two) times a day as needed for anxiety 60 tablet 0   • co-enzyme Q-10 30 MG capsule Take 30 mg by mouth daily      • dicyclomine (BENTYL) 10 mg capsule TAKE 2 CAPSULES BY MOUTH  TWO TIMES A DAY (Patient taking differently: Take 10 mg by mouth 2 (two) times a day as needed) 360 capsule 12   • fenofibrate (TRICOR) 48 mg tablet TAKE 1 TABLET BY MOUTH  DAILY (Patient taking differently: Take 48 mg by mouth every morning) 90 tablet 3   • fluticasone (FLONASE) 50 mcg/act nasal spray 1 spray into each nostril daily 1 g 5   • hydrochlorothiazide (HYDRODIURIL) 12 5 mg tablet Take 1 tablet (12 5 mg total) by mouth daily 30 tablet 3   • losartan (COZAAR) 100 MG tablet TAKE 1 TABLET BY MOUTH  DAILY (Patient taking differently: Take 100 mg by mouth every morning) 90 tablet 3   • magnesium oxide (MAG-OX) 400 mg Take 400 mg by mouth every other day      • Omega 3 1000 MG CAPS Take by mouth daily     • Probiotic Product (PROBIOTIC-10 PO) Take by mouth daily     • rosuvastatin (CRESTOR) 5 mg tablet TAKE 1 TABLET BY MOUTH 3  TIMES WEEKLY 39 tablet 3   • benzonatate (TESSALON PERLES) 100 mg capsule Take 1 capsule (100 mg total) by mouth 3 (three) times a day as needed for cough (Patient not taking: No sig reported) 20 capsule 0   • methocarbamol (ROBAXIN) 750 mg tablet TAKE 1 TABLET BY MOUTH 3 TIMES DAILY AS NEEDED FOR SPASMS (Patient not taking: No sig reported)     • saccharomyces boulardii (FLORASTOR) 250 mg capsule Take 250 mg by mouth 2 (two) times a day (Patient not taking: No sig reported)     • tiotropium (Spiriva HandiHaler) 18 mcg inhalation capsule Place 1 capsule (18 mcg total) into inhaler and inhale daily (Patient not taking: No sig reported) 30 capsule 0   • traMADol (ULTRAM) 50 mg tablet Take 1 tablet (50 mg total) by mouth every 8 (eight) hours as needed for moderate pain (Patient not taking: No sig reported) 9 tablet 0   • Turmeric 500 MG CAPS Take by mouth daily  (Patient not taking: No sig reported)       No current facility-administered medications for this visit  Allergies: Allergies   Allergen Reactions   • Antihistamines, Diphenhydramine-Type    • Bupropion    • Clarithromycin    • Codeine Other (See Comments)     increased HR   • Gabapentin    • Meloxicam Nausea Only and Visual Disturbance     Other reaction(s): Numbness  Action Taken: med stopped ; Category: Allergy;    • Ondansetron    • Pravastatin    • Propoxyphene Other (See Comments)     increased HR         Physical Exam:    Body surface area is 1 8 meters squared      Wt Readings from Last 3 Encounters:   11/09/22 77 1 kg (170 lb)   11/07/22 78 kg (172 lb)   10/21/22 78 2 kg (172 lb 6 4 oz)        Temp Readings from Last 3 Encounters:   11/09/22 98 2 °F (36 8 °C) (Temporal)   11/07/22 98 2 °F (36 8 °C) (Temporal)   10/21/22 97 6 °F (36 4 °C) (Temporal)        BP Readings from Last 3 Encounters:   11/09/22 140/90   11/07/22 124/72   10/21/22 119/59         Pulse Readings from Last 3 Encounters:   11/09/22 77   11/07/22 78   10/21/22 61     @LASTSAO2(3)@    Physical Exam     Constitutional   General appearance: No acute distress, well appearing and well nourished  Eyes   Conjunctiva and lids: No swelling, erythema or discharge  Pupils and irises: Equal, round and reactive to light  Ears, Nose, Mouth, and Throat   External inspection of ears and nose: Normal     Nasal mucosa, septum, and turbinates: Normal without edema or erythema  Oropharynx: Normal with no erythema, edema, exudate or lesions  Pulmonary   Respiratory effort: No increased work of breathing or signs of respiratory distress  Auscultation of lungs: Clear to auscultation  Cardiovascular   Palpation of heart: Normal PMI, no thrills  Auscultation of heart: Normal rate and rhythm, normal S1 and S2, without murmurs  Examination of extremities for edema and/or varicosities: Normal     Carotid pulses: Normal     Abdomen   Abdomen: Non-tender, no masses  Liver and spleen: No hepatomegaly or splenomegaly  Lymphatic   Palpation of lymph nodes in neck: No lymphadenopathy  Musculoskeletal   Gait and station: Normal     Digits and nails: Normal without clubbing or cyanosis  Inspection/palpation of joints, bones, and muscles: Normal     Skin   Skin and subcutaneous tissue: Normal without rashes or lesions  Neurologic   Cranial nerves: Cranial nerves 2-12 intact  Sensation: No sensory loss  Psychiatric   Orientation to person, place, and time: Normal     Mood and affect: Normal           Assessment/ Plan:  We discussed that she does have node positive breast cancer and I did bring up the possibility of sending an oncotype for decision making about adjuvant chemotherapy  However ,the reality is that the benefit to chemo is small over age 80 and I am concerned that it could make her balance worse and lead to falls  She is not interested in sending the oncotype or chemo and I think this I reasonable  She would like staging studies done due to her positive LNs  This is reasonable and I will do them now and call her with the result    She is going to have an evaluation for radiation  I will see her back in 3 mos with a new DEXA scan  I have recommended that she start anastrozole 1-2 weeks after the completion of radiation  We reviewed the side effects of aromatase inhibitors which include joint aches, worsening of bone density, sexual dysfunction, worsening of mood, difficulty sleeping and hot flashes  We will check on tolerance of the anastrozole at her next visit  She knows to call in the interim with any questions or concerns  I spent 60 minutes in chart review, face to face counseling, coordination of care, and documentation

## 2022-11-17 ENCOUNTER — CLINICAL SUPPORT (OUTPATIENT)
Dept: RADIATION ONCOLOGY | Facility: CLINIC | Age: 77
End: 2022-11-17
Attending: RADIOLOGY

## 2022-11-17 ENCOUNTER — RADIATION ONCOLOGY CONSULT (OUTPATIENT)
Dept: RADIATION ONCOLOGY | Facility: CLINIC | Age: 77
End: 2022-11-17
Attending: RADIOLOGY

## 2022-11-17 ENCOUNTER — HOSPITAL ENCOUNTER (OUTPATIENT)
Dept: BONE DENSITY | Facility: CLINIC | Age: 77
Discharge: HOME | End: 2022-11-17

## 2022-11-17 VITALS
OXYGEN SATURATION: 97 % | BODY MASS INDEX: 30.47 KG/M2 | WEIGHT: 171.96 LBS | RESPIRATION RATE: 20 BRPM | HEART RATE: 71 BPM | HEIGHT: 63 IN | TEMPERATURE: 97.4 F | DIASTOLIC BLOOD PRESSURE: 88 MMHG | SYSTOLIC BLOOD PRESSURE: 142 MMHG

## 2022-11-17 DIAGNOSIS — Z78.0 ASYMPTOMATIC MENOPAUSE: ICD-10-CM

## 2022-11-17 DIAGNOSIS — Z17.0 MALIGNANT NEOPLASM OF UPPER-OUTER QUADRANT OF RIGHT BREAST IN FEMALE, ESTROGEN RECEPTOR POSITIVE (HCC): ICD-10-CM

## 2022-11-17 DIAGNOSIS — Z17.0 MALIGNANT NEOPLASM OF UPPER-OUTER QUADRANT OF RIGHT BREAST IN FEMALE, ESTROGEN RECEPTOR POSITIVE (HCC): Primary | ICD-10-CM

## 2022-11-17 DIAGNOSIS — C50.411 MALIGNANT NEOPLASM OF UPPER-OUTER QUADRANT OF RIGHT BREAST IN FEMALE, ESTROGEN RECEPTOR POSITIVE (HCC): Primary | ICD-10-CM

## 2022-11-17 DIAGNOSIS — C50.411 MALIGNANT NEOPLASM OF UPPER-OUTER QUADRANT OF RIGHT BREAST IN FEMALE, ESTROGEN RECEPTOR POSITIVE (HCC): ICD-10-CM

## 2022-11-17 NOTE — PROGRESS NOTES
Curtis Leavitt 1945 is a 69 y o  female  with newly diagnosed invasive lobular carcinoma of the right breast  She presents today for radiation oncology consult  She had discomfort in the right breast around the nipple  She had a diagnostic mammogram and right breast US 8/24/22 which showed a mass at 11 o'clock, 7 cmfn  Biopsy done 9/12/22 revealed invasive lobular carcinoma, ER/AR+ HER2 -  She has a family history of breast cancer in her daughter  Her genetic testing was negative  She had a right breast lumpectomy with Dr Miky Daniel on 10/21/22       8/24/22 diagnostic bilateral mammogram/R breast US-  FINDINGS:   RIGHT  A) MASS  Mammo diagnostic bilateral w 3d & cad: There is an irregularly shaped mass with spiculated margins seen in the upper outer quadrant of the right breast in the posterior depth  US breast right limited (diagnostic): There is an 18 mm irregularly shaped, hypoechoic mass with spiculated margins seen in the right breast at 11 o'clock, 7 cm from the nipple  There is no evidence of right axillary lymphadenopathy  Anterior outer right breast calcifications have been present on remote prior studies  Left  There are no suspicious masses, grouped microcalcifications or areas of unexplained architectural distortion  The skin and nipple areolar complex are unremarkable        IMPRESSION:  Recommend ultrasound-guided core biopsy of the right breast      ASSESSMENT/BI-RADS CATEGORY:  Right: 5 - Highly Suggestive of Malignancy  Overall: 5 - Highly Suggestive of Malignancy      10/21/11 right breast lumpectomy  18 mm invasive lobular with ductal features  Tumor grade two  LVI absent  Margins clean with the additional margins  Estrogen receptor and progesterone receptor status positive  HER2 status and test method negative    Lymph node assessment/status 2/4 positive sentinel nodes  Stage: IA prognostic     11/7/22 Dr Miky Daniel- s/p lumpectomy and SLNB for a T1c N1a invasive lobular carcinoma with ductal features  Grade 2, ER/KY + HER2-, low Ki 67  Healing well  Seroma in axilla  Offered to aspirate but she prefers to continue the warm compresses  Med onc scheduled, refer to rad onc  F/u 6 months    11/9/22 Dr Bora Wharton- Benefit to chemo is small over age 80, she is not interested in Oncotype and I think this is reasonable  Recommend anastrozole 1-2 weeks after RT completed  She would like staging scans due to positive lymph nodes  F/u 3 months with new DEXA scan      11/20/22 CT C/A/P  11/25/22 bone scan  2/13/23 Dr Bora Wharton  5/10/23 Veronica Villa NP      Oncology History   Malignant neoplasm of upper-outer quadrant of right breast in female, estrogen receptor positive ([facility] [address] )   9/12/2022 Initial Diagnosis    Malignant neoplasm of upper-outer quadrant of right breast in female, estrogen receptor positive ([facility] [address] )     9/12/2022 Biopsy    Final Diagnosis   A  Breast, right at 11:00 o'clock 7 cm from nipple, ultrasound-guided biopsy (3 passes):  -  - Invasive lobular carcinoma  * Matteo grade 2 of 3 (total score: 6 of 9)    -- tubule formation < 10%, score 3    -- nuclear grade 2 of 3, score 2    -- mitoses < 3/mm2, (</= 7 mitoses/10HPF), score 1    * Confirmed by tumor cell immunophenotype:    -- positive: nuclear stain for BREANNA    -- negative:  p63, calponin-B; loss of membranous stain for E-cadherin and p120  * Invasive carcinoma involves 3 of 3 submitted core biopsies, max  dimension = 11 millimeters  * Estrogen, progesterone & HER2 receptor studies pending, to be described in a separate receptor report  * Ductal carcinoma in situ (DCIS): Not identified  * Lymph-vascular invasion: Not identified  * Microcalcifications: Absent        ER 90-95%, KY 90-95%, HER2 1+ negative     9/29/2022 -  Cancer Staged    Staging form: Breast, AJCC 8th Edition  - Clinical stage from 9/29/2022: Stage IA (cT1c, cN0, cM0, G2, ER+, KY+, HER2-) - Signed by Percy Milligan MD on 9/29/2022  Stage prefix: Initial diagnosis  Method of lymph node assessment: Clinical  Histologic grading system: 3 grade system       2022 -  Cancer Staged    Staging form: Breast, AJCC 8th Edition  - Pathologic stage from 2022: Stage IA (pT1c, pN1a, cM0, G2, ER+, IN+, HER2-) - Signed by Usman Leary MD on 2022  Stage prefix: Initial diagnosis  Histologic grading system: 3 grade system           Review of Systems:  Review of Systems   Constitutional: Positive for fatigue (5/10  Daily naps )  Ambulates in her home by "furniture walking"  Cane and rolling walker outside home  Balance issues noted  HENT: Negative  Eyes:        Eye exam pending in Dec  2022 "Flashes x 4months"  "Watery eyes all the time"    Respiratory: Positive for shortness of breath (Rx-Inhalers/TOUSSAINT/Cold sensitive )  Cardiovascular: Negative  Gastrointestinal: Positive for diarrhea (Chronic diarrhea-not new to pt  GI-Dec  2022 )  Endocrine: Positive for cold intolerance  Genitourinary: Negative  Musculoskeletal: Positive for back pain (Hx-Back sx   )  Skin: Negative  Allergic/Immunologic: Negative  Neurological: Negative  Balance issues/walks w/cane  Unsteady at times/tends to fall to the left w/falling  Long distance walking w/rolling walker  Hematological: Negative  Psychiatric/Behavioral: Positive for sleep disturbance (Rx's to sleep  Moderate sleep apnea noted  )  Clinical Trial: no    OB/GYN History:  The patient underwent menarche at 16 years  Menopause Status   No LMP recorded (lmp unknown)  Patient has had a hysterectomy  Menopause at Age Late 30's Hyst   Menopause Reason  Surgical   Hormone replacement therapy:  Yes/Years    3  Para 3  Age at first delivery being 18yo  Nursing:   Birth control pills: Yes/Months       Pregnancy test needed:      ONCOTYPE/MAMMOPRINT results       PFT      Prior Radiation No prior RT or Chemo     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       Implantable Devices (Port, Pacemaker, pain stimulator) No     Hip Replacement  No     Covid Vaccine Status   Vaccinated/Booster     [unfilled]  Health Maintenance   Topic Date Due   • Hepatitis B Vaccine (1 of 3 - 3-dose series) Never done   • COVID-19 Vaccine (3 - Moderna risk series) 03/29/2021   • Influenza Vaccine (1) 09/01/2022   • Fall Risk  06/22/2023   • Urinary Incontinence Screening  06/22/2023   • Medicare Annual Wellness Visit (AWV)  06/22/2023   • BMI: Followup Plan  06/22/2023   • Breast Cancer Screening: Mammogram  08/24/2023   • BMI: Adult  11/09/2023   • Depression Screening  11/17/2023   • Hepatitis C Screening  Completed   • Osteoporosis Screening  Completed   • Pneumococcal Vaccine: 65+ Years  Completed   • HIB Vaccine  Aged Out   • IPV Vaccine  Aged Out   • Hepatitis A Vaccine  Aged Out   • Meningococcal ACWY Vaccine  Aged Out   • HPV Vaccine  Aged Out   • Colorectal Cancer Screening  Discontinued     Past Medical History:   Diagnosis Date   • Anxiety    • Anxiety disorder    • Arthralgia    • Arthritis    • Asthma    • Basal cell carcinoma    • Breast cancer ([address] )    • Chronic lumbar radiculopathy     last assessed: 12/20/16   • Chronic respiratory failure ([address] )    • Colon cancer screening 04/11/2019    Last colonoscopy 2014-15   • Depression    • Dysfunction of eustachian tube    • Dyslipidemia    • Emphysema lung ([address] )    • Fatigue    • HTN (hypertension) 10/29/2014   • Hypercholesterolemia    • Hypertension    • Ileus of unspecified type ([address] )    • Irritable bowel syndrome with diarrhea 04/11/2019   • Lumbosacral stenosis     last assessed: 9/20/16   • TRAE (obstructive sleep apnea)    • Pancreatitis     resolved: 9/20/17   • Pneumonia    • Post-operative nausea and vomiting    • PTSD (post-traumatic stress disorder)      Past Surgical History:   Procedure Laterality Date   • BREAST BIOPSY Right 09/12/2022    [facility]   • BREAST LUMPECTOMY Right 10/21/2022    Procedure: LUMPECTOMY BREAST FLORENCIO; Surgeon: Carrie Helms MD;  Location: [facility];  Service: Surgical Oncology   • CHOLECYSTECTOMY     • EGD  04/15/2019   • GALLBLADDER SURGERY     • HYSTERECTOMY      pt thinks she still has one ovary, done over 20 yrs ago   • LUMBAR SPINE SURGERY  2016    3, 4, 5   • LYMPH NODE BIOPSY Right 10/21/2022    Procedure: SLN BX, lymphoscintigraphy;  Surgeon: Carrie Helms MD;  Location: [facility];  Service: Surgical Oncology   • MOUTH SURGERY     • MULTIPLE TOOTH EXTRACTIONS N/A    • OOPHORECTOMY Bilateral     1 1/2 ovaries removed   • TONSILLECTOMY     • US BREAST FLORENCIO  NEEDLE LOC RIGHT Right 2022   • US GUIDED BREAST BIOPSY RIGHT COMPLETE Right 2022     Family History   Problem Relation Age of Onset   • Breast cancer Mother 61   • Depression Mother         panic attacks   • Lung cancer Mother    • Mental illness Mother    • Substance Abuse Mother         CIGS   • Coronary artery disease Father         high # of paternal family members  in their 46s   • Substance Abuse Father         CIGS   • Depression Sister         panic attacks   • Hypertension Sister    • Breast cancer Daughter 48        CHEK2 positve   • Breast cancer Maternal Aunt 40   • Lung cancer Maternal Aunt    • Ovarian cancer Maternal Grandmother 79   • Depression Other         panic attacks   • Colon cancer Other         age at dx unk   • Heart disease Family    • Hypertension Family    • Colon polyps Neg Hx      Social History     Tobacco Use   • Smoking status: Never   • Smokeless tobacco: Never   Vaping Use   • Vaping Use: Never used   Substance Use Topics   • Alcohol use:  Yes     Alcohol/week: 4 0 standard drinks     Types: 4 Glasses of wine per week     Comment: 4 drinks every night   • Drug use: Yes     Frequency: 7 0 times per week     Types: Marijuana     Comment: uses topically daily- last used 10/20        Current Outpatient Medications:   •  albuterol (Ventolin HFA) 90 mcg/act inhaler, Inhale 2 puffs every 6 (six) hours as needed for wheezing, Disp: 18 g, Rfl: 0  •  anastrozole (ARIMIDEX) 1 mg tablet, Take 1 tablet (1 mg total) by mouth daily, Disp: 30 tablet, Rfl: 3  •  ascorbic acid (VITAMIN C) 250 mg tablet, Take 250 mg by mouth daily, Disp: , Rfl:   •  atenolol (TENORMIN) 25 mg tablet, TAKE 1 TABLET BY MOUTH TWICE DAILY, Disp: 180 tablet, Rfl: 3  •  Cholecalciferol (VITAMIN D3) 2000 units capsule, Take by mouth daily, Disp: , Rfl:   •  clonazePAM (KlonoPIN) 0 5 mg tablet, Take 1 tablet (0 5 mg total) by mouth 2 (two) times a day as needed for anxiety, Disp: 60 tablet, Rfl: 0  •  co-enzyme Q-10 30 MG capsule, Take 30 mg by mouth daily , Disp: , Rfl:   •  dicyclomine (BENTYL) 10 mg capsule, TAKE 2 CAPSULES BY MOUTH  TWO TIMES A DAY (Patient taking differently: Take 10 mg by mouth 2 (two) times a day as needed), Disp: 360 capsule, Rfl: 12  •  fenofibrate (TRICOR) 48 mg tablet, TAKE 1 TABLET BY MOUTH  DAILY (Patient taking differently: Take 48 mg by mouth every morning), Disp: 90 tablet, Rfl: 3  •  fluticasone (FLONASE) 50 mcg/act nasal spray, 1 spray into each nostril daily, Disp: 1 g, Rfl: 5  •  hydrochlorothiazide (HYDRODIURIL) 12 5 mg tablet, Take 1 tablet (12 5 mg total) by mouth daily, Disp: 30 tablet, Rfl: 3  •  losartan (COZAAR) 100 MG tablet, TAKE 1 TABLET BY MOUTH  DAILY (Patient taking differently: Take 100 mg by mouth every morning), Disp: 90 tablet, Rfl: 3  •  magnesium oxide (MAG-OX) 400 mg, Take 400 mg by mouth every other day , Disp: , Rfl:   •  Omega 3 1000 MG CAPS, Take by mouth daily, Disp: , Rfl:   •  Probiotic Product (PROBIOTIC-10 PO), Take by mouth daily, Disp: , Rfl:   •  rosuvastatin (CRESTOR) 5 mg tablet, TAKE 1 TABLET BY MOUTH 3  TIMES WEEKLY, Disp: 39 tablet, Rfl: 3  •  benzonatate (TESSALON PERLES) 100 mg capsule, Take 1 capsule (100 mg total) by mouth 3 (three) times a day as needed for cough (Patient not taking: Reported on 6/22/2022), Disp: 20 capsule, Rfl: 0  •  methocarbamol (ROBAXIN) 750 mg tablet, TAKE 1 TABLET BY MOUTH 3 TIMES DAILY AS NEEDED FOR SPASMS (Patient not taking: Reported on 6/22/2022), Disp: , Rfl:   •  saccharomyces boulardii (FLORASTOR) 250 mg capsule, Take 250 mg by mouth 2 (two) times a day (Patient not taking: Reported on 11/7/2022), Disp: , Rfl:   •  tiotropium (Spiriva HandiHaler) 18 mcg inhalation capsule, Place 1 capsule (18 mcg total) into inhaler and inhale daily (Patient not taking: No sig reported), Disp: 30 capsule, Rfl: 0  •  traMADol (ULTRAM) 50 mg tablet, Take 1 tablet (50 mg total) by mouth every 8 (eight) hours as needed for moderate pain (Patient not taking: Reported on 10/13/2022), Disp: 9 tablet, Rfl: 0  •  Turmeric 500 MG CAPS, Take by mouth daily  (Patient not taking: Reported on 6/22/2022), Disp: , Rfl:   Allergies   Allergen Reactions   • Antihistamines, Diphenhydramine-Type    • Bupropion    • Clarithromycin    • Codeine Other (See Comments)     increased HR   • Gabapentin    • Meloxicam Nausea Only and Visual Disturbance     Other reaction(s): Numbness  Action Taken: med stopped ; Category:  Allergy;    • Ondansetron    • Pravastatin    • Propoxyphene Other (See Comments)     increased HR      Vitals:    11/17/22 1335   BP: 142/88   Pulse: 71   Resp: 20   Temp: (!) 97 4 °F (36 3 °C)   SpO2: 97%   Weight: 78 kg (171 lb 15 3 oz)   Height: 5' 3" (1 6 m)     Pain Score: 0-No pain

## 2022-11-20 ENCOUNTER — HOSPITAL ENCOUNTER (OUTPATIENT)
Dept: CT IMAGING | Facility: HOSPITAL | Age: 77
Discharge: HOME | End: 2022-11-20
Attending: INTERNAL MEDICINE

## 2022-11-20 DIAGNOSIS — C50.911 MALIGNANT NEOPLASM OF RIGHT FEMALE BREAST, UNSPECIFIED ESTROGEN RECEPTOR STATUS, UNSPECIFIED SITE OF BREAST (HCC): ICD-10-CM

## 2022-11-20 RX ADMIN — IOHEXOL 100 ML: 350 INJECTION, SOLUTION INTRAVENOUS at 09:10

## 2022-11-22 NOTE — PROGRESS NOTES
Bautista Deal  [birth year]  [id number]    Radiation Oncology Consult    August 24, 2022:  Bilateral mammograms showing an irregular mass in the upper-outer quadrant of the right breast at posterior depth, BI-RADS category 5     September 12, 2022: Right breast biopsy showing grade 2 invasive lobular carcinoma, estrogen receptor 95%, progesterone receptor 95%, HER2 negative, 11 mm in the core    October 21, 2022: Lumpectomy and sentinel node biopsy by Dr Destiny Dubois  Pathology showed a 1 8 cm grade 2 invasive lobular carcinoma, lymphovascular invasion present, all margins greater than 2 5 mm   2/4 sentinel nodes containing macrometastases measuring up to 9 mm with 2 mm of extranodal extension    History of present illness:  Ms Esperanza Orozco is a 29-year-old woman who presented with a mammographic abnormality in the upper-outer right breast after presenting with some right nipple discomfort  Mammogram and ultrasound on August 24, 2022 showed a mass measuring 1 8 cm at the 11 o'clock position of the right breast 7 cm from the nipple  Biopsy of the area was performed on September 12, 2022 and revealed a grade 2 invasive lobular carcinoma that was strongly estrogen and progesterone receptor positive, HER2 negative  She was seen by Dr Destiny Dubois and underwent a right breast lumpectomy and sentinel node biopsy on October 21, 2022  Final pathology revealed a 1 8 cm invasive lobular carcinoma with negative margins of excision, lymphovascular invasion present in 2/4 sentinel nodes contained macrometastases measuring up to 9 mm with focal extranodal extension  She was seen by Dr Didier Evans and after discussion declined adjuvant chemotherapy therefore genomic assay was not sent  She has recommended adjuvant anastrozole    She is referred by Dr Destiny Dubois for information regarding the role of radiation therapy in the setting of breast conservation treatment for a invasive lobular carcinoma of the right breast, pathologic stage pT1c pN1a cM0, prognostic stage IB  Oncology History   Malignant neoplasm of upper-outer quadrant of right breast in female, estrogen receptor positive ([facility] [address] )   9/12/2022 Initial Diagnosis    Malignant neoplasm of upper-outer quadrant of right breast in female, estrogen receptor positive ([facility] [address] )     9/12/2022 Biopsy    Final Diagnosis   A  Breast, right at 11:00 o'clock 7 cm from nipple, ultrasound-guided biopsy (3 passes):  -  - Invasive lobular carcinoma  * Matteo grade 2 of 3 (total score: 6 of 9)    -- tubule formation < 10%, score 3    -- nuclear grade 2 of 3, score 2    -- mitoses < 3/mm2, (</= 7 mitoses/10HPF), score 1    * Confirmed by tumor cell immunophenotype:    -- positive: nuclear stain for BREANNA    -- negative:  p63, calponin-B; loss of membranous stain for E-cadherin and p120  * Invasive carcinoma involves 3 of 3 submitted core biopsies, max  dimension = 11 millimeters  * Estrogen, progesterone & HER2 receptor studies pending, to be described in a separate receptor report  * Ductal carcinoma in situ (DCIS): Not identified  * Lymph-vascular invasion: Not identified  * Microcalcifications: Absent        ER 90-95%, MA 90-95%, HER2 1+ negative     9/29/2022 -  Cancer Staged    Staging form: Breast, AJCC 8th Edition  - Clinical stage from 9/29/2022: Stage IA (cT1c, cN0, cM0, G2, ER+, MA+, HER2-) - Signed by Foreign Gardiner MD on 9/29/2022  Stage prefix: Initial diagnosis  Method of lymph node assessment: Clinical  Histologic grading system: 3 grade system       11/7/2022 -  Cancer Staged    Staging form: Breast, AJCC 8th Edition  - Pathologic stage from 11/7/2022: Stage IA (pT1c, pN1a, cM0, G2, ER+, MA+, HER2-) - Signed by Foreign Gardiner MD on 11/7/2022  Stage prefix: Initial diagnosis  Histologic grading system: 3 grade system           Patient Active Problem List   Diagnosis   • Allergic rhinitis   • Anxiety disorder   • Chronic bronchitis with emphysema ([facility] [address] )   • Chronic low back pain   • Essential hypertension   • Myofascial pain syndrome   • Obstructive sleep apnea   • Panic attack   • Xerostomia   • Symmetrical polyarthritis   • Vitamin D deficiency   • Bilateral carotid artery disease (HCC)   • Colon cancer screening   • Irritable bowel syndrome with diarrhea   • Erosive gastritis   • Class 1 obesity without serious comorbidity in adult   • Syncope   • Abnormal left aortic arch   • Aberrant right subclavian artery   • Nonrheumatic aortic valve insufficiency   • Mild mitral regurgitation   • Mild tricuspid regurgitation   • Lumbar spinal stenosis   • Family hx-breast malignancy   • Dysphagia   • Right leg pain   • Primary osteoarthritis of right knee   • Effusion of right knee   • Degenerative tear of posterior horn of medial meniscus of right knee   • Malignant neoplasm of upper-outer quadrant of right breast in female, estrogen receptor positive ([facility] [address] )   • Family history of gene mutation   • PONV (postoperative nausea and vomiting)   • Postoperative ileus ([facility] [address] )   • BRCA negative    Cancer Staging   Malignant neoplasm of upper-outer quadrant of right breast in female, estrogen receptor positive ([facility] [address] )  Staging form: Breast, AJCC 8th Edition  - Clinical stage from 9/29/2022: Stage IA (cT1c, cN0, cM0, G2, ER+, KS+, HER2-) - Signed by Aneta Trejo MD on 9/29/2022  Stage prefix: Initial diagnosis  Method of lymph node assessment: Clinical  Histologic grading system: 3 grade system  - Pathologic stage from 11/7/2022: Stage IA (pT1c, pN1a, cM0, G2, ER+, KS+, HER2-) - Signed by Aneta Trejo MD on 11/7/2022  Stage prefix: Initial diagnosis  Histologic grading system: 3 grade system    Past Medical History:   Diagnosis Date   • Anxiety    • Anxiety disorder    • Arthralgia    • Arthritis    • Asthma    • Basal cell carcinoma    • Breast cancer ([facility] [address] )    • Chronic lumbar radiculopathy     last assessed: 12/20/16   • Chronic respiratory failure ([facility] [address] )    • Colon cancer screening 04/11/2019    Last colonoscopy 2014-15   • Depression    • Dysfunction of eustachian tube    • Dyslipidemia    • Emphysema lung (HCC)    • Fatigue    • HTN (hypertension) 10/29/2014   • Hypercholesterolemia    • Hypertension    • Ileus of unspecified type ([facility] [address] )    • Irritable bowel syndrome with diarrhea 04/11/2019   • Lumbosacral stenosis     last assessed: 9/20/16   • TRAE (obstructive sleep apnea)    • Pancreatitis     resolved: 9/20/17   • Pneumonia    • Post-operative nausea and vomiting    • PTSD (post-traumatic stress disorder)      Social History     Socioeconomic History   • Marital status:      Spouse name: Not on file   • Number of children: Not on file   • Years of education: Not on file   • Highest education level: Not on file   Occupational History   • Occupation: retired   Tobacco Use   • Smoking status: Never   • Smokeless tobacco: Never   Vaping Use   • Vaping Use: Never used   Substance and Sexual Activity   • Alcohol use:  Yes     Alcohol/week: 4 0 standard drinks     Types: 4 Glasses of wine per week     Comment: 4 drinks every night   • Drug use: Yes     Frequency: 7 0 times per week     Types: Marijuana     Comment: uses topically daily- last used 10/20   • Sexual activity: Not Currently   Other Topics Concern   • Not on file   Social History Narrative    Person living alone    FEELS SAFE AT [address] [place] [address]    Wears seatbelt     Social Determinants of Health     Financial Resource Strain: Not on file   Food Insecurity: Not on file   Transportation Needs: Not on file   Physical Activity: Not on file   Stress: Not on file   Social Connections: Not on file   Intimate Partner Violence: Not on file   Housing Stability: Not on file      Family History   Problem Relation Age of Onset   • Breast cancer Mother 61   • Depression Mother         panic attacks   • Lung cancer Mother    • Mental illness Mother    • Substance Abuse Mother         CIGS   • Coronary artery disease Father         high # of paternal family members  in their 46s   • Substance Abuse Father         CIGS   • Depression Sister         panic attacks   • Hypertension Sister    • Breast cancer Daughter 48        CHEK2 positve   • Breast cancer Maternal Aunt 40   • Lung cancer Maternal Aunt    • Ovarian cancer Maternal Grandmother 79   • Depression Other         panic attacks   • Colon cancer Other         age at dx unk   • Heart disease Family    • Hypertension Family    • Colon polyps Neg Hx      Past Surgical History:   Procedure Laterality Date   • BREAST BIOPSY Right 2022    ILC   • BREAST LUMPECTOMY Right 10/21/2022    Procedure: LUMPECTOMY BREAST FLORENCIO;  Surgeon: Joana Dave MD;  Location: [facility];  Service: Surgical Oncology   • CHOLECYSTECTOMY     • EGD  04/15/2019   • GALLBLADDER SURGERY     • HYSTERECTOMY      pt thinks she still has one ovary, done over 20 yrs ago   • LUMBAR SPINE SURGERY  2016    3, 4, 5   • LYMPH NODE BIOPSY Right 10/21/2022    Procedure: SLN BX, lymphoscintigraphy;  Surgeon: Joana Dave MD;  Location: [facility];  Service: Surgical Oncology   • MOUTH SURGERY     • MULTIPLE TOOTH EXTRACTIONS N/A    • OOPHORECTOMY Bilateral     1 1/2 ovaries removed   • TONSILLECTOMY     • US BREAST FLORENCIO  NEEDLE LOC RIGHT Right 2022   • US GUIDED BREAST BIOPSY RIGHT COMPLETE Right 2022       Current Outpatient Medications:   •  albuterol (Ventolin HFA) 90 mcg/act inhaler, Inhale 2 puffs every 6 (six) hours as needed for wheezing, Disp: 18 g, Rfl: 0  •  anastrozole (ARIMIDEX) 1 mg tablet, Take 1 tablet (1 mg total) by mouth daily, Disp: 30 tablet, Rfl: 3  •  ascorbic acid (VITAMIN C) 250 mg tablet, Take 250 mg by mouth daily, Disp: , Rfl:   •  atenolol (TENORMIN) 25 mg tablet, TAKE 1 TABLET BY MOUTH TWICE DAILY, Disp: 180 tablet, Rfl: 3  •  Cholecalciferol (VITAMIN D3) 2000 units capsule, Take by mouth daily, Disp: , Rfl:   •  clonazePAM (KlonoPIN) 0 5 mg tablet, Take 1 tablet (0 5 mg total) by mouth 2 (two) times a day as needed for anxiety, Disp: 60 tablet, Rfl: 0  •  co-enzyme Q-10 30 MG capsule, Take 30 mg by mouth daily , Disp: , Rfl:   •  dicyclomine (BENTYL) 10 mg capsule, TAKE 2 CAPSULES BY MOUTH  TWO TIMES A DAY (Patient taking differently: Take 10 mg by mouth 2 (two) times a day as needed), Disp: 360 capsule, Rfl: 12  •  fenofibrate (TRICOR) 48 mg tablet, TAKE 1 TABLET BY MOUTH  DAILY (Patient taking differently: Take 48 mg by mouth every morning), Disp: 90 tablet, Rfl: 3  •  fluticasone (FLONASE) 50 mcg/act nasal spray, 1 spray into each nostril daily, Disp: 1 g, Rfl: 5  •  hydrochlorothiazide (HYDRODIURIL) 12 5 mg tablet, Take 1 tablet (12 5 mg total) by mouth daily, Disp: 30 tablet, Rfl: 3  •  losartan (COZAAR) 100 MG tablet, TAKE 1 TABLET BY MOUTH  DAILY (Patient taking differently: Take 100 mg by mouth every morning), Disp: 90 tablet, Rfl: 3  •  magnesium oxide (MAG-OX) 400 mg, Take 400 mg by mouth every other day , Disp: , Rfl:   •  Omega 3 1000 MG CAPS, Take by mouth daily, Disp: , Rfl:   •  Probiotic Product (PROBIOTIC-10 PO), Take by mouth daily, Disp: , Rfl:   •  rosuvastatin (CRESTOR) 5 mg tablet, TAKE 1 TABLET BY MOUTH 3  TIMES WEEKLY, Disp: 39 tablet, Rfl: 3  •  benzonatate (TESSALON PERLES) 100 mg capsule, Take 1 capsule (100 mg total) by mouth 3 (three) times a day as needed for cough (Patient not taking: Reported on 6/22/2022), Disp: 20 capsule, Rfl: 0  •  methocarbamol (ROBAXIN) 750 mg tablet, TAKE 1 TABLET BY MOUTH 3 TIMES DAILY AS NEEDED FOR SPASMS (Patient not taking: Reported on 6/22/2022), Disp: , Rfl:   •  saccharomyces boulardii (FLORASTOR) 250 mg capsule, Take 250 mg by mouth 2 (two) times a day (Patient not taking: Reported on 11/7/2022), Disp: , Rfl:   •  tiotropium (Spiriva HandiHaler) 18 mcg inhalation capsule, Place 1 capsule (18 mcg total) into inhaler and inhale daily (Patient not taking: No sig reported), Disp: 30 capsule, Rfl: 0  •  traMADol (ULTRAM) 50 mg tablet, Take 1 tablet (50 mg total) by mouth every 8 (eight) hours as needed for moderate pain (Patient not taking: Reported on 10/13/2022), Disp: 9 tablet, Rfl: 0  •  Turmeric 500 MG CAPS, Take by mouth daily  (Patient not taking: Reported on 6/22/2022), Disp: , Rfl:   Allergies   Allergen Reactions   • Antihistamines, Diphenhydramine-Type    • Bupropion    • Clarithromycin    • Codeine Other (See Comments)     increased HR   • Gabapentin    • Meloxicam Nausea Only and Visual Disturbance     Other reaction(s): Numbness  Action Taken: med stopped ; Category: Allergy;    • Ondansetron    • Pravastatin    • Propoxyphene Other (See Comments)     increased HR       Review of Systems:  Constitutional: Positive for fatigue (5/10  Daily naps )  Ambulates in her home by "furniture walking"  Cane and rolling walker outside home  Balance issues noted  HENT: Negative  Eyes:        Eye exam pending in Dec  2022 "Flashes x 4months"  "Watery eyes all the time"    Respiratory: Positive for shortness of breath (Rx-Inhalers/TOUSSAINT/Cold sensitive )  Cardiovascular: Negative  Gastrointestinal: Positive for diarrhea (Chronic diarrhea-not new to pt  GI-Dec  2022 )  Endocrine: Positive for cold intolerance  Genitourinary: Negative  Musculoskeletal: Positive for back pain (Hx-Back sx   )  Skin: Negative  Allergic/Immunologic: Negative  Neurological: Negative  Balance issues/walks w/cane  Unsteady at times/tends to fall to the left w/falling  Long distance walking w/rolling walker  Hematological: Negative  Psychiatric/Behavioral: Positive for sleep disturbance (Rx's to sleep  Moderate sleep apnea noted  )  Physical Exam:  Physical Exam  Vitals and nursing note reviewed  Constitutional:       General: She is not in acute distress  Appearance: Normal appearance  HENT:      Head: Normocephalic and atraumatic  Nose: Nose normal  No congestion     Eyes:      General: No scleral icterus  Extraocular Movements: Extraocular movements intact  Conjunctiva/sclera: Conjunctivae normal       Pupils: Pupils are equal, round, and reactive to light  Pulmonary:      Effort: Pulmonary effort is normal  No respiratory distress  Chest:   Breasts:     Right: No swelling, bleeding, inverted nipple, mass or nipple discharge  Left: Normal  No swelling, bleeding, inverted nipple, mass, nipple discharge or skin change  Comments: Right breast:  Well-healing incision upper-outer breast and axilla with palpable seroma present And mild skin erythema  Abdominal:      General: Abdomen is flat  There is no distension  Tenderness: There is no abdominal tenderness  Musculoskeletal:         General: No swelling  Normal range of motion  Cervical back: Normal range of motion  Lymphadenopathy:      Cervical: No cervical adenopathy  Skin:     General: Skin is warm  Findings: No erythema or rash  Neurological:      General: No focal deficit present  Mental Status: She is alert and oriented to person, place, and time  Mental status is at baseline  Cranial Nerves: No cranial nerve deficit  Sensory: No sensory deficit  Motor: No weakness  Psychiatric:         Mood and Affect: Mood normal          Thought Content:  Thought content normal          Judgment: Judgment normal        LABS:    CBC  Diff   Lab Results   Component Value Date/Time    WBC 5 05 10/06/2022 11:48 AM    WBC 7 2 07/15/2016 02:20 PM    HGB 13 3 10/06/2022 11:48 AM    HGB 13 4 07/15/2016 02:20 PM    HCT 40 6 10/06/2022 11:48 AM    HCT 40 8 07/15/2016 02:20 PM    RBC 4 38 10/06/2022 11:48 AM    RBC 4 51 07/15/2016 02:20 PM    MCV 93 10/06/2022 11:48 AM    MCV 90 3 07/15/2016 02:20 PM    MCHC 32 8 10/06/2022 11:48 AM    MCHC 32 9 07/15/2016 02:20 PM    MCH 30 4 10/06/2022 11:48 AM    MCH 29 7 07/15/2016 02:20 PM    RDW 12 9 10/06/2022 11:48 AM    RDW 14 0 07/15/2016 02:20 PM    MPV 11 3 10/06/2022 11:48 AM    MPV 9 2 07/15/2016 02:20 PM    Lab Results   Component Value Date/Time    MONOCYTES 9 1 07/15/2016 02:20 PM    LYMPHSABS 2 03 10/06/2022 11:48 AM    LYMPHSABS 21 2 07/15/2016 02:20 PM    LYMPHSABS 1526 07/15/2016 02:20 PM    EOSABS 0 14 10/06/2022 11:48 AM    EOSABS 0 (L) 07/15/2016 02:20 PM    EOSABS 0 07/15/2016 02:20 PM    MONOSABS 0 33 10/06/2022 11:48 AM    MONOSABS 655 07/15/2016 02:20 PM    BASOSABS 0 03 10/06/2022 11:48 AM    BASOSABS 72 07/15/2016 02:20 PM        Basic Metabolic Profile    Lab Results   Component Value Date/Time    SODIUM 140 10/06/2022 11:48 AM    CO2 25 10/06/2022 11:48 AM    CO2 23 07/15/2016 02:20 PM    Lab Results   Component Value Date/Time    BUN 16 10/06/2022 11:48 AM    BUN 33 (H) 07/15/2016 02:20 PM    CREATININE 0 75 10/06/2022 11:48 AM    CREATININE 0 78 07/15/2016 02:20 PM          Discussion/Summary:  Ms Ramon Miguel is a 50-year-old postmenopausal woman who presented with a mammographic abnormality in the right breast, biopsy showing grade 2 invasive lobular carcinoma estrogen and progesterone receptor positive HER2 negative  She is status post lumpectomy and sentinel node biopsy showing a 1 8 cm grade 2 invasive lobular carcinoma with negative margins of excision, lymphovascular invasion present, 2/4 positive axillary nodes with macrometastases and focal extranodal extension  She will be taking adjuvant aromatase inhibitor  Pathologic stage pT1c pN1a cM0, prognostic stage I B  I explained that radiation is used to eradicate microscopic deposits of disease which may reside in the breast after surgery  Radiation is used to decrease the risk of local recurrence and contributes to overall survival benefit  She is at risk for local recurrence in the breast and regional nodes    I therefore recommend radiation to the right whole breast and regional nodes with our without internal mammary node inclusion dependent upon anatomy and ultimate dose to organs at risk  A tumor bed boost is also reasonable given the lobular histology and presence of lymphovascular invasion  I described the procedure involved in radiation to the right breast and regional nodes  I described the potential acute and long-term side effects  Acutely she may experience some localized skin irritation, swelling in the breast or fatigue  Late side effects primarily include the possibility of fibrosis formation which can lead to changes in texture or cosmesis  Patient expressed understanding of these recommendations  She would like to proceed with right breast and regional kenneth radiation  Informed consent was reviewed by me and signed by the patient today  CT simulation was scheduled for 2 weeks to allow for some additional wound healing

## 2022-11-25 ENCOUNTER — HOSPITAL ENCOUNTER (OUTPATIENT)
Dept: NUCLEAR MEDICINE | Facility: HOSPITAL | Age: 77
End: 2022-11-25
Attending: INTERNAL MEDICINE

## 2022-11-25 DIAGNOSIS — C50.911 MALIGNANT NEOPLASM OF RIGHT FEMALE BREAST, UNSPECIFIED ESTROGEN RECEPTOR STATUS, UNSPECIFIED SITE OF BREAST (HCC): ICD-10-CM

## 2022-11-29 ENCOUNTER — PATIENT OUTREACH (OUTPATIENT)
Dept: HEMATOLOGY ONCOLOGY | Facility: CLINIC | Age: 77
End: 2022-11-29

## 2022-11-29 ENCOUNTER — TELEPHONE (OUTPATIENT)
Dept: SURGICAL ONCOLOGY | Facility: CLINIC | Age: 77
End: 2022-11-29

## 2022-11-29 DIAGNOSIS — F45.8 ANXIETY HYPERVENTILATION: ICD-10-CM

## 2022-11-29 DIAGNOSIS — F41.9 ANXIETY HYPERVENTILATION: ICD-10-CM

## 2022-11-29 RX ORDER — CLONAZEPAM 0.5 MG/1
0.5 TABLET ORAL 2 TIMES DAILY PRN
Qty: 60 TABLET | Refills: 0 | Status: SHIPPED | OUTPATIENT
Start: 2022-11-29

## 2022-11-29 NOTE — TELEPHONE ENCOUNTER
Received a message from [facility], Yann Friedman regarding Kaity Schwartz  She shared Kaity Schwartz continues to have right axillary swelling and discomfort  Attempted calling Kaity Schwartz to discuss, no answer, left her a message to return call to office

## 2022-11-30 ENCOUNTER — TELEPHONE (OUTPATIENT)
Dept: SURGICAL ONCOLOGY | Facility: CLINIC | Age: 77
End: 2022-11-30

## 2022-11-30 NOTE — TELEPHONE ENCOUNTER
Sonny Jefferson returned call to office  She shared she is feeling skin irritation in her axilla  She denies any fevers, redness, or drainage at axillary incision line  She shares the swelling at site has decreased  She feels irritation of skin against skin in the axilla  Discussed comfort measures of using warm moist compresses, a small pillow or folded towel in underarm  Discussed keeping the area clean and dry, no shaving or deodorant  Offered her an appt with Dr Ben Martin which she declined  She has RT appt tomorrow and will have them check site

## 2022-12-01 ENCOUNTER — APPOINTMENT (OUTPATIENT)
Dept: RADIATION ONCOLOGY | Facility: CLINIC | Age: 77
End: 2022-12-01
Attending: RADIOLOGY

## 2022-12-08 ENCOUNTER — APPOINTMENT (OUTPATIENT)
Dept: RADIATION ONCOLOGY | Facility: CLINIC | Age: 77
End: 2022-12-08
Attending: RADIOLOGY

## 2022-12-13 ENCOUNTER — APPOINTMENT (OUTPATIENT)
Dept: RADIATION ONCOLOGY | Facility: CLINIC | Age: 77
End: 2022-12-13

## 2022-12-14 DIAGNOSIS — R51.9 RIGHT FACIAL PAIN: ICD-10-CM

## 2022-12-14 RX ORDER — FLUTICASONE PROPIONATE 50 MCG
SPRAY, SUSPENSION (ML) NASAL
Qty: 16 G | Refills: 5 | Status: SHIPPED | OUTPATIENT
Start: 2022-12-14

## 2022-12-17 ENCOUNTER — TELEPHONE (OUTPATIENT)
Dept: OTHER | Facility: OTHER | Age: 77
End: 2022-12-17

## 2022-12-17 NOTE — TELEPHONE ENCOUNTER
Patient is calling regarding cancelling an appointment      Date/Time:12/19/22 at 1 pm    Patient was rescheduled: YES [] NO [x]    Patient requesting call back to reschedule: YES [x] NO []

## 2023-01-03 ENCOUNTER — APPOINTMENT (OUTPATIENT)
Dept: RADIATION ONCOLOGY | Facility: CLINIC | Age: 78
End: 2023-01-03
Attending: RADIOLOGY

## 2023-01-04 ENCOUNTER — APPOINTMENT (OUTPATIENT)
Dept: RADIATION ONCOLOGY | Facility: CLINIC | Age: 78
End: 2023-01-04

## 2023-01-05 ENCOUNTER — APPOINTMENT (OUTPATIENT)
Dept: RADIATION ONCOLOGY | Facility: CLINIC | Age: 78
End: 2023-01-05
Attending: RADIOLOGY

## 2023-01-05 ENCOUNTER — APPOINTMENT (OUTPATIENT)
Dept: RADIATION ONCOLOGY | Facility: CLINIC | Age: 78
End: 2023-01-05

## 2023-01-06 ENCOUNTER — APPOINTMENT (OUTPATIENT)
Dept: RADIATION ONCOLOGY | Facility: CLINIC | Age: 78
End: 2023-01-06
Attending: RADIOLOGY

## 2023-01-06 ENCOUNTER — PATIENT OUTREACH (OUTPATIENT)
Dept: HEMATOLOGY ONCOLOGY | Facility: CLINIC | Age: 78
End: 2023-01-06

## 2023-01-06 NOTE — PROGRESS NOTES
I attempted to outreach patient to inquire on ay question's or concerns the patient may have regarding Oncology care  A voicemail was left stating a reason for my call and my contact information

## 2023-01-09 ENCOUNTER — APPOINTMENT (OUTPATIENT)
Dept: RADIATION ONCOLOGY | Facility: CLINIC | Age: 78
End: 2023-01-09
Attending: RADIOLOGY

## 2023-01-10 ENCOUNTER — APPOINTMENT (OUTPATIENT)
Dept: RADIATION ONCOLOGY | Facility: CLINIC | Age: 78
End: 2023-01-10
Attending: RADIOLOGY

## 2023-01-10 ENCOUNTER — APPOINTMENT (OUTPATIENT)
Dept: RADIATION ONCOLOGY | Facility: CLINIC | Age: 78
End: 2023-01-10

## 2023-01-11 ENCOUNTER — APPOINTMENT (OUTPATIENT)
Dept: RADIATION ONCOLOGY | Facility: CLINIC | Age: 78
End: 2023-01-11
Attending: RADIOLOGY

## 2023-01-12 ENCOUNTER — APPOINTMENT (OUTPATIENT)
Dept: RADIATION ONCOLOGY | Facility: CLINIC | Age: 78
End: 2023-01-12
Attending: RADIOLOGY

## 2023-01-13 ENCOUNTER — APPOINTMENT (OUTPATIENT)
Dept: RADIATION ONCOLOGY | Facility: CLINIC | Age: 78
End: 2023-01-13
Attending: RADIOLOGY

## 2023-01-16 ENCOUNTER — APPOINTMENT (OUTPATIENT)
Dept: RADIATION ONCOLOGY | Facility: CLINIC | Age: 78
End: 2023-01-16
Attending: RADIOLOGY

## 2023-01-17 ENCOUNTER — APPOINTMENT (OUTPATIENT)
Dept: RADIATION ONCOLOGY | Facility: CLINIC | Age: 78
End: 2023-01-17
Attending: RADIOLOGY

## 2023-01-18 ENCOUNTER — APPOINTMENT (OUTPATIENT)
Dept: RADIATION ONCOLOGY | Facility: CLINIC | Age: 78
End: 2023-01-18

## 2023-01-18 ENCOUNTER — APPOINTMENT (OUTPATIENT)
Dept: RADIATION ONCOLOGY | Facility: CLINIC | Age: 78
End: 2023-01-18
Attending: RADIOLOGY

## 2023-01-19 ENCOUNTER — APPOINTMENT (OUTPATIENT)
Dept: RADIATION ONCOLOGY | Facility: CLINIC | Age: 78
End: 2023-01-19
Attending: RADIOLOGY

## 2023-01-19 ENCOUNTER — APPOINTMENT (OUTPATIENT)
Dept: RADIATION ONCOLOGY | Facility: CLINIC | Age: 78
End: 2023-01-19

## 2023-01-20 ENCOUNTER — APPOINTMENT (OUTPATIENT)
Dept: RADIATION ONCOLOGY | Facility: CLINIC | Age: 78
End: 2023-01-20
Attending: RADIOLOGY

## 2023-01-23 ENCOUNTER — APPOINTMENT (OUTPATIENT)
Dept: RADIATION ONCOLOGY | Facility: CLINIC | Age: 78
End: 2023-01-23

## 2023-01-23 ENCOUNTER — APPOINTMENT (OUTPATIENT)
Dept: RADIATION ONCOLOGY | Facility: CLINIC | Age: 78
End: 2023-01-23
Attending: RADIOLOGY

## 2023-01-24 ENCOUNTER — APPOINTMENT (OUTPATIENT)
Dept: RADIATION ONCOLOGY | Facility: CLINIC | Age: 78
End: 2023-01-24

## 2023-01-24 ENCOUNTER — APPOINTMENT (OUTPATIENT)
Dept: RADIATION ONCOLOGY | Facility: CLINIC | Age: 78
End: 2023-01-24
Attending: RADIOLOGY

## 2023-01-25 ENCOUNTER — APPOINTMENT (OUTPATIENT)
Dept: RADIATION ONCOLOGY | Facility: CLINIC | Age: 78
End: 2023-01-25

## 2023-02-03 ENCOUNTER — OFFICE VISIT (OUTPATIENT)
Dept: FAMILY MEDICINE CLINIC | Facility: HOSPITAL | Age: 78
End: 2023-02-03

## 2023-02-03 VITALS
HEART RATE: 79 BPM | OXYGEN SATURATION: 95 % | SYSTOLIC BLOOD PRESSURE: 136 MMHG | HEIGHT: 63 IN | WEIGHT: 170 LBS | DIASTOLIC BLOOD PRESSURE: 78 MMHG | BODY MASS INDEX: 30.12 KG/M2

## 2023-02-03 DIAGNOSIS — L57.9 SKIN CHANGES DUE TO CHRONIC EXPOSURE TO NONIONIZING RADIATION, UNSPECIFIED: ICD-10-CM

## 2023-02-03 DIAGNOSIS — I77.9 BILATERAL CAROTID ARTERY DISEASE, UNSPECIFIED TYPE (HCC): ICD-10-CM

## 2023-02-03 DIAGNOSIS — H61.21 HEARING LOSS OF RIGHT EAR DUE TO CERUMEN IMPACTION: ICD-10-CM

## 2023-02-03 DIAGNOSIS — F45.8 ANXIETY HYPERVENTILATION: ICD-10-CM

## 2023-02-03 DIAGNOSIS — I10 ESSENTIAL HYPERTENSION: ICD-10-CM

## 2023-02-03 DIAGNOSIS — C77.3 SECONDARY AND UNSPECIFIED MALIGNANT NEOPLASM OF AXILLA AND UPPER LIMB LYMPH NODES (HCC): ICD-10-CM

## 2023-02-03 DIAGNOSIS — Z17.0 MALIGNANT NEOPLASM OF UPPER-OUTER QUADRANT OF RIGHT BREAST IN FEMALE, ESTROGEN RECEPTOR POSITIVE (HCC): Primary | ICD-10-CM

## 2023-02-03 DIAGNOSIS — C50.411 MALIGNANT NEOPLASM OF UPPER-OUTER QUADRANT OF RIGHT BREAST IN FEMALE, ESTROGEN RECEPTOR POSITIVE (HCC): Primary | ICD-10-CM

## 2023-02-03 DIAGNOSIS — I89.0 LYMPHEDEMA DUE TO RADIATION: ICD-10-CM

## 2023-02-03 DIAGNOSIS — I65.23 BILATERAL CAROTID ARTERY STENOSIS: ICD-10-CM

## 2023-02-03 DIAGNOSIS — F41.9 ANXIETY HYPERVENTILATION: ICD-10-CM

## 2023-02-03 DIAGNOSIS — J44.9 CHRONIC BRONCHITIS WITH EMPHYSEMA (HCC): ICD-10-CM

## 2023-02-03 PROBLEM — K56.7 POSTOPERATIVE ILEUS (HCC): Status: RESOLVED | Noted: 2022-10-21 | Resolved: 2023-02-03

## 2023-02-03 PROBLEM — R11.2 PONV (POSTOPERATIVE NAUSEA AND VOMITING): Status: RESOLVED | Noted: 2022-10-21 | Resolved: 2023-02-03

## 2023-02-03 PROBLEM — K91.89 POSTOPERATIVE ILEUS (HCC): Status: RESOLVED | Noted: 2022-10-21 | Resolved: 2023-02-03

## 2023-02-03 PROBLEM — Z98.890 PONV (POSTOPERATIVE NAUSEA AND VOMITING): Status: RESOLVED | Noted: 2022-10-21 | Resolved: 2023-02-03

## 2023-02-03 RX ORDER — CLONAZEPAM 0.5 MG/1
0.5 TABLET ORAL 2 TIMES DAILY PRN
Qty: 60 TABLET | Refills: 0 | Status: SHIPPED | OUTPATIENT
Start: 2023-02-03

## 2023-02-03 NOTE — PROGRESS NOTES
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  I89 0       4  Hearing loss of right ear due to cerumen impaction  H61 21           Problem List Items Addressed This Visit        Cardiovascular and Mediastinum    Bilateral carotid artery disease ([facility] )       Other    Malignant neoplasm of upper-outer quadrant of right breast in female, estrogen receptor positive ([facility] ) - Primary     finisished radiation- had 25 treatments then 4 booster( last la 2 weeks ago)- has fatigue and sleeps a lot with  Naps- severe tiredness- some skin irritation- Dr Evan Laguna is radiation physician         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     Right arm from radiation     Jan 2023 completed   to see medical oncology 1 week from Monday   has rx for arimidex but not starting it yet        Other Visit Diagnoses     Hearing loss of right ear due to cerumen impaction                No follow-ups on file  Subjective:    Patient ID: Sima Shaikh is a 69 y o  female    1  Right breast redness- radiation skin changes- some increased lymphedema- will refer to OT if ongoing issues-will give  rx for mupiriocin- has allergy to neop sporin  2  Right ear impaction with stu loss- irrigated with improving hearing and plug removed- to use debrox once weekly for prevention  3  The following portions of the patient's history were reviewed and updated as appropriate: allergies, current medications and problem list      Review of Systems   HENT: Positive for ear pain  Respiratory: Negative for shortness of breath  Cardiovascular: Negative for chest pain  All other systems reviewed and are negative          Objective:      Current Outpatient Medications:   •  albuterol (Ventolin HFA) 90 mcg/act inhaler, Inhale 2 puffs every 6 (six) hours as needed for wheezing, Disp: 18 g, Rfl: 0  •  ascorbic acid (VITAMIN C) 250 mg tablet, Take 250 mg by mouth daily, Disp: , Rfl:   •  atenolol (TENORMIN) 25 mg tablet, TAKE 1 TABLET BY MOUTH TWICE DAILY, Disp: 180 tablet, Rfl: 3  •  Cholecalciferol (VITAMIN D3) 2000 units capsule, Take by mouth daily, Disp: , Rfl:   •  clonazePAM (KlonoPIN) 0 5 mg tablet, Take 1 tablet (0 5 mg total) by mouth 2 (two) times a day as needed for anxiety, Disp: 60 tablet, Rfl: 0  •  co-enzyme Q-10 30 MG capsule, Take 30 mg by mouth daily , Disp: , Rfl:   •  dicyclomine (BENTYL) 10 mg capsule, TAKE 2 CAPSULES BY MOUTH  TWO TIMES A DAY (Patient taking differently: Take 10 mg by mouth 2 (two) times a day as needed), Disp: 360 capsule, Rfl: 12  •  fenofibrate (TRICOR) 48 mg tablet, TAKE 1 TABLET BY MOUTH  DAILY (Patient taking differently: Take 48 mg by mouth every morning), Disp: 90 tablet, Rfl: 3  •  fluticasone (FLONASE) 50 mcg/act nasal spray, USE 1 SPRAY IN EACH NOSTRIL DAILY, Disp: 16 g, Rfl: 5  •  losartan (COZAAR) 100 MG tablet, TAKE 1 TABLET BY MOUTH  DAILY (Patient taking differently: Take 100 mg by mouth every morning), Disp: 90 tablet, Rfl: 3  •  magnesium oxide (MAG-OX) 400 mg, Take 400 mg by mouth every other day , Disp: , Rfl:   •  Omega 3 1000 MG CAPS, Take by mouth daily, Disp: , Rfl:   •  Probiotic Product (PROBIOTIC-10 PO), Take by mouth daily, Disp: , Rfl:   •  rosuvastatin (CRESTOR) 5 mg tablet, TAKE 1 TABLET BY MOUTH 3  TIMES WEEKLY, Disp: 39 tablet, Rfl: 3  •  anastrozole (ARIMIDEX) 1 mg tablet, Take 1 tablet (1 mg total) by mouth daily (Patient not taking: Reported on 2/3/2023), Disp: 30 tablet, Rfl: 3  •  benzonatate (TESSALON PERLES) 100 mg capsule, Take 1 capsule (100 mg total) by mouth 3 (three) times a day as needed for cough, Disp: 20 capsule, Rfl: 0  •  methocarbamol (ROBAXIN) 750 mg tablet, TAKE 1 TABLET BY MOUTH 3 TIMES DAILY AS NEEDED FOR SPASMS (Patient not taking: Reported on 6/22/2022), Disp: , Rfl: Blood pressure 136/78, pulse 79, height 5' 3" (1 6 m), weight 77 1 kg (170 lb), SpO2 95 %  Physical Exam  Vitals and nursing note reviewed  HENT:      Head: Normocephalic  Right Ear: There is impacted cerumen  Left Ear: Tympanic membrane normal  There is no impacted cerumen  Nose: No congestion  Eyes:      General:         Right eye: No discharge  Left eye: No discharge  Cardiovascular:      Rate and Rhythm: Normal rate and regular rhythm  Heart sounds: No murmur heard  Pulmonary:      Breath sounds: No wheezing or rhonchi  Abdominal:      Palpations: Abdomen is soft  Tenderness: There is no abdominal tenderness  Lymphadenopathy:      Cervical: Cervical adenopathy present  Skin:     Findings: Erythema and rash present  Comments: Redness in right axillary region   Neurological:      General: No focal deficit present  Mental Status: She is alert  Psychiatric:         Mood and Affect: Mood normal          Thought Content: Thought content normal          Judgment: Judgment normal       Ear cerumen removal    Date/Time: 2/3/2023 11:39 AM  Performed by: Samantha Finley DO  Authorized by: Samantha Finley DO   Universal Protocol:  Consent: Verbal consent obtained  Written consent not obtained  Risks and benefits: risks, benefits and alternatives were discussed  Consent given by: patient  Patient understanding: patient states understanding of the procedure being performed  Patient consent: the patient's understanding of the procedure matches consent given  Procedure consent: procedure consent matches procedure scheduled      Patient location:  Clinic  Procedure details:     Location:  R ear    Procedure type: irrigation only      Approach:  External  Post-procedure details:     Complication:  None    Patient tolerance of procedure:   Tolerated well, no immediate complications  Comments:      Hearing improved-tm is clear

## 2023-02-03 NOTE — ASSESSMENT & PLAN NOTE
Right arm from radiation     Jan 2023 completed   to see medical oncology 1 week from Monday   has rx for arimidex but not starting it yet

## 2023-02-03 NOTE — ASSESSMENT & PLAN NOTE
finisished radiation- had 25 treatments then 4 booster( last la 2 weeks ago)- has fatigue and sleeps a lot with  Naps- severe tiredness- some skin irritation- Dr Hardeep Adler is radiation physician

## 2023-02-08 ENCOUNTER — APPOINTMENT (OUTPATIENT)
Dept: LAB | Facility: CLINIC | Age: 78
End: 2023-02-08

## 2023-02-08 ENCOUNTER — TELEPHONE (OUTPATIENT)
Dept: HEMATOLOGY ONCOLOGY | Facility: CLINIC | Age: 78
End: 2023-02-08

## 2023-02-08 DIAGNOSIS — Z78.0 ASYMPTOMATIC MENOPAUSE: ICD-10-CM

## 2023-02-08 DIAGNOSIS — C50.911 MALIGNANT NEOPLASM OF RIGHT FEMALE BREAST, UNSPECIFIED ESTROGEN RECEPTOR STATUS, UNSPECIFIED SITE OF BREAST (HCC): Primary | ICD-10-CM

## 2023-02-08 DIAGNOSIS — C50.911 MALIGNANT NEOPLASM OF RIGHT FEMALE BREAST, UNSPECIFIED ESTROGEN RECEPTOR STATUS, UNSPECIFIED SITE OF BREAST (HCC): ICD-10-CM

## 2023-02-08 LAB
ALBUMIN SERPL BCP-MCNC: 3.6 G/DL (ref 3.5–5)
ALP SERPL-CCNC: 53 U/L (ref 46–116)
ALT SERPL W P-5'-P-CCNC: 33 U/L (ref 12–78)
ANION GAP SERPL CALCULATED.3IONS-SCNC: 3 MMOL/L (ref 4–13)
AST SERPL W P-5'-P-CCNC: 24 U/L (ref 5–45)
BASOPHILS # BLD AUTO: 0.01 THOUSANDS/ÂΜL (ref 0–0.1)
BASOPHILS NFR BLD AUTO: 0 % (ref 0–1)
BILIRUB SERPL-MCNC: 0.63 MG/DL (ref 0.2–1)
BUN SERPL-MCNC: 18 MG/DL (ref 5–25)
CALCIUM SERPL-MCNC: 9.5 MG/DL (ref 8.3–10.1)
CHLORIDE SERPL-SCNC: 112 MMOL/L (ref 96–108)
CO2 SERPL-SCNC: 24 MMOL/L (ref 21–32)
CREAT SERPL-MCNC: 0.69 MG/DL (ref 0.6–1.3)
EOSINOPHIL # BLD AUTO: 0.1 THOUSAND/ÂΜL (ref 0–0.61)
EOSINOPHIL NFR BLD AUTO: 3 % (ref 0–6)
ERYTHROCYTE [DISTWIDTH] IN BLOOD BY AUTOMATED COUNT: 13.3 % (ref 11.6–15.1)
GFR SERPL CREATININE-BSD FRML MDRD: 84 ML/MIN/1.73SQ M
GLUCOSE P FAST SERPL-MCNC: 146 MG/DL (ref 65–99)
HCT VFR BLD AUTO: 39.2 % (ref 34.8–46.1)
HGB BLD-MCNC: 13 G/DL (ref 11.5–15.4)
IMM GRANULOCYTES # BLD AUTO: 0.02 THOUSAND/UL (ref 0–0.2)
IMM GRANULOCYTES NFR BLD AUTO: 1 % (ref 0–2)
LYMPHOCYTES # BLD AUTO: 1.07 THOUSANDS/ÂΜL (ref 0.6–4.47)
LYMPHOCYTES NFR BLD AUTO: 30 % (ref 14–44)
MCH RBC QN AUTO: 30.9 PG (ref 26.8–34.3)
MCHC RBC AUTO-ENTMCNC: 33.2 G/DL (ref 31.4–37.4)
MCV RBC AUTO: 93 FL (ref 82–98)
MONOCYTES # BLD AUTO: 0.37 THOUSAND/ÂΜL (ref 0.17–1.22)
MONOCYTES NFR BLD AUTO: 11 % (ref 4–12)
NEUTROPHILS # BLD AUTO: 1.95 THOUSANDS/ÂΜL (ref 1.85–7.62)
NEUTS SEG NFR BLD AUTO: 55 % (ref 43–75)
NRBC BLD AUTO-RTO: 0 /100 WBCS
PLATELET # BLD AUTO: 197 THOUSANDS/UL (ref 149–390)
PMV BLD AUTO: 11 FL (ref 8.9–12.7)
POTASSIUM SERPL-SCNC: 4.1 MMOL/L (ref 3.5–5.3)
PROT SERPL-MCNC: 6.5 G/DL (ref 6.4–8.4)
RBC # BLD AUTO: 4.21 MILLION/UL (ref 3.81–5.12)
SODIUM SERPL-SCNC: 139 MMOL/L (ref 135–147)
WBC # BLD AUTO: 3.52 THOUSAND/UL (ref 4.31–10.16)

## 2023-02-12 DIAGNOSIS — E78.00 PURE HYPERCHOLESTEROLEMIA: ICD-10-CM

## 2023-02-13 ENCOUNTER — OFFICE VISIT (OUTPATIENT)
Dept: HEMATOLOGY ONCOLOGY | Facility: HOSPITAL | Age: 78
End: 2023-02-13

## 2023-02-13 VITALS
HEIGHT: 63 IN | OXYGEN SATURATION: 95 % | TEMPERATURE: 97.1 F | DIASTOLIC BLOOD PRESSURE: 78 MMHG | HEART RATE: 75 BPM | RESPIRATION RATE: 16 BRPM | SYSTOLIC BLOOD PRESSURE: 120 MMHG | BODY MASS INDEX: 30.12 KG/M2 | WEIGHT: 170 LBS

## 2023-02-13 DIAGNOSIS — Z17.0 MALIGNANT NEOPLASM OF UPPER-OUTER QUADRANT OF RIGHT BREAST IN FEMALE, ESTROGEN RECEPTOR POSITIVE (HCC): Primary | ICD-10-CM

## 2023-02-13 DIAGNOSIS — C50.411 MALIGNANT NEOPLASM OF UPPER-OUTER QUADRANT OF RIGHT BREAST IN FEMALE, ESTROGEN RECEPTOR POSITIVE (HCC): Primary | ICD-10-CM

## 2023-02-13 RX ORDER — FENOFIBRATE 48 MG/1
48 TABLET, COATED ORAL EVERY MORNING
Qty: 90 TABLET | Refills: 3 | Status: SHIPPED | OUTPATIENT
Start: 2023-02-13

## 2023-02-16 NOTE — PROGRESS NOTES
HEMATOLOGY / [address] FOLLOW UP NOTE    Primary Care Provider: Lina hSin DO  Referring Provider:    MRN: [id number]  : 1945    Reason for Encounter: follow up breast cancer       Oncology History Overview Note   10/2022 - right partial mastectomy for invasive lobular with ductal features, ER 90-95% WI 90-95% Her2 1+ Ki 67 15%, grade 2 - pT1c(18mm)N1a()M0    2022 - adjuvant RT    2023 - start anastrozole       Malignant neoplasm of upper-outer quadrant of right breast in female, estrogen receptor positive ([facility] )   2022 Initial Diagnosis    Malignant neoplasm of upper-outer quadrant of right breast in female, estrogen receptor positive ([facility] )     2022 Biopsy    Final Diagnosis   A  Breast, right at 11:00 o'clock 7 cm from nipple, ultrasound-guided biopsy (3 passes):  -  - Invasive lobular carcinoma  * [place] grade 2 of 3 (total score: 6 of 9)    -- tubule formation < 10%, score 3    -- nuclear grade 2 of 3, score 2    -- mitoses < 3/mm2, (</= 7 mitoses/10HPF), score 1    * Confirmed by tumor cell immunophenotype:    -- positive: nuclear stain for BREANNA    -- negative:  p63, calponin-B; loss of membranous stain for E-cadherin and p120  * Invasive carcinoma involves 3 of 3 submitted core biopsies, max  dimension = 11 millimeters  * Estrogen, progesterone & HER2 receptor studies pending, to be described in a separate receptor report  * Ductal carcinoma in situ (DCIS): Not identified  * Lymph-vascular invasion: Not identified  * Microcalcifications: Absent        ER 90-95%, WI 90-95%, HER2 1+ negative     2022 -  Cancer Staged    Staging form: Breast, AJCC 8th Edition  - Clinical stage from 2022: Stage IA (cT1c, cN0, cM0, G2, ER+, WI+, HER2-) - Signed by Florina Patrick MD on 2022  Stage prefix: Initial diagnosis  Method of lymph node assessment: Clinical  Histologic grading system: 3 grade system       2022 -  Cancer Staged    Staging form: Breast, AJCC 8th Edition  - Pathologic stage from 11/7/2022: Stage IA (pT1c, pN1a, cM0, G2, ER+, AZ+, HER2-) - Signed by Danisha Dudley MD on 11/7/2022  Stage prefix: Initial diagnosis  Histologic grading system: 3 grade system       Secondary and unspecified malignant neoplasm of axilla and upper limb lymph nodes ([address] )   2/3/2023 Initial Diagnosis    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)         Interval History: patient presents for follow up of her breast cancer  She has not started her anastrozole yet because she just finished RT  She had staging studies after our last visit that were negative for any metastatic disease but did see a thickening in the mid ascending colon  Her DEXA scan was normal   Her genetic testing was negative  She denies any BRBPR or melena  She does have IBS  REVIEW OF SYSTEMS:  Please note that a 14-point review of systems was performed to include Constitutional, HEENT, Respiratory, CVS, GI, , Musculoskeletal, Integumentary, Neurologic, Rheumatologic, Endocrinologic, Psychiatric, Lymphatic, and Hematologic/Oncologic systems were reviewed and are negative unless otherwise stated in HPI  Positive and negative findings pertinent to this evaluation are incorporated into the history of present illness        ECOG PS: 0    PROBLEM LIST:  Patient Active Problem List   Diagnosis   • Allergic rhinitis   • Anxiety disorder   • Chronic bronchitis with emphysema (HCC)   • Chronic low back pain   • Essential hypertension   • Myofascial pain syndrome   • Obstructive sleep apnea   • Panic attack   • Xerostomia   • Symmetrical polyarthritis   • Vitamin D deficiency   • Bilateral carotid artery disease (HCC)   • Colon cancer screening   • Irritable bowel syndrome with diarrhea   • Erosive gastritis   • Class 1 obesity without serious comorbidity in adult   • Syncope   • Abnormal left aortic arch   • Aberrant right subclavian artery   • Nonrheumatic aortic valve insufficiency   • Mild mitral regurgitation   • Mild tricuspid regurgitation   • Lumbar spinal stenosis   • Family hx-breast malignancy   • Dysphagia   • Right leg pain   • Primary osteoarthritis of right knee   • Effusion of right knee   • Degenerative tear of posterior horn of medial meniscus of right knee   • Malignant neoplasm of upper-outer quadrant of right breast in female, estrogen receptor positive ([facility] )   • Family history of gene mutation   • BRCA negative   • 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   • Secondary and unspecified malignant neoplasm of axilla and upper limb lymph nodes ([facility] )       Assessment / Plan: patient will start anastrozole and I will see her in 2 mos to check on tolerance  We reviewed the side effects of aromatase inhibitors which include joint aches, worsening of bone density, sexual dysfunction, worsening of mood, difficulty sleeping and hot flashes  She is going to call Dr Kari Alvares to arrange for a colonoscopy as well  I spent 20 minutes on chart review, face to face counseling time, coordination of care and documentation  Past Medical History:   has a past medical history of Anxiety, Anxiety disorder, Arthralgia, Arthritis, Asthma, Basal cell carcinoma, Breast cancer ([facility] ), Chronic lumbar radiculopathy, Chronic respiratory failure ([facility] ), Colon cancer screening (04/11/2019), Depression, Dysfunction of eustachian tube, Dyslipidemia, Emphysema lung ([facility] ), Fatigue, HTN (hypertension) (10/29/2014), Hypercholesterolemia, Hypertension, Ileus of unspecified type ([facility] ), Irritable bowel syndrome with diarrhea (04/11/2019), Lumbosacral stenosis, TRAE (obstructive sleep apnea), Pancreatitis, Pneumonia, Post-operative nausea and vomiting, and PTSD (post-traumatic stress disorder)  PAST SURGICAL HISTORY:   has a past surgical history that includes Cholecystectomy; Gallbladder surgery; Mouth surgery; Tonsillectomy; Multiple tooth extractions (N/A, 2018);  Hysterectomy; Lumbar spine surgery (12/2016); EGD (04/15/2019); Oophorectomy (Bilateral); US guided breast biopsy right complete (Right, 09/12/2022); US breast gabrielle  right (Right, 09/12/2022); Breast biopsy (Right, 09/12/2022); Breast lumpectomy (Right, 10/21/2022); and Lymph node biopsy (Right, 10/21/2022)      CURRENT MEDICATIONS  Current Outpatient Medications   Medication Sig Dispense Refill   • albuterol (Ventolin HFA) 90 mcg/act inhaler Inhale 2 puffs every 6 (six) hours as needed for wheezing 18 g 0   • anastrozole (ARIMIDEX) 1 mg tablet Take 1 tablet (1 mg total) by mouth daily 30 tablet 3   • ascorbic acid (VITAMIN C) 250 mg tablet Take 250 mg by mouth daily     • atenolol (TENORMIN) 25 mg tablet TAKE 1 TABLET BY MOUTH TWICE DAILY 180 tablet 3   • benzonatate (TESSALON PERLES) 100 mg capsule Take 1 capsule (100 mg total) by mouth 3 (three) times a day as needed for cough 20 capsule 0   • Cholecalciferol (VITAMIN D3) 2000 units capsule Take by mouth daily     • clonazePAM (KlonoPIN) 0 5 mg tablet Take 1 tablet (0 5 mg total) by mouth 2 (two) times a day as needed for anxiety 60 tablet 0   • clonazePAM (KlonoPIN) 0 5 mg tablet Take 1 tablet (0 5 mg total) by mouth 2 (two) times a day as needed for anxiety 60 tablet 0   • co-enzyme Q-10 30 MG capsule Take 30 mg by mouth daily      • dicyclomine (BENTYL) 10 mg capsule TAKE 2 CAPSULES BY MOUTH  TWO TIMES A DAY (Patient taking differently: Take 10 mg by mouth 2 (two) times a day as needed) 360 capsule 12   • fluticasone (FLONASE) 50 mcg/act nasal spray USE 1 SPRAY IN EACH NOSTRIL DAILY 16 g 5   • losartan (COZAAR) 100 MG tablet TAKE 1 TABLET BY MOUTH  DAILY (Patient taking differently: Take 100 mg by mouth every morning) 90 tablet 3   • magnesium oxide (MAG-OX) 400 mg Take 400 mg by mouth every other day      • methocarbamol (ROBAXIN) 750 mg tablet      • mupirocin (BACTROBAN) 2 % ointment Apply topically daily 22 g 2   • Omega 3 1000 MG CAPS Take by mouth daily     • Probiotic Product (PROBIOTIC-10 PO) Take by mouth daily     • rosuvastatin (CRESTOR) 5 mg tablet TAKE 1 TABLET BY MOUTH 3  TIMES WEEKLY 39 tablet 3   • fenofibrate (TRICOR) 48 mg tablet Take 1 tablet (48 mg total) by mouth every morning 90 tablet 3     No current facility-administered medications for this visit  [unfilled]    SOCIAL HISTORY:   reports that she has never smoked  She has never used smokeless tobacco  She reports current alcohol use of about 4 0 standard drinks per week  She reports current drug use  Frequency: 7 00 times per week  Drug: Marijuana  FAMILY HISTORY:  family history includes Breast cancer (age of onset: 36) in her maternal aunt; Breast cancer (age of onset: 48) in her daughter; Breast cancer (age of onset: 61) in her mother; Colon cancer in her other; Coronary artery disease in her father; Depression in her mother, other, and sister; Heart disease in her family; Hypertension in her family and sister; Lung cancer in her maternal aunt and mother; Mental illness in her mother; Ovarian cancer (age of onset: 79) in her maternal grandmother; Substance Abuse in her father and mother  ALLERGIES:  is allergic to antihistamines, diphenhydramine-type; bupropion; clarithromycin; codeine; gabapentin; meloxicam; ondansetron; pravastatin; and propoxyphene  Physical Exam:  Vital Signs:   Visit Vitals  /78 (BP Location: Left arm, Patient Position: Sitting, Cuff Size: Adult)   Pulse 75   Temp (!) 97 1 °F (36 2 °C) (Tympanic)   Resp 16   Ht 5' 3" (1 6 m)   Wt 77 1 kg (170 lb)   LMP  (LMP Unknown)   SpO2 95%   BMI 30 11 kg/m²   OB Status Hysterectomy   Smoking Status Never   BSA 1 8 m²     Body mass index is 30 11 kg/m²  Body surface area is 1 8 meters squared  GEN: Alert, awake oriented x3, in no acute distress  HEENT- No pallor, icterus, cyanosis, no oral mucosal lesions,   LAD - no palpable cervical, clavicle, axillary, inguinal LAD  Heart- normal S1 S2, regular rate and rhythm, No murmur, rubs  Lungs- clear breathing sound bilateral    Abdomen- soft, Non tender, bowel sounds present  Extremities- No cyanosis, clubbing, edema  Neuro- No focal neurological deficit    Labs:  Lab Results   Component Value Date    WBC 3 52 (L) 02/08/2023    HGB 13 0 02/08/2023    HCT 39 2 02/08/2023    MCV 93 02/08/2023     02/08/2023     Lab Results   Component Value Date     07/15/2016    SODIUM 139 02/08/2023    K 4 1 02/08/2023     (H) 02/08/2023    CO2 24 02/08/2023    AGAP 3 (L) 02/08/2023    BUN 18 02/08/2023    CREATININE 0 69 02/08/2023    GLUC 107 10/06/2022    GLUF 146 (H) 02/08/2023    CALCIUM 9 5 02/08/2023    AST 24 02/08/2023    ALT 33 02/08/2023    ALKPHOS 53 02/08/2023    PROT 6 7 07/15/2016    TP 6 5 02/08/2023    BILITOT 0 6 07/15/2016    TBILI 0 63 02/08/2023    EGFR 84 02/08/2023

## 2023-03-02 DIAGNOSIS — E78.00 PURE HYPERCHOLESTEROLEMIA: ICD-10-CM

## 2023-03-02 RX ORDER — ATENOLOL 25 MG/1
TABLET ORAL
Qty: 180 TABLET | Refills: 3 | Status: SHIPPED | OUTPATIENT
Start: 2023-03-02

## 2023-03-09 ENCOUNTER — TELEPHONE (OUTPATIENT)
Dept: HEMATOLOGY ONCOLOGY | Facility: CLINIC | Age: 78
End: 2023-03-09

## 2023-03-09 NOTE — TELEPHONE ENCOUNTER
Per Dr Nelly Carney, hold anastrazole to see if symptoms improve  I let her know to call us next week with an update  Patient verbalized understanding

## 2023-03-09 NOTE — TELEPHONE ENCOUNTER
Patient left a message on the voicemail requesting a call back because she has been experiencing blurred vision since starting her new medication  Patient just started anastrazole on feb 13th and said almost immediately after starting this, she began experiencing blurred vision  She said this seems to progressively get worse throughout the day, especially when she is watching tv  She is having trouble reading and doing her painting and does not think she can continue this medication if this is going to be a long term effect from the medication  She does follow with an eye doctor regular for optical migraines  She said she looked on the internet and found that this medication can cause blurry vision  No weakness or headaches  She wants to know if this will wear off eventually  I told her I would check with Dr Vitaly Meier and call her back

## 2023-03-09 NOTE — TELEPHONE ENCOUNTER
Call Transfer   Reason for patient call? patient calling to speak with Dr Ludmila Pollock  She is having an allergic reaction to medication   If someone other than patient is calling, are they listed on the communication consent? na   Who is the patients Primary Oncologist? Dr Ludmila Pollock   Person/Department that the call was transferred to? Time that call was transferred? Day @ 10:30am   Informed patient that the message will be forwarded to the team and someone will get back to them as soon as possible  Did you relay this information to the patient?  yes

## 2023-03-14 ENCOUNTER — TELEPHONE (OUTPATIENT)
Dept: HEMATOLOGY ONCOLOGY | Facility: CLINIC | Age: 78
End: 2023-03-14

## 2023-03-14 DIAGNOSIS — Z17.0 MALIGNANT NEOPLASM OF UPPER-OUTER QUADRANT OF RIGHT BREAST IN FEMALE, ESTROGEN RECEPTOR POSITIVE (HCC): Primary | ICD-10-CM

## 2023-03-14 DIAGNOSIS — C50.411 MALIGNANT NEOPLASM OF UPPER-OUTER QUADRANT OF RIGHT BREAST IN FEMALE, ESTROGEN RECEPTOR POSITIVE (HCC): Primary | ICD-10-CM

## 2023-03-14 NOTE — TELEPHONE ENCOUNTER
Patient stopped the anastrozole on 3/9/23 to see if her blurry vision would get better  She said it has improved since she stopped it but still experiencing some blurry vision  She is able to read  She was questioning if this might be allergy related  She does not have any congestion currently but she is using a nose spray and has an inhaler to use as needed  She said she is willing to take another medication and wants to start soon because she is afraid to be off the medication too long  Next appt with Dr Nelly Carney is 4/17  Will talk to Dr Nelly Carney and give her a call back

## 2023-03-14 NOTE — TELEPHONE ENCOUNTER
Appointment Confirmation (to confirm pre existing appointments - ONLY)  No need to route   Who is calling to confirm? Patient   If someone other than patient is calling, are they listed on the communication consent form? N/A   Appointment with  Dr Jaylyn Mcduffie   Appointment date & time  04/17/2023 @9:20AM   Appointment location [facility]   Patient verbilized understanding Yes     Patient Call Form   Reason for patient call? Patient would like a call to discuss what to do about her medication that she is taking, Patient is wondering if we should get her in before her April appointment  Patient states she needs to talk to someone from the office  Patient's primary oncologist? Dr Jaylyn Mcduffie    Name of person the patient was calling for? Dr Carolina Fulton team    Does the patient issue require a call back? Yes   If call back required, inform patient that the message will be forwarded to the team and someone will get back to them as soon as possible    Did you relay this information to the patient?  Yes

## 2023-03-16 ENCOUNTER — CLINICAL SUPPORT (OUTPATIENT)
Dept: RADIATION ONCOLOGY | Facility: CLINIC | Age: 78
End: 2023-03-16
Attending: RADIOLOGY

## 2023-03-16 ENCOUNTER — RADIATION ONCOLOGY FOLLOW-UP (OUTPATIENT)
Dept: RADIATION ONCOLOGY | Facility: CLINIC | Age: 78
End: 2023-03-16
Attending: RADIOLOGY

## 2023-03-16 VITALS
OXYGEN SATURATION: 97 % | RESPIRATION RATE: 16 BRPM | SYSTOLIC BLOOD PRESSURE: 126 MMHG | BODY MASS INDEX: 30.62 KG/M2 | WEIGHT: 172.84 LBS | HEART RATE: 79 BPM | DIASTOLIC BLOOD PRESSURE: 77 MMHG | TEMPERATURE: 97.7 F

## 2023-03-16 DIAGNOSIS — C50.411 MALIGNANT NEOPLASM OF UPPER-OUTER QUADRANT OF RIGHT BREAST IN FEMALE, ESTROGEN RECEPTOR POSITIVE (HCC): Primary | ICD-10-CM

## 2023-03-16 DIAGNOSIS — Z17.0 MALIGNANT NEOPLASM OF UPPER-OUTER QUADRANT OF RIGHT BREAST IN FEMALE, ESTROGEN RECEPTOR POSITIVE (HCC): Primary | ICD-10-CM

## 2023-03-16 NOTE — PROGRESS NOTES
Oneida Cooney 1945 is a 69 y o  female with invasive lobular carcinoma of the right breast  She is s/p lumpectomy and sentinel lymph node biopsy 10/21/21  She completed radiation to the right breast 1/24/23  She returns today for her first follow-up  2/13/23 Dr Abdon Ceja- Start anastrozole  F/u in 2 months  Staging study showed thickening in mid ascending colon  She will call Dr Dottie Baer to arrange colonoscopy      4/17/23 Dr Abdon Ceja  5/10/23 Paramjit Loge NP      Follow up visit     Oncology History   Malignant neoplasm of upper-outer quadrant of right breast in female, estrogen receptor positive ([facility] [address] )   9/12/2022 Initial Diagnosis    Malignant neoplasm of upper-outer quadrant of right breast in female, estrogen receptor positive ([facility] [address] )     9/12/2022 Biopsy    Final Diagnosis   A  Breast, right at 11:00 o'clock 7 cm from nipple, ultrasound-guided biopsy (3 passes):  -  - Invasive lobular carcinoma  * [place] grade 2 of 3 (total score: 6 of 9)    -- tubule formation < 10%, score 3    -- nuclear grade 2 of 3, score 2    -- mitoses < 3/mm2, (</= 7 mitoses/10HPF), score 1    * Confirmed by tumor cell immunophenotype:    -- positive: nuclear stain for BREANNA    -- negative:  p63, calponin-B; loss of membranous stain for E-cadherin and p120  * Invasive carcinoma involves 3 of 3 submitted core biopsies, max  dimension = 11 millimeters  * Estrogen, progesterone & HER2 receptor studies pending, to be described in a separate receptor report  * Ductal carcinoma in situ (DCIS): Not identified  * Lymph-vascular invasion: Not identified  * Microcalcifications: Absent        ER 90-95%, MS 90-95%, HER2 1+ negative     9/29/2022 -  Cancer Staged    Staging form: Breast, AJCC 8th Edition  - Clinical stage from 9/29/2022: Stage IA (cT1c, cN0, cM0, G2, ER+, MS+, HER2-) - Signed by Kendra Rojas MD on 9/29/2022  Stage prefix: Initial diagnosis  Method of lymph node assessment: Clinical  Histologic grading system: 3 grade system       10/21/2022 Surgery    Final Diagnosis  A  Right breast (lumpectomy):      - Invasive mammary carcinoma, predominantly lobular with focal ductal features  - Tumor size: 18 mm  Tumor rdgrdrrdarddrderd:rd rd3rd of 3      - Tumor closely approaches inferior / posterior resection surfaces (refer to parts B and D for new margins)  - Remainder of margins negative for carcinoma  B  New inferior margin, right breast (excision):     - Focus of invasive mammary carcinoma, present 2 5 mm from new inferior margin  C  New lateral margin, right breast (excision):     - Focus of invasive mammary carcinoma, present away from new margin      - New lateral margin negative for carcinoma  D  New posterior margin, right breast (excision):     - Benign breast tissue  - New posterior margin negative for carcinoma  E   [place] lymph node #1, right axilla (excision):     - Metastatic mammary carcinoma involving one (1) lymph node  - Tumor deposit measures 9 mm; positive for extranodal extension  F   [place] lymph node #2, right axilla (excision):     - Metastatic mammary carcinoma involving one (1) lymph node  - Tumor deposit measures 9 mm; no definitive extranodal extension  G   [place] lymph node #3, right axilla (excision):     - One (1) lymph node, negative for carcinoma  H  [place] lymph node #4, right axilla (excision):       - One (1) lymph node, negative for carcinoma        11/7/2022 -  Cancer Staged    Staging form: Breast, AJCC 8th Edition  - Pathologic stage from 11/7/2022: Stage IA (pT1c, pN1a, cM0, G2, ER+, MN+, HER2-) - Signed by Kathy Parnell MD on 11/7/2022  Stage prefix: Initial diagnosis  Histologic grading system: 3 grade system       12/12/2022 - 1/24/2023 Radiation    Plan ID Energy Fractions Dose per Fraction (cGy) Dose Correction (cGy) Total Dose Delivered (cGy) Elapsed Days   R Boost 6X 4 / 4 250 0 1,000 5   R Breast 10X/6X 25 / 25 200 0 5,000 40   R Sclav_Ax # 10X 25 / 25 200 0 5,000 37        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)   2/3/2023 Initial Diagnosis    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)         Review of Systems:  Review of Systems   Constitutional: Positive for fatigue  Feels warm at night   HENT: Positive for postnasal drip  Eyes: Positive for visual disturbance  Blurry vision that started after starting anastrozole which has resolved since stopping it   Respiratory: Positive for shortness of breath (with exertion)  Cardiovascular: Negative  Gastrointestinal: Negative  Endocrine: Negative  Genitourinary: Negative  Musculoskeletal: Negative  Pain in right shoulder, tenderness in right axilla   Skin: Negative  Radiation reaction has resolved   Allergic/Immunologic: Negative  Neurological: Negative  Hematological: Negative  Psychiatric/Behavioral: Negative           Reports memory problems       Clinical Trial: no      Health Maintenance   Topic Date Due   • BMI: Followup Plan  06/22/2023   • Fall Risk  06/22/2023   • Urinary Incontinence Screening  06/22/2023   • Medicare Annual Wellness Visit (AWV)  06/22/2023   • Breast Cancer Screening: Mammogram  08/24/2023   • Depression Screening  03/16/2024   • BMI: Adult  03/16/2024   • Hepatitis C Screening  Completed   • Osteoporosis Screening  Completed   • Pneumococcal Vaccine: 65+ Years  Completed   • Influenza Vaccine  Completed   • COVID-19 Vaccine  Completed   • HIB Vaccine  Aged Out   • IPV Vaccine  Aged Out   • Hepatitis A Vaccine  Aged Out   • Meningococcal ACWY Vaccine  Aged Out   • HPV Vaccine  Aged Out   • Colorectal Cancer Screening  Discontinued     Patient Active Problem List   Diagnosis   • Allergic rhinitis   • Anxiety disorder   • Chronic bronchitis with emphysema ([facility] [address] )   • Chronic low back pain   • Essential hypertension   • Myofascial pain syndrome   • Obstructive sleep apnea   • Panic attack   • Xerostomia   • Symmetrical polyarthritis   • Vitamin D deficiency   • Bilateral carotid artery disease (HCC)   • Colon cancer screening   • Irritable bowel syndrome with diarrhea   • Erosive gastritis   • Class 1 obesity without serious comorbidity in adult   • Syncope   • Abnormal left aortic arch   • Aberrant right subclavian artery   • Nonrheumatic aortic valve insufficiency   • Mild mitral regurgitation   • Mild tricuspid regurgitation   • Lumbar spinal stenosis   • Family hx-breast malignancy   • Dysphagia   • Right leg pain   • Primary osteoarthritis of right knee   • Effusion of right knee   • Degenerative tear of posterior horn of medial meniscus of right knee   • Malignant neoplasm of upper-outer quadrant of right breast in female, estrogen receptor positive ([facility] [address] )   • Family history of gene mutation   • BRCA negative   • 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   • 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)     Past Medical History:   Diagnosis Date   • Anxiety    • Anxiety disorder    • Arthralgia    • Arthritis    • Asthma    • Basal cell carcinoma    • Breast cancer (HCC)    • Chronic lumbar radiculopathy     last assessed: 12/20/16   • Chronic respiratory failure ([facility] [address] )    • Colon cancer screening 04/11/2019    Last colonoscopy 2014-15   • Depression    • Dysfunction of eustachian tube    • Dyslipidemia    • Emphysema lung (HCC)    • Fatigue    • HTN (hypertension) 10/29/2014   • Hypercholesterolemia    • Hypertension    • Ileus of unspecified type (HCC)    • Irritable bowel syndrome with diarrhea 04/11/2019   • Lumbosacral stenosis     last assessed: 9/20/16   • TRAE (obstructive sleep apnea)    • Pancreatitis     resolved: 9/20/17   • Pneumonia    • Post-operative nausea and vomiting    • PTSD (post-traumatic stress disorder)      Past Surgical History:   Procedure Laterality Date   • BREAST BIOPSY Right 2022    IL[facility]   • BREAST LUMPECTOMY Right 10/21/2022    Procedure: LUMPECTOMY BREAST FLORENCIO;  Surgeon: Florina Patrick MD;  Location: [facility];  Service: Surgical Oncology   • CHOLECYSTECTOMY     • EGD  04/15/2019   • GALLBLADDER SURGERY     • HYSTERECTOMY      pt thinks she still has one ovary, done over 20 yrs ago   • [address]  2016    3, 4, 5   • LYMPH NODE BIOPSY Right 10/21/2022    Procedure: SLN BX, lymphoscintigraphy;  Surgeon: Florina Patrick MD;  Location: [facility];  Service: Surgical Oncology   • MOUTH SURGERY     • MULTIPLE TOOTH EXTRACTIONS N/A    • OOPHORECTOMY Bilateral     1 1/2 ovaries removed   • TONSILLECTOMY     • US BREAST FLORENCIO  NEEDLE LOC RIGHT Right 2022   • US GUIDED BREAST BIOPSY RIGHT COMPLETE Right 2022     Family History   Problem Relation Age of Onset   • Breast cancer Mother 61   • Depression Mother         panic attacks   • Lung cancer Mother    • Mental illness Mother    • Substance Abuse Mother         CIGS   • Coronary artery disease Father         high # of paternal family members  in their 46s   • Substance Abuse Father         CIGS   • Depression Sister         panic attacks   • Hypertension Sister    • Breast cancer Daughter 48        CHEK2 positve   • Breast cancer Maternal Aunt 40   • Lung cancer Maternal Aunt    • Ovarian cancer Maternal Grandmother 79   • Depression Other         panic attacks   • Colon cancer Other         age at dx unk   • Heart disease Family    • Hypertension Family    • Colon polyps Neg Hx      Social History     Socioeconomic History   • Marital status:      Spouse name: Not on file   • Number of children: Not on file   • Years of education: Not on file   • Highest education level: Not on file   Occupational History   • Occupation: retired   Tobacco Use   • Smoking status: Never   • Smokeless tobacco: Never   Vaping Use   • Vaping Use: Never used   Substance and Sexual Activity   • Alcohol use:  Yes Alcohol/week: 4 0 standard drinks     Types: 4 Glasses of wine per week     Comment: 4 drinks every night   • Drug use: Yes     Frequency: 7 0 times per week     Types: Marijuana     Comment: uses topically daily- last used 10/20   • Sexual activity: Not Currently   Other Topics Concern   • Not on file   Social History Narrative    Person living alone    FEELS SAFE AT HOME    SEES [employer]    HAS LIVING WILL    Wears seatbelt     Social Determinants of Health     Financial Resource Strain: Not on file   Food Insecurity: Not on file   Transportation Needs: Not on file   Physical Activity: Not on file   Stress: Not on file   Social Connections: Not on file   Intimate Partner Violence: Not on file   Housing Stability: Not on file       Current Outpatient Medications:   •  albuterol (Ventolin HFA) 90 mcg/act inhaler, Inhale 2 puffs every 6 (six) hours as needed for wheezing, Disp: 18 g, Rfl: 0  •  ascorbic acid (VITAMIN C) 250 mg tablet, Take 250 mg by mouth daily, Disp: , Rfl:   •  atenolol (TENORMIN) 25 mg tablet, TAKE 1 TABLET BY MOUTH TWICE DAILY, Disp: 180 tablet, Rfl: 3  •  Cholecalciferol (VITAMIN D3) 2000 units capsule, Take by mouth daily, Disp: , Rfl:   •  clonazePAM (KlonoPIN) 0 5 mg tablet, Take 1 tablet (0 5 mg total) by mouth 2 (two) times a day as needed for anxiety, Disp: 60 tablet, Rfl: 0  •  clonazePAM (KlonoPIN) 0 5 mg tablet, Take 1 tablet (0 5 mg total) by mouth 2 (two) times a day as needed for anxiety, Disp: 60 tablet, Rfl: 0  •  co-enzyme Q-10 30 MG capsule, Take 30 mg by mouth daily , Disp: , Rfl:   •  dicyclomine (BENTYL) 10 mg capsule, TAKE 2 CAPSULES BY MOUTH  TWO TIMES A DAY (Patient taking differently: Take 10 mg by mouth 2 (two) times a day as needed), Disp: 360 capsule, Rfl: 12  •  fenofibrate (TRICOR) 48 mg tablet, Take 1 tablet (48 mg total) by mouth every morning, Disp: 90 tablet, Rfl: 3  •  fluticasone (FLONASE) 50 mcg/act nasal spray, USE 1 SPRAY IN EACH NOSTRIL DAILY, Disp: 16 g, Rfl: 5  •  losartan (COZAAR) 100 MG tablet, TAKE 1 TABLET BY MOUTH  DAILY (Patient taking differently: Take 100 mg by mouth every morning), Disp: 90 tablet, Rfl: 3  •  magnesium oxide (MAG-OX) 400 mg, Take 400 mg by mouth every other day , Disp: , Rfl:   •  Omega 3 1000 MG CAPS, Take by mouth daily, Disp: , Rfl:   •  Probiotic Product (PROBIOTIC-10 PO), Take by mouth daily, Disp: , Rfl:   •  rosuvastatin (CRESTOR) 5 mg tablet, TAKE 1 TABLET BY MOUTH 3  TIMES WEEKLY, Disp: 39 tablet, Rfl: 3  •  anastrozole (ARIMIDEX) 1 mg tablet, Take 1 tablet (1 mg total) by mouth daily (Patient not taking: Reported on 3/16/2023), Disp: 30 tablet, Rfl: 3  •  benzonatate (TESSALON PERLES) 100 mg capsule, Take 1 capsule (100 mg total) by mouth 3 (three) times a day as needed for cough (Patient not taking: Reported on 3/16/2023), Disp: 20 capsule, Rfl: 0  •  methocarbamol (ROBAXIN) 750 mg tablet, , Disp: , Rfl:   •  mupirocin (BACTROBAN) 2 % ointment, Apply topically daily (Patient not taking: Reported on 3/16/2023), Disp: 22 g, Rfl: 2  Allergies   Allergen Reactions   • Antihistamines, Diphenhydramine-Type    • Bupropion    • Clarithromycin    • Codeine Other (See Comments)     increased HR   • Gabapentin    • Meloxicam Nausea Only and Visual Disturbance     Other reaction(s): Numbness  Action Taken: med stopped ; Category:  Allergy;    • Ondansetron    • Pravastatin    • Propoxyphene Other (See Comments)     increased HR     Vitals:    03/16/23 1455   BP: 126/77   Pulse: 79   Resp: 16   Temp: 97 7 °F (36 5 °C)   SpO2: 97%   Weight: 78 4 kg (172 lb 13 5 oz)   Height: (P) 5' 3" (1 6 m)      Pain Score: 0-No pain

## 2023-03-16 NOTE — PROGRESS NOTES
Kal Callahan  0[id number]  [id number]    Radiation Oncology Follow Up      Ms Raymond is a 79-year-old woman who presented with a mammographic abnormality in the upper-outer right breast after presenting with some right nipple discomfort  Mammogram and ultrasound on August 24, 2022 showed a mass measuring 1 8 cm at the 11 o'clock position of the right breast 7 cm from the nipple  Biopsy of the area was performed on September 12, 2022 and revealed a grade 2 invasive lobular carcinoma that was strongly estrogen and progesterone receptor positive, HER2 negative  She was seen by Dr Ventura Lafleur and underwent a right breast lumpectomy and sentinel node biopsy on October 21, 2022  Final pathology revealed a 1 8 cm invasive lobular carcinoma with negative margins of excision, lymphovascular invasion present in 2/4 sentinel nodes contained macrometastases measuring up to 9 mm with focal extranodal extension  She was seen by Dr Pako Griggs and after discussion declined adjuvant chemotherapy therefore genomic assay was not sent  She has recommended adjuvant anastrozole  Pathologic stage pT1c pN1a cM0, prognostic stage IB  She was treated with radiation to the right whole breast and regional nodes to a total dose of 5000 cGy in 25 fractions followed by a right breast tumor bed boost of 1000 cGy in 5 fractions, completing all radiation on January 24, 2023  She had some significant skin reaction at the completion of radiation which she states her primary physician prescribed some kind of topical antibiotic but this has essentially resolved at this point time  She has some shooting pains particularly in the axilla where she describes some intermittent swelling particularly in the evenings with a waxing and waning erythema and warmth but denies any fevers  She does not have any swelling in the upper extremity specifically    She started anastrozole in February and then found significant deterioration in her visual acuity with bilateral blurry vision that prevented her from reading or painting  She spoke with Dr Raquel Fuentes office who recommended discontinuing the medication which she states resulted within a week or 2 of resolution of these visual symptoms  She states that she had seen her ophthalmologist recently and did not have any abnormalities on exam   She otherwise denies any new or persistent bone or joint pain  She denies any cough, shortness of breath or chest wall pain    Oncology History   Malignant neoplasm of upper-outer quadrant of right breast in female, estrogen receptor positive ([facility] [address] )   9/12/2022 Initial Diagnosis    Malignant neoplasm of upper-outer quadrant of right breast in female, estrogen receptor positive ([facility] [address] )     9/12/2022 Biopsy    Final Diagnosis   A  Breast, right at 11:00 o'clock 7 cm from nipple, ultrasound-guided biopsy (3 passes):  -  - Invasive lobular carcinoma  * Cherokee grade 2 of 3 (total score: 6 of 9)    -- tubule formation < 10%, score 3    -- nuclear grade 2 of 3, score 2    -- mitoses < 3/mm2, (</= 7 mitoses/10HPF), score 1    * Confirmed by tumor cell immunophenotype:    -- positive: nuclear stain for BREANNA    -- negative:  p63, calponin-B; loss of membranous stain for E-cadherin and p120  * Invasive carcinoma involves 3 of 3 submitted core biopsies, max  dimension = 11 millimeters  * Estrogen, progesterone & HER2 receptor studies pending, to be described in a separate receptor report  * Ductal carcinoma in situ (DCIS): Not identified  * Lymph-vascular invasion: Not identified  * Microcalcifications: Absent        ER 90-95%, AR 90-95%, HER2 1+ negative     9/29/2022 -  Cancer Staged    Staging form: Breast, AJCC 8th Edition  - Clinical stage from 9/29/2022: Stage IA (cT1c, cN0, cM0, G2, ER+, AR+, HER2-) - Signed by Marylene Parrot, MD on 9/29/2022  Stage prefix: Initial diagnosis  Method of lymph node assessment: Clinical  Histologic grading system: 3 grade system 10/21/2022 Surgery    Final Diagnosis  A  Right breast (lumpectomy):      - Invasive mammary carcinoma, predominantly lobular with focal ductal features  - Tumor size: 18 mm  Tumor stgstrstastdstest:st st1st of 3      - Tumor closely approaches inferior / posterior resection surfaces (refer to parts B and D for new margins)  - Remainder of margins negative for carcinoma  B  New inferior margin, right breast (excision):     - Focus of invasive mammary carcinoma, present 2 5 mm from new inferior margin  C  New lateral margin, right breast (excision):     - Focus of invasive mammary carcinoma, present away from new margin      - New lateral margin negative for carcinoma  D  New posterior margin, right breast (excision):     - Benign breast tissue  - New posterior margin negative for carcinoma  E   Lyme lymph node #1, right axilla (excision):     - Metastatic mammary carcinoma involving one (1) lymph node  - Tumor deposit measures 9 mm; positive for extranodal extension  F   Lyme lymph node #2, right axilla (excision):     - Metastatic mammary carcinoma involving one (1) lymph node  - Tumor deposit measures 9 mm; no definitive extranodal extension  G   Lyme lymph node #3, right axilla (excision):     - One (1) lymph node, negative for carcinoma  H  Lyme lymph node #4, right axilla (excision):       - One (1) lymph node, negative for carcinoma        11/7/2022 -  Cancer Staged    Staging form: Breast, AJCC 8th Edition  - Pathologic stage from 11/7/2022: Stage IA (pT1c, pN1a, cM0, G2, ER+, DE+, HER2-) - Signed by Ngozi Mohr MD on 11/7/2022  Stage prefix: Initial diagnosis  Histologic grading system: 3 grade system       12/12/2022 - 1/24/2023 Radiation    Plan ID Energy Fractions Dose per Fraction (cGy) Dose Correction (cGy) Total Dose Delivered (cGy) Elapsed Days   R Boost 6X 4 / 4 250 0 1,000 5   R Breast 10X/6X 25 / 25 200 0 5,000 37   R Sclav_Ax # 10X 25 / 25 200 0 5,000 37        Secondary and unspecified malignant neoplasm of axilla and upper limb lymph nodes ([facility] [address] )   2/3/2023 Initial Diagnosis    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)         Patient Active Problem List   Diagnosis   • Allergic rhinitis   • Anxiety disorder   • Chronic bronchitis with emphysema (HCC)   • Chronic low back pain   • Essential hypertension   • Myofascial pain syndrome   • Obstructive sleep apnea   • Panic attack   • Xerostomia   • Symmetrical polyarthritis   • Vitamin D deficiency   • Bilateral carotid artery disease (HCC)   • Colon cancer screening   • Irritable bowel syndrome with diarrhea   • Erosive gastritis   • Class 1 obesity without serious comorbidity in adult   • Syncope   • Abnormal left aortic arch   • Aberrant right subclavian artery   • Nonrheumatic aortic valve insufficiency   • Mild mitral regurgitation   • Mild tricuspid regurgitation   • Lumbar spinal stenosis   • Family hx-breast malignancy   • Dysphagia   • Right leg pain   • Primary osteoarthritis of right knee   • Effusion of right knee   • Degenerative tear of posterior horn of medial meniscus of right knee   • Malignant neoplasm of upper-outer quadrant of right breast in female, estrogen receptor positive ([facility] [address] )   • Family history of gene mutation   • BRCA negative   • 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   • Secondary and unspecified malignant neoplasm of axilla and upper limb lymph nodes ([facility] [address] )    Cancer Staging   Malignant neoplasm of upper-outer quadrant of right breast in female, estrogen receptor positive ([facility] [address] )  Staging form: Breast, AJCC 8th Edition  - Clinical stage from 9/29/2022: Stage IA (cT1c, cN0, cM0, G2, ER+, MD+, HER2-) - Signed by Mina Pederson MD on 9/29/2022  Stage prefix: Initial diagnosis  Method of lymph node assessment: Clinical  Histologic grading system: 3 grade system  - Pathologic stage from 11/7/2022: Stage IA (pT1c, pN1a, cM0, G2, ER+, MD+, HER2-) - Signed by Kiran Nicole MD on 11/7/2022  Stage prefix: Initial diagnosis  Histologic grading system: 3 grade system    Past Medical History:   Diagnosis Date   • Anxiety    • Anxiety disorder    • Arthralgia    • Arthritis    • Asthma    • Basal cell carcinoma    • Breast cancer ([facility] [address] )    • Chronic lumbar radiculopathy     last assessed: 12/20/16   • Chronic respiratory failure ([facility] [address] )    • Colon cancer screening 04/11/2019    Last colonoscopy 2014-15   • Depression    • Dysfunction of eustachian tube    • Dyslipidemia    • Emphysema lung (HCC)    • Fatigue    • HTN (hypertension) 10/29/2014   • Hypercholesterolemia    • Hypertension    • Ileus of unspecified type (HCC)    • Irritable bowel syndrome with diarrhea 04/11/2019   • Lumbosacral stenosis     last assessed: 9/20/16   • TRAE (obstructive sleep apnea)    • Pancreatitis     resolved: 9/20/17   • Pneumonia    • Post-operative nausea and vomiting    • PTSD (post-traumatic stress disorder)      Social History     Socioeconomic History   • Marital status:      Spouse name: Not on file   • Number of children: Not on file   • Years of education: Not on file   • Highest education level: Not on file   Occupational History   • Occupation: retired   Tobacco Use   • Smoking status: Never   • Smokeless tobacco: Never   Vaping Use   • Vaping Use: Never used   Substance and Sexual Activity   • Alcohol use:  Yes     Alcohol/week: 4 0 standard drinks     Types: 4 Glasses of wine per week     Comment: 4 drinks every night   • Drug use: Yes     Frequency: 7 0 times per week     Types: Marijuana     Comment: uses topically daily- last used 10/20   • Sexual activity: Not Currently   Other Topics Concern   • Not on file   Social History Narrative    Person living alone    FEELS SAFE AT [address] [place] [address]    Wears seatbelt     Social Determinants of Health     Financial Resource Strain: Not on file   Food Insecurity: Not on file   Transportation Needs: Not on file   Physical Activity: Not on file   Stress: Not on file   Social Connections: Not on file   Intimate Partner Violence: Not on file   Housing Stability: Not on file     Family History   Problem Relation Age of Onset   • Breast cancer Mother 61   • Depression Mother         panic attacks   • Lung cancer Mother    • Mental illness Mother    • Substance Abuse Mother         CIGS   • Coronary artery disease Father         high # of paternal family members  in their 46s   • Substance Abuse Father         CIGS   • Depression Sister         panic attacks   • Hypertension Sister    • Breast cancer Daughter 48        CHEK2 positve   • Breast cancer Maternal Aunt 40   • Lung cancer Maternal Aunt    • Ovarian cancer Maternal Grandmother 79   • Depression Other         panic attacks   • Colon cancer Other         age at dx unk   • Heart disease Family    • Hypertension Family    • Colon polyps Neg Hx      Past Surgical History:   Procedure Laterality Date   • BREAST BIOPSY Right 2022    [facility] [address]   • BREAST LUMPECTOMY Right 10/21/2022    Procedure: LUMPECTOMY BREAST FLORENCIO;  Surgeon: Jose Sarmiento MD;  Location: [facility];  Service: Surgical Oncology   • CHOLECYSTECTOMY     • EGD  04/15/2019   • GALLBLADDER SURGERY     • HYSTERECTOMY      pt thinks she still has one ovary, done over 20 yrs ago   • [facility]  2016    3, 4, 5   • LYMPH NODE BIOPSY Right 10/21/2022    Procedure: SLN BX, lymphoscintigraphy;  Surgeon: Jose Sarmiento MD;  Location: [facility];  Service: Surgical Oncology   • MOUTH SURGERY     • MULTIPLE TOOTH EXTRACTIONS N/A    • OOPHORECTOMY Bilateral     1 1/2 ovaries removed   • TONSILLECTOMY     • US BREAST FLORENCIO  NEEDLE LOC RIGHT Right 2022   • US GUIDED BREAST BIOPSY RIGHT COMPLETE Right 2022       Current Outpatient Medications:   •  albuterol (Ventolin HFA) 90 mcg/act inhaler, Inhale 2 puffs every 6 (six) hours as needed for wheezing, Disp: 18 g, Rfl: 0  • ascorbic acid (VITAMIN C) 250 mg tablet, Take 250 mg by mouth daily, Disp: , Rfl:   •  atenolol (TENORMIN) 25 mg tablet, TAKE 1 TABLET BY MOUTH TWICE DAILY, Disp: 180 tablet, Rfl: 3  •  Cholecalciferol (VITAMIN D3) 2000 units capsule, Take by mouth daily, Disp: , Rfl:   •  clonazePAM (KlonoPIN) 0 5 mg tablet, Take 1 tablet (0 5 mg total) by mouth 2 (two) times a day as needed for anxiety, Disp: 60 tablet, Rfl: 0  •  clonazePAM (KlonoPIN) 0 5 mg tablet, Take 1 tablet (0 5 mg total) by mouth 2 (two) times a day as needed for anxiety, Disp: 60 tablet, Rfl: 0  •  co-enzyme Q-10 30 MG capsule, Take 30 mg by mouth daily , Disp: , Rfl:   •  dicyclomine (BENTYL) 10 mg capsule, TAKE 2 CAPSULES BY MOUTH  TWO TIMES A DAY (Patient taking differently: Take 10 mg by mouth 2 (two) times a day as needed), Disp: 360 capsule, Rfl: 12  •  fenofibrate (TRICOR) 48 mg tablet, Take 1 tablet (48 mg total) by mouth every morning, Disp: 90 tablet, Rfl: 3  •  fluticasone (FLONASE) 50 mcg/act nasal spray, USE 1 SPRAY IN EACH NOSTRIL DAILY, Disp: 16 g, Rfl: 5  •  losartan (COZAAR) 100 MG tablet, TAKE 1 TABLET BY MOUTH  DAILY (Patient taking differently: Take 100 mg by mouth every morning), Disp: 90 tablet, Rfl: 3  •  magnesium oxide (MAG-OX) 400 mg, Take 400 mg by mouth every other day , Disp: , Rfl:   •  Omega 3 1000 MG CAPS, Take by mouth daily, Disp: , Rfl:   •  Probiotic Product (PROBIOTIC-10 PO), Take by mouth daily, Disp: , Rfl:   •  rosuvastatin (CRESTOR) 5 mg tablet, TAKE 1 TABLET BY MOUTH 3  TIMES WEEKLY, Disp: 39 tablet, Rfl: 3  •  anastrozole (ARIMIDEX) 1 mg tablet, Take 1 tablet (1 mg total) by mouth daily (Patient not taking: Reported on 3/16/2023), Disp: 30 tablet, Rfl: 3  •  benzonatate (TESSALON PERLES) 100 mg capsule, Take 1 capsule (100 mg total) by mouth 3 (three) times a day as needed for cough (Patient not taking: Reported on 3/16/2023), Disp: 20 capsule, Rfl: 0  •  methocarbamol (ROBAXIN) 750 mg tablet, , Disp: , Rfl:   • mupirocin (BACTROBAN) 2 % ointment, Apply topically daily (Patient not taking: Reported on 3/16/2023), Disp: 22 g, Rfl: 2  Allergies   Allergen Reactions   • Antihistamines, Diphenhydramine-Type    • Bupropion    • Clarithromycin    • Codeine Other (See Comments)     increased HR   • Gabapentin    • Meloxicam Nausea Only and Visual Disturbance     Other reaction(s): Numbness  Action Taken: med stopped ; Category: Allergy;    • Ondansetron    • Pravastatin    • Propoxyphene Other (See Comments)     increased HR       Review of Systems:  Review of Systems   Constitutional: Positive for fatigue  Feels warm at night   HENT: Positive for postnasal drip  Eyes: Positive for visual disturbance  Blurry vision that started after starting anastrozole which has resolved since stopping it   Respiratory: Positive for shortness of breath (with exertion)  Cardiovascular: Negative  Gastrointestinal: Negative  Endocrine: Negative  Genitourinary: Negative  Musculoskeletal: Negative  Pain in right shoulder, tenderness in right axilla   Skin: Negative  Radiation reaction has resolved   Allergic/Immunologic: Negative  Neurological: Negative  Hematological: Negative  Psychiatric/Behavioral: Negative  Reports memory problems     Physical Exam:  Physical Exam  Vitals and nursing note reviewed  Constitutional:       General: She is not in acute distress  Appearance: Normal appearance  HENT:      Nose: No congestion  Eyes:      General: No scleral icterus  Extraocular Movements: Extraocular movements intact  Pupils: Pupils are equal, round, and reactive to light  Pulmonary:      Effort: Pulmonary effort is normal  No respiratory distress  Chest:   Breasts:     Right: Swelling and skin change present  No inverted nipple, mass or nipple discharge  Left: Normal  No swelling, inverted nipple, mass or nipple discharge            Comments: Right breast: There is mild residual hyperpigmentation and mild dry skin, there is a palpable seroma in the right axilla that is currently nontender and fairly firm  On exam today I do not note any erythema specifically overlying the seroma  Musculoskeletal:         General: No swelling  Normal range of motion  Cervical back: Normal range of motion  Comments: Measurements 10 cm below the antecubital fossa are 25 cm on the right and 24 5 cm on the left  Measurements 10 cm above the antecubital fossa are 30 5 cm on the right and 32 cm on the left   Lymphadenopathy:      Cervical: No cervical adenopathy  Upper Body:      Right upper body: No supraclavicular or axillary adenopathy  Left upper body: No supraclavicular or axillary adenopathy  Skin:     General: Skin is warm and dry  Neurological:      General: No focal deficit present  Mental Status: She is alert and oriented to person, place, and time  Cranial Nerves: No cranial nerve deficit  Sensory: No sensory deficit  Motor: No weakness  Psychiatric:         Mood and Affect: Mood normal          Thought Content:  Thought content normal          Judgment: Judgment normal            LABS:    CBC  Diff   Lab Results   Component Value Date/Time    WBC 3 52 (L) 02/08/2023 11:58 AM    WBC 7 2 07/15/2016 02:20 PM    HGB 13 0 02/08/2023 11:58 AM    HGB 13 4 07/15/2016 02:20 PM    HCT 39 2 02/08/2023 11:58 AM    HCT 40 8 07/15/2016 02:20 PM    RBC 4 21 02/08/2023 11:58 AM    RBC 4 51 07/15/2016 02:20 PM    MCV 93 02/08/2023 11:58 AM    MCV 90 3 07/15/2016 02:20 PM    MCHC 33 2 02/08/2023 11:58 AM    MCHC 32 9 07/15/2016 02:20 PM    MCH 30 9 02/08/2023 11:58 AM    MCH 29 7 07/15/2016 02:20 PM    RDW 13 3 02/08/2023 11:58 AM    RDW 14 0 07/15/2016 02:20 PM    MPV 11 0 02/08/2023 11:58 AM    MPV 9 2 07/15/2016 02:20 PM    Lab Results   Component Value Date/Time    MONOCYTES 9 1 07/15/2016 02:20 PM    LYMPHSABS 1 07 02/08/2023 11:58 AM LYMPHSABS 21 2 07/15/2016 02:20 PM    LYMPHSABS 1526 07/15/2016 02:20 PM    EOSABS 0 10 02/08/2023 11:58 AM    EOSABS 0 (L) 07/15/2016 02:20 PM    EOSABS 0 07/15/2016 02:20 PM    MONOSABS 0 37 02/08/2023 11:58 AM    MONOSABS 655 07/15/2016 02:20 PM    BASOSABS 0 01 02/08/2023 11:58 AM    BASOSABS 72 07/15/2016 02:20 PM        Basic Metabolic Profile    Lab Results   Component Value Date/Time    SODIUM 139 02/08/2023 11:58 AM    CO2 24 02/08/2023 11:58 AM    CO2 23 07/15/2016 02:20 PM    Lab Results   Component Value Date/Time    BUN 18 02/08/2023 11:58 AM    BUN 33 (H) 07/15/2016 02:20 PM    CREATININE 0 69 02/08/2023 11:58 AM    CREATININE 0 78 07/15/2016 02:20 PM          Discussion/Summary: Ms Ya Dumont was seen for routine posttreatment follow-up visit today  She has near complete resolution of acute radiation dermatitis following breast and regional kenneth radiation  She has a persistent seroma in the right axilla which may be causing some tenderness and intermittent swelling  She does not have measurable lymphedema in the right upper extremity on our measurements today  Therefore I do not think she needs to see physical therapy for lymphedema evaluation at this time though she was counseled about symptoms to inform us of  Her seroma will presumably continue to improve over time  She states that she had visual changes while on aromatase inhibitor therapy which she has discontinued at this point time  She has a follow-up with medical oncology in April and may try to contact their office in the interim  She is also seeing surgical oncology for follow-up in May  I have ordered follow-up mammograms for July and we will see her for routine follow-up shortly thereafter

## 2023-03-20 RX ORDER — LETROZOLE 2.5 MG/1
2.5 TABLET, FILM COATED ORAL DAILY
Qty: 30 TABLET | Refills: 6 | Status: SHIPPED | OUTPATIENT
Start: 2023-03-20

## 2023-03-30 ENCOUNTER — TELEPHONE (OUTPATIENT)
Age: 78
End: 2023-03-30

## 2023-04-21 ENCOUNTER — APPOINTMENT (OUTPATIENT)
Dept: LAB | Facility: CLINIC | Age: 78
End: 2023-04-21

## 2023-04-21 DIAGNOSIS — R53.83 OTHER FATIGUE: ICD-10-CM

## 2023-04-21 DIAGNOSIS — C50.411 MALIGNANT NEOPLASM OF UPPER-OUTER QUADRANT OF RIGHT BREAST IN FEMALE, ESTROGEN RECEPTOR POSITIVE (HCC): ICD-10-CM

## 2023-04-21 DIAGNOSIS — R73.09 ELEVATED GLUCOSE: ICD-10-CM

## 2023-04-21 DIAGNOSIS — D50.0 IRON DEFICIENCY ANEMIA DUE TO CHRONIC BLOOD LOSS: ICD-10-CM

## 2023-04-21 DIAGNOSIS — Z17.0 MALIGNANT NEOPLASM OF UPPER-OUTER QUADRANT OF RIGHT BREAST IN FEMALE, ESTROGEN RECEPTOR POSITIVE (HCC): ICD-10-CM

## 2023-04-21 LAB
ALBUMIN SERPL BCP-MCNC: 3.6 G/DL (ref 3.5–5)
ALP SERPL-CCNC: 50 U/L (ref 46–116)
ALT SERPL W P-5'-P-CCNC: 25 U/L (ref 12–78)
ANION GAP SERPL CALCULATED.3IONS-SCNC: 2 MMOL/L (ref 4–13)
AST SERPL W P-5'-P-CCNC: 20 U/L (ref 5–45)
BASOPHILS # BLD AUTO: 0.02 THOUSANDS/ΜL (ref 0–0.1)
BASOPHILS NFR BLD AUTO: 1 % (ref 0–1)
BILIRUB SERPL-MCNC: 0.57 MG/DL (ref 0.2–1)
BUN SERPL-MCNC: 19 MG/DL (ref 5–25)
CALCIUM SERPL-MCNC: 9.6 MG/DL (ref 8.3–10.1)
CHLORIDE SERPL-SCNC: 113 MMOL/L (ref 96–108)
CO2 SERPL-SCNC: 25 MMOL/L (ref 21–32)
CREAT SERPL-MCNC: 0.71 MG/DL (ref 0.6–1.3)
EOSINOPHIL # BLD AUTO: 0.1 THOUSAND/ΜL (ref 0–0.61)
EOSINOPHIL NFR BLD AUTO: 3 % (ref 0–6)
ERYTHROCYTE [DISTWIDTH] IN BLOOD BY AUTOMATED COUNT: 12.3 % (ref 11.6–15.1)
FERRITIN SERPL-MCNC: 286 NG/ML (ref 8–388)
GFR SERPL CREATININE-BSD FRML MDRD: 82 ML/MIN/1.73SQ M
GLUCOSE SERPL-MCNC: 108 MG/DL (ref 65–140)
HCT VFR BLD AUTO: 39.9 % (ref 34.8–46.1)
HGB BLD-MCNC: 12.6 G/DL (ref 11.5–15.4)
IMM GRANULOCYTES # BLD AUTO: 0.02 THOUSAND/UL (ref 0–0.2)
IMM GRANULOCYTES NFR BLD AUTO: 1 % (ref 0–2)
IRON SATN MFR SERPL: 22 % (ref 15–50)
IRON SERPL-MCNC: 77 UG/DL (ref 50–170)
LYMPHOCYTES # BLD AUTO: 1.23 THOUSANDS/ΜL (ref 0.6–4.47)
LYMPHOCYTES NFR BLD AUTO: 33 % (ref 14–44)
MCH RBC QN AUTO: 29.9 PG (ref 26.8–34.3)
MCHC RBC AUTO-ENTMCNC: 31.6 G/DL (ref 31.4–37.4)
MCV RBC AUTO: 95 FL (ref 82–98)
MONOCYTES # BLD AUTO: 0.3 THOUSAND/ΜL (ref 0.17–1.22)
MONOCYTES NFR BLD AUTO: 8 % (ref 4–12)
NEUTROPHILS # BLD AUTO: 2.03 THOUSANDS/ΜL (ref 1.85–7.62)
NEUTS SEG NFR BLD AUTO: 54 % (ref 43–75)
NRBC BLD AUTO-RTO: 0 /100 WBCS
PLATELET # BLD AUTO: 179 THOUSANDS/UL (ref 149–390)
PMV BLD AUTO: 11.9 FL (ref 8.9–12.7)
POTASSIUM SERPL-SCNC: 4.3 MMOL/L (ref 3.5–5.3)
PROT SERPL-MCNC: 6.8 G/DL (ref 6.4–8.4)
RBC # BLD AUTO: 4.22 MILLION/UL (ref 3.81–5.12)
SODIUM SERPL-SCNC: 140 MMOL/L (ref 135–147)
TIBC SERPL-MCNC: 344 UG/DL (ref 250–450)
TSH SERPL DL<=0.05 MIU/L-ACNC: 1.54 UIU/ML (ref 0.45–4.5)
WBC # BLD AUTO: 3.7 THOUSAND/UL (ref 4.31–10.16)

## 2023-04-22 LAB
EST. AVERAGE GLUCOSE BLD GHB EST-MCNC: 114 MG/DL
HBA1C MFR BLD: 5.6 %

## 2023-05-10 ENCOUNTER — ONCOLOGY SURVIVORSHIP (OUTPATIENT)
Dept: SURGICAL ONCOLOGY | Facility: CLINIC | Age: 78
End: 2023-05-10

## 2023-05-10 VITALS
RESPIRATION RATE: 16 BRPM | WEIGHT: 170 LBS | DIASTOLIC BLOOD PRESSURE: 78 MMHG | HEIGHT: 63 IN | TEMPERATURE: 98.6 F | BODY MASS INDEX: 30.12 KG/M2 | HEART RATE: 74 BPM | SYSTOLIC BLOOD PRESSURE: 142 MMHG | OXYGEN SATURATION: 98 %

## 2023-05-10 DIAGNOSIS — Z17.0 MALIGNANT NEOPLASM OF UPPER-OUTER QUADRANT OF RIGHT BREAST IN FEMALE, ESTROGEN RECEPTOR POSITIVE (HCC): Primary | ICD-10-CM

## 2023-05-10 DIAGNOSIS — Z79.811 AROMATASE INHIBITOR USE: ICD-10-CM

## 2023-05-10 DIAGNOSIS — C50.411 MALIGNANT NEOPLASM OF UPPER-OUTER QUADRANT OF RIGHT BREAST IN FEMALE, ESTROGEN RECEPTOR POSITIVE (HCC): Primary | ICD-10-CM

## 2023-05-10 NOTE — PROGRESS NOTES
Assessment/Plan:    Diagnoses and all orders for this visit:    Malignant neoplasm of upper-outer quadrant of right breast in female, estrogen receptor positive [facility])    Patient is a 60-year-old female that was diagnosed with a right breast cancer in September 2022  Her pathology revealed invasive lobular carcinoma, ER/HI 90%, HER2 negative  She underwent a right lumpectomy and sentinel node biopsy with Dr Nolvia Lance  She had 2/4 positive lymph nodes  Chemotherapy was not recommended  She was started on anastrozole, later switched to Letrozole secondary to side effects  Breast cancer survivorship visit performed today and treatment summary and care plan were reviewed with patient  She is scheduled for a bilateral mammogram in July  Breast cancer survivorship visit performed today and treatment summary and care plan were reviewed with the patient  She reports fatigue since her cancer treatment, encouraged regular exercise  She had recent labs with no concerning findings  I have recommended the strength ABC program, patient agreeable and referral was placed  She also reports an occasional cough that has been present for several months  Instructed patient to monitor and call with worsening or more persistent symptoms  She is up-to-date on colorectal cancer screening and osteoporosis screening  There are no worrisome findings on her clinical exam today  She does have fibrosis/postoperative seroma at her lumpectomy site for which I encouraged massage  We will see her back in 6 months for a routine follow-up or sooner should the need arise  She was instructed to contact us with any changes or concerns in the interim  All of her questions were answered today  -     Ambulatory referral to Physical Therapy; Future  -     Ambulatory referral to oncology social worker;  Future    Aromatase inhibitor use        REASON FOR VISIT:   Survivorship      Previous therapy:  Oncology History   Malignant neoplasm of upper-outer quadrant of right breast in female, estrogen receptor positive ([facility] [address] )   9/12/2022 Biopsy    Right breast biopsy:  - Invasive lobular carcinoma   -Grade 2  ER 90%  DC 90%  HER2 negative     9/29/2022 -  Cancer Staged    Staging form: Breast, AJCC 8th Edition  - Clinical stage from 9/29/2022: Stage IA (cT1c, cN0, cM0, G2, ER+, DC+, HER2-) - Signed by Juanita Peraza MD on 9/29/2022  Stage prefix: Initial diagnosis  Method of lymph node assessment: Clinical  Histologic grading system: 3 grade system       10/21/2022 Surgery    Right lumpectomy with sentinel lymph node biopsy:   - Clear margins  - 2/4 lymph nodes  Dr Nolvia Lance     10/2022 Genetic Testing    [employer] Breast Cancer STAT Panel (9 genes): SINDI, BRCA1, BRCA2, CDH1, CHEK2, PALB2, PTEN, STK11, and TP53 with reflex to [employer] Core Cancer Panel (27 additional genes): APC, AXIN2, BARD1, BRIP1, BMPR1A, CDK4, CDKN2A, DICER1, EPCAM, GREM1, HOXB13, MLH1, MSH2, MSH3, MSH6, MUTYH, NBN, NF1, NTHL1, PMS2, POLD1, POLE, RAD51C, RAD51D, RECQL SMAD4, SMARCA4    Negative     11/7/2022 -  Cancer Staged    Staging form: Breast, AJCC 8th Edition  - Pathologic stage from 11/7/2022: Stage IA (pT1c, pN1a, cM0, G2, ER+, DC+, HER2-) - Signed by Juanita Peraza MD on 11/7/2022  Stage prefix: Initial diagnosis  Histologic grading system: 3 grade system       12/12/2022 - 1/24/2023 Radiation    Plan ID Energy Fractions Dose per Fraction (cGy) Dose Correction (cGy) Total Dose Delivered (cGy) Elapsed Days   R Boost 6X 4 / 4 250 0 1,000 5   R Breast 10X/6X 25 / 25 200 0 5,000 37   R Sclav_Ax # 10X 25 / 25 200 0 5,000 40   Dr Lynn Long     2/2023 -  Hormone Therapy    Anastrozole  Dr Yordan Sloan      Secondary and unspecified malignant neoplasm of axilla and upper limb lymph nodes ([address] )   2/3/2023 Initial Diagnosis    Secondary and unspecified malignant neoplasm of axilla and upper limb lymph nodes ([facility] )           Patient ID: Virgen Soto is a 69 y o  female  Presenting today for a breast "cancer survivorship visit  She feels some firmness at her lumpectomy site  She reports fatigue  She also reports an intermittent cough for several months  Reports chronic shortness of breath  Denies persistent headaches, back pain or bone pain, abdominal pain  Review of Systems   Constitutional: Positive for fatigue  Negative for activity change, appetite change, chills, fever and unexpected weight change  Respiratory: Positive for cough (occasional/ intermittent) and shortness of breath  Cardiovascular: Negative for chest pain  Gastrointestinal: Negative for abdominal pain, constipation, diarrhea, nausea and vomiting  Musculoskeletal: Positive for gait problem  Negative for arthralgias, back pain and myalgias  Skin: Negative for color change and rash  Neurological: Negative for dizziness and headaches  Hematological: Negative for adenopathy  Psychiatric/Behavioral: Negative for agitation and confusion  All other systems reviewed and are negative  Objective:    Blood pressure 142/78, pulse 74, temperature 98 6 °F (37 °C), temperature source Tympanic, resp  rate 16, height 5' 3\" (1 6 m), weight 77 1 kg (170 lb), SpO2 98 %  Body mass index is 30 11 kg/m²  Physical Exam  Vitals reviewed  Constitutional:       General: She is not in acute distress  Appearance: Normal appearance  She is well-developed  She is not diaphoretic  HENT:      Head: Normocephalic and atraumatic  Cardiovascular:      Rate and Rhythm: Normal rate and regular rhythm  Heart sounds: Normal heart sounds  Pulmonary:      Effort: Pulmonary effort is normal       Breath sounds: Normal breath sounds  Chest:   Breasts:     Right: Skin change (surgical scars breast and axilla) and tenderness present  No swelling, bleeding, inverted nipple, mass or nipple discharge  Left: No swelling, bleeding, inverted nipple, mass, nipple discharge, skin change or tenderness  Comments:  There is fibrosis " at the lumpectomy site, upper outer quadrant  Massage encouraged  Abdominal:      Palpations: Abdomen is soft  There is no mass  Tenderness: There is no abdominal tenderness  Musculoskeletal:         General: Normal range of motion  Cervical back: Normal range of motion  Comments: Ambulates with cane   Lymphadenopathy:      Upper Body:      Right upper body: No supraclavicular or axillary adenopathy  Left upper body: No supraclavicular or axillary adenopathy  Skin:     General: Skin is warm and dry  Findings: No rash  Neurological:      Mental Status: She is alert and oriented to person, place, and time  Psychiatric:         Speech: Speech normal              Discussed symptoms related to disease recurrence, Yes    Evaluated for late effects related to cancer treatment, Yes, describe: discussed effects of surgery, RT, AI therapy    Screening current for cervical cancer, Yes, describe: n/a age    Screening current for colon cancer, Yes, describe: colonoscopy 6/2015, repeat in 10 years, has upcoming appt with GI    Cancer rehabilitation services addressed, Yes, describe: referred to [facility]    Screening current for osteoporosis, Yes, describe: dxa 11/2022    Oncology Treatment Summary reviewed with patient and copy provided, Yes    Referral placed for psychosocial evaluation/screening to oncology social work  Yes    I have spent 45 minutes with Patient  today in which greater than 50% of this time was spent in counseling/coordination of care regarding breast cancer survivorship

## 2023-05-12 ENCOUNTER — PATIENT OUTREACH (OUTPATIENT)
Dept: HEMATOLOGY ONCOLOGY | Facility: CLINIC | Age: 78
End: 2023-05-12

## 2023-05-12 NOTE — PROGRESS NOTES
MSW received a referral for this pt after she had her survivorship appointment  Outreach was made this day and the pt's voicemail was received  MSW left a detailed message, along with a contact number and the pt was encouraged to return the call

## 2023-05-19 ENCOUNTER — PATIENT OUTREACH (OUTPATIENT)
Dept: HEMATOLOGY ONCOLOGY | Facility: CLINIC | Age: 78
End: 2023-05-19

## 2023-05-19 NOTE — PROGRESS NOTES
"Biopsychosocial and Barriers Assessment Survivorship     Home/Cell Phone: [phone]  Emergency Contact: Irena Santiago, sister  Marital Status:   Interpretation concerns, speaks another language, preferred language: English  Cultural concerns: None reported  Ability to read or write: Fully Literate    Housing: own home  Home Setup: 2 story  Lives With: Alone  Daily Living Activities: Pt is able to care for herself  Durable Medical Equipment: none  Ambulation: Fully Ambulatory    Preferred Pharmacy: [facility]  Medication coverage: Yes    Employment: retired  [place] Status/Location: No  Ability to pay bills: Yes  POA/LW/AD: Yes    Additional Comments:      MSW made 2nd attempt to reach pt this day after her survivorship appointment and she was open and receptive of this call  The pt shared that she was surprised to hear that she was a \"cancer survivor\" at her appointment  She shared that she continues to have difficulty with her lymph nodes and will be starting PT in June  The pt shared that one thing that appears to trouble her the most is her lack of energy  She shared that she is feeling tired all of the time  When asked about stress, the pt shared that she is feeling \"very stressed\", for reasons unrelated to her cancer diagnosis  The pt states that she is having issue with her car, she recently had to repair her furnace and she is having to pay for painting in her community  She is able to recognize that the small things are easily irritating her which was never the case in the past   The pt reports that she is also having difficulty with her own mental clarity and she is confused with making decisions  MSW acknowledged these struggles and validated her concerns  MSW asked the pt if she would consider counseling, to which she was open to discussing  Information on the [address] was provided to her  MSW provided contact information and encouraged her to call as needed    Significant support " offered

## 2023-06-01 ENCOUNTER — EVALUATION (OUTPATIENT)
Dept: PHYSICAL THERAPY | Facility: CLINIC | Age: 78
End: 2023-06-01

## 2023-06-01 DIAGNOSIS — R53.81 PHYSICAL DECONDITIONING: Primary | ICD-10-CM

## 2023-06-01 DIAGNOSIS — C50.411 MALIGNANT NEOPLASM OF UPPER-OUTER QUADRANT OF RIGHT BREAST IN FEMALE, ESTROGEN RECEPTOR POSITIVE (HCC): ICD-10-CM

## 2023-06-01 DIAGNOSIS — I89.0 LYMPHEDEMA: ICD-10-CM

## 2023-06-01 DIAGNOSIS — Z17.0 MALIGNANT NEOPLASM OF UPPER-OUTER QUADRANT OF RIGHT BREAST IN FEMALE, ESTROGEN RECEPTOR POSITIVE (HCC): ICD-10-CM

## 2023-06-01 NOTE — PROGRESS NOTES
PT Evaluation     Today's date: 2023  Patient name: Hernan Barrow  : 1945  MRN: [id number]  Referring provider: VITOR Syed  Dx:   Encounter Diagnosis     ICD-10-CM    1  Physical deconditioning  R53 81       2  Malignant neoplasm of upper-outer quadrant of right breast in female, estrogen receptor positive ([facility] [address] )  C50 411 Ambulatory referral to Physical Therapy    Z17 0                      Assessment  Assessment details: Pt is a 69y o  year old female coming to outpatient PT with R breast edema and cancer related fatigue to participate in the [facility] program  Pt presents with increased pain and TTP in R breast, decreased ROM, decreased strength, and overall decreased functional mobility  Pt would benefit from skilled PT services in order to address these deficits and reach maximum level of function  Thank you kindly for the referral!    Pt will initiate lymphedema treatment for R breast first to address swelling, fibrotic tissue and ROM limitations  As her R arm pain resolves, we will plan on transitioning to the [facility] program for muscular conditioning to decrease her cancer related fatigue  Impairments: abnormal gait, abnormal or restricted ROM, activity intolerance, impaired physical strength, lacks appropriate home exercise program and pain with function  Understanding of Dx/Px/POC: good   Prognosis: good    Goals  STG's ( 3-4 weeks)  1  Pt will be independent in HEP  2  Pt will be independent in chip bag use to decrease fibrotic breast tissue    LTG's (4-6 weeks)  1  Reduce R breast edema to max ability  2  Pt will have improved R UE ROM to WFL's  3  Pt will be able to reach overhead with greater ease  4  Pt will have 4+/5 R shoulder abduction strength  5   Improve FOTO score by 4-6 points    Plan  Patient would benefit from: lymphedema eval and skilled physical therapy  Planned therapy interventions: manual therapy, joint mobilization, neuromuscular re-education, stretching, strengthening, therapeutic activities, therapeutic exercise, functional ROM exercises, flexibility and home exercise program  Other planned therapy interventions: MLD  Frequency: 2x week  Duration in weeks: 6  Plan of Care beginning date: 2023  Plan of Care expiration date: 2023  Treatment plan discussed with: patient and PTA        Subjective Evaluation    History of Present Illness  Mechanism of injury: Pt has a history of R breast CA stage IA  Pt underwent R lumpectomy with sentinel lymph node biopsy with clear margins, 2 out of 4 lymph nodes involved  Pt had 30 radiation treatments from 2022-23  Pt reports she is able to garden and walk her dog every day  Pt has been noticing increased fatigue and says that she feels tired all the time  Pt misses doing a physical sport such as pickle ball  Pt is currently unable to do this due to back pain and R knee pain  Pt had lumbar surgery and nerve ablations x2 in   Pt has noticed her balance has not been as good recently  Pt does not use a SPC when inside her home  Pt uses a rollator walker when walking the dog  Pt has a torn meniscus in her R knee  Pt uses a special cream to control her pain  Pt has R arm pain after performing gardening and digging activities  Pt has some shoulder pain when lowering her arm after reaching overhead  Pt has pain when taking off a shirt and reaching behind her back      Work: retired  Hobbies: gardening, walking the dog, plays [employer]  Gait: slow speed with SPC  Pain  At best pain ratin  At worst pain ratin  Location: R shoulder  Quality: tight, dull ache and sharp    Social Support    Employment status: not working  Hand dominance: right    Patient Goals  Patient goals for therapy: decreased pain  Patient goal: to have less fatigue;        Objective     Neurological Testing     Sensation     Shoulder   Left Shoulder   Intact: light touch    Right Shoulder   Intact: light touch    Reflexes Left   Biceps (C5/C6): normal (2+)  Brachioradialis (C6): normal (2+)  Triceps (C7): normal (2+)    Right   Biceps (C5/C6): normal (2+)  Brachioradialis (C6): normal (2+)  Triceps (C7): normal (2+)    Active Range of Motion   Left Shoulder   Flexion: 148 degrees with pain  Abduction: 100 degrees     Right Shoulder   Flexion: 155 degrees with pain  Abduction: 115 degrees   External rotation 45°: 40 degrees with pain  Internal rotation 45°: 80 degrees     Additional Active Range of Motion Details  (+) TTP L axillary region and R lateral breast  (+) R fibrotic tissue lateral and inferior breast  (-) stemmer sign    Passive Range of Motion     Right Shoulder   Flexion: 136 degrees with pain  Abduction: WFL  External rotation 45°: 40 degrees with pain  Internal rotation 45°: 80 degrees     Strength/Myotome Testing     Left Shoulder     Planes of Motion   Flexion: 4+   Abduction: 4 (pain)   External rotation at 45°: 5   Internal rotation at 0°: 5     Right Shoulder     Planes of Motion   Flexion: 4+   Abduction: 4 (pain)   External rotation at 45°: 5   Internal rotation at 0°: 5             Dx: R breast edema  EPOC: 7/13/23  CO-MORBIDITIES:  PERSONAL FACTORS:  Precautions: LBP, decreased balance      Manuals             R UE/ breast MLD             R shoulder manual stretching                                       Neuro Re-Ed                                                                                                        Ther Ex             Pulleys: flexion for ROM             Pulleys: scap for ROM             Supine shoulder AAROM hand hold             Supine shld cane ER AAROM             Pendulums: CW, CCW                                                    Ther Activity                                       Gait Training                                       Modalities

## 2023-06-05 ENCOUNTER — OFFICE VISIT (OUTPATIENT)
Dept: PHYSICAL THERAPY | Facility: CLINIC | Age: 78
End: 2023-06-05
Payer: MEDICARE

## 2023-06-05 DIAGNOSIS — C50.411 MALIGNANT NEOPLASM OF UPPER-OUTER QUADRANT OF RIGHT BREAST IN FEMALE, ESTROGEN RECEPTOR POSITIVE (HCC): ICD-10-CM

## 2023-06-05 DIAGNOSIS — Z17.0 MALIGNANT NEOPLASM OF UPPER-OUTER QUADRANT OF RIGHT BREAST IN FEMALE, ESTROGEN RECEPTOR POSITIVE (HCC): ICD-10-CM

## 2023-06-05 DIAGNOSIS — R53.81 PHYSICAL DECONDITIONING: Primary | ICD-10-CM

## 2023-06-05 DIAGNOSIS — I89.0 LYMPHEDEMA: ICD-10-CM

## 2023-06-05 PROCEDURE — 97110 THERAPEUTIC EXERCISES: CPT

## 2023-06-05 PROCEDURE — 97140 MANUAL THERAPY 1/> REGIONS: CPT

## 2023-06-05 PROCEDURE — 97112 NEUROMUSCULAR REEDUCATION: CPT

## 2023-06-05 NOTE — PROGRESS NOTES
Daily Note     Today's date: 2023  Patient name: Eyad Ham  : 1945  MRN: [id number]  Referring provider: VITOR Muhammad  Dx:   Encounter Diagnosis     ICD-10-CM    1  Physical deconditioning  R53 81       2  Malignant neoplasm of upper-outer quadrant of right breast in female, estrogen receptor positive ([facility] [address] )  C50 411     Z17 0       3  Lymphedema  I89 0                      Subjective: Pt states her arm gets sore a lot  Objective: See treatment diary below      Assessment: Introduced new TE to challenge pt OH strength and endurance  Advised pt break up sets into 10x  Pt tolerated manuals well      Plan: focus on edema reduction     Dx: R breast edema  EPOC: 23  CO-MORBIDITIES:  PERSONAL FACTORS:  Precautions: LBP, decreased balance      Manuals 6/5            R UE/ breast MLD 10            R shoulder manual stretching 10                                      Neuro Re-Ed                                                                                                        Ther Ex             Pulleys: flexion for ROM 3'            Pulleys: scap for ROM             Supine shoulder AAROM hand hold             Supine shld cane ER AAROM             Pendulums: CW, CCW                                                    Ther Activity             Wall slides flexion/abd 2x10 ea                                      Gait Training                                       Modalities

## 2023-06-07 ENCOUNTER — OFFICE VISIT (OUTPATIENT)
Dept: CARDIOLOGY CLINIC | Facility: CLINIC | Age: 78
End: 2023-06-07
Payer: MEDICARE

## 2023-06-07 VITALS
DIASTOLIC BLOOD PRESSURE: 78 MMHG | HEIGHT: 64 IN | BODY MASS INDEX: 29.02 KG/M2 | HEART RATE: 73 BPM | WEIGHT: 170 LBS | SYSTOLIC BLOOD PRESSURE: 120 MMHG

## 2023-06-07 DIAGNOSIS — I35.1 NONRHEUMATIC AORTIC VALVE INSUFFICIENCY: Primary | ICD-10-CM

## 2023-06-07 DIAGNOSIS — I65.23 BILATERAL CAROTID ARTERY STENOSIS: ICD-10-CM

## 2023-06-07 DIAGNOSIS — E78.00 HYPERCHOLESTEROLEMIA: ICD-10-CM

## 2023-06-07 DIAGNOSIS — R07.89 CHEST DISCOMFORT: ICD-10-CM

## 2023-06-07 DIAGNOSIS — I07.1 MILD TRICUSPID REGURGITATION: ICD-10-CM

## 2023-06-07 DIAGNOSIS — Q27.8 ABERRANT RIGHT SUBCLAVIAN ARTERY: ICD-10-CM

## 2023-06-07 DIAGNOSIS — Q25.40 ABNORMAL LEFT AORTIC ARCH: ICD-10-CM

## 2023-06-07 DIAGNOSIS — I34.0 MILD MITRAL REGURGITATION: ICD-10-CM

## 2023-06-07 DIAGNOSIS — I10 ESSENTIAL HYPERTENSION: ICD-10-CM

## 2023-06-07 PROCEDURE — 99214 OFFICE O/P EST MOD 30 MIN: CPT | Performed by: INTERNAL MEDICINE
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
## 2023-06-12 ENCOUNTER — OFFICE VISIT (OUTPATIENT)
Dept: PHYSICAL THERAPY | Facility: CLINIC | Age: 78
End: 2023-06-12
Payer: MEDICARE

## 2023-06-12 DIAGNOSIS — I89.0 LYMPHEDEMA: ICD-10-CM

## 2023-06-12 DIAGNOSIS — R53.81 PHYSICAL DECONDITIONING: Primary | ICD-10-CM

## 2023-06-12 PROCEDURE — 97140 MANUAL THERAPY 1/> REGIONS: CPT | Performed by: PHYSICAL THERAPIST

## 2023-06-12 PROCEDURE — 97110 THERAPEUTIC EXERCISES: CPT | Performed by: PHYSICAL THERAPIST

## 2023-06-12 NOTE — PROGRESS NOTES
"Daily Note     Today's date: 2023  Patient name: Felix Luis  : 1945  MRN: [id number]  Referring provider: VITOR Tom Aas  Dx:   Encounter Diagnosis     ICD-10-CM    1  Physical deconditioning  R53 81       2  Lymphedema  I89 0                      Subjective: Pt reports she was reaching overhead for her shower head and it caused more pain  Pt denies pain when her arm is by her side  Objective: See treatment diary below      Assessment: Tolerated treatment well  Patient exhibited good technique with therapeutic exercises  Pt was issued a chip bag to decrease R breast fibrotic tissue to be worn for 1-2 hours daily  Plan: Continue per plan of care  Focus on improving ROM and decreasing pain       Dx: R breast edema  EPOC: 23  CO-MORBIDITIES:  PERSONAL FACTORS:  Precautions: LBP, decreased balance      Manuals            R UE/ breast MLD 10            R shoulder manual stretching 10 th             30'           Chip bag issue/instruct  th           Neuro Re-Ed                                                                                                        Ther Ex             Pulleys: flexion for ROM 3' 3'           Pulleys: scap for ROM  3'           Supine shoulder AAROM hand hold  10x5\"           Supine shld cane ER AAROM  10x5\"           Pendulums: CW, CCW                                                    Ther Activity             Wall slides flexion/abd 2x10 ea                                      Gait Training                                       Modalities                                            "

## 2023-06-14 ENCOUNTER — OFFICE VISIT (OUTPATIENT)
Dept: PHYSICAL THERAPY | Facility: CLINIC | Age: 78
End: 2023-06-14
Payer: MEDICARE

## 2023-06-14 DIAGNOSIS — Z17.0 MALIGNANT NEOPLASM OF UPPER-OUTER QUADRANT OF RIGHT BREAST IN FEMALE, ESTROGEN RECEPTOR POSITIVE (HCC): ICD-10-CM

## 2023-06-14 DIAGNOSIS — I89.0 LYMPHEDEMA: ICD-10-CM

## 2023-06-14 DIAGNOSIS — C50.411 MALIGNANT NEOPLASM OF UPPER-OUTER QUADRANT OF RIGHT BREAST IN FEMALE, ESTROGEN RECEPTOR POSITIVE (HCC): ICD-10-CM

## 2023-06-14 DIAGNOSIS — R53.81 PHYSICAL DECONDITIONING: Primary | ICD-10-CM

## 2023-06-14 PROCEDURE — 97140 MANUAL THERAPY 1/> REGIONS: CPT

## 2023-06-14 PROCEDURE — 97110 THERAPEUTIC EXERCISES: CPT

## 2023-06-14 NOTE — PROGRESS NOTES
"Daily Note     Today's date: 2023  Patient name: Lyric Sanderson  : 1945  MRN: [id number]  Referring provider: VITOR Weldon  Dx:   Encounter Diagnosis     ICD-10-CM    1  Physical deconditioning  R53 81       2  Lymphedema  I89 0       3  Malignant neoplasm of upper-outer quadrant of right breast in female, estrogen receptor [facility] ([facility]  C50 411     Z17 0                      Subjective: Pt reports her arm was very sore waking up this morning, believed she over did it with the chip bag, wore it for 12hrs  Objective: See treatment diary below      Assessment: Introduced new TE with good toelrance, pt experienced increased ms fatigue post TE task  Reminder pt to solely use chip bag 1-2 hours daily  Plan: Continue per plan of care  Focus on improving ROM and decreasing pain       Dx: R breast edema  EPOC: 23  CO-MORBIDITIES:  PERSONAL FACTORS:  Precautions: LBP, decreased balance      Manuals           R UE/ breast MLD 10 th 10          R shoulder manual stretching 10 th 10            30'           Chip bag issue/instruct  th           Neuro Re-Ed                                                                                                        Ther Ex             Pulleys: flexion for ROM 3' 3' 3'          Pulleys: scap for ROM  3' 3'          Seated UBE  Fwd/bck   L1 2'/2'          Supine shoulder AAROM hand hold  10x5\" 10x5\"          Supine shld cane ER AAROM  10x5\" 10x5\" standing          Pendulums: CW, CCW                                                    Ther Activity             Wall slides flexion/abd 2x10 ea  2x10 ea                                    Gait Training                                       Modalities                                            "

## 2023-06-19 ENCOUNTER — OFFICE VISIT (OUTPATIENT)
Dept: PHYSICAL THERAPY | Facility: CLINIC | Age: 78
End: 2023-06-19
Payer: MEDICARE

## 2023-06-19 DIAGNOSIS — I89.0 LYMPHEDEMA: Primary | ICD-10-CM

## 2023-06-19 DIAGNOSIS — Z17.0 MALIGNANT NEOPLASM OF UPPER-OUTER QUADRANT OF RIGHT BREAST IN FEMALE, ESTROGEN RECEPTOR POSITIVE (HCC): ICD-10-CM

## 2023-06-19 DIAGNOSIS — R53.81 PHYSICAL DECONDITIONING: ICD-10-CM

## 2023-06-19 DIAGNOSIS — C50.411 MALIGNANT NEOPLASM OF UPPER-OUTER QUADRANT OF RIGHT BREAST IN FEMALE, ESTROGEN RECEPTOR POSITIVE (HCC): ICD-10-CM

## 2023-06-19 PROCEDURE — 97112 NEUROMUSCULAR REEDUCATION: CPT

## 2023-06-19 PROCEDURE — 97110 THERAPEUTIC EXERCISES: CPT

## 2023-06-19 PROCEDURE — 97140 MANUAL THERAPY 1/> REGIONS: CPT

## 2023-06-19 NOTE — PROGRESS NOTES
"Daily Note     Today's date: 2023  Patient name: Brittany Plummer  : 1945  MRN: [id number]  Referring provider: VITOR López  Dx:   Encounter Diagnosis     ICD-10-CM    1  Lymphedema  I89 0       2  Physical deconditioning  R53 81       3  Malignant neoplasm of upper-outer quadrant of right breast in female, estrogen receptor positive ([facility] [address] )  C50 411     Z17 0                      Subjective: Pt reports her arm was a bit sore after lv but was fine  Objective: See treatment diary below      Assessment: Introduced new TE with good toelrance, pt experienced increased ms fatigue post TE task  Plan: Continue per plan of care  Focus on improving ROM and decreasing pain       Dx: R breast edema  EPOC: 23  CO-MORBIDITIES:  PERSONAL FACTORS:  Precautions: LBP, decreased balance      Manuals          R UE/ breast MLD 10 th 10 10         R shoulder manual stretching 10 th 10 10           30'           Chip bag issue/instruct  th           Neuro Re-Ed                                                                                                        Ther Ex             Pulleys: flexion for ROM 3' 3' 3' 3'         Pulleys: scap for ROM  3' 3' 3'         Seated UBE  Fwd/bck   L1 2'/2' L1 3'/3'         Supine shoulder AAROM hand hold  10x5\" 10x5\"          Supine shld cane ER AAROM  10x5\" 10x5\" standing 10x5\" seated         Seated cane press ups    20x         Seated TB Rows    [place] 2x10         Seated TB LDP    [place] 2x10         Pendulums: CW, CCW                                                    Ther Activity             Wall slides flexion/abd 2x10 ea  2x10 ea 2x10 ea                                   Gait Training                                       Modalities                                            "

## 2023-06-22 ENCOUNTER — APPOINTMENT (OUTPATIENT)
Dept: PHYSICAL THERAPY | Facility: CLINIC | Age: 78
End: 2023-06-22
Payer: MEDICARE

## 2023-06-22 DIAGNOSIS — F41.9 ANXIETY HYPERVENTILATION: ICD-10-CM

## 2023-06-22 DIAGNOSIS — F45.8 ANXIETY HYPERVENTILATION: ICD-10-CM

## 2023-06-22 RX ORDER — CLONAZEPAM 0.5 MG/1
0.5 TABLET ORAL 2 TIMES DAILY PRN
Qty: 60 TABLET | Refills: 0 | Status: SHIPPED | OUTPATIENT
Start: 2023-06-22

## 2023-06-23 ENCOUNTER — OFFICE VISIT (OUTPATIENT)
Dept: PHYSICAL THERAPY | Facility: CLINIC | Age: 78
End: 2023-06-23
Payer: MEDICARE

## 2023-06-23 DIAGNOSIS — C50.411 MALIGNANT NEOPLASM OF UPPER-OUTER QUADRANT OF RIGHT BREAST IN FEMALE, ESTROGEN RECEPTOR POSITIVE (HCC): ICD-10-CM

## 2023-06-23 DIAGNOSIS — I89.0 LYMPHEDEMA: Primary | ICD-10-CM

## 2023-06-23 DIAGNOSIS — Z17.0 MALIGNANT NEOPLASM OF UPPER-OUTER QUADRANT OF RIGHT BREAST IN FEMALE, ESTROGEN RECEPTOR POSITIVE (HCC): ICD-10-CM

## 2023-06-23 DIAGNOSIS — R53.81 PHYSICAL DECONDITIONING: ICD-10-CM

## 2023-06-23 PROCEDURE — 97112 NEUROMUSCULAR REEDUCATION: CPT

## 2023-06-23 PROCEDURE — 97140 MANUAL THERAPY 1/> REGIONS: CPT

## 2023-06-23 PROCEDURE — 97110 THERAPEUTIC EXERCISES: CPT

## 2023-06-23 NOTE — PROGRESS NOTES
"Daily Note     Today's date: 2023  Patient name: Myriam Garay  : 1945  MRN: [id number]  Referring provider: VITOR Mahmood  Dx:   Encounter Diagnosis     ICD-10-CM    1  Lymphedema  I89 0       2  Physical deconditioning  R53 81       3  Malignant neoplasm of upper-outer quadrant of right breast in female, estrogen receptor positive ([address] )  C50 411     Z17 0                      Subjective: Pt reports her arm was a bit sore after lv but was fine  Objective: See treatment diary below      Assessment: Introduced new TE with good toelrance, pt experienced increased ms fatigue post TE task  Plan: Continue per plan of care  Focus on improving ROM and decreasing pain       Dx: R breast edema  EPOC: 23  CO-MORBIDITIES:  PERSONAL FACTORS:  Precautions: LBP, decreased balance      Manuals         R UE/ breast MLD 10 th 10 10 10        R shoulder manual stretching 10  10 10 10          30'           Chip bag issue/instruct  th           Neuro Re-Ed                                                                                                        Ther Ex             Pulleys: flexion for ROM 3' 3' 3' 3' 3'        Pulleys: scap for ROM  3' 3' 3' 3'        Seated UBE  Fwd/bck   L1 2'/2' L1 3'/3' l1 3'/3'        Supine shoulder AAROM hand hold  10x5\" 10x5\"          Supine shld cane ER AAROM  10x5\" 10x5\" standing 10x5\" seated         Seated cane press ups    20x No cane  20x        Seated TB Rows    [place] 2x10 [place] 20        Seated TB LDP    [place] 2x10 [place] 20        Seated Bicep curls      3# 20x        Pendulums: CW, CCW                                                    Ther Activity             Wall slides flexion/abd 2x10 ea  2x10 ea 2x10 ea 2x10        Wall push ups     2x10                     Gait Training                                       Modalities                                            "
5

## 2023-06-26 ENCOUNTER — APPOINTMENT (OUTPATIENT)
Dept: LAB | Facility: CLINIC | Age: 78
End: 2023-06-26
Payer: MEDICARE

## 2023-06-26 ENCOUNTER — OFFICE VISIT (OUTPATIENT)
Dept: PHYSICAL THERAPY | Facility: CLINIC | Age: 78
End: 2023-06-26
Payer: MEDICARE

## 2023-06-26 DIAGNOSIS — I89.0 LYMPHEDEMA: Primary | ICD-10-CM

## 2023-06-26 DIAGNOSIS — I10 ESSENTIAL HYPERTENSION: ICD-10-CM

## 2023-06-26 DIAGNOSIS — E78.00 HYPERCHOLESTEROLEMIA: ICD-10-CM

## 2023-06-26 DIAGNOSIS — R07.89 CHEST DISCOMFORT: ICD-10-CM

## 2023-06-26 LAB
ALBUMIN SERPL BCP-MCNC: 3.8 G/DL (ref 3.5–5)
ALP SERPL-CCNC: 55 U/L (ref 46–116)
ALT SERPL W P-5'-P-CCNC: 25 U/L (ref 12–78)
ANION GAP SERPL CALCULATED.3IONS-SCNC: 6 MMOL/L
AST SERPL W P-5'-P-CCNC: 25 U/L (ref 5–45)
BILIRUB SERPL-MCNC: 0.67 MG/DL (ref 0.2–1)
BUN SERPL-MCNC: 18 MG/DL (ref 5–25)
CALCIUM SERPL-MCNC: 9.8 MG/DL (ref 8.3–10.1)
CHLORIDE SERPL-SCNC: 110 MMOL/L (ref 96–108)
CHOLEST SERPL-MCNC: 173 MG/DL
CO2 SERPL-SCNC: 24 MMOL/L (ref 21–32)
CREAT SERPL-MCNC: 0.74 MG/DL (ref 0.6–1.3)
ERYTHROCYTE [DISTWIDTH] IN BLOOD BY AUTOMATED COUNT: 13.2 % (ref 11.6–15.1)
GFR SERPL CREATININE-BSD FRML MDRD: 78 ML/MIN/1.73SQ M
GLUCOSE P FAST SERPL-MCNC: 106 MG/DL (ref 65–99)
HCT VFR BLD AUTO: 39 % (ref 34.8–46.1)
HDLC SERPL-MCNC: 51 MG/DL
HGB BLD-MCNC: 12.9 G/DL (ref 11.5–15.4)
LDLC SERPL CALC-MCNC: 90 MG/DL (ref 0–100)
MCH RBC QN AUTO: 30.2 PG (ref 26.8–34.3)
MCHC RBC AUTO-ENTMCNC: 33.1 G/DL (ref 31.4–37.4)
MCV RBC AUTO: 91 FL (ref 82–98)
NONHDLC SERPL-MCNC: 122 MG/DL
PLATELET # BLD AUTO: 234 THOUSANDS/UL (ref 149–390)
PMV BLD AUTO: 11.2 FL (ref 8.9–12.7)
POTASSIUM SERPL-SCNC: 4.1 MMOL/L (ref 3.5–5.3)
PROT SERPL-MCNC: 7 G/DL (ref 6.4–8.4)
RBC # BLD AUTO: 4.27 MILLION/UL (ref 3.81–5.12)
SODIUM SERPL-SCNC: 140 MMOL/L (ref 135–147)
TRIGL SERPL-MCNC: 162 MG/DL
WBC # BLD AUTO: 4.57 THOUSAND/UL (ref 4.31–10.16)

## 2023-06-26 PROCEDURE — 97112 NEUROMUSCULAR REEDUCATION: CPT

## 2023-06-26 PROCEDURE — 97110 THERAPEUTIC EXERCISES: CPT

## 2023-06-26 PROCEDURE — 97140 MANUAL THERAPY 1/> REGIONS: CPT

## 2023-06-26 PROCEDURE — 36415 COLL VENOUS BLD VENIPUNCTURE: CPT

## 2023-06-26 PROCEDURE — 85027 COMPLETE CBC AUTOMATED: CPT

## 2023-06-26 PROCEDURE — 80053 COMPREHEN METABOLIC PANEL: CPT

## 2023-06-26 PROCEDURE — 80061 LIPID PANEL: CPT

## 2023-06-26 NOTE — PROGRESS NOTES
"Daily Note     Today's date: 2023  Patient name: Myriam Garay  : 1945  MRN: [id number]  Referring provider: VITOR Mahmood  Dx:   Encounter Diagnosis     ICD-10-CM    1  Lymphedema  I89 0                      Subjective: Pt reports her arm was a bit sore after lv but was fine  Objective: See treatment diary below      Assessment: pt performed TE with good toelrance, pt experienced increased ms fatigue post TE task  Plan: Continue per plan of care  Focus on improving ROM and decreasing pain       Dx: R breast edema  EPOC: 23  CO-MORBIDITIES:  PERSONAL FACTORS:  Precautions: LBP, decreased balance      Manuals        R UE/ breast MLD 10  10 10 10 10       R shoulder manual stretching 10 th 10 10 10 10         30'           Chip bag issue/instruct  th           Neuro Re-Ed                                                                                                        Ther Ex             Pulleys: flexion for ROM 3' 3' 3' 3' 3' 3'       Pulleys: scap for ROM  3' 3' 3' 3' 3'       Seated UBE  Fwd/bck   L1 2'/2' L1 3'/3' L1 3'/3' L1 3'/3'       Supine shoulder AAROM hand hold  10x5\" 10x5\"          Supine shld cane ER AAROM  10x5\" 10x5\" standing 10x5\" seated         Seated cane press ups    20x No cane  20x No cane  20x       Seated TB Rows    [place] 2x10 [place] 20 [place] 20       Seated TB LDP    [place] 2x10 [place] 20 [place] 20       Seated Bicep curls      3# 20x 3# 20x       Pendulums: CW, CCW                                                    Ther Activity             Wall slides flexion/abd 2x10 ea  2x10 ea 2x10 ea 2x10 2x10       Wall push ups     2x10 2x10                    Gait Training                                       Modalities                                            "

## 2023-06-28 ENCOUNTER — OFFICE VISIT (OUTPATIENT)
Dept: FAMILY MEDICINE CLINIC | Facility: HOSPITAL | Age: 78
End: 2023-06-28
Payer: MEDICARE

## 2023-06-28 VITALS
DIASTOLIC BLOOD PRESSURE: 68 MMHG | BODY MASS INDEX: 28.48 KG/M2 | HEART RATE: 75 BPM | HEIGHT: 64 IN | WEIGHT: 166.8 LBS | OXYGEN SATURATION: 95 % | SYSTOLIC BLOOD PRESSURE: 118 MMHG

## 2023-06-28 DIAGNOSIS — R26.89 BALANCE DISORDER: ICD-10-CM

## 2023-06-28 DIAGNOSIS — F33.9 DEPRESSION, RECURRENT (HCC): ICD-10-CM

## 2023-06-28 DIAGNOSIS — M62.838 MUSCLE SPASTICITY: ICD-10-CM

## 2023-06-28 DIAGNOSIS — Z00.00 MEDICARE ANNUAL WELLNESS VISIT, SUBSEQUENT: Primary | ICD-10-CM

## 2023-06-28 DIAGNOSIS — C50.411 MALIGNANT NEOPLASM OF UPPER-OUTER QUADRANT OF RIGHT BREAST IN FEMALE, ESTROGEN RECEPTOR POSITIVE (HCC): ICD-10-CM

## 2023-06-28 DIAGNOSIS — G95.19 OTHER VASCULAR MYELOPATHIES (HCC): ICD-10-CM

## 2023-06-28 DIAGNOSIS — Z17.0 MALIGNANT NEOPLASM OF UPPER-OUTER QUADRANT OF RIGHT BREAST IN FEMALE, ESTROGEN RECEPTOR POSITIVE (HCC): ICD-10-CM

## 2023-06-28 PROCEDURE — G0439 PPPS, SUBSEQ VISIT: HCPCS | Performed by: INTERNAL MEDICINE

## 2023-06-28 RX ORDER — CYCLOBENZAPRINE HCL 5 MG
5 TABLET ORAL 2 TIMES DAILY
Qty: 60 TABLET | Refills: 1 | Status: SHIPPED | OUTPATIENT
Start: 2023-06-28

## 2023-06-28 RX ORDER — DIPHENOXYLATE HYDROCHLORIDE AND ATROPINE SULFATE 2.5; .025 MG/1; MG/1
1 TABLET ORAL DAILY
COMMUNITY

## 2023-06-28 NOTE — PROGRESS NOTES
Assessment and Plan:     Problem List Items Addressed This Visit        Nervous and Auditory    Other vascular myelopathies ([facility] )     Seen remotely in [address] for vein issues in left leg- had injection done for venous issues            Other    Malignant neoplasm of upper-outer quadrant of right breast in female, estrogen receptor positive ([facility] )    Depression, recurrent ([facility] )     Ongoing issues with her physical issues        Other Visit Diagnoses     Medicare annual wellness visit, subsequent    -  Primary    Muscle spasticity        Relevant Medications    cyclobenzaprine (FLEXERIL) 5 mg tablet    Other Relevant Orders    Ambulatory Referral to Neurology    Balance disorder        Relevant Orders    Ambulatory referral to Physical Therapy    Ambulatory Referral to Neurology        BMI Counseling: Body mass index is 29 08 kg/m²  The BMI is above normal  Nutrition recommendations include encouraging healthy choices of fruits and vegetables and moderation in carbohydrate intake  Exercise recommendations include exercising 3-5 times per week  Rationale for BMI follow-up plan is due to patient being overweight or obese  Walks daily and doing gardening- uses cane or walker- having balance issues      Preventive health issues were discussed with patient, and age appropriate screening tests were ordered as noted in patient's After Visit Summary  Personalized health advice and appropriate referrals for health education or preventive services given if needed, as noted in patient's After Visit Summary  History of Present Illness:     Patient presents for a Medicare Wellness Visit    1  Here for medicare wellness   having difficulty getting up from chair- doing  PT- feels she is very tight in muslces- stiffness and balance isuses-    no change in handwriting- limited in her art work - has lost some dexterity- only able to do  15-20 minutes at a time   has ongoing balance issues  2    Breast cancer s s/p surgery and radiation- is on letrozole- for  About 3 monhts - did not tolerate anatrazole as it caused confusion      doing lymphedema  PT       Patient Care Team:  Shant Garcia DO as PCP - General  DO Annie Wilhelm MD Leory Aldrich, MD Filbert Piedmont, DO Daryl Marten Locke, MD (General Surgery)  Jacinto Gill DO (Oncology)  Joseline Gomes MD (Radiation Oncology)  VITOR Garcia (Oncology)     Review of Systems:     Review of Systems   Constitutional: Positive for fatigue  Musculoskeletal: Positive for arthralgias and myalgias  Stiffness overall   Neurological:        Keeps organized in her life-   able to win frequently at [address] other systems reviewed and are negative         Problem List:     Patient Active Problem List   Diagnosis   • Allergic rhinitis   • Anxiety disorder   • Chronic bronchitis with emphysema (HCC)   • Chronic low back pain   • Essential hypertension   • Myofascial pain syndrome   • Obstructive sleep apnea   • Panic attack   • Xerostomia   • Symmetrical polyarthritis   • Vitamin D deficiency   • Bilateral carotid artery disease (HCC)   • Colon cancer screening   • Irritable bowel syndrome with diarrhea   • Erosive gastritis   • Class 1 obesity without serious comorbidity in adult   • Syncope   • Abnormal left aortic arch   • Aberrant right subclavian artery   • Nonrheumatic aortic valve insufficiency   • Mild mitral regurgitation   • Mild tricuspid regurgitation   • Lumbar spinal stenosis   • Family hx-breast malignancy   • Dysphagia   • Right leg pain   • Primary osteoarthritis of right knee   • Effusion of right knee   • Degenerative tear of posterior horn of medial meniscus of right knee   • Malignant neoplasm of upper-outer quadrant of right breast in female, estrogen receptor positive ([address] )   • Family history of gene mutation   • BRCA negative   • 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   • 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)   • Aromatase inhibitor use   • Other vascular myelopathies (HCC)   • Depression, recurrent (HCC)      Past Medical and Surgical History:     Past Medical History:   Diagnosis Date   • Anxiety    • Anxiety disorder    • Arthralgia    • Arthritis    • Asthma    • Basal cell carcinoma    • Breast cancer ([facility] )    • Chronic lumbar radiculopathy     last assessed: 12/20/16   • Chronic respiratory failure ([facility] )    • Colon cancer screening 04/11/2019    Last colonoscopy 2014-15   • Depression    • Dysfunction of eustachian tube    • Dyslipidemia    • Emphysema lung (HCC)    • Fatigue    • HTN (hypertension) 10/29/2014   • Hypercholesterolemia    • Hypertension    • Ileus of unspecified type ([facility] )    • Irritable bowel syndrome with diarrhea 04/11/2019   • Lumbosacral stenosis     last assessed: 9/20/16   • TRAE (obstructive sleep apnea)    • Pancreatitis     resolved: 9/20/17   • Pneumonia    • Post-operative nausea and vomiting    • PTSD (post-traumatic stress disorder)      Past Surgical History:   Procedure Laterality Date   • BREAST BIOPSY Right 09/12/2022    IL[facility]   • BREAST LUMPECTOMY Right 10/21/2022    Procedure: LUMPECTOMY BREAST FLORENCIO;  Surgeon: Ayden Betancur MD;  Location: [facility];  Service: Surgical Oncology   • CHOLECYSTECTOMY     • EGD  04/15/2019   • GALLBLADDER SURGERY     • HYSTERECTOMY      pt thinks she still has one ovary, done over 20 yrs ago   • LUMBAR SPINE SURGERY  12/2016    3, 4, 5   • LYMPH NODE BIOPSY Right 10/21/2022    Procedure: SLN BX, lymphoscintigraphy;  Surgeon: Ayden Betancur MD;  Location: [facility];  Service: Surgical Oncology   • MOUTH SURGERY     • MULTIPLE TOOTH EXTRACTIONS N/A 2018   • OOPHORECTOMY Bilateral     1 1/2 ovaries removed   • TONSILLECTOMY     • US BREAST FLORENCIO  NEEDLE LOC RIGHT Right 09/12/2022   • US GUIDED BREAST BIOPSY RIGHT COMPLETE Right 09/12/2022      Family History:     Family History   Problem Relation Age of Onset   • Breast cancer Mother 61   • Depression Mother         panic attacks   • Lung cancer Mother    • Mental illness Mother    • Substance Abuse Mother         CIGS   • Coronary artery disease Father         high # of paternal family members  in their 46s   • Substance Abuse Father         CIGS   • Depression Sister         panic attacks   • Hypertension Sister    • Breast cancer Daughter 48        CHEK2 positve   • Breast cancer Maternal Aunt 40   • Lung cancer Maternal Aunt    • Ovarian cancer Maternal Grandmother 79   • Depression Other         panic attacks   • Colon cancer Other         age at dx unk   • Heart disease Family    • Hypertension Family    • Colon polyps Neg Hx       Social History:     Social History     Socioeconomic History   • Marital status:      Spouse name: None   • Number of children: None   • Years of education: None   • Highest education level: None   Occupational History   • Occupation: retired   Tobacco Use   • Smoking status: Never   • Smokeless tobacco: Never   Vaping Use   • Vaping Use: Never used   Substance and Sexual Activity   • Alcohol use: Yes     Alcohol/week: 4 0 standard drinks of alcohol     Types: 4 Glasses of wine per week     Comment: 4 drinks every night   • Drug use: Yes     Frequency: 7 0 times per week     Types: Marijuana     Comment: uses topically daily- last used 10/20   • Sexual activity: Not Currently   Other Topics Concern   • None   Social History Narrative    Person living alone    FEELS SAFE AT HOME    SEES DENTIST REG    HAS LIVING WILL    Wears seatbelt     Social Determinants of Health     Financial Resource Strain: Low Risk  (2023)    Overall Financial Resource Strain (CARDIA)    • Difficulty of Paying Living Expenses: Not hard at all   Food Insecurity: Not on file   Transportation Needs: No Transportation Needs (2023)    PRAPARE - Transportation    • Lack of Transportation (Medical): No    • Lack of Transportation (Non-Medical):  No   Physical Activity: Not on file   Stress: Not on file   Social Connections: Not on file   Intimate Partner Violence: Not on file   Housing Stability: Not on file      Medications and Allergies:     Current Outpatient Medications   Medication Sig Dispense Refill   • ascorbic acid (VITAMIN C) 250 mg tablet Take 250 mg by mouth daily     • atenolol (TENORMIN) 25 mg tablet TAKE 1 TABLET BY MOUTH TWICE DAILY 180 tablet 3   • Cholecalciferol (VITAMIN D3) 2000 units capsule Take by mouth daily     • clonazePAM (KlonoPIN) 0 5 mg tablet Take 1 tablet (0 5 mg total) by mouth 2 (two) times a day as needed for anxiety 60 tablet 0   • co-enzyme Q-10 30 MG capsule Take 30 mg by mouth daily      • cyclobenzaprine (FLEXERIL) 5 mg tablet Take 1 tablet (5 mg total) by mouth 2 (two) times a day 60 tablet 1   • dicyclomine (BENTYL) 10 mg capsule TAKE 2 CAPSULES BY MOUTH  TWO TIMES A DAY (Patient taking differently: Take 10 mg by mouth 2 (two) times a day as needed) 360 capsule 12   • fenofibrate (TRICOR) 48 mg tablet Take 1 tablet (48 mg total) by mouth every morning 90 tablet 3   • fluticasone (FLONASE) 50 mcg/act nasal spray USE 1 SPRAY IN EACH NOSTRIL DAILY 16 g 5   • letrozole (FEMARA) 2 5 mg tablet Take 1 tablet (2 5 mg total) by mouth daily 30 tablet 6   • losartan (COZAAR) 100 MG tablet TAKE 1 TABLET BY MOUTH  DAILY (Patient taking differently: Take 100 mg by mouth every morning) 90 tablet 3   • magnesium oxide (MAG-OX) 400 mg Take 400 mg by mouth every other day      • multivitamin (THERAGRAN) TABS Take 1 tablet by mouth daily     • Omega 3 1000 MG CAPS Take by mouth daily     • Probiotic Product (PROBIOTIC-10 PO) Take by mouth daily     • rosuvastatin (CRESTOR) 5 mg tablet TAKE 1 TABLET BY MOUTH 3  TIMES WEEKLY 39 tablet 3     No current facility-administered medications for this visit       Allergies   Allergen Reactions   • Antihistamines, Diphenhydramine-Type    • Arimidex [Anastrozole] Confusion   • Bupropion    • Clarithromycin    • Codeine Other (See Comments)     increased HR   • Gabapentin    • Meloxicam Nausea Only and Visual Disturbance     Other reaction(s): Numbness  Action Taken: med stopped ; Category: Allergy;    • Ondansetron    • Pravastatin    • Propoxyphene Other (See Comments)     increased HR      Immunizations:     Immunization History   Administered Date(s) Administered   • COVID-19 MODERNA VACC 0 5 ML IM 01/25/2021, 03/01/2021, 11/19/2021   • COVID-19 Moderna Vac BIVALENT 12 Yr+ IM (BOOSTER ONLY) 0 5 ML 01/06/2023   • INFLUENZA 09/25/2019   • Influenza Quadrivalent Preservative Free 3 years and older IM 11/03/2014   • Influenza Split High Dose Preservative Free IM 12/17/2015, 10/06/2016, 09/20/2017   • Influenza, high dose seasonal 0 7 mL 10/15/2018, 10/15/2020   • Influenza, seasonal, injectable 09/12/2013   • Pneumococcal Conjugate 13-Valent 12/30/2015, 07/27/2017   • Pneumococcal Polysaccharide PPV23 01/01/2011, 08/09/2019   • Td (adult), adsorbed 06/06/2011      Health Maintenance:         Topic Date Due   • Breast Cancer Screening: Mammogram  08/24/2023   • Hepatitis C Screening  Completed   • Colorectal Cancer Screening  Discontinued     There are no preventive care reminders to display for this patient  Medicare Screening Tests and Risk Assessments:     Odalis Herzog is here for her Subsequent Wellness visit  Health Risk Assessment:   Patient rates overall health as good  Patient feels that their physical health rating is slightly worse  Patient is satisfied with their life  Eyesight was rated as same  Hearing was rated as same  Patient feels that their emotional and mental health rating is slightly worse  Patients states they are never, rarely angry  Patient states they are always unusually tired/fatigued  Pain experienced in the last 7 days has been a lot  Patient's pain rating has been 9/10  Patient states that she has experienced no weight loss or gain in last 6 months  Fall Risk Screening:    In the past year, patient has experienced: no history of falling in past year      Urinary Incontinence Screening:   Patient has leaked urine accidently in the last six months  Home Safety:  Patient has trouble with stairs inside or outside of their home  Patient has working smoke alarms and has working carbon monoxide detector  Home safety hazards include: none  Nutrition:   Current diet is Limited junk food and Regular  Medications:   Patient is currently taking over-the-counter supplements  OTC medications include: see medication list  Patient is able to manage medications  Activities of Daily Living (ADLs)/Instrumental Activities of Daily Living (IADLs):   Walk and transfer into and out of bed and chair?: Yes  Dress and groom yourself?: Yes    Bathe or shower yourself?: Yes    Feed yourself? Yes  Do your laundry/housekeeping?: Yes  Manage your money, pay your bills and track your expenses?: Yes  Make your own meals?: Yes    Do your own shopping?: Yes    Previous Hospitalizations:   Any hospitalizations or ED visits within the last 12 months?: Yes    How many hospitalizations have you had in the last year?: 1-2    Advance Care Planning:   Living will: Yes    Durable POA for healthcare:  Yes    Advanced directive: Yes    Advanced directive counseling given: Yes    End of Life Decisions reviewed with patient: Yes    Provider agrees with end of life decisions: Yes      Comments: Bring in copy of living will- updated   medical poa - she need to decide on alternative to her sister    Full code now- if no hope for recovery would not want long term support      Cognitive Screening:   Provider or family/friend/caregiver concerned regarding cognition?: No    PREVENTIVE SCREENINGS      Cardiovascular Screening:    General: Screening Not Indicated, History Lipid Disorder, Risks and Benefits Discussed and Screening Current      Diabetes Screening:     General: Screening Current      Colorectal Cancer Screening:     General: Risks and Benefits Discussed      Breast Cancer Screening:     General: History Breast Cancer and Risks and Benefits Discussed      Cervical Cancer Screening:    General: Screening Not Indicated and Risks and Benefits Discussed      Osteoporosis Screening:    General: Risks and Benefits Discussed      Abdominal Aortic Aneurysm (AAA) Screening:        General: Risks and Benefits Discussed      Lung Cancer Screening:     General: Screening Not Indicated      Hepatitis C Screening:    General: Screening Current    Screening, Brief Intervention, and Referral to Treatment (SBIRT)    Screening      AUDIT-C Screenin) How often did you have a drink containing alcohol in the past year? 4 or more times a week  2) How many drinks did you have on a typical day when you were drinking in the past year? 1 to 2  3) How often did you have 6 or more drinks on one occasion in the past year? never    AUDIT-C Score: 4  Interpretation: Score 3-12 (female): POSITIVE screen for alcohol misuse    AUDIT Screenin) How often during the last year have you found that you were not able to stop drinking once you had started? 0 - never  5) How often during the last year have you failed to do what was normally expected from you because of drinking? 0 - never  6) How often during the last year have you needed a first drink in the morning to get yourself going after a heavy drinking session? 0 - never  7) How often during the last year have you had a feeling of guilt or remorse after drinking? 0 - never  8) How often during the last year have you been unable to remember what happened the night before because you had been drinking? 0 - never  9) Have you or someone else been injured as a result of your drinking? 0 - no  10) Has a relative or friend or a doctor or another health worker been concerned about your drinking or suggested you cut down? 0 - no    AUDIT Score: 4  Interpretation: Low risk alcohol consumption    Single Item Drug Screening:   How "often have you used an illegal drug (including marijuana) or a prescription medication for non-medical reasons in the past year? never    Single Item Drug Screen Score: 0  Interpretation: Negative screen for possible drug use disorder    Other Counseling Topics:   Calcium and vitamin D intake  No results found  Physical Exam:     /68   Pulse 75   Ht 5' 3 5\" (1 613 m)   Wt 75 7 kg (166 lb 12 8 oz)   LMP  (LMP Unknown)   SpO2 95%   BMI 29 08 kg/m²     Physical Exam  Vitals and nursing note reviewed  HENT:      Head: Normocephalic  Right Ear: There is no impacted cerumen  Left Ear: There is no impacted cerumen  Nose: No congestion  Mouth/Throat:      Pharynx: No oropharyngeal exudate (ref  neuroloyg)  Eyes:      General: Scleral icterus: ref neurology  Right eye: No discharge  Left eye: No discharge  Cardiovascular:      Rate and Rhythm: Normal rate and regular rhythm  Heart sounds: No murmur heard  Pulmonary:      Breath sounds: No rhonchi  Abdominal:      Palpations: Abdomen is soft  Tenderness: There is no abdominal tenderness  Musculoskeletal:         General: Tenderness present  Cervical back: No tenderness  Comments: Increased muscle tone  Limited fine motor hand movement     Skin:     Findings: No rash  Neurological:      Mental Status: She is alert  Gait: Gait abnormal       Comments: Using cane   Psychiatric:         Mood and Affect: Mood normal          Thought Content:  Thought content normal          Judgment: Judgment normal           Leandrew Police, DO  "

## 2023-06-28 NOTE — PATIENT INSTRUCTIONS
Medicare Preventive Visit Patient Instructions  Thank you for completing your Welcome to Medicare Visit or Medicare Annual Wellness Visit today  Your next wellness visit will be due in one year (6/28/2024)  The screening/preventive services that you may require over the next 5-10 years are detailed below  Some tests may not apply to you based off risk factors and/or age  Screening tests ordered at today's visit but not completed yet may show as past due  Also, please note that scanned in results may not display below  Preventive Screenings:  Service Recommendations Previous Testing/Comments   Colorectal Cancer Screening  * Colonoscopy    * Fecal Occult Blood Test (FOBT)/Fecal Immunochemical Test (FIT)  * Fecal DNA/Cologuard Test  * Flexible Sigmoidoscopy Age: 39-70 years old   Colonoscopy: every 10 years (may be performed more frequently if at higher risk)  OR  FOBT/FIT: every 1 year  OR  Cologuard: every 3 years  OR  Sigmoidoscopy: every 5 years  Screening may be recommended earlier than age 39 if at higher risk for colorectal cancer  Also, an individualized decision between you and your healthcare provider will decide whether screening between the ages of 74-80 would be appropriate  Colonoscopy: 06/04/2015  FOBT/FIT: Not on file  Cologuard: Not on file  Sigmoidoscopy: Not on file          Breast Cancer Screening Age: 36 years old  Frequency: every 1-2 years  Not required if history of left and right mastectomy Mammogram: 08/24/2022    History Breast Cancer   Cervical Cancer Screening Between the ages of 21-29, pap smear recommended once every 3 years  Between the ages of 33-67, can perform pap smear with HPV co-testing every 5 years     Recommendations may differ for women with a history of total hysterectomy, cervical cancer, or abnormal pap smears in past  Pap Smear: Not on file    Screening Not Indicated   Hepatitis C Screening Once for adults born between 1945 and 1965  More frequently in patients at high risk for Hepatitis C Hep C Antibody: 04/13/2019    Screening Current   Diabetes Screening 1-2 times per year if you're at risk for diabetes or have pre-diabetes Fasting glucose: 106 mg/dL (6/26/2023)  A1C: 5 6 % (4/21/2023)  Screening Current   Cholesterol Screening Once every 5 years if you don't have a lipid disorder  May order more often based on risk factors  Lipid panel: 06/26/2023    Screening Not Indicated  History Lipid Disorder     Other Preventive Screenings Covered by Medicare:  1  Abdominal Aortic Aneurysm (AAA) Screening: covered once if your at risk  You're considered to be at risk if you have a family history of AAA  2  Lung Cancer Screening: covers low dose CT scan once per year if you meet all of the following conditions: (1) Age 50-69; (2) No signs or symptoms of lung cancer; (3) Current smoker or have quit smoking within the last 15 years; (4) You have a tobacco smoking history of at least 20 pack years (packs per day multiplied by number of years you smoked); (5) You get a written order from a healthcare provider  3  Glaucoma Screening: covered annually if you're considered high risk: (1) You have diabetes OR (2) Family history of glaucoma OR (3)  aged 48 and older OR (3)  American aged 72 and older  3  Osteoporosis Screening: covered every 2 years if you meet one of the following conditions: (1) You're estrogen deficient and at risk for osteoporosis based off medical history and other findings; (2) Have a vertebral abnormality; (3) On glucocorticoid therapy for more than 3 months; (4) Have primary hyperparathyroidism; (5) On osteoporosis medications and need to assess response to drug therapy  · Last bone density test (DXA Scan): 11/17/2022   5  HIV Screening: covered annually if you're between the age of 15-65  Also covered annually if you are younger than 13 and older than 72 with risk factors for HIV infection   For pregnant patients, it is covered up to 3 times per pregnancy  Immunizations:  Immunization Recommendations   Influenza Vaccine Annual influenza vaccination during flu season is recommended for all persons aged >= 6 months who do not have contraindications   Pneumococcal Vaccine   * Pneumococcal conjugate vaccine = PCV13 (Prevnar 13), PCV15 (Vaxneuvance), PCV20 (Prevnar 20)  * Pneumococcal polysaccharide vaccine = PPSV23 (Pneumovax) Adults 25-60 years old: 1-3 doses may be recommended based on certain risk factors  Adults 72 years old: 1-2 doses may be recommended based off what pneumonia vaccine you previously received   Hepatitis B Vaccine 3 dose series if at intermediate or high risk (ex: diabetes, end stage renal disease, liver disease)   Tetanus (Td) Vaccine - COST NOT COVERED BY MEDICARE PART B Following completion of primary series, a booster dose should be given every 10 years to maintain immunity against tetanus  Td may also be given as tetanus wound prophylaxis  Tdap Vaccine - COST NOT COVERED BY MEDICARE PART B Recommended at least once for all adults  For pregnant patients, recommended with each pregnancy  Shingles Vaccine (Shingrix) - COST NOT COVERED BY MEDICARE PART B  2 shot series recommended in those aged 48 and above     Health Maintenance Due:      Topic Date Due   • Breast Cancer Screening: Mammogram  08/24/2023   • Hepatitis C Screening  Completed   • Colorectal Cancer Screening  Discontinued     Immunizations Due:  There are no preventive care reminders to display for this patient  Advance Directives   What are advance directives? Advance directives are legal documents that state your wishes and plans for medical care  These plans are made ahead of time in case you lose your ability to make decisions for yourself  Advance directives can apply to any medical decision, such as the treatments you want, and if you want to donate organs  What are the types of advance directives?   There are many types of advance directives, and each state has rules about how to use them  You may choose a combination of any of the following:  · Living will: This is a written record of the treatment you want  You can also choose which treatments you do not want, which to limit, and which to stop at a certain time  This includes surgery, medicine, IV fluid, and tube feedings  · Durable power of  for healthcare [facility]): This is a written record that states who you want to make healthcare choices for you when you are unable to make them for yourself  This person, called a proxy, is usually a family member or a friend  You may choose more than 1 proxy  · Do not resuscitate (DNR) order:  A DNR order is used in case your heart stops beating or you stop breathing  It is a request not to have certain forms of treatment, such as CPR  A DNR order may be included in other types of advance directives  · Medical directive: This covers the care that you want if you are in a coma, near death, or unable to make decisions for yourself  You can list the treatments you want for each condition  Treatment may include pain medicine, surgery, blood transfusions, dialysis, IV or tube feedings, and a ventilator (breathing machine)  · Values history: This document has questions about your views, beliefs, and how you feel and think about life  This information can help others choose the care that you would choose  Why are advance directives important? An advance directive helps you control your care  Although spoken wishes may be used, it is better to have your wishes written down  Spoken wishes can be misunderstood, or not followed  Treatments may be given even if you do not want them  An advance directive may make it easier for your family to make difficult choices about your care  Urinary Incontinence   Urinary incontinence (UI)  is when you lose control of your bladder  UI develops because your bladder cannot store or empty urine properly   The 3 most common types of UI are stress incontinence, urge incontinence, or both  Medicines:   · May be given to help strengthen your bladder control  Report any side effects of medication to your healthcare provider  Do pelvic muscle exercises often:  Your pelvic muscles help you stop urinating  Squeeze these muscles tight for 5 seconds, then relax for 5 seconds  Gradually work up to squeezing for 10 seconds  Do 3 sets of 15 repetitions a day, or as directed  This will help strengthen your pelvic muscles and improve bladder control  Train your bladder:  Go to the bathroom at set times, such as every 2 hours, even if you do not feel the urge to go  You can also try to hold your urine when you feel the urge to go  For example, hold your urine for 5 minutes when you feel the urge to go  As that becomes easier, hold your urine for 10 minutes  Self-care:   · Keep a UI record  Write down how often you leak urine and how much you leak  Make a note of what you were doing when you leaked urine  · Drink liquids as directed  You may need to limit the amount of liquid you drink to help control your urine leakage  Do not drink any liquid right before you go to bed  Limit or do not have drinks that contain caffeine or alcohol  · Prevent constipation  Eat a variety of high-fiber foods  Good examples are high-fiber cereals, beans, vegetables, and whole-grain breads  Walking is the best way to trigger your intestines to have a bowel movement  · Exercise regularly and maintain a healthy weight  Weight loss and exercise will decrease pressure on your bladder and help you control your leakage  · Use a catheter as directed  to help empty your bladder  A catheter is a tiny, plastic tube that is put into your bladder to drain your urine  · Go to behavior therapy as directed  Behavior therapy may be used to help you learn to control your urge to urinate      Weight Management   Why it is important to manage your weight:  Being overweight increases your risk of health conditions such as heart disease, high blood pressure, type 2 diabetes, and certain types of cancer  It can also increase your risk for osteoarthritis, sleep apnea, and other respiratory problems  Aim for a slow, steady weight loss  Even a small amount of weight loss can lower your risk of health problems  How to lose weight safely:  A safe and healthy way to lose weight is to eat fewer calories and get regular exercise  You can lose up about 1 pound a week by decreasing the number of calories you eat by 500 calories each day  Healthy meal plan for weight management:  A healthy meal plan includes a variety of foods, contains fewer calories, and helps you stay healthy  A healthy meal plan includes the following:  · Eat whole-grain foods more often  A healthy meal plan should contain fiber  Fiber is the part of grains, fruits, and vegetables that is not broken down by your body  Whole-grain foods are healthy and provide extra fiber in your diet  Some examples of whole-grain foods are whole-wheat breads and pastas, oatmeal, brown rice, and bulgur  · Eat a variety of vegetables every day  Include dark, leafy greens such as spinach, kale, leandro greens, and mustard greens  Eat yellow and orange vegetables such as carrots, sweet potatoes, and winter squash  · Eat a variety of fruits every day  Choose fresh or canned fruit (canned in its own juice or light syrup) instead of juice  Fruit juice has very little or no fiber  · Eat low-fat dairy foods  Drink fat-free (skim) milk or 1% milk  Eat fat-free yogurt and low-fat cottage cheese  Try low-fat cheeses such as mozzarella and other reduced-fat cheeses  · Choose meat and other protein foods that are low in fat  Choose beans or other legumes such as split peas or lentils  Choose fish, skinless poultry (chicken or turkey), or lean cuts of red meat (beef or pork)  Before you cook meat or poultry, cut off any visible fat     · Use less "fat and oil  Try baking foods instead of frying them  Add less fat, such as margarine, sour cream, regular salad dressing and mayonnaise to foods  Eat fewer high-fat foods  Some examples of high-fat foods include french fries, doughnuts, ice cream, and cakes  · Eat fewer sweets  Limit foods and drinks that are high in sugar  This includes candy, cookies, regular soda, and sweetened drinks  Exercise:  Exercise at least 30 minutes per day on most days of the week  Some examples of exercise include walking, biking, dancing, and swimming  You can also fit in more physical activity by taking the stairs instead of the elevator or parking farther away from stores  Ask your healthcare provider about the best exercise plan for you  Alcohol Use and Your Health    Drinking too much can harm your health  Excessive alcohol use leads to about 88,000 death in the [place] each year, and shortens the life of those who diet by almost 30 years  Further, excessive drinking cost the economy $249 billion in 2010  Most excessive drinkers are not alcohol dependent  Excessive alcohol use has immediate effects that increase the risk of many harmful health conditions  These are most often the result of binge drinking  Over time, excessive alcohol use can lead to the development of chronic diseases and other series health problems  What is considered a \"drink\"? Excessive alcohol use includes:  · Binge Drinking: For women, 4 or more drinks consumed on one occasion  For men, 5 or more drinks consumed on one occasion  · Heavy Drinking: For women, 8 or more drinks per week  For men, 15 or more drinks per week  · Any alcohol used by pregnant women  · Any alcohol used by those under the age of 21 years    If you choose to drink, do so in moderation:  · Do not drink at all if you are under the age of 24, or if you are or may be pregnant, or have health problems that could be made worse by drinking    · For women, up to 1 " drink per day  · For men, up to 2 drinks a day    No one should begin drinking or drink more frequently based on potential health benefits    Short-Term Health Risks:  · Injuries: motor vehicle crashes, falls, drownings, burns  · Violence: homicide, suicide, sexual assault, intimate partner violence  · Alcohol poisoning  · Reproductive health: risky sexual behaviors, unintended prengnacy, sexually transmitted diseases, miscarriage, stillbirth, fetal alcohol syndrome    Long-Term Health Risks:  · Chronic diseases: high blood pressure, heart disease, stroke, liver disease, digestive problems  · Cancers: breast, mouth and throat, liver, colon  · Learning and memory problems: dementia, poor school performance  · Mental health: depression, anxiety, insomnia  · Social problems: lost productivity, family problems, unemployment  · Alcohol dependence    For support and more information:  · Substance Abuse and [facility] , [address] [place]  Web Address: https://[employer]/    · Alcoholics Anonymous        Web Address: http://www martino info/    https://www cdc gov/alcohol/fact-sheets/alcohol-use htm     © Copyright [facility] 2018 Information is for End User's use only and may not be sold, redistributed or otherwise used for commercial purposes   All illustrations and images included in CareNotes® are the copyrighted property of [employer]  or [address] [place], [place] [address]pe

## 2023-06-29 ENCOUNTER — OFFICE VISIT (OUTPATIENT)
Dept: PHYSICAL THERAPY | Facility: CLINIC | Age: 78
End: 2023-06-29
Payer: MEDICARE

## 2023-06-29 DIAGNOSIS — R26.89 BALANCE DISORDER: ICD-10-CM

## 2023-06-29 DIAGNOSIS — I89.0 LYMPHEDEMA: Primary | ICD-10-CM

## 2023-06-29 DIAGNOSIS — R53.81 PHYSICAL DECONDITIONING: ICD-10-CM

## 2023-06-29 PROCEDURE — 97164 PT RE-EVAL EST PLAN CARE: CPT | Performed by: PHYSICAL THERAPIST

## 2023-06-29 PROCEDURE — 97140 MANUAL THERAPY 1/> REGIONS: CPT | Performed by: PHYSICAL THERAPIST

## 2023-06-29 NOTE — PROGRESS NOTES
PT Re-Evaluation     Today's date: 2023  Patient name: Myriam Garay  : 1945  MRN: [id number]  Referring provider: VITOR Mahmood  Dx:   Encounter Diagnosis     ICD-10-CM    1  Lymphedema  I89 0       2  Physical deconditioning  R53 81                      Assessment  Assessment details: Since starting skilled PT, pain levels are decreased slightly in R shoulder, R UE edema has decreased, R UE ROM and strength have improved with gradual functional progress  Pt presents with decreased LE strength, decreased balance and decreased functional activities  Pt has been having increased general body aches and pains  Therapist has recommended pt discuss this with her physician 2* possible side effect of medication  Will discharge lymphedema therapy at this time  Pt arrived with a script for balance disorder from PCP  Will initiate balance exercises and therapy at this time  Impairments: abnormal gait, abnormal or restricted ROM, activity intolerance, impaired physical strength, lacks appropriate home exercise program and pain with function  Understanding of Dx/Px/POC: good   Prognosis: good    Goals  STG's ( 3-4 weeks)  1  Pt will be independent in HEP- met  2  Pt will be independent in chip bag use to decrease fibrotic breast tissue-met    LTG's (4-6 weeks)  1  Reduce R breast edema to max ability- met  2  Pt will have improved R UE ROM to WFL's- met  3  Pt will be able to reach overhead with greater ease-partial met  4  Pt will have 4+/5 R shoulder abduction strength-met  5  Improve FOTO score by 4-6 points- not met  6  Pt will have improved balance demonstrated by improved score on Pleitez Balance testing  7   Pt will have improved tolerance for walking community distances    Plan  Patient would benefit from: skilled physical therapy  Planned therapy interventions: manual therapy, joint mobilization, neuromuscular re-education, stretching, strengthening, therapeutic activities, therapeutic exercise, functional ROM exercises, flexibility, home exercise program and balance  Other planned therapy interventions: MLD  Frequency: 2x week  Duration in weeks: 6  Plan of Care beginning date: 6/29/2023  Plan of Care expiration date: 8/10/2023  Treatment plan discussed with: patient and PTA        Subjective Evaluation    History of Present Illness  Mechanism of injury: I E: Pt has a history of R breast CA stage IA  Pt underwent R lumpectomy with sentinel lymph node biopsy with clear margins, 2 out of 4 lymph nodes involved  Pt had 30 radiation treatments from 12/12/2022-1/24/23  Pt reports she is able to garden and walk her dog every day  Pt has been noticing increased fatigue and says that she feels tired all the time  Pt misses doing a physical sport such as pickle ball  Pt is currently unable to do this due to back pain and R knee pain  Pt had lumbar surgery and nerve ablations x2 in 2000  Pt has noticed her balance has not been as good recently  Pt does not use a SPC when inside her home  Pt uses a rollator walker when walking the dog  Pt has a torn meniscus in her R knee  Pt uses a special cream to control her pain  Pt has R arm pain after performing gardening and digging activities  Pt has some shoulder pain when lowering her arm after reaching overhead  Pt has pain when taking off a shirt and reaching behind her back  6/29/23; Pt has been compliant in HEP  Pt is able to lift her arm better, but continues to feel tightness in her arm  Pt is taking Anastrazol medication, and is feeling like her muscle aches and pains are worsening  Pt is able walk for about 25 minutes  Pt has increased pain when reaching behind her back and less pain when taking off a shirt  Pt reports she is using a chip bag to control the swelling in her breast and her tissue is softer  Pt continues to take a nap, but the nap is shorter and sleeps for about 1 5 hours, rather than 2 5 hours       Work: retired  Hobbies: gardening, walking the dog, plays [place]  Gait: slow speed with SPC  Pain  At best pain ratin  At worst pain ratin  Location: R shoulder  Quality: tight, dull ache and sharp    Social Support    Employment status: not working  Hand dominance: right    Patient Goals  Patient goals for therapy: decreased pain and improved balance  Patient goal: to have less fatigue;        Objective     Neurological Testing     Sensation     Shoulder   Left Shoulder   Intact: light touch    Right Shoulder   Intact: light touch    Hip   Left Hip   Intact: light touch    Right Hip   Intact: light touch    Reflexes   Left   Biceps (C5/C6): normal (2+)  Brachioradialis (C6): normal (2+)  Triceps (C7): normal (2+)    Right   Biceps (C5/C6): normal (2+)  Brachioradialis (C6): normal (2+)  Triceps (C7): normal (2+)    Active Range of Motion   Left Shoulder   Flexion: 155 degrees with pain  Abduction: 150 degrees     Right Shoulder   Flexion: 165 degrees with pain  Abduction: 138 degrees   External rotation 45°: 50 degrees with pain  Internal rotation 45°: 85 degrees     Additional Active Range of Motion Details  (+) TTP L axillary region and R lateral breast  (+) R fibrotic tissue lateral and inferior breast  (-) stemmer sign    Passive Range of Motion     Right Shoulder   Flexion: 155 degrees with pain  Abduction: WFL  External rotation 45°: 50 degrees with pain  Internal rotation 45°: 85 degrees     Strength/Myotome Testing     Left Shoulder     Planes of Motion   Flexion: 4+   Abduction: 4 (pain)   External rotation at 45°: 5   Internal rotation at 0°: 5     Right Shoulder     Planes of Motion   Flexion: 4+   Abduction: 4+ (pain)   External rotation at 45°: 5   Internal rotation at 0°: 5     Left Hip   Planes of Motion   Flexion: 4  External rotation: 4+  Internal rotation: 4+    Right Hip   Planes of Motion   Flexion: 4  External rotation: 4+  Internal rotation: 4+    Left Knee   Normal strength    Right Knee   Normal strength    Additional Strength Details  Ankle df MMT: 5/5            Dx: R breast edema/ balance disorder  EPOC: 8/10/23  CO-MORBIDITIES:  PERSONAL FACTORS:  Precautions: LBP, decreased balance      Manuals 6/29            R UE/ breast MLD th            R shoulder manual stretching th            Re-eval: add balance dx Th x15'                         Neuro Re-Ed             sidestepping             Tandem walking             Mini squats             bridges             rhomberg EO on foam             rhomberg FT EC             FW walking over hurdles                                                    Ther Ex             Bike for LE ROM             LAQ             Standing HR             S/l hip abduction             Supine SLR                                                    Ther Activity             Step up: FW             Step up: lat             Gait Training                                       Modalities

## 2023-07-03 ENCOUNTER — OFFICE VISIT (OUTPATIENT)
Dept: PHYSICAL THERAPY | Facility: CLINIC | Age: 78
End: 2023-07-03
Payer: MEDICARE

## 2023-07-03 DIAGNOSIS — R26.89 BALANCE DISORDER: Primary | ICD-10-CM

## 2023-07-03 PROCEDURE — 97530 THERAPEUTIC ACTIVITIES: CPT

## 2023-07-03 PROCEDURE — 97112 NEUROMUSCULAR REEDUCATION: CPT

## 2023-07-03 NOTE — PROGRESS NOTES
Daily Note     Today's date: 7/3/2023  Patient name: Mabel Julien  : 1945  MRN: [id number]  Referring provider: VITOR Hardwick  Dx:   Encounter Diagnosis     ICD-10-CM    1. Balance disorder  R26.89                      Subjective: Pt states feeling unsteady. Objective: See treatment diary below      Assessment: Pt performed below TE with good tolerance however, pt required constant CG throughout PT session.        Plan: Continue plan of care     Dx: R breast edema/ balance disorder  EPOC: 8/10/23  CO-MORBIDITIES:  PERSONAL FACTORS:  Precautions: LBP, decreased balance      Manuals 6/29 7/3           R UE/ breast MLD th            R shoulder manual stretching th            Re-eval: add balance dx Th x15'                         Neuro Re-Ed             rhomberg EO on foam  No foam WB/NB 10" 10           rhomberg FT EC  No foam WB 5" 10                        Sidestepping on big foam with items underneath  5laps            Tandem walking on grn line  3laps           Sit to stand then walk to 2wedges(incline and decline)  5x           Mini squats             bridges                                       FW walking over hurdles                                                    Ther Ex             Nu Bike for LE ROM  L3 5'           LAQ             Standing HR             S/l hip abduction             Supine SLR                                                    Ther Activity             Practice sit-stand  40x           Step up: FW             Step up: lat             Gait Training                                       Modalities

## 2023-07-05 ENCOUNTER — HOSPITAL ENCOUNTER (OUTPATIENT)
Dept: NON INVASIVE DIAGNOSTICS | Age: 78
Discharge: HOME | End: 2023-07-05

## 2023-07-10 ENCOUNTER — OFFICE VISIT (OUTPATIENT)
Dept: PHYSICAL THERAPY | Facility: CLINIC | Age: 78
End: 2023-07-10
Payer: MEDICARE

## 2023-07-10 DIAGNOSIS — R26.89 BALANCE DISORDER: Primary | ICD-10-CM

## 2023-07-10 DIAGNOSIS — R53.81 PHYSICAL DECONDITIONING: ICD-10-CM

## 2023-07-10 PROCEDURE — 97112 NEUROMUSCULAR REEDUCATION: CPT | Performed by: PHYSICAL THERAPIST

## 2023-07-10 PROCEDURE — 97110 THERAPEUTIC EXERCISES: CPT | Performed by: PHYSICAL THERAPIST

## 2023-07-10 NOTE — PROGRESS NOTES
Daily Note     Today's date: 7/10/2023  Patient name: Neptali Abbott  : 1945  MRN: [id number]  Referring provider: VITOR Bunn  Dx:   Encounter Diagnosis     ICD-10-CM    1. Balance disorder  R26.89                      Subjective: Pt reports she has a lot of pain in her R shoulder, L hip, B knees and B ankles, but she believes it is due to her chemotherapy pill. Pt felt tired after last visit. Objective: See treatment diary below      Assessment: Tolerated treatment fair. Patient exhibited good technique with therapeutic exercises. Pt had increased L hip pain with 6" step ups. Therapist modified step ups by using a 4" step up. Pt has L hip and R knee pain with there ex and balance ex. Pt was not able to lay on L hip due to pain levels. Pt had several near LOB during therapy. Plan: Continue per plan of care. Focus on improving leg strength and balance. Dx: balance disorder  EPOC: 8/10/23  CO-MORBIDITIES:  PERSONAL FACTORS:  Precautions: LBP, decreased balance; unsteady      Manuals 6/29 7/3 7/10          R UE/ breast MLD th  D/c          L hip PROM/ MFR th  8'          Re-eval: add balance dx Th x15'                         Neuro Re-Ed             rhomberg EO on foam  No foam WB/NB 10" 10 30"x2 FA          rhomberg FT EC  No foam WB 5" 10 FA 30"x2          DLS: abd bracing   08I14"          Sidestepping on big foam with items underneath  5laps  3 laps foam 1 HH no shoes          Tandem walking on grn line  3laps 3 laps 1 HH shoe on          Sit to stand then walk to 2wedges(incline and decline)  5x           Mini squats   10x5"          bridges   10x5"                                    FW walking over hurdles                                                    Ther Ex             Nu Bike for LE ROM  L3 5' 5'          LAQ   10x5"          Standing HR   10          S/l hip abduction   p!           Supine SLR   5 ea                                                 Ther Activity Practice sit-stand  40x x9 p!           Step up: FW   4"x10 ea          Step up: lat   4"x10 ea          Gait Training                                       Modalities

## 2023-07-13 ENCOUNTER — APPOINTMENT (OUTPATIENT)
Dept: PHYSICAL THERAPY | Facility: CLINIC | Age: 78
End: 2023-07-13
Payer: MEDICARE

## 2023-07-17 ENCOUNTER — OFFICE VISIT (OUTPATIENT)
Dept: PHYSICAL THERAPY | Facility: CLINIC | Age: 78
End: 2023-07-17
Payer: MEDICARE

## 2023-07-17 DIAGNOSIS — R53.81 PHYSICAL DECONDITIONING: ICD-10-CM

## 2023-07-17 DIAGNOSIS — R26.89 BALANCE DISORDER: Primary | ICD-10-CM

## 2023-07-17 PROCEDURE — 97110 THERAPEUTIC EXERCISES: CPT | Performed by: PHYSICAL THERAPIST

## 2023-07-17 PROCEDURE — 97112 NEUROMUSCULAR REEDUCATION: CPT | Performed by: PHYSICAL THERAPIST

## 2023-07-17 NOTE — PROGRESS NOTES
Daily Note     Today's date: 2023  Patient name: Jose Juan Cormier  : 1945  MRN: [id number]  Referring provider: VITOR Lloyd  Dx:   Encounter Diagnosis     ICD-10-CM    1. Balance disorder  R26.89       2. Physical deconditioning  R53.81           Start Time: 1300  Stop Time: 1345  Total time in clinic (min): 45 minutes    Subjective: Patient reports that her R shoulder is painful and clicking. She is wondering if her joint pain is a side effect from the medication she is on for her breast CA. She plans on calling the oncologist this week. Objective: See treatment diary below      Assessment: Patient continues to tolerate treatment well, requiring minimal recovery periods throughout the session. She denied any shoulder or hip pain at the end of the session. Patient primarily relies on trunk/UE strategies in order to correct LOB. Continue to progress with LE strengthening and static/dynamic balance training, as able. Plan: Continue per plan of care.       Dx: balance disorder  EPOC: 8/10/23  CO-MORBIDITIES:  PERSONAL FACTORS:  Precautions: LBP, decreased balance; unsteady      Manuals  7/3 7/10 7/17         R UE/ breast MLD th  D/c          L hip PROM/ MFR th  8'          Re-eval: add balance dx Th x15'                         Neuro Re-Ed             rhomberg EO on foam  No foam WB/NB 10" 10 30"x2 FA 30"x2 FA         rhomberg FT EC  No foam WB 5" 10 FA 30"x2 FA 30" x 2         SLS    Foot on dynadisc 2x30" ea         DLS: abd bracing   60N23"          Sidestepping on big foam with items underneath  5laps  3 laps foam 1 HH no shoes 3 laps foam 1 HH         Tandem walking on grn line  3laps 3 laps 1 HH shoe on 3 laps 1 HH         Sit to stand then walk to 2wedges(incline and decline)  5x           Mini squats   10x5" 10x         bridges   10x5"          rockerboard    10x ea M/L and A/P         FW walking over hurdles                                                    Ther Ex Nu Bike for LE ROM  L3 5' 5' 5'         LAQ   10x5"          Standing HR   10 10         S/l hip abduction   p! Supine SLR   5 ea                                                 Ther Activity             Practice sit-stand  40x x9 p!           Step up: FW   4"x10 ea 4"x10 ea         Step up: lat   4"x10 ea 4"x10 ea         Gait Training                                       Modalities

## 2023-07-20 ENCOUNTER — APPOINTMENT (OUTPATIENT)
Dept: PHYSICAL THERAPY | Facility: CLINIC | Age: 78
End: 2023-07-20
Payer: MEDICARE

## 2023-07-24 ENCOUNTER — HOSPITAL ENCOUNTER (OUTPATIENT)
Dept: MAMMOGRAPHY | Facility: CLINIC | Age: 78
Discharge: HOME | End: 2023-07-24
Payer: MEDICARE

## 2023-07-24 ENCOUNTER — APPOINTMENT (OUTPATIENT)
Dept: PHYSICAL THERAPY | Facility: CLINIC | Age: 78
End: 2023-07-24
Payer: MEDICARE

## 2023-07-24 VITALS — WEIGHT: 165.34 LBS | BODY MASS INDEX: 28.23 KG/M2 | HEIGHT: 64 IN

## 2023-07-24 DIAGNOSIS — Z17.0 MALIGNANT NEOPLASM OF UPPER-OUTER QUADRANT OF RIGHT BREAST IN FEMALE, ESTROGEN RECEPTOR POSITIVE (HCC): ICD-10-CM

## 2023-07-24 DIAGNOSIS — C50.411 MALIGNANT NEOPLASM OF UPPER-OUTER QUADRANT OF RIGHT BREAST IN FEMALE, ESTROGEN RECEPTOR POSITIVE (HCC): ICD-10-CM

## 2023-07-24 PROCEDURE — 77066 DX MAMMO INCL CAD BI: CPT

## 2023-07-24 PROCEDURE — G0279 TOMOSYNTHESIS, MAMMO: HCPCS

## 2023-07-26 ENCOUNTER — OFFICE VISIT (OUTPATIENT)
Dept: PHYSICAL THERAPY | Facility: CLINIC | Age: 78
End: 2023-07-26
Payer: MEDICARE

## 2023-07-26 DIAGNOSIS — R53.81 PHYSICAL DECONDITIONING: ICD-10-CM

## 2023-07-26 DIAGNOSIS — R26.89 BALANCE DISORDER: Primary | ICD-10-CM

## 2023-07-26 PROCEDURE — 97112 NEUROMUSCULAR REEDUCATION: CPT

## 2023-07-26 PROCEDURE — 97110 THERAPEUTIC EXERCISES: CPT

## 2023-07-26 NOTE — PROGRESS NOTES
Daily Note     Today's date: 2023  Patient name: Neftaly Brian  : 1945  MRN: [id number]  Referring provider: VITOR Beauchamp  Dx:   Encounter Diagnosis     ICD-10-CM    1. Balance disorder  R26.89       2. Physical deconditioning  R53.81                      Subjective: Pt reports she has unpredictable mov'ts. Reports she has not fallen in a long time. Objective: See treatment diary below      Assessment: Tolerated treatment fair. Patient was challenged w/ balance ex's. Pt was not able to initiate LE mov'ts w/o manual facilitation. Pt would benefit from cont'd PT to work on LE strengthening and balance. Plan: Continue per plan of care. Progress treatment as tolerated. Dx: balance disorder  EPOC: 8/10/23  CO-MORBIDITIES:  PERSONAL FACTORS:  Precautions: LBP, decreased balance; unsteady  PT TH assisted for 10 mins. Manuals 6/29 7/3 7/10 7/17 7/26        R UE/ breast MLD th  D/c          L hip PROM/ MFR th  8'          Re-eval: add balance dx Th x15'                         Neuro Re-Ed             rhomberg EO on foam  No foam WB/NB 10" 10 30"x2 FA 30"x2 FA FT 30"x2        rhomberg FT EC  No foam WB 5" 10 FA 30"x2 FA 30" x 2 FA 30" x 2        SLS    Foot on dynadisc 2x30" ea Foot on dynadisc 2x30" ea        DLS: abd bracing   50X72"  t/o        Sidestepping on big foam with items underneath  5laps  3 laps foam 1 HH no shoes 3 laps foam 1 HH 3 laps faom 1 HH        Tandem walking on grn line  3laps 3 laps 1 HH shoe on 3 laps 1 HH 3 laps 1 HH        Sit to stand then walk to 2wedges(incline and decline)  5x           Mini squats   10x5" 10x 20        bridges   10x5"  3x p!        rockerboard    10x ea M/L and A/P 10x ea M/L and A/P        FW walking over hurdles                                                    Ther Ex             Nu Bike for LE ROM  L3 5' 5' 5' 5'        LAQ   10x5"  10x5"        Standing HR   10 10 20        S/l hip abduction   p!           Supine SLR   5 ea          QS     10x10"                                  Ther Activity             Practice sit-stand  40x x9 p!           Step up: FW   4"x10 ea 4"x10 ea np        Step up: lat   4"x10 ea 4"x10 ea np        Gait Training                                       Modalities

## 2023-07-27 ENCOUNTER — APPOINTMENT (OUTPATIENT)
Dept: PHYSICAL THERAPY | Facility: CLINIC | Age: 78
End: 2023-07-27
Payer: MEDICARE

## 2023-07-31 ENCOUNTER — OFFICE VISIT (OUTPATIENT)
Dept: PHYSICAL THERAPY | Facility: CLINIC | Age: 78
End: 2023-07-31
Payer: MEDICARE

## 2023-07-31 DIAGNOSIS — R53.81 PHYSICAL DECONDITIONING: ICD-10-CM

## 2023-07-31 DIAGNOSIS — R26.89 BALANCE DISORDER: Primary | ICD-10-CM

## 2023-07-31 PROCEDURE — 97110 THERAPEUTIC EXERCISES: CPT

## 2023-07-31 PROCEDURE — 97112 NEUROMUSCULAR REEDUCATION: CPT

## 2023-07-31 NOTE — PROGRESS NOTES
Daily Note     Today's date: 2023  Patient name: Stephani Groves  : 1945  MRN: [id number]  Referring provider: VITOR Patterson  Dx:   Encounter Diagnosis     ICD-10-CM    1. Balance disorder  R26.89       2. Physical deconditioning  R53.81                      Subjective: pt offers no new c/o's. Objective: See treatment diary below      Assessment: Tolerated treatment fair. Patient demonstrated fatigue post treatment and denied it having to catch her self w/ the railing and her cane mutl x's. Pt would benefit from cont'd PT to work on balance and strengthening. Pt possibly in denial of her lack of balance and poor endurance. Plan: Continue per plan of care. Progress treatment as tolerated. Dx: balance disorder  EPOC: 8/10/23  CO-MORBIDITIES:  PERSONAL FACTORS:  Precautions: LBP, decreased balance; unsteady  PT TH assisted for 10 mins.     Manuals 6/29 7/3 7/10 7/17 7/26 7/31       R UE/ breast MLD th  D/c          L hip PROM/ MFR th  8'          Re-eval: add balance dx Th x15'                         Neuro Re-Ed             rhomberg EO on foam  No foam WB/NB 10" 10 30"x2 FA 30"x2 FA FT 30"x2 FT 30"x2       rhomberg FT EC  No foam WB 5" 10 FA 30"x2 FA 30" x 2 FA 30" x 2 FA 30" x 2       SLS    Foot on dynadisc 2x30" ea Foot on dynadisc 2x30" ea        DLS: abd bracing   33W62"  t/o T/o w/ VC's       Sidestepping on big foam with items underneath  5laps  3 laps foam 1 HH no shoes 3 laps foam 1 HH 3 laps faom 1 HH 3 laps faom 1 HH       Tandem walking on grn line  3laps 3 laps 1 HH shoe on 3 laps 1 HH 3 laps 1 HH 3 laps 1 HH       Sit to stand then walk to 2wedges(incline and decline)  5x           Mini squats   10x5" 10x 20 20       bridges   10x5"  3x p!        rockerboard    10x ea M/L and A/P 10x ea M/L and A/P 10x ea M/L and A/P       FW walking over hurdles                                                    Ther Ex             Nu Bike for LE ROM  L3 5' 5' 5' 5' 5'       LAQ 10x5"  10x5" 10x5"       Standing HR   10 10 20 20       S/l hip abduction   p! Supine SLR   5 ea          QS     10x10" 10x5"                                 Ther Activity             Practice sit-stand  40x x9 p!           Step up: FW   4"x10 ea 4"x10 ea np 6"x10 ea       Step up: lat   4"x10 ea 4"x10 ea np np       Gait Training                                       Modalities

## 2023-08-01 ENCOUNTER — TELEPHONE (OUTPATIENT)
Dept: NEUROLOGY | Facility: CLINIC | Age: 78
End: 2023-08-01

## 2023-08-01 NOTE — TELEPHONE ENCOUNTER
ALVARO to confirm your upcoming appt, 8/10/23 @11:00am (10:45am) at the [facility] office, to confirm/RS if you are unable to keep this appt please call [phone]

## 2023-08-02 ENCOUNTER — OFFICE VISIT (OUTPATIENT)
Dept: GASTROENTEROLOGY | Facility: CLINIC | Age: 78
End: 2023-08-02
Payer: MEDICARE

## 2023-08-02 VITALS
DIASTOLIC BLOOD PRESSURE: 70 MMHG | SYSTOLIC BLOOD PRESSURE: 136 MMHG | BODY MASS INDEX: 29.02 KG/M2 | WEIGHT: 170 LBS | HEIGHT: 64 IN

## 2023-08-02 DIAGNOSIS — Z12.11 COLON CANCER SCREENING: ICD-10-CM

## 2023-08-02 DIAGNOSIS — K29.60 EROSIVE GASTRITIS: ICD-10-CM

## 2023-08-02 DIAGNOSIS — K58.0 IRRITABLE BOWEL SYNDROME WITH DIARRHEA: Primary | ICD-10-CM

## 2023-08-02 PROCEDURE — 99214 OFFICE O/P EST MOD 30 MIN: CPT | Performed by: INTERNAL MEDICINE

## 2023-08-02 NOTE — PATIENT INSTRUCTIONS
Continue current treatment for breast cancer. Okay to try the L-glutamine supplement. Okay to use the Bentyl with the Levsin as needed for abdominal pain. Try to lose weight as you get healthier from the cancer situation. Repeat colonoscopy 2025. Follow-up office visit 1 year.   Reach out to me if anything changes

## 2023-08-02 NOTE — LETTER
August 2, 2023     Ludin Morris[address]  [address] [id number]    Patient: Courtney Cunningham   YOB: 1945   Date of Visit: 8/2/2023       Dear Dr. Altaf Mckinney: Thank you for referring Kaushik Alcaraz to me for evaluation. Below are my notes for this consultation. If you have questions, please do not hesitate to call me. I look forward to following your patient along with you. Sincerely,        Xu Blackburn. Enrique Pineda MD        CC: Tara Ni, DO Xu Blackburn. Enrique Pineda MD  8/2/2023  4:25 PM  Sign when Signing Visit  [address] [facility] - Outpatient Follow-up Note  Courtney Cunningham 69 y.o. female MRN: [id number]  Encounter: [id number]    ASSESSMENT AND PLAN:      1. Irritable bowel syndrome with diarrhea  Long history of irritable bowel syndrome taking supplements including magnesium and glutamine. Rarely needs to use Bentyl for abdominal pain. Suspect yellow stool just exacerbation of this with alterations of her bowel azra secondary to cancer treatments  -Follow for now. -Can use probiotic, magnesium, and L-glutamine  -Would not use any further liver detox      2. History of fatty liver  Seen on previous CAT scan. LFTs from June 2023 normal.  CAT scan as part of cancer screening reported normal liver  - Follow-up for now  -Stressed the importance of weight loss    3. Colon cancer screening  Last colonoscopy June 2015. Due for follow-up 2025      Followup Appointment: 1 year  ______________________________________________________________________    Chief Complaint   Patient presents with   • Change in Bowel Habits     HPI: The patient is seen back in the office today. She is history of gastritis and irritable bowel syndrome who is doing well taking magnesium supplements daily with Levsin and Bentyl as needed when I last saw her 2 years ago. Since that time she was diagnosed with breast cancer with positive lymph nodes.   She underwent surgery radiation and is now on hormone therapy. She reports having a change in bowel habits during this time period. She reports that she still moving her bowels every day but the stools are little looser and are yellow. She moves her bowels once a day she denies any diarrhea, incontinence, or rectal bleeding. She denies any abdominal pain. Her weight is increasing. Because of the yellow that she thought this was related to her fatty liver and tried a liver detox.   She also restarted herself on L-glutamine from the [facility] store for her "leaky gut"    Historical Information   Past Medical History:   Diagnosis Date   • Anxiety    • Anxiety disorder    • Arthralgia    • Arthritis    • Asthma    • Basal cell carcinoma    • Breast cancer ([address])    • Chronic lumbar radiculopathy     last assessed: 12/20/16   • Chronic respiratory failure ([address])    • Colon cancer screening 04/11/2019    Last colonoscopy 2014-15   • Depression    • Dysfunction of eustachian tube    • Dyslipidemia    • Emphysema lung (HCC)    • Fatigue    • HTN (hypertension) 10/29/2014   • Hypercholesterolemia    • Hypertension    • Ileus of unspecified type ([address])    • Irritable bowel syndrome with diarrhea 04/11/2019   • Lumbosacral stenosis     last assessed: 9/20/16   • TRAE (obstructive sleep apnea)    • Pancreatitis     resolved: 9/20/17   • Pneumonia    • Post-operative nausea and vomiting    • PTSD (post-traumatic stress disorder)      Past Surgical History:   Procedure Laterality Date   • BREAST BIOPSY Right 09/12/2022    [facility]   • BREAST LUMPECTOMY Right 10/21/2022    Procedure: LUMPECTOMY BREAST FLORENCIO;  Surgeon: Michelle Motley MD;  Location: [facility];  Service: Surgical Oncology   • CHOLECYSTECTOMY     • EGD  04/15/2019   • GALLBLADDER SURGERY     • HYSTERECTOMY      pt thinks she still has one ovary, done over 20 yrs ago   • [address]  12/2016    3, 4, 5   • LYMPH NODE BIOPSY Right 10/21/2022    Procedure: SLN BX, lymphoscintigraphy; Surgeon: Bella Garibay MD;  Location: [facility];  Service: Surgical Oncology   • MOUTH SURGERY     • MULTIPLE TOOTH EXTRACTIONS N/A 2018   • OOPHORECTOMY Bilateral     1 2 ovaries removed   • TONSILLECTOMY     • US BREAST FLORENCIO  NEEDLE LOC RIGHT Right 2022   • US GUIDED BREAST BIOPSY RIGHT COMPLETE Right 2022     Social History     Substance and Sexual Activity   Alcohol Use Yes   • Alcohol/week: 4.0 standard drinks of alcohol   • Types: 4 Glasses of wine per week    Comment: 4 drinks every night     Social History     Substance and Sexual Activity   Drug Use Yes   • Frequency: 7.0 times per week   • Types: Marijuana    Comment: uses topically daily- last used 10/20     Social History     Tobacco Use   Smoking Status Never   Smokeless Tobacco Never     Family History   Problem Relation Age of Onset   • Breast cancer Mother 61   • Depression Mother         panic attacks   • Lung cancer Mother    • Mental illness Mother    • Substance Abuse Mother         CIGS   • Coronary artery disease Father         high # of paternal family members  in their 46s   • Substance Abuse Father         CIGS   • Depression Sister         panic attacks   • Hypertension Sister    • Breast cancer Daughter 48        CHEK2 positve   • Breast cancer Maternal Aunt 36   • Lung cancer Maternal Aunt    • Ovarian cancer Maternal Grandmother 79   • Depression Other         panic attacks   • Colon cancer Other         age at dx unk   • Heart disease Family    • Hypertension Family    • Colon polyps Neg Hx          Current Outpatient Medications:   •  ascorbic acid (VITAMIN C) 250 mg tablet  •  atenolol (TENORMIN) 25 mg tablet  •  Cholecalciferol (VITAMIN D3) 2000 units capsule  •  clonazePAM (KlonoPIN) 0.5 mg tablet  •  co-enzyme Q-10 30 MG capsule  •  cyclobenzaprine (FLEXERIL) 5 mg tablet  •  dicyclomine (BENTYL) 10 mg capsule  •  fenofibrate (TRICOR) 48 mg tablet  •  fluticasone (FLONASE) 50 mcg/act nasal spray  •  letrozole [facility]) 2.5 mg tablet  •  losartan (COZAAR) 100 MG tablet  •  magnesium oxide (MAG-OX) 400 mg  •  multivitamin (THERAGRAN) TABS  •  Omega 3 1000 MG CAPS  •  Probiotic Product (PROBIOTIC-10 PO)  •  rosuvastatin (CRESTOR) 5 mg tablet  Allergies   Allergen Reactions   • Antihistamines, Diphenhydramine-Type    • Arimidex [Anastrozole] Confusion   • Bupropion    • Clarithromycin    • Codeine Other (See Comments)     increased HR   • Gabapentin    • Meloxicam Nausea Only and Visual Disturbance     Other reaction(s): Numbness  Action Taken: med stopped.; Category: Allergy;    • Ondansetron    • Pravastatin    • Propoxyphene Other (See Comments)     increased HR     Reviewed medications and allergies and updated as indicated    PHYSICAL EXAM:    Blood pressure 136/70, height 5' 3.5" (1.613 m), weight 77.1 kg (170 lb). Body mass index is 29.64 kg/m². General Appearance: NAD, cooperative, alert  Eyes: Anicteric, PERRLA, EOMI  ENT:  Normocephalic, atraumatic, normal mucosa. Neck:  Supple, symmetrical, trachea midline  Resp:  Clear to auscultation bilaterally; no rales, rhonchi or wheezing; respirations unlabored   CV:  S1 S2, Regular rate and rhythm; no murmur, rub, or gallop. GI:  Soft, non-tender, non-distended; normal bowel sounds; no masses, no organomegaly   Rectal: Deferred  Musculoskeletal: No cyanosis, clubbing or edema. Normal ROM.   Skin:  No jaundice, rashes, or lesions   Heme/Lymph: No palpable cervical lymphadenopathy  Psych: Normal affect, good eye contact  Neuro: No gross deficits, AAOx3    Lab Results:   Lab Results   Component Value Date    WBC 4.57 06/26/2023    HGB 12.9 06/26/2023    HCT 39.0 06/26/2023    MCV 91 06/26/2023     06/26/2023     Lab Results   Component Value Date     07/15/2016    K 4.1 06/26/2023     (H) 06/26/2023    CO2 24 06/26/2023    BUN 18 06/26/2023    CREATININE 0.74 06/26/2023    GLUF 106 (H) 06/26/2023    CALCIUM 9.8 06/26/2023    AST 25 06/26/2023    ALT 25 06/26/2023    ALKPHOS 55 06/26/2023    PROT 6.7 07/15/2016    BILITOT 0.6 07/15/2016    EGFR 78 06/26/2023     Lab Results   Component Value Date    IRON 77 04/21/2023    TIBC 344 04/21/2023    FERRITIN 286 04/21/2023     Lab Results   Component Value Date    LIPASE 160 07/31/2018       Radiology Results:   None recently

## 2023-08-02 NOTE — PROGRESS NOTES
[facility] [place] [facility] [facility] - Outpatient Follow-up Note  Jose Juan Cormier 69 y.o. female MRN: [id number]  Encounter: [id number]    ASSESSMENT AND PLAN:      1. Irritable bowel syndrome with diarrhea  Long history of irritable bowel syndrome taking supplements including magnesium and glutamine. Rarely needs to use Bentyl for abdominal pain. Suspect yellow stool just exacerbation of this with alterations of her bowel azra secondary to cancer treatments  -Follow for now. -Can use probiotic, magnesium, and L-glutamine  -Would not use any further liver detox      2. History of fatty liver  Seen on previous CAT scan. LFTs from June 2023 normal.  CAT scan as part of cancer screening reported normal liver  - Follow-up for now  -Stressed the importance of weight loss    3. Colon cancer screening  Last colonoscopy June 2015. Due for follow-up 2025      Followup Appointment: 1 year  ______________________________________________________________________    Chief Complaint   Patient presents with   • Change in Bowel Habits     HPI: The patient is seen back in the office today. She is history of gastritis and irritable bowel syndrome who is doing well taking magnesium supplements daily with Levsin and Bentyl as needed when I last saw her 2 years ago. Since that time she was diagnosed with breast cancer with positive lymph nodes. She underwent surgery radiation and is now on hormone therapy. She reports having a change in bowel habits during this time period. She reports that she still moving her bowels every day but the stools are little looser and are yellow. She moves her bowels once a day she denies any diarrhea, incontinence, or rectal bleeding. She denies any abdominal pain. Her weight is increasing. Because of the yellow that she thought this was related to her fatty liver and tried a liver detox.   She also restarted herself on L-glutamine from the [facility] store for her "leaky gut"    Historical Information   Past Medical History:   Diagnosis Date   • Anxiety    • Anxiety disorder    • Arthralgia    • Arthritis    • Asthma    • Basal cell carcinoma    • Breast cancer (HCC)    • Chronic lumbar radiculopathy     last assessed: 12/20/16   • Chronic respiratory failure ([address])    • Colon cancer screening 04/11/2019    Last colonoscopy 2014-15   • Depression    • Dysfunction of eustachian tube    • Dyslipidemia    • Emphysema lung (HCC)    • Fatigue    • HTN (hypertension) 10/29/2014   • Hypercholesterolemia    • Hypertension    • Ileus of unspecified type ([address])    • Irritable bowel syndrome with diarrhea 04/11/2019   • Lumbosacral stenosis     last assessed: 9/20/16   • TRAE (obstructive sleep apnea)    • Pancreatitis     resolved: 9/20/17   • Pneumonia    • Post-operative nausea and vomiting    • PTSD (post-traumatic stress disorder)      Past Surgical History:   Procedure Laterality Date   • BREAST BIOPSY Right 09/12/2022    [facility]   • BREAST LUMPECTOMY Right 10/21/2022    Procedure: LUMPECTOMY BREAST FLORENCIO;  Surgeon: Steven Sun MD;  Location: [facility];  Service: Surgical Oncology   • CHOLECYSTECTOMY     • EGD  04/15/2019   • GALLBLADDER SURGERY     • HYSTERECTOMY      pt thinks she still has one ovary, done over 20 yrs ago   • LUMBAR SPINE SURGERY  12/2016    3, 4, 5   • LYMPH NODE BIOPSY Right 10/21/2022    Procedure: SLN BX, lymphoscintigraphy;  Surgeon: Steven Sun MD;  Location: [facility];  Service: Surgical Oncology   • MOUTH SURGERY     • MULTIPLE TOOTH EXTRACTIONS N/A 2018   • OOPHORECTOMY Bilateral     1 1/2 ovaries removed   • TONSILLECTOMY     • US BREAST FLORENCIO  NEEDLE LOC RIGHT Right 09/12/2022   • US GUIDED BREAST BIOPSY RIGHT COMPLETE Right 09/12/2022     Social History     Substance and Sexual Activity   Alcohol Use Yes   • Alcohol/week: 4.0 standard drinks of alcohol   • Types: 4 Glasses of wine per week    Comment: 4 drinks every night     Social History     Substance and Sexual Activity   Drug Use Yes   • Frequency: 7.0 times per week   • Types: Marijuana    Comment: uses topically daily- last used 10/20     Social History     Tobacco Use   Smoking Status Never   Smokeless Tobacco Never     Family History   Problem Relation Age of Onset   • Breast cancer Mother 61   • Depression Mother         panic attacks   • Lung cancer Mother    • Mental illness Mother    • Substance Abuse Mother         CIGS   • Coronary artery disease Father         high # of paternal family members  in their 46s   • Substance Abuse Father         CIGS   • Depression Sister         panic attacks   • Hypertension Sister    • Breast cancer Daughter 48        CHEK2 positve   • Breast cancer Maternal Aunt 40   • Lung cancer Maternal Aunt    • Ovarian cancer Maternal Grandmother 79   • Depression Other         panic attacks   • Colon cancer Other         age at dx unk   • Heart disease Family    • Hypertension Family    • Colon polyps Neg Hx          Current Outpatient Medications:   •  ascorbic acid (VITAMIN C) 250 mg tablet  •  atenolol (TENORMIN) 25 mg tablet  •  Cholecalciferol (VITAMIN D3) 2000 units capsule  •  clonazePAM (KlonoPIN) 0.5 mg tablet  •  co-enzyme Q-10 30 MG capsule  •  cyclobenzaprine (FLEXERIL) 5 mg tablet  •  dicyclomine (BENTYL) 10 mg capsule  •  fenofibrate (TRICOR) 48 mg tablet  •  fluticasone (FLONASE) 50 mcg/act nasal spray  •  letrozole (FEMARA) 2.5 mg tablet  •  losartan (COZAAR) 100 MG tablet  •  magnesium oxide (MAG-OX) 400 mg  •  multivitamin (THERAGRAN) TABS  •  Omega 3 1000 MG CAPS  •  Probiotic Product (PROBIOTIC-10 PO)  •  rosuvastatin (CRESTOR) 5 mg tablet  Allergies   Allergen Reactions   • Antihistamines, Diphenhydramine-Type    • Arimidex [Anastrozole] Confusion   • Bupropion    • Clarithromycin    • Codeine Other (See Comments)     increased HR   • Gabapentin    • Meloxicam Nausea Only and Visual Disturbance     Other reaction(s): Numbness  Action Taken: med stopped.; Category: Allergy;    • Ondansetron    • Pravastatin    • Propoxyphene Other (See Comments)     increased HR     Reviewed medications and allergies and updated as indicated    PHYSICAL EXAM:    Blood pressure 136/70, height 5' 3.5" (1.613 m), weight 77.1 kg (170 lb). Body mass index is 29.64 kg/m². General Appearance: NAD, cooperative, alert  Eyes: Anicteric, PERRLA, EOMI  ENT:  Normocephalic, atraumatic, normal mucosa. Neck:  Supple, symmetrical, trachea midline  Resp:  Clear to auscultation bilaterally; no rales, rhonchi or wheezing; respirations unlabored   CV:  S1 S2, Regular rate and rhythm; no murmur, rub, or gallop. GI:  Soft, non-tender, non-distended; normal bowel sounds; no masses, no organomegaly   Rectal: Deferred  Musculoskeletal: No cyanosis, clubbing or edema. Normal ROM.   Skin:  No jaundice, rashes, or lesions   Heme/Lymph: No palpable cervical lymphadenopathy  Psych: Normal affect, good eye contact  Neuro: No gross deficits, AAOx3    Lab Results:   Lab Results   Component Value Date    WBC 4.57 06/26/2023    HGB 12.9 06/26/2023    HCT 39.0 06/26/2023    MCV 91 06/26/2023     06/26/2023     Lab Results   Component Value Date     07/15/2016    K 4.1 06/26/2023     (H) 06/26/2023    CO2 24 06/26/2023    BUN 18 06/26/2023    CREATININE 0.74 06/26/2023    GLUF 106 (H) 06/26/2023    CALCIUM 9.8 06/26/2023    AST 25 06/26/2023    ALT 25 06/26/2023    ALKPHOS 55 06/26/2023    PROT 6.7 07/15/2016    BILITOT 0.6 07/15/2016    EGFR 78 06/26/2023     Lab Results   Component Value Date    IRON 77 04/21/2023    TIBC 344 04/21/2023    FERRITIN 286 04/21/2023     Lab Results   Component Value Date    LIPASE 160 07/31/2018       Radiology Results:   None recently

## 2023-08-03 ENCOUNTER — OFFICE VISIT (OUTPATIENT)
Dept: PHYSICAL THERAPY | Facility: CLINIC | Age: 78
End: 2023-08-03
Payer: MEDICARE

## 2023-08-03 DIAGNOSIS — R26.89 BALANCE DISORDER: Primary | ICD-10-CM

## 2023-08-03 DIAGNOSIS — R53.81 PHYSICAL DECONDITIONING: ICD-10-CM

## 2023-08-03 DIAGNOSIS — I10 ESSENTIAL HYPERTENSION: ICD-10-CM

## 2023-08-03 PROCEDURE — 97112 NEUROMUSCULAR REEDUCATION: CPT | Performed by: PHYSICAL THERAPIST

## 2023-08-03 PROCEDURE — 97140 MANUAL THERAPY 1/> REGIONS: CPT | Performed by: PHYSICAL THERAPIST

## 2023-08-03 RX ORDER — LOSARTAN POTASSIUM 100 MG/1
TABLET ORAL
Qty: 90 TABLET | Refills: 3 | Status: SHIPPED | OUTPATIENT
Start: 2023-08-03

## 2023-08-03 NOTE — PROGRESS NOTES
PT Re-Evaluation     Today's date: 8/3/2023  Patient name: Cynthia Montiel  : 1945  MRN: [id number]  Referring provider: VITOR Arthur  Dx:   Encounter Diagnosis     ICD-10-CM    1. Balance disorder  R26.89       2. Physical deconditioning  R53.81                      Assessment  Assessment details: Since starting skilled PT, mulit joint pain is unchanged, balance has improved slightly, but continues to have intermittent episodes where she looses her balance abnormally. Pt to see Dr. Jami Tafoya in neurology 8/10/23. Please advise regarding skilled PT. Pt may need diagnostic testing on L hip 2* high pain levels. Pt may benefit from transition to neuro PT for balance deficits. Pt was issued an updated HEP. Impairments: abnormal gait, abnormal or restricted ROM, activity intolerance, impaired physical strength and pain with function  Understanding of Dx/Px/POC: good   Prognosis: good    Goals  STG's ( 3-4 weeks)  1. Pt will be independent in HEP- met  2. Pt will be independent in chip bag use to decrease fibrotic breast tissue-met    LTG's (4-6 weeks)  1. Reduce R breast edema to max ability- met  2. Pt will have improved R UE ROM to WFL's- met  3. Pt will be able to reach overhead with greater ease-partial met  4. Pt will have 4+/5 R shoulder abduction strength-met  5. Improve FOTO score by 4-6 points- not met  6. Pt will have improved balance demonstrated by improved score on [employer] Balance testing- met  7.  Pt will have improved tolerance for walking community distances- not met    Plan  Patient would benefit from: skilled physical therapy  Planned therapy interventions: manual therapy, joint mobilization, neuromuscular re-education, stretching, strengthening, therapeutic activities, therapeutic exercise, functional ROM exercises, flexibility, home exercise program and balance  Other planned therapy interventions: MLD  Frequency: 2x week  Duration in weeks: 6  Plan of Care beginning date: 8/3/2023  Plan of Care expiration date: 9/14/2023  Treatment plan discussed with: patient and PTA        Subjective Evaluation    History of Present Illness  Mechanism of injury: I.E: Pt has a history of R breast CA stage IA. Pt underwent R lumpectomy with sentinel lymph node biopsy with clear margins, 2 out of 4 lymph nodes involved. Pt had 30 radiation treatments from 12/12/2022-1/24/23. Pt reports she is able to garden and walk her dog every day. Pt has been noticing increased fatigue and says that she feels tired all the time. Pt misses doing a physical sport such as pickle ball. Pt is currently unable to do this due to back pain and R knee pain. Pt had lumbar surgery and nerve ablations x2 in 2000. Pt has noticed her balance has not been as good recently. Pt does not use a SPC when inside her home. Pt uses a rollator walker when walking the dog. Pt has a torn meniscus in her R knee. Pt uses a special cream to control her pain. Pt has R arm pain after performing gardening and digging activities. Pt has some shoulder pain when lowering her arm after reaching overhead. Pt has pain when taking off a shirt and reaching behind her back. 6/29/23; Pt has been compliant in HEP. Pt is able to lift her arm better, but continues to feel tightness in her arm. Pt is taking Anastrazol medication, and is feeling like her muscle aches and pains are worsening. Pt is able walk for about 25 minutes. Pt has increased pain when reaching behind her back and less pain when taking off a shirt. Pt reports she is using a chip bag to control the swelling in her breast and her tissue is softer. Pt continues to take a nap, but the nap is shorter and sleeps for about 1.5 hours, rather than 2.5 hours. 8/3/23: Pt reports all of her joints are hurting more, such as her ankles, knees, hips, wrists and shoulders. Pt has good days and bad days with her balance. Tuesday was a bad day with increased aching in her hip and decreased balance. Pt notes decreased balance when turning her head to the right. Pt denies dizziness or vertigo sx.     Work: retired  Hobbies: gardening, walking the dog, plays [employer]  Gait: slow speed with [facility]  Patient Goals  Patient goals for therapy: decreased pain and improved balance  Patient goal: to have less fatigue;  Pain  At best pain ratin  At worst pain ratin  Location: R shoulder  Quality: tight, dull ache and sharp    Social Support    Employment status: not working  Hand dominance: right          Objective     Neurological Testing     Sensation     Shoulder   Left Shoulder   Intact: light touch    Right Shoulder   Intact: light touch    Hip   Left Hip   Intact: light touch    Right Hip   Intact: light touch    Reflexes   Left   Biceps (C5/C6): normal (2+)  Brachioradialis (C6): normal (2+)  Triceps (C7): normal (2+)    Right   Biceps (C5/C6): normal (2+)  Brachioradialis (C6): normal (2+)  Triceps (C7): normal (2+)    Active Range of Motion   Left Shoulder   Flexion: 155 degrees with pain  Abduction: 150 degrees     Right Shoulder   Flexion: 165 degrees with pain  Abduction: 138 degrees   External rotation 45°: 50 degrees with pain  Internal rotation 45°: 85 degrees     Additional Active Range of Motion Details  (+) TTP L axillary region and R lateral breast  (+) R fibrotic tissue lateral and inferior breast  (-) stemmer sign    Passive Range of Motion     Right Shoulder   Flexion: 155 degrees with pain  Abduction: WFL  External rotation 45°: 50 degrees with pain  Internal rotation 45°: 85 degrees     Strength/Myotome Testing     Left Shoulder     Planes of Motion   Flexion: 4+   Abduction: 4 (pain)   External rotation at 45°: 5   Internal rotation at 0°: 5     Right Shoulder     Planes of Motion   Flexion: 4+   Abduction: 4+ (pain)   External rotation at 45°: 5   Internal rotation at 0°: 5     Left Hip   Planes of Motion   Flexion: 4  External rotation: 4+  Internal rotation: 4+    Right Hip   Planes of Motion   Flexion: 4  External rotation: 4+  Internal rotation: 4+    Left Knee   Normal strength    Right Knee   Normal strength    Additional Strength Details  Ankle df MMT: 5/5          Dx: balance disorder  EPOC: 8/10/23  CO-MORBIDITIES:  PERSONAL FACTORS:  Precautions: LBP, decreased balance; unsteady      Manuals 6/29 7/3 7/10 7/17 7/26 7/31 8/3      R UE/ breast MLD th  D/c          L hip PROM/ MFR th  8'    10'      Re-eval: add balance dx Th x15'      th                   Neuro Re-Ed             rhomberg EO on foam  No foam WB/NB 10" 10 30"x2 FA 30"x2 FA FT 30"x2 FT 30"x2 FT 30"x2      rhomberg FT EC  No foam WB 5" 10 FA 30"x2 FA 30" x 2 FA 30" x 2 FA 30" x 2 FA 30"x3      SLS    Foot on dynadisc 2x30" ea Foot on dynadisc 2x30" ea        DLS: abd bracing   [id number]"  t/o T/o w/ VC's       Sidestepping on big foam with items underneath  5laps  3 laps foam 1 HH no shoes 3 laps foam 1 HH 3 laps faom 1 HH 3 laps faom 1 HH 3 laps foam 1 HH      Tandem walking on grn line  3laps 3 laps 1 HH shoe on 3 laps 1 HH 3 laps 1 HH 3 laps 1 HH 3 laps 1 HH      Sit to stand then walk to 2wedges(incline and decline)  5x           Mini squats   10x5" 10x 20 20       bridges   10x5"  3x p!        rockerboard    10x ea M/L and A/P 10x ea M/L and A/P 10x ea M/L and A/P       FW walking over hurdles                                                    Ther Ex             Nu Bike for LE ROM  L3 5' 5' 5' 5' 5' 6'      LAQ   10x5"  10x5" 10x5"       Standing HR   10 10 20 20       S/l hip abduction   p! Supine SLR   5 ea          QS     10x10" 10x5"                                 Ther Activity             Practice sit-stand  40x x9 p!           Step up: FW   4"x10 ea 4"x10 ea np 6"x10 ea Step tapx x8      Step up: lat   4"x10 ea 4"x10 ea np np       Gait Training                                       Modalities

## 2023-08-04 ENCOUNTER — TELEPHONE (OUTPATIENT)
Dept: NEUROLOGY | Facility: CLINIC | Age: 78
End: 2023-08-04

## 2023-08-09 ENCOUNTER — TELEPHONE (OUTPATIENT)
Age: 78
End: 2023-08-09

## 2023-08-09 ENCOUNTER — TELEPHONE (OUTPATIENT)
Dept: HEMATOLOGY ONCOLOGY | Facility: CLINIC | Age: 78
End: 2023-08-09

## 2023-08-09 NOTE — TELEPHONE ENCOUNTER
Patient Call    Who are you speaking with? Patient    If it is not the patient, are they listed on an active communication consent form? Yes   What is the reason for this call? Patient calling advising all the pain she is having and the medication she has been taking letrozole (FEMARA) 2.5 mg tablet       Does this require a call back? Yes   If a call back is required, please list best call back number [phone]   If a call back is required, advise that a message will be forwarded to their care team and someone will return their call as soon as possible. Did you relay this information to the patient?  Yes

## 2023-08-09 NOTE — TELEPHONE ENCOUNTER
Spoke with patient and informed her that Dr. Barbra Garcia would like her to stop her letrozole now until she sees her in the office in October. I informed her that she should call next week to let us know how she is feeling. Patient verbalized understanding and is in agreement with the plan.

## 2023-08-09 NOTE — TELEPHONE ENCOUNTER
Spoke with patient regarding her message about side effects she is experiencing from the letrozole. Patient states that about 6-8 weeks after she started the Letrozole, that she has been having severe joint pain. It started in her right shoulder and wrist and now it is in her left hip and ankle. Patient states that she has been taking aleve and tylenol without relief and is not open to many pain medications. I informed her to stop the letrozole and I will call her back regarding a plan. Patient verbalized understanding and is in agreement with the plan.

## 2023-08-10 ENCOUNTER — OFFICE VISIT (OUTPATIENT)
Dept: NEUROLOGY | Facility: CLINIC | Age: 78
End: 2023-08-10
Payer: MEDICARE

## 2023-08-10 ENCOUNTER — TELEPHONE (OUTPATIENT)
Dept: NEUROLOGY | Facility: CLINIC | Age: 78
End: 2023-08-10

## 2023-08-10 ENCOUNTER — HOSPITAL ENCOUNTER (OUTPATIENT)
Dept: NON INVASIVE DIAGNOSTICS | Facility: HOSPITAL | Age: 78
Discharge: HOME | End: 2023-08-10
Attending: RADIOLOGY
Payer: MEDICARE

## 2023-08-10 ENCOUNTER — CLINICAL SUPPORT (OUTPATIENT)
Facility: HOSPITAL | Age: 78
End: 2023-08-10
Attending: RADIOLOGY
Payer: MEDICARE

## 2023-08-10 ENCOUNTER — RADIATION ONCOLOGY FOLLOW-UP (OUTPATIENT)
Facility: HOSPITAL | Age: 78
End: 2023-08-10
Payer: MEDICARE

## 2023-08-10 ENCOUNTER — TELEPHONE (OUTPATIENT)
Facility: HOSPITAL | Age: 78
End: 2023-08-10

## 2023-08-10 VITALS
DIASTOLIC BLOOD PRESSURE: 73 MMHG | SYSTOLIC BLOOD PRESSURE: 120 MMHG | HEART RATE: 69 BPM | TEMPERATURE: 87.4 F | OXYGEN SATURATION: 97 % | WEIGHT: 168 LBS | RESPIRATION RATE: 18 BRPM | BODY MASS INDEX: 29.29 KG/M2

## 2023-08-10 VITALS
TEMPERATURE: 98 F | DIASTOLIC BLOOD PRESSURE: 74 MMHG | WEIGHT: 170 LBS | HEART RATE: 69 BPM | BODY MASS INDEX: 29.02 KG/M2 | HEIGHT: 64 IN | SYSTOLIC BLOOD PRESSURE: 114 MMHG

## 2023-08-10 DIAGNOSIS — S89.92XA LEFT KNEE INJURY: Primary | ICD-10-CM

## 2023-08-10 DIAGNOSIS — G60.9 HEREDITARY AND IDIOPATHIC NEUROPATHY, UNSPECIFIED: ICD-10-CM

## 2023-08-10 DIAGNOSIS — C50.411 MALIGNANT NEOPLASM OF UPPER-OUTER QUADRANT OF RIGHT BREAST IN FEMALE, ESTROGEN RECEPTOR POSITIVE (HCC): Primary | ICD-10-CM

## 2023-08-10 DIAGNOSIS — Z17.0 MALIGNANT NEOPLASM OF UPPER-OUTER QUADRANT OF RIGHT BREAST IN FEMALE, ESTROGEN RECEPTOR POSITIVE (HCC): ICD-10-CM

## 2023-08-10 DIAGNOSIS — R26.89 BALANCE DISORDER: ICD-10-CM

## 2023-08-10 DIAGNOSIS — G60.3 IDIOPATHIC PROGRESSIVE NEUROPATHY: ICD-10-CM

## 2023-08-10 DIAGNOSIS — G62.9 NEUROPATHY: ICD-10-CM

## 2023-08-10 DIAGNOSIS — Z17.0 MALIGNANT NEOPLASM OF UPPER-OUTER QUADRANT OF RIGHT BREAST IN FEMALE, ESTROGEN RECEPTOR POSITIVE (HCC): Primary | ICD-10-CM

## 2023-08-10 DIAGNOSIS — R27.0 ATAXIA: ICD-10-CM

## 2023-08-10 DIAGNOSIS — Z17.0 MALIGNANT NEOPLASM OF UPPER-OUTER QUADRANT OF RIGHT BREAST IN FEMALE, ESTROGEN RECEPTOR POSITIVE: Primary | ICD-10-CM

## 2023-08-10 DIAGNOSIS — M62.838 MUSCLE SPASTICITY: ICD-10-CM

## 2023-08-10 DIAGNOSIS — M62.89 PROXIMAL WEAKNESS OF EXTREMITY: ICD-10-CM

## 2023-08-10 DIAGNOSIS — C50.411 MALIGNANT NEOPLASM OF UPPER-OUTER QUADRANT OF RIGHT BREAST IN FEMALE, ESTROGEN RECEPTOR POSITIVE (HCC): ICD-10-CM

## 2023-08-10 DIAGNOSIS — M13.0 SYMMETRICAL POLYARTHRITIS: ICD-10-CM

## 2023-08-10 DIAGNOSIS — C50.411 MALIGNANT NEOPLASM OF UPPER-OUTER QUADRANT OF RIGHT BREAST IN FEMALE, ESTROGEN RECEPTOR POSITIVE: Primary | ICD-10-CM

## 2023-08-10 PROCEDURE — 99211 OFF/OP EST MAY X REQ PHY/QHP: CPT | Performed by: RADIOLOGY

## 2023-08-10 PROCEDURE — 99214 OFFICE O/P EST MOD 30 MIN: CPT | Performed by: RADIOLOGY

## 2023-08-10 PROCEDURE — 93970 EXTREMITY STUDY: CPT | Performed by: SURGERY

## 2023-08-10 PROCEDURE — 99205 OFFICE O/P NEW HI 60 MIN: CPT | Performed by: PSYCHIATRY & NEUROLOGY

## 2023-08-10 PROCEDURE — 93970 EXTREMITY STUDY: CPT

## 2023-08-10 NOTE — TELEPHONE ENCOUNTER
Patient with pain in her left lower extremity with coolness. Stat duplex bilateral made in [facility] lab. Patient taken via wheelchair. 1540 she returned to the department via wheelchair with report of no DVT. Patient will follow up with orthopedic doctors tomorrow for knee pain.

## 2023-08-10 NOTE — PROGRESS NOTES
Neptali Abbott 1945 is a 69 y.o. female Patient  with invasive lobular carcinoma of the right breast. She is s/p lumpectomy and sentinel lymph node biopsy 10/21/22. She completed radiation to the right breast 1/24/23. She was last seen 3/16/23. She returns today for follow-up. 4/18/23 Dr. Ya Gonzalez- Continue letrozole. F/u 6 months    5/10/23 Bogdan Griffith NP- Referred to strength ABC program. No worrisome findings on exam. Encouraged massage for for fibrosis/postop seroma at lumpectomy site. F/u 6 months    7/24/23 diagnostic bilateral mammogram-  IMPRESSION:   Posttreatment changes on the right. No worrisome new abnormality appreciated. Recommend diagnostic bilateral mammogram in 1 year. ASSESSMENT/BI-RADS CATEGORY:  Overall: 2 - Benign    8/9/23 Dr. Ya Gonzalez stopped letrozole d/t joint pain and swelling and gait issues. Completed PT for lymphedema and uses chip bag in bra.     8/10/23 Dr. Osmar Hernandez neuro consult: Balance disorder, neuropathy, proximal weakness of extremity.        Upcoming:  10/16/23 Dr. Ya Gonzalez  1/18/24 Dr. Kevin Field      Follow up visit     Oncology History   Malignant neoplasm of upper-outer quadrant of right breast in female, estrogen receptor positive ([address])   9/12/2022 Biopsy    Right breast biopsy:  - Invasive lobular carcinoma   -Grade 2  ER 90%  IL 90%  HER2 negative     9/29/2022 -  Cancer Staged    Staging form: Breast, AJCC 8th Edition  - Clinical stage from 9/29/2022: Stage IA (cT1c, cN0, cM0, G2, ER+, IL+, HER2-) - Signed by Makenna Sandoval MD on 9/29/2022  Stage prefix: Initial diagnosis  Method of lymph node assessment: Clinical  Histologic grading system: 3 grade system       10/21/2022 Surgery    Right lumpectomy with sentinel lymph node biopsy:   - Clear margins  - 2/4 lymph nodes  Dr. Kevin Field     10/2022 Genetic Testing    [employer] Breast Cancer STAT Panel (9 genes): SINDI, BRCA1, BRCA2, CDH1, CHEK2, PALB2, PTEN, STK11, and TP53 with reflex to [employer] Core Cancer Panel (27 additional genes): APC, AXIN2, BARD1, BRIP1, BMPR1A, CDK4, CDKN2A, DICER1, EPCAM, GREM1, HOXB13, MLH1, MSH2, MSH3, MSH6, MUTYH, NBN, NF1, NTHL1, PMS2, POLD1, POLE, RAD51C, RAD51D, RECQL SMAD4, SMARCA4    Negative     11/7/2022 -  Cancer Staged    Staging form: Breast, AJCC 8th Edition  - Pathologic stage from 11/7/2022: Stage IA (pT1c, pN1a, cM0, G2, ER+, PA+, HER2-) - Signed by Eli Magallanes MD on 11/7/2022  Stage prefix: Initial diagnosis  Histologic grading system: 3 grade system       12/12/2022 - 1/24/2023 Radiation    Plan ID Energy Fractions Dose per Fraction (cGy) Dose Correction (cGy) Total Dose Delivered (cGy) Elapsed Days   R Boost 6X 4 / 4 250 0 1,000 5   R Breast 10X/6X 25 / 25 200 0 5,000 40   R Sclav_Ax # 10X 25 / 25 200 0 5,000 40   Dr. Jolie Dyson     2/2023 -  Hormone Therapy    Anastrozole, switched to Letrozole in March of 2023  Dr. Crystal Melton      Secondary and unspecified malignant neoplasm of axilla and upper limb lymph nodes ([address])   2/3/2023 Initial Diagnosis    Secondary and unspecified malignant neoplasm of axilla and upper limb lymph nodes ([address])         Review of Systems:  Review of Systems   Constitutional: Positive for activity change (joint pain and swelling especially left knee. ) and fatigue. Feels warm at night   HENT: Positive for postnasal drip. Eyes:        Blurry vision that started after starting anastrozole which has resolved since stopping it, resolved after stopping anastrozole. Respiratory: Positive for shortness of breath (with exertion). Cardiovascular: Negative. Gastrointestinal: Negative. Endocrine: Negative. Genitourinary: Negative. Musculoskeletal: Positive for gait problem. Pain in right shoulder, tenderness in right axilla improved went through lymphedema PT. Now has left hip, knee swollen and painful and crakes with walks since on letrozole. Stop letrozole 8/9,  ankle pain and swelling. Skin: Negative.          Radiation reaction has resolved   Allergic/Immunologic: Negative. Neurological: Positive for dizziness, weakness and light-headedness. Hematological: Negative. Psychiatric/Behavioral: Negative.          Reports memory problems       Clinical Trial: no    Teaching: completed    Health Maintenance   Topic Date Due   • Influenza Vaccine (1) 09/01/2023   • Depression Remission PHQ  09/16/2023   • Breast Cancer Survivorship Visit  11/08/2023   • ONC Cervical Cancer Screening  11/08/2023   • ONC DXA Scan  11/08/2023   • ONC Colorectal Surgery Screening  06/04/2025 (Originally 11/8/2023)   • Fall Risk  06/28/2024   • Urinary Incontinence Screening  06/28/2024   • Medicare Annual Wellness Visit (AWV)  06/28/2024   • BMI: Followup Plan  06/28/2024   • Breast Cancer Screening: Mammogram  07/24/2024   • BMI: Adult  08/10/2024   • Hepatitis C Screening  Completed   • Osteoporosis Screening  Completed   • Pneumococcal Vaccine: 65+ Years  Completed   • COVID-19 Vaccine  Completed   • ONC Physical Therapy Referral  Addressed   • HIB Vaccine  Aged Out   • IPV Vaccine  Aged Out   • Hepatitis A Vaccine  Aged Out   • Meningococcal ACWY Vaccine  Aged Out   • HPV Vaccine  Aged Out   • Colorectal Cancer Screening  Discontinued     Patient Active Problem List   Diagnosis   • Allergic rhinitis   • Anxiety disorder   • Chronic bronchitis with emphysema ([address])   • Chronic low back pain   • Essential hypertension   • Myofascial pain syndrome   • Obstructive sleep apnea   • Panic attack   • Xerostomia   • Symmetrical polyarthritis   • Vitamin D deficiency   • Bilateral carotid artery disease (HCC)   • Colon cancer screening   • Irritable bowel syndrome with diarrhea   • Erosive gastritis   • Class 1 obesity without serious comorbidity in adult   • Syncope   • Abnormal left aortic arch   • Aberrant right subclavian artery   • Nonrheumatic aortic valve insufficiency   • Mild mitral regurgitation   • Mild tricuspid regurgitation   • Lumbar spinal stenosis   • Family hx-breast malignancy   • Dysphagia   • Right leg pain   • Primary osteoarthritis of right knee   • Effusion of right knee   • Degenerative tear of posterior horn of medial meniscus of right knee   • Malignant neoplasm of upper-outer quadrant of right breast in female, estrogen receptor positive ([address])   • Family history of gene mutation   • BRCA negative   • 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   • 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)   • Aromatase inhibitor use   • Other vascular myelopathies (HCC)   • Depression, recurrent (HCC)   • Balance disorder   • Neuropathy   • Proximal weakness of extremity     Past Medical History:   Diagnosis Date   • Anxiety    • Anxiety disorder    • Arthralgia    • Arthritis    • Asthma    • Basal cell carcinoma    • Breast cancer (HCC)    • Chronic lumbar radiculopathy     last assessed: 12/20/16   • Chronic respiratory failure ([address])    • Colon cancer screening 04/11/2019    Last colonoscopy 2014-15   • Depression    • Dysfunction of eustachian tube    • Dyslipidemia    • Emphysema lung (HCC)    • Fatigue    • HTN (hypertension) 10/29/2014   • Hypercholesterolemia    • Hypertension    • Ileus of unspecified type (HCC)    • Irritable bowel syndrome with diarrhea 04/11/2019   • Lumbosacral stenosis     last assessed: 9/20/16   • TRAE (obstructive sleep apnea)    • Pancreatitis     resolved: 9/20/17   • Pneumonia    • Post-operative nausea and vomiting    • PTSD (post-traumatic stress disorder)      Past Surgical History:   Procedure Laterality Date   • BREAST BIOPSY Right 09/12/2022    [facility]   • BREAST LUMPECTOMY Right 10/21/2022    Procedure: LUMPECTOMY BREAST FLORENCIO;  Surgeon: Eliseo Avila MD;  Location: [facility];  Service: Surgical Oncology   • CHOLECYSTECTOMY     • EGD  04/15/2019   • GALLBLADDER SURGERY     • HYSTERECTOMY      pt thinks she still has one ovary, done over 20 yrs ago   • LUMBAR SPINE SURGERY  12/2016    3, 4, 5   • LYMPH NODE BIOPSY Right 10/21/2022    Procedure: SLN BX, lymphoscintigraphy;  Surgeon: Nathan Hopper MD;  Location: [facility];  Service: Surgical Oncology   • MOUTH SURGERY     • MULTIPLE TOOTH EXTRACTIONS N/A    • OOPHORECTOMY Bilateral     1 /2 ovaries removed   • TONSILLECTOMY     • US BREAST FLORENCIO  NEEDLE LOC RIGHT Right 2022   • US GUIDED BREAST BIOPSY RIGHT COMPLETE Right 2022     Family History   Problem Relation Age of Onset   • Breast cancer Mother 61   • Depression Mother         panic attacks   • Lung cancer Mother    • Mental illness Mother    • Substance Abuse Mother         CIGS   • Coronary artery disease Father         high # of paternal family members  in their 46s   • Substance Abuse Father         CIGS   • Depression Sister         panic attacks   • Hypertension Sister    • Breast cancer Daughter 48        CHEK2 positve   • Breast cancer Maternal Aunt 40   • Lung cancer Maternal Aunt    • Ovarian cancer Maternal Grandmother 79   • Depression Other         panic attacks   • Colon cancer Other         age at dx unk   • Heart disease Family    • Hypertension Family    • Colon polyps Neg Hx      Social History     Socioeconomic History   • Marital status:      Spouse name: Not on file   • Number of children: Not on file   • Years of education: Not on file   • Highest education level: Not on file   Occupational History   • Occupation: retired   Tobacco Use   • Smoking status: Never   • Smokeless tobacco: Never   Vaping Use   • Vaping Use: Never used   Substance and Sexual Activity   • Alcohol use:  Yes     Alcohol/week: 4.0 standard drinks of alcohol     Types: 4 Glasses of wine per week     Comment: 4 drinks every night   • Drug use: Yes     Frequency: 7.0 times per week     Types: Marijuana     Comment: uses topically daily- last used 10/20   • Sexual activity: Not Currently   Other Topics Concern   • Not on file   Social History Narrative    Person living alone    FEELS SAFE AT HOME    SEES DENTIST REG    HAS LIVING WILL    Wears seatbelt     Social Determinants of Health     Financial Resource Strain: Low Risk  (6/28/2023)    Overall Financial Resource Strain (CARDIA)    • Difficulty of Paying Living Expenses: Not hard at all   Food Insecurity: Not on file   Transportation Needs: No Transportation Needs (6/28/2023)    PRAPARE - Transportation    • Lack of Transportation (Medical): No    • Lack of Transportation (Non-Medical):  No   Physical Activity: Not on file   Stress: Not on file   Social Connections: Not on file   Intimate Partner Violence: Not on file   Housing Stability: Not on file       Current Outpatient Medications:   •  ascorbic acid (VITAMIN C) 250 mg tablet, Take 250 mg by mouth daily, Disp: , Rfl:   •  atenolol (TENORMIN) 25 mg tablet, TAKE 1 TABLET BY MOUTH TWICE DAILY, Disp: 180 tablet, Rfl: 3  •  Cholecalciferol (VITAMIN D3) 2000 units capsule, Take by mouth daily, Disp: , Rfl:   •  clonazePAM (KlonoPIN) 0.5 mg tablet, Take 1 tablet (0.5 mg total) by mouth 2 (two) times a day as needed for anxiety, Disp: 60 tablet, Rfl: 0  •  co-enzyme Q-10 30 MG capsule, Take 30 mg by mouth daily , Disp: , Rfl:   •  cyclobenzaprine (FLEXERIL) 5 mg tablet, Take 1 tablet (5 mg total) by mouth 2 (two) times a day, Disp: 60 tablet, Rfl: 1  •  dicyclomine (BENTYL) 10 mg capsule, TAKE 2 CAPSULES BY MOUTH  TWO TIMES A DAY (Patient taking differently: Take 10 mg by mouth 2 (two) times a day as needed), Disp: 360 capsule, Rfl: 12  •  fenofibrate (TRICOR) 48 mg tablet, Take 1 tablet (48 mg total) by mouth every morning, Disp: 90 tablet, Rfl: 3  •  fluticasone (FLONASE) 50 mcg/act nasal spray, USE 1 SPRAY IN EACH NOSTRIL DAILY, Disp: 16 g, Rfl: 5  •  letrozole (FEMARA) 2.5 mg tablet, Take 1 tablet (2.5 mg total) by mouth daily (Patient not taking: Reported on 8/10/2023), Disp: 30 tablet, Rfl: 6  •  losartan (COZAAR) 100 MG tablet, TAKE 1 TABLET BY MOUTH  DAILY, Disp: 90 tablet, Rfl: 3  • magnesium oxide (MAG-OX) 400 mg, Take 400 mg by mouth every other day , Disp: , Rfl:   •  multivitamin (THERAGRAN) TABS, Take 1 tablet by mouth daily, Disp: , Rfl:   •  Omega 3 1000 MG CAPS, Take by mouth daily, Disp: , Rfl:   •  Probiotic Product (PROBIOTIC-10 PO), Take by mouth daily, Disp: , Rfl:   •  rosuvastatin (CRESTOR) 5 mg tablet, TAKE 1 TABLET BY MOUTH 3  TIMES WEEKLY, Disp: 39 tablet, Rfl: 3  Allergies   Allergen Reactions   • Antihistamines, Diphenhydramine-Type    • Arimidex [Anastrozole] Confusion   • Bupropion    • Clarithromycin    • Codeine Other (See Comments)     increased HR   • Gabapentin    • Meloxicam Nausea Only and Visual Disturbance     Other reaction(s): Numbness  Action Taken: med stopped.; Category: Allergy;    • Ondansetron    • Pravastatin    • Propoxyphene Other (See Comments)     increased HR     There were no vitals filed for this visit.

## 2023-08-10 NOTE — ASSESSMENT & PLAN NOTE
Balance disorder is multifactorial  Pt notes int swaying to the right  Pt notes feeling off balance  Currently in balance therapy  Rev notes from aug 3  Pt main issue joint pain and now superimposed left knee injury about one week old and still issues with weight bearing  Pt with dec vib more right greater than left  Pt also with known "bad back".  Surgery at [facility] about 7 yrs ago but pt unsure if hardware is mri compatible and name of surgeon  Needs neuropathy eval  Pt needs emg of lowers  Needs follow up with ortho asap  Pt going to find name of her back surgeon and check about ability to get mri head  Due to balance issue, neruopathic features needs emg  However pt also with some issues with use of left side, at times seems to have an apraxia of the limb  Some issues with time line and details and pt here by herself  rec ideally mri head in light of balance and left sided apraxia and breast cancer history this past year  Pt unsure about mri head  Will get ct head with contrast  Ideally will order mri head attn iacs with and without once pt calls back  Will also check with pcp if she has any data from prior orthopedic surgery

## 2023-08-10 NOTE — ASSESSMENT & PLAN NOTE
Pt here today for initial neuro consultation  Pt notes consult for balance   Pt main issues at this time is pain in joints  Pt notes sxs ever since start of letrozole  Pt did get in contact with oncology yesterday and told to stop medication  Rev with pt need to see oncology for appt due to multiplicity of pain issues in several joints, right shoulder, right wrist, knees and ankles  Pt has been having cracking sensation in all joints and now just recently ie one week ago upon walking down step had cracking following by severe pain.   Pt with issue with bearing weight at all on leg  Told pt needed to go to er  Pt declined in office  Pt already established with dr Aj Mattson for right knee, sent referral  Also sending notes to her provider team dr blount and dr Florentino Garcia  Pt recommended to get more urgent care and testing at ER  Pt also going to try to go to ortho office right from here as well ie one floor below  Will also check for lyme too

## 2023-08-10 NOTE — TELEPHONE ENCOUNTER
Pt seen in office today for first visit. Please see full consult note. 70 min spent with multiplicity of current issues. Pt sent for balance and muscle stiffness. Pt current issue severe multiple joint pain even to any movement. Pt feels all started since change to letrozole. Breast cancer diagnosed one yr ago. Pt also with significant crunching noise of her joints and felt a severe pain in left knee upon walking down steps one week ago. The joint pains for at least 3-4 weeks,. Pt unable to bear any significant weight on left leg. All of these issues more acute to subacute in nature. Pt also with some proximal weakness more on left thigh. Pt looking for further direction from her current team including pcp , oncologist and orthopedist.  Pt seen in past for right knee. Pt concerned about effects of the letrozole. Also with right shoulder, right wrist and left ankle and hip pain. The injury is to her left knee. Letitia Rolling do you know if prior back surgery hardware from [facility] is compatible with mri ? I would like to order mri head due to balance and apraxia of left side. Pt was unsure about any names, timelines of prior surgeries and hoped you would know. For now I ordered ct head with contrast.   Neuropathy workup as well but she has many ongoing new issues that were concerning. Pt refused to go to er. I am ccing Dr Ashlee Arvizu, Dr Barbra Garcia and Dr Lieutenant Guillermo.

## 2023-08-10 NOTE — TELEPHONE ENCOUNTER
Dr. Johann Sosa is away until 8/21. He would be able to see her for her left knee when he returns. If she wants to be seen sooner we can get her scheduled with our total joint specialist Dr. Leona Steele. I'll have the office call to offer an appointment with whomever she chooses.

## 2023-08-10 NOTE — ASSESSMENT & PLAN NOTE
Pt with some features of neuropathy  Some asymetry which is odd more apparent on right lower ext  Pt also with superimposed low back issues and prior surgery as well as need for ablation post surgery  rec emg of lowers  rec neuropathy labs including paraneoplastic panel

## 2023-08-10 NOTE — ASSESSMENT & PLAN NOTE
Pt with left greater than right proximal weakness  Pt notes history of issues with statins and notes more affecting left thigh  Question if back issues also contributory to asymetry  Pt does not recall name of ortho or specific mri center where her back mris were done.  Pt notes ortho was specific about mri location and she only went to this one location specifically for the lumbar studies and not in epic  rec CK  Pt on statins every other day  emg will also be helpful

## 2023-08-10 NOTE — PROGRESS NOTES
Patient ID: Sarbjit Jiang is a 69 y.o. female. Assessment/Plan:    Symmetrical polyarthritis  Pt here today for initial neuro consultation  Pt notes consult for balance   Pt main issues at this time is pain in joints  Pt notes sxs ever since start of letrozole  Pt did get in contact with oncology yesterday and told to stop medication  Rev with pt need to see oncology for appt due to multiplicity of pain issues in several joints, right shoulder, right wrist, knees and ankles  Pt has been having cracking sensation in all joints and now just recently ie one week ago upon walking down step had cracking following by severe pain. Pt with issue with bearing weight at all on leg  Told pt needed to go to er  Pt declined in office  Pt already established with dr Brent Yu for right knee, sent referral  Also sending notes to her provider team dr blount and dr Juan Francisco Quevedo  Pt recommended to get more urgent care and testing at ER  Pt also going to try to go to ortho office right from here as well ie one floor below  Will also check for lyme too      Balance disorder  Balance disorder is multifactorial  Pt notes int swaying to the right  Pt notes feeling off balance  Currently in balance therapy  Rev notes from aug 3  Pt main issue joint pain and now superimposed left knee injury about one week old and still issues with weight bearing  Pt with dec vib more right greater than left  Pt also with known "bad back".  Surgery at [facility] about 7 yrs ago but pt unsure if hardware is mri compatible and name of surgeon  Needs neuropathy eval  Pt needs emg of lowers  Needs follow up with ortho asap  Pt going to find name of her back surgeon and check about ability to get mri head  Due to balance issue, neruopathic features needs emg  However pt also with some issues with use of left side, at times seems to have an apraxia of the limb  Some issues with time line and details and pt here by herself  rec ideally mri head in light of balance and left sided apraxia and breast cancer history this past year  Pt unsure about mri head  Will get ct head with contrast  Ideally will order mri head attn iacs with and without once pt calls back  Will also check with pcp if she has any data from prior orthopedic surgery      Neuropathy  Pt with some features of neuropathy  Some asymetry which is odd more apparent on right lower ext  Pt also with superimposed low back issues and prior surgery as well as need for ablation post surgery  rec emg of lowers  rec neuropathy labs including paraneoplastic panel    Proximal weakness of extremity  Pt with left greater than right proximal weakness  Pt notes history of issues with statins and notes more affecting left thigh  Question if back issues also contributory to asymetry  Pt does not recall name of ortho or specific mri center where her back mris were done. Pt notes ortho was specific about mri location and she only went to this one location specifically for the lumbar studies and not in epic  rec CK  Pt on statins every other day  emg will also be helpful       Diagnoses and all orders for this visit:    Left knee injury  -     EMG 2 limb lower extremity; Future  -     Ambulatory Referral to Orthopedic Surgery; Future    Muscle spasticity  -     Ambulatory Referral to Neurology    Balance disorder  -     Ambulatory Referral to Neurology  -     Cancel: CT head wo contrast; Future    Proximal weakness of extremity  -     CK; Future  -     EMG 2 limb lower extremity; Future    Neuropathy  -     Vitamin B12; Future  -     Vitamin B6; Future  -     Vitamin B1, whole blood; Future  -     Folate; Future  -     Lyme Disease Serology w/Reflex; Future  -     Sjogren's Antibodies; Future  -     Cancel: CT head wo contrast; Future  -     EMG 2 limb lower extremity; Future  -     Anti-Hu, Ri, Yo Antibody Profile; Future    Idiopathic progressive neuropathy  -     Vitamin B12; Future  -     Folate;  Future    Hereditary and idiopathic neuropathy, unspecified  -     Vitamin B6; Future    Symmetrical polyarthritis           Subjective:    HPI    Pt is a 68 yo f with pmh of TRAE, HTN, right breast camcer s/p surgery including lymph nodes and radiation- dx about one year ago. Pt presents today for balance issue. Initial consult for muscle tightness and joint pain. Pt with new onset joint pain over past month ever since new change in cancer meds. Pt notes she was unable to tolerate anatrozole due to confusion. Pt was then placed on letrazole more recently and just told to stop med due to extreme joint pain. Pt notes significant issues over the past several weeks. Pt notes pain in right shoulder even to slight movement, also in right wrist, both knees and left ankle. Pt with history of low back surgery by [facility] about 7 yrs ago and still needed ablation therapy after surgery. No [facility] records in [place] and pt not know name of surgeon or location for mri lumbar imaging. Pt recalls it had to be at a specific location but unsure name. Pt is also not sure if back hardware is mri compatible, no card available. Pt notes int falls. Pt notes diff with ambulation for years. Pt using cane for any distances over past 2 yrs. In home or in garden she does not use,  Pt seen by dr Flor Hannah for her right knee in past. Pt notes since being on letrozole, cracking and crunching sensation in her joints. Pt was going to PT for balance therapy and recalls issues with left knee at that time. When she got home pt had been going down steps and had horrible severe pain and now after one week , still unable to bear full weight on leg. Pt declined er visit. Strongly recommended er eval for imaging and ortho eval. Pt declined. Will send referal to ortho and notify pcp as well. Pt here is same building as dr Flor Hannah,.  rec pt to go to office and see about fit in appt this week due to new knee issue. Pt also needs to follow up with dr Pb Noble re pain in joints.    On exam pt with evidence of some apraxia on left side. Strongly rec mri head padmini with cancer history , but pt unsure if ok to get imaging due to her hardware. Ordered ct head with contrast in light of breast cancer. Also rec paraneoplastic panel due to neuropathic features on exam.  Pt also with ongoing issues for months with diff getting out of chair. Pt with left greater than right proximal weakness. Unsure if component related to bad back history but pt also with diff with statins in past.  Currently on statin q other day. Will check ck and emg. Pt aware to go to er for any new or acute focal sxs including her left knee pain. Currently no acute radicular or myelopathic findings. No change in bowel or bladder. No ha or n or v.   No change in speech or swallowing. Pt will call me this week with name of ortho and let us know if cleared for mri imaging. Ideally want mri head attn post fossa and iacs. The following portions of the patient's history were reviewed and updated as appropriate: allergies, current medications, past family history, past medical history, past social history, past surgical history and problem list and med rec and ros rev. Total time spent today 70 minutes. Greater than 50% of total time was spent with the patient and / or family counseling and / or coordination of care    Objective:    Blood pressure 114/74, pulse 69, temperature 98 °F (36.7 °C), height 5' 3.5" (1.613 m), weight 77.1 kg (170 lb). Physical Exam  Constitutional:       General: She is not in acute distress. Appearance: She is not ill-appearing. Eyes:      General: Lids are normal.      Extraocular Movements: Extraocular movements intact. Pupils: Pupils are equal, round, and reactive to light. Musculoskeletal:      Right lower leg: No edema. Left lower leg: No edema. Neurological:      Mental Status: She is alert.       Deep Tendon Reflexes:      Reflex Scores:       Tricep reflexes are 2+ on the right side and 2+ on the left side. Bicep reflexes are 2+ on the right side and 2+ on the left side. Brachioradialis reflexes are 2+ on the right side and 2+ on the left side. Patellar reflexes are 1+ on the right side and 1+ on the left side. Achilles reflexes are 1+ on the right side and 1+ on the left side. Psychiatric:         Speech: Speech normal.         Neurological Exam  Mental Status  Alert. Recent and remote memory are intact. Speech is normal. Language is fluent with no aphasia. Attention and concentration are normal.    Cranial Nerves  CN II: Visual acuity is normal. Visual fields full to confrontation. CN III, IV, VI: Extraocular movements intact bilaterally. Normal lids and orbits bilaterally. Pupils equal round and reactive to light bilaterally. CN V: Facial sensation is normal.  CN VII: Full and symmetric facial movement. CN VIII: Hearing is normal.  CN IX, X: Palate elevates symmetrically. Normal gag reflex. CN XI: Shoulder shrug strength is normal.  CN XII: Tongue midline without atrophy or fasciculations. Motor  Normal muscle bulk throughout. Normal muscle tone. No abnormal involuntary movements. Strength is 5/5 in all four extremities except as noted. Proximal lower ext weakness right 5-/5, left 4/5  Some apraxia of left side specifically left arm and left leg  No issues with drift or fine motor  . Sensory  Dec vib worse on right   Intact temp and pp  No sensory level.     Reflexes                                            Right                      Left  Brachioradialis                    2+                         2+  Biceps                                 2+                         2+  Triceps                                2+                         2+  Finger flex                           2+                         2+  Hamstring                            2+                         2+  Patellar                                1+ 1+  Achilles                                1+                         1+  Right Plantar: downgoing  Left Plantar: downgoing    Coordination  Right: Finger-to-nose normal. Rapid alternating movement normal.Left: Finger-to-nose normal. Heel-to-shin normal.    Gait  Casual gait:  Pt with difficulty with left knee pain and weight bearing  Using cane to keep off left leg. ROS:    Review of Systems   Constitutional: Positive for fatigue. Negative for appetite change and fever. HENT: Negative. Negative for hearing loss, tinnitus, trouble swallowing and voice change. Eyes: Negative. Negative for photophobia, pain and visual disturbance. Respiratory: Positive for shortness of breath. Cardiovascular: Negative. Negative for palpitations. Gastrointestinal: Negative. Negative for nausea and vomiting. Endocrine: Negative. Negative for cold intolerance. Genitourinary: Negative. Negative for dysuria, frequency and urgency. Musculoskeletal: Positive for gait problem. Negative for back pain, myalgias and neck pain. L hip, ankle & knee bad due to cancer medication which doctor stopped  Walking and suddenly loses balance to the R   Skin: Negative. Negative for rash. Allergic/Immunologic: Negative. Neurological: Positive for tremors. Negative for dizziness, seizures, syncope, facial asymmetry, speech difficulty, weakness, light-headedness, numbness and headaches. Tremors in the L leg   Hematological: Negative. Does not bruise/bleed easily. Psychiatric/Behavioral: Negative. Negative for confusion, hallucinations and sleep disturbance. All other systems reviewed and are negative.

## 2023-08-10 NOTE — PROGRESS NOTES
Mahi Laguna  [birth year]  [id number]    Radiation Oncology Follow Up    Ms. Raymond is a 62-year-old woman who presented with a mammographic abnormality in the upper-outer right breast after presenting with some right nipple discomfort.  Mammogram and ultrasound on August 24, 2022 showed a mass measuring 1.8 cm at the 11 o'clock position of the right breast 7 cm from the nipple.  Biopsy of the area was performed on September 12, 2022 and revealed a grade 2 invasive lobular carcinoma that was strongly estrogen and progesterone receptor positive, HER2 negative.  She was seen by Dr. Poonam Barron and underwent a right breast lumpectomy and sentinel node biopsy on October 21, 2022.  Final pathology revealed a 1.8 cm invasive lobular carcinoma with negative margins of excision, lymphovascular invasion present in 2/4 sentinel nodes contained macrometastases measuring up to 9 mm with focal extranodal extension.  She was seen by Dr. Yuliet Zamorano and after discussion declined adjuvant chemotherapy therefore genomic assay was not sent. Susie Story has recommended adjuvant anastrozole. Pathologic stage pT1c pN1a cM0, prognostic stage IB.      She was treated with radiation to the right whole breast and regional nodes to a total dose of 5000 cGy in 25 fractions followed by a right breast tumor bed boost of 1000 cGy in 5 fractions, completing all radiation on January 24, 2023. She had some significant skin reaction at the completion of radiation which she states her primary physician prescribed some kind of topical antibiotic but this has essentially resolved at this point time. She does not have any swelling in the upper extremity specifically. She has been undergoing physical therapy primarily for issues with balance but has also been provided with a massage device for some discomfort along the lateral breast.  She otherwise denies any specific breast symptoms and has not noted any suspicious findings on self breast examination.     She started anastrozole in February 2023 and then had significant deterioration in her visual acuity with bilateral blurry vision that prevented her from reading or painting, as well as confusion. She spoke with Dr. Divya Irene office who recommended discontinuing the medication which she states resulted within a week or 2 of resolution of these visual symptoms. She saw the ophthalmologist and did not have any abnormalities on exam.    She was started then on the letrozole which she tolerated without difficulty for 6 to 8 weeks. At that time however she began to develop severe joint and bone pain and "cracking" in multiple joints including her right shoulder, left hip and left knee. She reports that she tripped last week and then had increased pain in the left knee. She has completed a course of physical therapy for imbalance issues. She was instructed by Dr. Divya Irene office to discontinue letrozole earlier this week to see what of her constellation of symptoms might improve and be attributable to the aromatase inhibitor treatment. She was seen by Dr. Amelia Guajardo in neurology yesterday who is instituting a workup for multiple neurologic issues including polyarthritis, balance disorder, neuropathy. In addition she was encouraged to see orthopedics as soon as possible and has an appointment with them tomorrow regarding her polyarthritis and left knee injury.     Oncology History   Malignant neoplasm of upper-outer quadrant of right breast in female, estrogen receptor positive ([address])   9/12/2022 Biopsy    Right breast biopsy:  - Invasive lobular carcinoma   -Grade 2  ER 90%  KY 90%  HER2 negative     9/29/2022 -  Cancer Staged    Staging form: Breast, AJCC 8th Edition  - Clinical stage from 9/29/2022: Stage IA (cT1c, cN0, cM0, G2, ER+, KY+, HER2-) - Signed by Shantell Dumont MD on 9/29/2022  Stage prefix: Initial diagnosis  Method of lymph node assessment: Clinical  Histologic grading system: 3 grade system 10/21/2022 Surgery    Right lumpectomy with sentinel lymph node biopsy:   - Clear margins  - 2/4 lymph nodes  Dr. Cuco Bird     10/2022 Genetic Testing    [employer] Breast Cancer STAT Panel (9 genes): SINDI, BRCA1, BRCA2, CDH1, CHEK2, PALB2, PTEN, STK11, and TP53 with reflex to [employer] Core Cancer Panel (27 additional genes): APC, AXIN2, BARD1, BRIP1, BMPR1A, CDK4, CDKN2A, DICER1, EPCAM, GREM1, HOXB13, MLH1, MSH2, MSH3, MSH6, MUTYH, NBN, NF1, NTHL1, PMS2, POLD1, POLE, RAD51C, RAD51D, RECQL SMAD4, SMARCA4    Negative     11/7/2022 -  Cancer Staged    Staging form: Breast, AJCC 8th Edition  - Pathologic stage from 11/7/2022: Stage IA (pT1c, pN1a, cM0, G2, ER+, KY+, HER2-) - Signed by Mendel Negri, MD on 11/7/2022  Stage prefix: Initial diagnosis  Histologic grading system: 3 grade system       12/12/2022 - 1/24/2023 Radiation    Plan ID Energy Fractions Dose per Fraction (cGy) Dose Correction (cGy) Total Dose Delivered (cGy) Elapsed Days   R Boost 6X 4 / 4 250 0 1,000 5   R Breast 10X/6X 25 / 25 200 0 5,000 40   R Sclav_Ax # 10X 25 / 25 200 0 5,000 40   Dr. Anahy Escobar     2/2023 -  Hormone Therapy    Anastrozole, switched to Letrozole in March of 2023  Dr. Marisa Noel      Secondary and unspecified malignant neoplasm of axilla and upper limb lymph nodes ([address])   2/3/2023 Initial Diagnosis    Secondary and unspecified malignant neoplasm of axilla and upper limb lymph nodes [facility])         Patient Active Problem List   Diagnosis   • Allergic rhinitis   • Anxiety disorder   • Chronic bronchitis with emphysema (HCC)   • Chronic low back pain   • Essential hypertension   • Myofascial pain syndrome   • Obstructive sleep apnea   • Panic attack   • Xerostomia   • Symmetrical polyarthritis   • Vitamin D deficiency   • Bilateral carotid artery disease (HCC)   • Colon cancer screening   • Irritable bowel syndrome with diarrhea   • Erosive gastritis   • Class 1 obesity without serious comorbidity in adult   • Syncope   • Abnormal left aortic arch   • Aberrant right subclavian artery   • Nonrheumatic aortic valve insufficiency   • Mild mitral regurgitation   • Mild tricuspid regurgitation   • Lumbar spinal stenosis   • Family hx-breast malignancy   • Dysphagia   • Right leg pain   • Primary osteoarthritis of right knee   • Effusion of right knee   • Degenerative tear of posterior horn of medial meniscus of right knee   • Malignant neoplasm of upper-outer quadrant of right breast in female, estrogen receptor positive ([address])   • Family history of gene mutation   • BRCA negative   • 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   • 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)   • Aromatase inhibitor use   • Other vascular myelopathies (HCC)   • Depression, recurrent (HCC)   • Balance disorder   • Neuropathy   • Proximal weakness of extremity    Cancer Staging   Malignant neoplasm of upper-outer quadrant of right breast in female, estrogen receptor positive ([address])  Staging form: Breast, AJCC 8th Edition  - Clinical stage from 9/29/2022: Stage IA (cT1c, cN0, cM0, G2, ER+, WA+, HER2-) - Signed by Damian Edwards MD on 9/29/2022  Stage prefix: Initial diagnosis  Method of lymph node assessment: Clinical  Histologic grading system: 3 grade system  - Pathologic stage from 11/7/2022: Stage IA (pT1c, pN1a, cM0, G2, ER+, WA+, HER2-) - Signed by Damian Edwards MD on 11/7/2022  Stage prefix: Initial diagnosis  Histologic grading system: 3 grade system    Past Medical History:   Diagnosis Date   • Anxiety    • Anxiety disorder    • Arthralgia    • Arthritis    • Asthma    • Basal cell carcinoma    • Breast cancer ([address])    • Chronic lumbar radiculopathy     last assessed: 12/20/16   • Chronic respiratory failure ([address])    • Colon cancer screening 04/11/2019    Last colonoscopy 2014-15   • Depression    • Dysfunction of eustachian tube    • Dyslipidemia    • Emphysema lung (HCC)    • Fatigue    • HTN (hypertension) 10/29/2014   • Hypercholesterolemia • Hypertension    • Ileus of unspecified type ([address])    • Irritable bowel syndrome with diarrhea 04/11/2019   • Lumbosacral stenosis     last assessed: 9/20/16   • TRAE (obstructive sleep apnea)    • Pancreatitis     resolved: 9/20/17   • Pneumonia    • Post-operative nausea and vomiting    • PTSD (post-traumatic stress disorder)      Social History     Socioeconomic History   • Marital status:      Spouse name: Not on file   • Number of children: Not on file   • Years of education: Not on file   • Highest education level: Not on file   Occupational History   • Occupation: retired   Tobacco Use   • Smoking status: Never   • Smokeless tobacco: Never   Vaping Use   • Vaping Use: Never used   Substance and Sexual Activity   • Alcohol use: Yes     Alcohol/week: 4.0 standard drinks of alcohol     Types: 4 Glasses of wine per week     Comment: 4 drinks every night   • Drug use: Yes     Frequency: 7.0 times per week     Types: Marijuana     Comment: uses topically daily- last used 10/20   • Sexual activity: Not Currently   Other Topics Concern   • Not on file   Social History Narrative    Person living alone    FEELS SAFE AT [address] [place] [address] Way    Wears seatbelt     Social Determinants of Health     Financial Resource Strain: Low Risk  (6/28/2023)    Overall Financial Resource Strain (CARDIA)    • Difficulty of Paying Living Expenses: Not hard at all   Food Insecurity: Not on file   Transportation Needs: No Transportation Needs (6/28/2023)    PRAPARE - Transportation    • Lack of Transportation (Medical): No    • Lack of Transportation (Non-Medical):  No   Physical Activity: Not on file   Stress: Not on file   Social Connections: Not on file   Intimate Partner Violence: Not on file   Housing Stability: Not on file     Family History   Problem Relation Age of Onset   • Breast cancer Mother 61   • Depression Mother         panic attacks   • Lung cancer Mother    • Mental illness Mother    • Substance Abuse Mother         CIGS   • Coronary artery disease Father         high # of paternal family members  in their 46s   • Substance Abuse Father         CIGS   • Depression Sister         panic attacks   • Hypertension Sister    • Breast cancer Daughter 48        CHEK2 positve   • Breast cancer Maternal Aunt 40   • Lung cancer Maternal Aunt    • Ovarian cancer Maternal Grandmother 79   • Depression Other         panic attacks   • Colon cancer Other         age at dx unk   • Heart disease Family    • Hypertension Family    • Colon polyps Neg Hx      Past Surgical History:   Procedure Laterality Date   • BREAST BIOPSY Right 2022    ILC   • BREAST LUMPECTOMY Right 10/21/2022    Procedure: LUMPECTOMY BREAST FLORENCIO;  Surgeon: Matt Barreto MD;  Location: [facility];  Service: Surgical Oncology   • CHOLECYSTECTOMY     • EGD  04/15/2019   • GALLBLADDER SURGERY     • HYSTERECTOMY      pt thinks she still has one ovary, done over 20 yrs ago   • LUMBAR SPINE SURGERY  2016    3, 4, 5   • LYMPH NODE BIOPSY Right 10/21/2022    Procedure: SLN BX, lymphoscintigraphy;  Surgeon: Matt Barreto MD;  Location: [facility];  Service: Surgical Oncology   • MOUTH SURGERY     • MULTIPLE TOOTH EXTRACTIONS N/A    • OOPHORECTOMY Bilateral     1 1/2 ovaries removed   • TONSILLECTOMY     • US BREAST FLORENCIO  NEEDLE LOC RIGHT Right 2022   • US GUIDED BREAST BIOPSY RIGHT COMPLETE Right 2022       Current Outpatient Medications:   •  ascorbic acid (VITAMIN C) 250 mg tablet, Take 250 mg by mouth daily, Disp: , Rfl:   •  atenolol (TENORMIN) 25 mg tablet, TAKE 1 TABLET BY MOUTH TWICE DAILY, Disp: 180 tablet, Rfl: 3  •  Cholecalciferol (VITAMIN D3) 2000 units capsule, Take by mouth daily, Disp: , Rfl:   •  clonazePAM (KlonoPIN) 0.5 mg tablet, Take 1 tablet (0.5 mg total) by mouth 2 (two) times a day as needed for anxiety, Disp: 60 tablet, Rfl: 0  •  co-enzyme Q-10 30 MG capsule, Take 30 mg by mouth daily , Disp: , Rfl:   •  cyclobenzaprine (FLEXERIL) 5 mg tablet, Take 1 tablet (5 mg total) by mouth 2 (two) times a day, Disp: 60 tablet, Rfl: 1  •  dicyclomine (BENTYL) 10 mg capsule, TAKE 2 CAPSULES BY MOUTH  TWO TIMES A DAY (Patient taking differently: Take 10 mg by mouth 2 (two) times a day as needed), Disp: 360 capsule, Rfl: 12  •  fenofibrate (TRICOR) 48 mg tablet, Take 1 tablet (48 mg total) by mouth every morning, Disp: 90 tablet, Rfl: 3  •  fluticasone (FLONASE) 50 mcg/act nasal spray, USE 1 SPRAY IN EACH NOSTRIL DAILY, Disp: 16 g, Rfl: 5  •  letrozole (FEMARA) 2.5 mg tablet, Take 1 tablet (2.5 mg total) by mouth daily (Patient not taking: Reported on 8/10/2023), Disp: 30 tablet, Rfl: 6  •  losartan (COZAAR) 100 MG tablet, TAKE 1 TABLET BY MOUTH  DAILY, Disp: 90 tablet, Rfl: 3  •  magnesium oxide (MAG-OX) 400 mg, Take 400 mg by mouth every other day , Disp: , Rfl:   •  multivitamin (THERAGRAN) TABS, Take 1 tablet by mouth daily, Disp: , Rfl:   •  Omega 3 1000 MG CAPS, Take by mouth daily, Disp: , Rfl:   •  Probiotic Product (PROBIOTIC-10 PO), Take by mouth daily, Disp: , Rfl:   •  rosuvastatin (CRESTOR) 5 mg tablet, TAKE 1 TABLET BY MOUTH 3  TIMES WEEKLY, Disp: 39 tablet, Rfl: 3  Allergies   Allergen Reactions   • Antihistamines, Diphenhydramine-Type    • Arimidex [Anastrozole] Confusion   • Bupropion    • Clarithromycin    • Codeine Other (See Comments)     increased HR   • Gabapentin    • Meloxicam Nausea Only and Visual Disturbance     Other reaction(s): Numbness  Action Taken: med stopped.; Category: Allergy;    • Ondansetron    • Pravastatin    • Propoxyphene Other (See Comments)     increased HR       Review of Systems:    Review of Systems   Constitutional: Positive for activity change (joint pain and swelling especially left knee. ) and fatigue. Feels warm at night   HENT: Positive for postnasal drip.     Eyes:        Blurry vision that started after starting anastrozole which has resolved since stopping it, resolved after stopping anastrozole. Respiratory: Positive for shortness of breath (with exertion). Cardiovascular: Negative. Gastrointestinal: Negative. Endocrine: Negative. Genitourinary: Negative. Musculoskeletal: Positive for gait problem. Pain in right shoulder, tenderness in right axilla improved went through lymphedema PT. Now has left hip, knee swollen and painful and crakes with walks since on letrozole. Stop letrozole ,  ankle pain and swelling. Skin: Negative. Radiation reaction has resolved   Allergic/Immunologic: Negative. Neurological: Positive for dizziness, weakness and light-headedness. Hematological: Negative. Psychiatric/Behavioral: Negative. Reports memory problems     Physical Exam:  Physical Exam  Vitals and nursing note reviewed. Constitutional:       General: She is not in acute distress. Appearance: Normal appearance. Eyes:      General: No scleral icterus. Extraocular Movements: Extraocular movements intact. Pupils: Pupils are equal, round, and reactive to light. Cardiovascular:      Pulses: Normal pulses. Popliteal pulses are 2+ on the right side and 2+ on the left side. Dorsalis pedis pulses are 2+ on the right side and 2+ on the left side. Posterior tibial pulses are 2+ on the right side and 2+ on the left side. Pulmonary:      Effort: Pulmonary effort is normal. No respiratory distress. Chest:   Breasts:     Right: No swelling, inverted nipple, mass, nipple discharge, skin change or tenderness. Left: Normal.          Comments: Right breast: Very well-healed incisions in the lower axilla and upper outer breast, good cosmesis, no skin changes, no palpable mass, minimal fibrosis in the lumpectomy bed  Musculoskeletal:         General: No swelling. Normal range of motion. Cervical back: Normal range of motion. Right lower le+ Pitting Edema present.       Left lower le+ Pitting Edema present. Right foot: Normal capillary refill. Normal pulse. Left foot: Normal capillary refill. Normal pulse. Legs:       Comments: Posterior left calf is cold to the touch, significant temperature differential compared to right calf, no palpable cord appreciated   Lymphadenopathy:      Cervical: No cervical adenopathy. Upper Body:      Right upper body: No supraclavicular or axillary adenopathy. Left upper body: No supraclavicular or axillary adenopathy. Skin:     General: Skin is warm and dry. Neurological:      Mental Status: She is alert and oriented to person, place, and time. Cranial Nerves: Cranial nerves 2-12 are intact. Gait: Gait abnormal.         Imaging:Mammo diagnostic bilateral w 3d & cad    Result Date: 2023  FINDINGS: Bilateral There are no suspicious masses, grouped microcalcifications or areas of unexplained architectural distortion. Postsurgical changes are seen in the right outer breast.  There is mild right breast skin thickening from treatment. Benign calcifications anteriorly in the right breast are stable. There is irregular density in the right axillary region on MLO view from lymph node surgery. Impression:  Posttreatment changes on the right. No worrisome new abnormality appreciated. Recommend diagnostic bilateral mammogram in 1 year. It should be noted that the lumpectomy specimen which was imaged on 10/21/2022 was accidentally labeled as a left-sided specimen. It is actually a right sided specimen. There was no lumpectomy performed on the left. ASSESSMENT/BI-RADS CATEGORY:  Overall: 2 - Benign RECOMMENDATION:      - Diagnostic mammogram in 1 year for both breasts.  Workstation ID: [id number]        LABS:    CBC  Diff   Lab Results   Component Value Date/Time    WBC 4.57 2023 11:30 AM    WBC 7.2 07/15/2016 02:20 PM    HGB 12.9 2023 11:30 AM    HGB 13.4 07/15/2016 02:20 PM    HCT 39.0 2023 11:30 AM    HCT 40.8 07/15/2016 02:20 PM    RBC 4.27 06/26/2023 11:30 AM    RBC 4.51 07/15/2016 02:20 PM    MCV 91 06/26/2023 11:30 AM    MCV 90.3 07/15/2016 02:20 PM    MCHC 33.1 06/26/2023 11:30 AM    MCHC 32.9 07/15/2016 02:20 PM    MCH 30.2 06/26/2023 11:30 AM    MCH 29.7 07/15/2016 02:20 PM    RDW 13.2 06/26/2023 11:30 AM    RDW 14.0 07/15/2016 02:20 PM    MPV 11.2 06/26/2023 11:30 AM    MPV 9.2 07/15/2016 02:20 PM    Lab Results   Component Value Date/Time    MONOCYTES 9.1 07/15/2016 02:20 PM    LYMPHSABS 1.23 04/21/2023 11:43 AM    LYMPHSABS 21.2 07/15/2016 02:20 PM    LYMPHSABS 1526 07/15/2016 02:20 PM    EOSABS 0.10 04/21/2023 11:43 AM    EOSABS 0 (L) 07/15/2016 02:20 PM    EOSABS 0 07/15/2016 02:20 PM    MONOSABS 0.30 04/21/2023 11:43 AM    MONOSABS 655 07/15/2016 02:20 PM    BASOSABS 0.02 04/21/2023 11:43 AM    BASOSABS 72 07/15/2016 02:20 PM        Basic Metabolic Profile    Lab Results   Component Value Date/Time    SODIUM 140 06/26/2023 11:30 AM    CO2 24 06/26/2023 11:30 AM    CO2 23 07/15/2016 02:20 PM    Lab Results   Component Value Date/Time    BUN 18 06/26/2023 11:30 AM    BUN 33 (H) 07/15/2016 02:20 PM    CREATININE 0.74 06/26/2023 11:30 AM    CREATININE 0.78 07/15/2016 02:20 PM            Discussion/Summary: Ms Atul Lew was seen for routine posttreatment follow-up visit today. She has no significant residual side effects of radiation to the breast. She has a persistent seroma in the right axilla which may be causing some tenderness and intermittent swelling. She has had some other medical issues since her last follow-up. She is has had to discontinue her second aromatase inhibitor due to poor tolerance, and on the letrozole experiencing significant bone and joint pain.   She will need to contact Dr. Rylee Antonio office about next steps regarding her breast cancer systemic treatment after she has had a chance to evaluate whether discontinuing the letrozole resulted in resolution of some of her symptomatology. She is also being evaluated by neurology for multiple neurologic conditions. She has an appointment with orthopedics tomorrow regarding her left knee and other joint pain syndromes. On examination of the lower extremities today I noted symmetrical 1+ pitting edema and symmetrical pulses but the left calf was significantly colder in temperature than the right calf. I did not palpate a vascular clot but sent the patient for bilateral lower extremity Dopplers. It was confirmed prior to the patient's departure from the [address] [place] [address] that there was no evidence of DVT bilaterally. Patient is currently scheduled to see Dr. Garrett Rodríguez for follow-up in October and Dr. Irving Resendiz in January. She needs diagnostic follow-up mammograms in January 2024. I will see her for routine follow-up in the spring 2024.     Overall this encounter including review of medical records, discussion and examination of the patient and follow-up on diagnostic testing took place over 45 minutes

## 2023-08-11 ENCOUNTER — OFFICE VISIT (OUTPATIENT)
Dept: OBGYN CLINIC | Facility: CLINIC | Age: 78
End: 2023-08-11
Payer: MEDICARE

## 2023-08-11 ENCOUNTER — APPOINTMENT (OUTPATIENT)
Dept: RADIOLOGY | Facility: CLINIC | Age: 78
End: 2023-08-11
Payer: MEDICARE

## 2023-08-11 VITALS
SYSTOLIC BLOOD PRESSURE: 120 MMHG | HEIGHT: 64 IN | BODY MASS INDEX: 28.51 KG/M2 | WEIGHT: 167 LBS | DIASTOLIC BLOOD PRESSURE: 70 MMHG

## 2023-08-11 DIAGNOSIS — M54.16 LUMBAR RADICULOPATHY: Primary | ICD-10-CM

## 2023-08-11 DIAGNOSIS — Z01.89 ENCOUNTER FOR LOWER EXTREMITY COMPARISON IMAGING STUDY: ICD-10-CM

## 2023-08-11 DIAGNOSIS — S89.92XA LEFT KNEE INJURY: ICD-10-CM

## 2023-08-11 PROCEDURE — 73564 X-RAY EXAM KNEE 4 OR MORE: CPT

## 2023-08-11 PROCEDURE — 99214 OFFICE O/P EST MOD 30 MIN: CPT | Performed by: STUDENT IN AN ORGANIZED HEALTH CARE EDUCATION/TRAINING PROGRAM

## 2023-08-11 PROCEDURE — 73560 X-RAY EXAM OF KNEE 1 OR 2: CPT

## 2023-08-11 PROCEDURE — 77073 BONE LENGTH STUDIES: CPT

## 2023-08-11 RX ORDER — METHYLPREDNISOLONE 4 MG/1
TABLET ORAL
Qty: 21 TABLET | Refills: 0 | Status: SHIPPED | OUTPATIENT
Start: 2023-08-11

## 2023-08-11 NOTE — TELEPHONE ENCOUNTER
Spoke to PT - she was also called from Dr. Rekha Franco office - to find out the doctor who did her surgery at [facility] - she was actually going threw her files when I called. When she finds the surgeon she will call Dr. Rekha Franco office with the info.

## 2023-08-11 NOTE — TELEPHONE ENCOUNTER
Patient left voicemail to provide the following information:    Surgery was done by Pastor Najjar at [facility]. AT [place] , MRI compatible

## 2023-08-11 NOTE — PROGRESS NOTES
Knee New Office Note    Assessment:     1. Lumbar radiculopathy    2. Encounter for lower extremity comparison imaging study        Plan:  Findings today are consistent with left knee and left ankle pain secondary to lumbar radiculopathy. Imaging and prognosis was reviewed with the patient today. On clinical exam patient is not experiencing significant symptoms through the left knee and left ankle ROM. Pain is severe with straight leg raise. Patient has history of lumbar fusion L3-L5 and multiple lumbar ablations through [facility]. Recommend consultation with Dr. [address] [facility]- referral placed today. Script for a medrol dose pack was placed today. Follow up with Dr. [address] [facility]. Problem List Items Addressed This Visit    None  Visit Diagnoses     Lumbar radiculopathy    -  Primary    Relevant Orders    XR knee 4+ vw left injury    XR bone length (scanogram)    Ambulatory Referral to Orthopedic Surgery    Encounter for lower extremity comparison imaging study        Relevant Orders    XR knee 1 or 2 vw right    XR bone length (scanogram)         Subjective:     Patient ID: Stephani Groves is a 69 y.o. female. Chief Complaint:  HPI:  The patient presents with a chief complaint of left knee pain. Patient was referred here today by neurologist Dr. Amelia Guajardo. The pain began several week(s) ago and is not associated with an acute injury. Patient notes that she was prescribed Letrozole 2.5 mg by her Oncologist and noted that about three weeks ago she started experiencing multiple joint pain- right shoulder, right wrist, left hip, left ankle and left knee. Patient was instructed to discontinue the use of Letrozole. Joint pain has improved except for the knee and left ankle. She also reports occasional radiating pain down her leg into her ankle. The patient describes the pain as aching, sharp and throbbing and 5 out of 10 in intensity.   It is constant in timing, and localizes the pain to the lateral knee and at times diffused through out the knee. The pain is worse with weight bearing and relieved with rest, avoiding painful activities. She denies mechanical symptoms such as locking and catching. She admits to instability of the knee. Patient has not had any treatment for her knee. Patient was in PT for a balance disorder. Allergy:  Allergies   Allergen Reactions   • Antihistamines, Diphenhydramine-Type    • Arimidex [Anastrozole] Confusion   • Bupropion    • Clarithromycin    • Codeine Other (See Comments)     increased HR   • Gabapentin    • Meloxicam Nausea Only and Visual Disturbance     Other reaction(s): Numbness  Action Taken: med stopped.; Category:  Allergy;    • Ondansetron    • Pravastatin    • Propoxyphene Other (See Comments)     increased HR     Medications:  all current active meds have been reviewed  Past Medical History:  Past Medical History:   Diagnosis Date   • Anxiety    • Anxiety disorder    • Arthralgia    • Arthritis    • Asthma    • Basal cell carcinoma    • Breast cancer ([address])    • Chronic lumbar radiculopathy     last assessed: 12/20/16   • Chronic respiratory failure ([address])    • Colon cancer screening 04/11/2019    Last colonoscopy 2014-15   • Depression    • Dysfunction of eustachian tube    • Dyslipidemia    • Emphysema lung (HCC)    • Fatigue    • HTN (hypertension) 10/29/2014   • Hypercholesterolemia    • Hypertension    • Ileus of unspecified type ([address])    • Irritable bowel syndrome with diarrhea 04/11/2019   • Lumbosacral stenosis     last assessed: 9/20/16   • TRAE (obstructive sleep apnea)    • Pancreatitis     resolved: 9/20/17   • Pneumonia    • Post-operative nausea and vomiting    • PTSD (post-traumatic stress disorder)      Past Surgical History:  Past Surgical History:   Procedure Laterality Date   • BREAST BIOPSY Right 09/12/2022    [facility]   • BREAST LUMPECTOMY Right 10/21/2022    Procedure: LUMPECTOMY BREAST FLORENCIO;  Surgeon: Marianne Yee MD;  Location: [facility];  Service: Surgical Oncology   • CHOLECYSTECTOMY     • EGD  04/15/2019   • GALLBLADDER SURGERY     • HYSTERECTOMY      pt thinks she still has one ovary, done over 20 yrs ago   • LUMBAR SPINE SURGERY  2016    3, 4, 5   • LYMPH NODE BIOPSY Right 10/21/2022    Procedure: SLN BX, lymphoscintigraphy;  Surgeon: Ailyn Marley MD;  Location: [facility];  Service: Surgical Oncology   • MOUTH SURGERY     • MULTIPLE TOOTH EXTRACTIONS N/A    • OOPHORECTOMY Bilateral     1 1/2 ovaries removed   • TONSILLECTOMY     • US BREAST FLORENCIO  NEEDLE LOC RIGHT Right 2022   • US GUIDED BREAST BIOPSY RIGHT COMPLETE Right 2022     Family History:  Family History   Problem Relation Age of Onset   • Breast cancer Mother 61   • Depression Mother         panic attacks   • Lung cancer Mother    • Mental illness Mother    • Substance Abuse Mother         CIGS   • Coronary artery disease Father         high # of paternal family members  in their 46s   • Substance Abuse Father         CIGS   • Depression Sister         panic attacks   • Hypertension Sister    • Breast cancer Daughter 48        CHEK2 positve   • Breast cancer Maternal Aunt 40   • Lung cancer Maternal Aunt    • Ovarian cancer Maternal Grandmother 79   • Depression Other         panic attacks   • Colon cancer Other         age at dx unk   • Heart disease Family    • Hypertension Family    • Colon polyps Neg Hx      Social History:  Social History     Substance and Sexual Activity   Alcohol Use Yes   • Alcohol/week: 4.0 standard drinks of alcohol   • Types: 4 Glasses of wine per week    Comment: 4 drinks every night     Social History     Substance and Sexual Activity   Drug Use Yes   • Frequency: 7.0 times per week   • Types: Marijuana    Comment: uses topically daily- last used 10/20     Social History     Tobacco Use   Smoking Status Never   Smokeless Tobacco Never           ROS:  General: Per HPI  Skin: Negative, except if noted below  HEENT: Negative  Respiratory: Negative  Cardiovascular: Negative  Gastrointestinal: Negative  Urinary: Negative  Vascular: Negative  Musculoskeletal: Positive per HPI   Neurologic: Positive per HPI  Endocrine: Negative    Objective:  BP Readings from Last 1 Encounters:   08/11/23 120/70      Wt Readings from Last 1 Encounters:   08/11/23 75.8 kg (167 lb)        Respiratory:   non-labored respirations    Lymphatics:  no palpable lymph nodes    Gait:   Altered- ambulates with a cane    Neurologic:   Alert and oriented times 3  Patient with normal sensation except as noted below  Deep tendon reflexes 2+ except as noted in MSK exam    Bilateral Lower Extremity:  Left Knee: Inspection:  Skin intact    Overall limb alignment neutral    Effusion: none    ROM 0-120 w/o pain    +SLR    Extensor Lag: none    Palpation: no Joint line tenderness to palpation    AP Stability at 90 deg stable    M/L stability in full extension stable    M/L stability in midflexion stable    Motor: 5/5 Q/HS/TA/GS/P    Pulses: 2+ DP / 2+ PT    SILT DP/SP/S/S/TN    Imaging:  My interpretation XR AP scanogram/AP bilateral knee/lateral/franco/sunrise left knee: mild-moderate joint space narrowing, subchondral sclerosis, subchondral cysts, osteophyte formation. No fracture or dislocation. BMI:   Estimated body mass index is 29.12 kg/m² as calculated from the following:    Height as of this encounter: 5' 3.5" (1.613 m). Weight as of this encounter: 75.8 kg (167 lb). BSA:   Estimated body surface area is 1.8 meters squared as calculated from the following:    Height as of this encounter: 5' 3.5" (1.613 m). Weight as of this encounter: 75.8 kg (167 lb).            Scribe Attestation    I,:  Delphine Ruth am acting as a scribe while in the presence of the attending physician.:       I,:  Dimple Sheets, DO personally performed the services described in this documentation    as scribed in my presence.:

## 2023-08-13 DIAGNOSIS — R26.89 BALANCE DISORDER: Primary | ICD-10-CM

## 2023-08-13 NOTE — TELEPHONE ENCOUNTER
Placed order for mri head iacs with and without contrast. Please let pt know and help to get her scheduled.

## 2023-08-14 NOTE — TELEPHONE ENCOUNTER
Spoke w/pt. Advised her of note below. Pt verbalized understanding. Patient states she saw Ortho on 8/11/23. She was given a referral to see another physician and she is not sure why. I advised pt to contact Dr. Henry Roman re: question as to why she is being referred to Dr. Nando Brandt. Pt verbalized agreement. Confirmed Dr. Janett Walls phone # [phone]. Transferred pt to live agent at [facility]) to schedule MRI brain IACs.

## 2023-08-16 ENCOUNTER — OFFICE VISIT (OUTPATIENT)
Dept: OBGYN CLINIC | Facility: HOSPITAL | Age: 78
End: 2023-08-16
Payer: MEDICARE

## 2023-08-16 ENCOUNTER — HOSPITAL ENCOUNTER (OUTPATIENT)
Dept: RADIOLOGY | Facility: HOSPITAL | Age: 78
Discharge: HOME | End: 2023-08-16
Attending: ORTHOPAEDIC SURGERY
Payer: MEDICARE

## 2023-08-16 VITALS
HEART RATE: 60 BPM | WEIGHT: 167.11 LBS | BODY MASS INDEX: 28.53 KG/M2 | HEIGHT: 64 IN | DIASTOLIC BLOOD PRESSURE: 88 MMHG | SYSTOLIC BLOOD PRESSURE: 148 MMHG

## 2023-08-16 DIAGNOSIS — M54.16 LUMBAR RADICULOPATHY: ICD-10-CM

## 2023-08-16 DIAGNOSIS — R52 PAIN: ICD-10-CM

## 2023-08-16 DIAGNOSIS — R26.89 BALANCE PROBLEM: ICD-10-CM

## 2023-08-16 DIAGNOSIS — M50.30 DDD (DEGENERATIVE DISC DISEASE), CERVICAL: ICD-10-CM

## 2023-08-16 DIAGNOSIS — M54.16 LUMBAR RADICULOPATHY: Primary | ICD-10-CM

## 2023-08-16 PROCEDURE — 99214 OFFICE O/P EST MOD 30 MIN: CPT | Performed by: ORTHOPAEDIC SURGERY

## 2023-08-16 PROCEDURE — 72110 X-RAY EXAM L-2 SPINE 4/>VWS: CPT

## 2023-08-16 PROCEDURE — 72050 X-RAY EXAM NECK SPINE 4/5VWS: CPT

## 2023-08-16 NOTE — PROGRESS NOTES
NAME: Farooq Duncan  : 1945  PCP: Shirley Nuñez DO      Chief Complaint: back and left lower extremity pain, balance issues    HPI:  Farooq Duncan is a 69 y.o. female presenting for initial visit with chief complaint of back and left lower extremity pain. PMH L3-5 lumbar fusion surgery in 2016 at [place]. She notes improvement in functional ability after surgery without complication. Media Sayres describes ongoing balance issues and needing to lead with her left leg frequently due to previous right knee injury. She stepped down and had onset of shooting pain into her left lower extremity with isolated hip, knee and ankle pain. She also notes ongoing joint pain due to current chemotherapy treatment. She was seen by Dr. Virgen Méndez and given medrol dose roberto carlos which improved pain. She currently denies any back pain or significant left lower extremity pain. Denies ed lower extremity weakness. Denies numbness/tingling. Denies current neck or upper extremity pain. Has intermittent hand numbness/tingling and drops items occasionally. R hand dominant. 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 Has been ambulating with a cane over the past 2 years due to ongoing balance issues. Has fallen in past due to balance issues, but notes she is very careful with ambulation and hasn't had recent fall. She has been in physical therapy for her back without much improvement. Has been seen and evaluated for balance issues by neurology as well, currently undergoing work-up. Denies any ed trauma. Denies fever or chills. Denies any bladder or bowel changes. [facility] breast cancer, last radiation treatment in Feb, currently still on chemo. Denies diabetes or kidney disease. Denies smoking. Conservative therapy includes the following:   Medrol dose roberto carlos with improvement. Denies recent steroid injections. Had RFA a few years ago for back pain that resolved pain. Has been in balance therapy.  Denies recent therapy for her neck. These therapeutic modalities were ineffective. 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 Patient is able to function independently and perform ADLs but notes difficulty due to ongoing balance issues. Lives at home by herself.       FAMILY HISTORY   Family History   Problem Relation Age of Onset   • Breast cancer Mother 61   • Depression Mother         panic attacks   • Lung cancer Mother    • Mental illness Mother    • Substance Abuse Mother         CIGS   • Coronary artery disease Father         high # of paternal family members  in their 46s   • Substance Abuse Father         CIGS   • Depression Sister         panic attacks   • Hypertension Sister    • Breast cancer Daughter 48        CHEK2 positve   • Breast cancer Maternal Aunt 40   • Lung cancer Maternal Aunt    • Ovarian cancer Maternal Grandmother 79   • Depression Other         panic attacks   • Colon cancer Other         age at dx unk   • Heart disease Family    • Hypertension Family    • Colon polyps Neg Hx        PAST MEDICAL HISTORY:   Past Medical History:   Diagnosis Date   • Anxiety    • Anxiety disorder    • Arthralgia    • Arthritis    • Asthma    • Basal cell carcinoma    • Breast cancer ([address])    • Chronic lumbar radiculopathy     last assessed: 16   • Chronic respiratory failure ([address])    • Colon cancer screening 2019    Last colonoscopy -15   • Depression    • Dysfunction of eustachian tube    • Dyslipidemia    • Emphysema lung (HCC)    • Fatigue    • HTN (hypertension) 10/29/2014   • Hypercholesterolemia    • Hypertension    • Ileus of unspecified type (HCC)    • Irritable bowel syndrome with diarrhea 2019   • Lumbosacral stenosis     last assessed: 16   • TRAE (obstructive sleep apnea)    • Pancreatitis     resolved: 17   • Pneumonia    • Post-operative nausea and vomiting    • PTSD (post-traumatic stress disorder) MEDICATIONS:  Current Outpatient Medications   Medication Sig Dispense Refill   • ascorbic acid (VITAMIN C) 250 mg tablet Take 250 mg by mouth daily     • atenolol (TENORMIN) 25 mg tablet TAKE 1 TABLET BY MOUTH TWICE DAILY 180 tablet 3   • Cholecalciferol (VITAMIN D3) 2000 units capsule Take by mouth daily     • clonazePAM (KlonoPIN) 0.5 mg tablet Take 1 tablet (0.5 mg total) by mouth 2 (two) times a day as needed for anxiety 60 tablet 0   • co-enzyme Q-10 30 MG capsule Take 30 mg by mouth daily      • cyclobenzaprine (FLEXERIL) 5 mg tablet Take 1 tablet (5 mg total) by mouth 2 (two) times a day 60 tablet 1   • dicyclomine (BENTYL) 10 mg capsule TAKE 2 CAPSULES BY MOUTH  TWO TIMES A DAY (Patient taking differently: Take 10 mg by mouth 2 (two) times a day as needed) 360 capsule 12   • fenofibrate (TRICOR) 48 mg tablet Take 1 tablet (48 mg total) by mouth every morning 90 tablet 3   • fluticasone (FLONASE) 50 mcg/act nasal spray USE 1 SPRAY IN EACH NOSTRIL DAILY 16 g 5   • letrozole (FEMARA) 2.5 mg tablet Take 1 tablet (2.5 mg total) by mouth daily 30 tablet 6   • losartan (COZAAR) 100 MG tablet TAKE 1 TABLET BY MOUTH  DAILY 90 tablet 3   • magnesium oxide (MAG-OX) 400 mg Take 400 mg by mouth every other day      • methylPREDNISolone 4 MG tablet therapy pack Use as directed on package 21 tablet 0   • multivitamin (THERAGRAN) TABS Take 1 tablet by mouth daily     • Omega 3 1000 MG CAPS Take by mouth daily     • Probiotic Product (PROBIOTIC-10 PO) Take by mouth daily     • rosuvastatin (CRESTOR) 5 mg tablet TAKE 1 TABLET BY MOUTH 3  TIMES WEEKLY 39 tablet 3     No current facility-administered medications for this visit.        PAST SURGICAL HISTORY:  Past Surgical History:   Procedure Laterality Date   • BREAST BIOPSY Right 09/12/2022    [facility]   • BREAST LUMPECTOMY Right 10/21/2022    Procedure: LUMPECTOMY BREAST FLORENCIO;  Surgeon: Shantell Dumont MD;  Location: [facility] OR;  Service: Surgical Oncology   • CHOLECYSTECTOMY • EGD  04/15/2019   • GALLBLADDER SURGERY     • HYSTERECTOMY      pt thinks she still has one ovary, done over 20 yrs ago   • LUMBAR SPINE SURGERY  12/2016    3, 4, 5   • LYMPH NODE BIOPSY Right 10/21/2022    Procedure: SLN BX, lymphoscintigraphy;  Surgeon: Miguel Waite MD;  Location[facility];  Service: Surgical Oncology   • MOUTH SURGERY     • MULTIPLE TOOTH EXTRACTIONS N/A 2018   • OOPHORECTOMY Bilateral     1 1/2 ovaries removed   • TONSILLECTOMY     • US BREAST FLORENCIO  NEEDLE LOC RIGHT Right 09/12/2022   • US GUIDED BREAST BIOPSY RIGHT COMPLETE Right 09/12/2022       SOCIAL HISTORY:  Social History     Socioeconomic History   • Marital status:      Spouse name: Not on file   • Number of children: Not on file   • Years of education: Not on file   • Highest education level: Not on file   Occupational History   • Occupation: retired   Tobacco Use   • Smoking status: Never   • Smokeless tobacco: Never   Vaping Use   • Vaping Use: Never used   Substance and Sexual Activity   • Alcohol use: Yes     Alcohol/week: 4.0 standard drinks of alcohol     Types: 4 Glasses of wine per week     Comment: 4 drinks every night   • Drug use: Yes     Frequency: 7.0 times per week     Types: Marijuana     Comment: uses topically daily- last used 10/20   • Sexual activity: Not Currently   Other Topics Concern   • Not on file   Social History Narrative    Person living alone    FEELS SAFE AT [address]    Wears seatbelt     Social Determinants of Health     Financial Resource Strain: Low Risk  (6/28/2023)    Overall Financial Resource Strain (CARDIA)    • Difficulty of Paying Living Expenses: Not hard at all   Food Insecurity: Not on file   Transportation Needs: No Transportation Needs (6/28/2023)    PRAPARE - Transportation    • Lack of Transportation (Medical): No    • Lack of Transportation (Non-Medical):  No   Physical Activity: Not on file   Stress: Not on file   Social Connections: Not on file   Intimate Partner Violence: Not on file   Housing Stability: Not on file       ALLERGIES:  Allergies   Allergen Reactions   • Antihistamines, Diphenhydramine-Type    • Arimidex [Anastrozole] Confusion   • Bupropion    • Clarithromycin    • Codeine Other (See Comments)     increased HR   • Gabapentin    • Meloxicam Nausea Only and Visual Disturbance     Other reaction(s): Numbness  Action Taken: med stopped.; Category: Allergy;    • Ondansetron    • Pravastatin    • Propoxyphene Other (See Comments)     increased HR       ROS:  Constitutional:  No fever, chills, night sweats, loss of appetite   HEENT No no sore throat, difficulty swallowing   Cardiovascular:  No chest pain, palpitations, BLE edema, TOUSSAINT   Respiratory:  No SOB, coughing, chest congestion   Gastrointestinal:  No nausea, vomiting, abdominal pain   Genitourinary:  No dysuria, hematuria, urinary urgency/frequency   Musculoskeletal:  See HPI   Skin:  No rash, erythema, edema   Neurologic:  See HPI   Psychiatric Illness:  No depression, anxiety, mood disorder, substance abuse disorder     PHYSICAL EXAM:  /88   Pulse 60   Ht 5' 3.5" (1.613 m)   Wt 75.8 kg (167 lb 1.7 oz)   LMP  (LMP Unknown)   BMI 29.14 kg/m²           General:  Well-developed,appears well, no acute distress   Respiratory:  No SOB, no abnormal effort (use of accessory muscles). GI / Abdominal:  Soft. No abdominal masses or tenderness. Nondistended. Gait & balance No evidence of myelopathic gait. Lumbar spine range of motion:  -Forward flexion to 90  -Extension to neutral  -Lateral bend 45 right, 45 left  -Rotation 45 right, 45 left  There is no point tenderness with palpation along the posterior cervical, thoracic, lumbar spine.     Neurologic:  Upper Extremity Motor Function    Right  Left    Deltoid  5/5  5/5    Bicep  5/5  5/5    Wrist extension  5/5  5/5    Tricep  4/5  5/5    Finger flexion/  5/5  5/5    Hand intrinsic  5/5  5/5 Lower Extremity Motor Function    Right  Left    Iliopsoas  5/5  5/5    Quadriceps 5/5 5/5   Tibialis anterior  5/5  5/5    EHL  5/5  5/5    Gastroc. muscle  5/5  5/5    Heel rise  5/5  5/5    Toe rise  5/5  5/5      Sensory: light touch is intact to bilateral upper and lower extremities    Reflexes:    Right Left   Biceps 2+ 3+   Triceps 2+ 2+   Brachioradialis 2+ 2+   Patellar 1+ 1+   Achilles 1+ 1+   Babinski neg neg     Other tests:  Straight Leg Raise: negative  Azevedo's: positive left  Inverted radial reflex: positive left   Clonus: negative  Jayden SI: negative  AWILDA SI: negative  Greater troch: no tenderness   Internal/external hip ROM: intact, no pain   Flexion/extension knee ROM: intact, no pain  Tandem gait: positive  Romberg: positive    IMAGING: I have personally reviewed the images and these are my findings:  Cervical spine xray from 8/16/2023: Degenerative changes in cervical spine with loss of disc space height most notably at C5-6; uncovertebral arthrosis; end plate sclerosis; facet arthrosis; osteophyte formation; no apparent spondylolisthesis; no obvious dynamic instability on flexion/extension radiographs      Lumbar spine xray from 8/16/2023: Degenerative changes in lumbar spine with loss of disc space height most notably at L2-3 and L5-S1; asymmetric loss of disc space height at L2-3; fusion devika and screw construct L3-5 appears intact without obvious signs of malfunction; end plate sclerosis; facet arthrosis; osteophyte formation; no apparent spondylolisthesis; no obvious dynamic instability on flexion/extension radiographs      ASSESSMENT / Medical Decision Making (MDM):  69 y.o. female with history of left lower extremity pain. Also ongoing balance issues. Signs/symptoms concerning cervical myelopathy. No incontinence of bowel/bladder, no fever, no chills, no night sweats. Physical exam showing left upper extremity weakness    Imaging reviewed as above.   Findings most notable for cervical spondylosis, loss disc space height notably C5-6; L3-5 fusion devika/screw construct appears stable without obvious signs of loosening/malfunction; adjacent segment degeneration notably L2-3 and L5-S1, asymmetric disc space collapse L2-3. Impression of cervical and lumbar degenerative disease    Plan: The above clinical, physical and imaging findings were reviewed with the patient. Omar Hoskins  has signs/symptoms concerning for cervical myelopathy, cervical degenerative disease. Also with lumbar radiculopathy in setting lumbar degenerative disease, history lumbar fusion surgery. Omar Hoskins notes onset of left lower extremity pain after stepping the wrong way a week ago. Pain has resolved with oral steroid. Denies any back pain or new lower extremity weakness. Given improvement in pain and symptoms, would continue with conservative treatment for low back. In regards to patient's cervical spine,  she describes worsening balance issues, decreased dexterity and mild upper extremity weakness over past 2 years or so. She was in balance therapy without much improvement. Given signs/symptoms concerning for cervical myelopathy, will plan to obtain MRI cervical spine to further evaluate patient's symptoms. Will review MRI in detail with patient at follow-up visit and discuss further treatment options at that time. Continue with medications as previously prescribed if providing pain/symptom relief. Patient instructed to return to office/ER sooner if symptoms are not improving, getting worse, or new worrisome/neurologic symptoms arise.

## 2023-08-18 ENCOUNTER — APPOINTMENT (OUTPATIENT)
Dept: LAB | Facility: CLINIC | Age: 78
End: 2023-08-18
Payer: MEDICARE

## 2023-08-18 DIAGNOSIS — C50.411 MALIGNANT NEOPLASM OF UPPER-OUTER QUADRANT OF RIGHT BREAST IN FEMALE, ESTROGEN RECEPTOR POSITIVE (HCC): ICD-10-CM

## 2023-08-18 DIAGNOSIS — G60.9 HEREDITARY AND IDIOPATHIC NEUROPATHY, UNSPECIFIED: ICD-10-CM

## 2023-08-18 DIAGNOSIS — R26.89 BALANCE DISORDER: ICD-10-CM

## 2023-08-18 DIAGNOSIS — G60.3 IDIOPATHIC PROGRESSIVE NEUROPATHY: ICD-10-CM

## 2023-08-18 DIAGNOSIS — G62.9 NEUROPATHY: ICD-10-CM

## 2023-08-18 DIAGNOSIS — M62.89 PROXIMAL WEAKNESS OF EXTREMITY: ICD-10-CM

## 2023-08-18 DIAGNOSIS — Z17.0 MALIGNANT NEOPLASM OF UPPER-OUTER QUADRANT OF RIGHT BREAST IN FEMALE, ESTROGEN RECEPTOR POSITIVE (HCC): ICD-10-CM

## 2023-08-18 PROCEDURE — 82746 ASSAY OF FOLIC ACID SERUM: CPT

## 2023-08-18 PROCEDURE — 82607 VITAMIN B-12: CPT

## 2023-08-18 PROCEDURE — 84207 ASSAY OF VITAMIN B-6: CPT

## 2023-08-18 PROCEDURE — 86255 FLUORESCENT ANTIBODY SCREEN: CPT

## 2023-08-18 PROCEDURE — 82550 ASSAY OF CK (CPK): CPT

## 2023-08-18 PROCEDURE — 86235 NUCLEAR ANTIGEN ANTIBODY: CPT

## 2023-08-18 PROCEDURE — 82565 ASSAY OF CREATININE: CPT

## 2023-08-18 PROCEDURE — 84425 ASSAY OF VITAMIN B-1: CPT

## 2023-08-18 PROCEDURE — 36415 COLL VENOUS BLD VENIPUNCTURE: CPT

## 2023-08-18 PROCEDURE — 86618 LYME DISEASE ANTIBODY: CPT

## 2023-08-18 PROCEDURE — 84520 ASSAY OF UREA NITROGEN: CPT

## 2023-08-19 LAB
B BURGDOR IGG+IGM SER QL IA: NEGATIVE
BUN SERPL-MCNC: 24 MG/DL (ref 5–25)
CK SERPL-CCNC: 96 U/L (ref 26–192)
CREAT SERPL-MCNC: 0.84 MG/DL (ref 0.6–1.3)
FOLATE SERPL-MCNC: >22.3 NG/ML
GFR SERPL CREATININE-BSD FRML MDRD: 67 ML/MIN/1.73SQ M
VIT B12 SERPL-MCNC: 338 PG/ML (ref 180–914)

## 2023-08-21 DIAGNOSIS — F45.8 ANXIETY HYPERVENTILATION: ICD-10-CM

## 2023-08-21 DIAGNOSIS — F41.9 ANXIETY HYPERVENTILATION: ICD-10-CM

## 2023-08-21 LAB
ENA SS-A AB SER-ACNC: <0.2 AI (ref 0–0.9)
ENA SS-B AB SER-ACNC: <0.2 AI (ref 0–0.9)
HU1 AB TITR SER: NEGATIVE {TITER}
HU2 AB TITR SER IF: NEGATIVE {TITER}
PCA TYPE-1 (ANTI-YO) AB: NEGATIVE

## 2023-08-22 RX ORDER — CLONAZEPAM 0.5 MG/1
0.5 TABLET ORAL 2 TIMES DAILY PRN
Qty: 60 TABLET | Refills: 0 | Status: SHIPPED | OUTPATIENT
Start: 2023-08-22

## 2023-08-24 ENCOUNTER — HOSPITAL ENCOUNTER (OUTPATIENT)
Dept: CT IMAGING | Facility: HOSPITAL | Age: 78
Discharge: HOME | End: 2023-08-24
Attending: PSYCHIATRY & NEUROLOGY
Payer: MEDICARE

## 2023-08-24 DIAGNOSIS — C50.411 MALIGNANT NEOPLASM OF UPPER-OUTER QUADRANT OF RIGHT BREAST IN FEMALE, ESTROGEN RECEPTOR POSITIVE (HCC): ICD-10-CM

## 2023-08-24 DIAGNOSIS — R27.0 ATAXIA: ICD-10-CM

## 2023-08-24 DIAGNOSIS — Z17.0 MALIGNANT NEOPLASM OF UPPER-OUTER QUADRANT OF RIGHT BREAST IN FEMALE, ESTROGEN RECEPTOR POSITIVE (HCC): ICD-10-CM

## 2023-08-24 LAB — VIT B6 SERPL-MCNC: 21.6 UG/L (ref 3.4–65.2)

## 2023-08-24 PROCEDURE — 70470 CT HEAD/BRAIN W/O & W/DYE: CPT

## 2023-08-24 PROCEDURE — G1004 CDSM NDSC: HCPCS

## 2023-08-24 RX ADMIN — IOHEXOL 85 ML: 350 INJECTION, SOLUTION INTRAVENOUS at 16:42

## 2023-08-25 LAB — VIT B1 BLD-SCNC: 111.5 NMOL/L (ref 66.5–200)

## 2023-08-29 NOTE — PROGRESS NOTES
I reached out to patient to check in and see how she was feeling  Patient report's her Right Axilla area is still swollen and sore  Patient is asking if a cream or even a antibiotic can be ordered  She states it is warm to the touch and hard    Please call patient at [phone]      Breast Assessment Completed Bi-Rhombic Flap Text: The defect edges were debeveled with a #15 scalpel blade.  Given the location of the defect and the proximity to free margins a bi-rhombic flap was deemed most appropriate.  Using a sterile surgical marker, an appropriate rhombic flap was drawn incorporating the defect. The area thus outlined was incised deep to adipose tissue with a #15 scalpel blade.  The skin margins were undermined to an appropriate distance in all directions utilizing iris scissors.

## 2023-08-31 ENCOUNTER — OFFICE VISIT (OUTPATIENT)
Dept: FAMILY MEDICINE CLINIC | Facility: HOSPITAL | Age: 78
End: 2023-08-31
Payer: MEDICARE

## 2023-08-31 VITALS
DIASTOLIC BLOOD PRESSURE: 78 MMHG | HEIGHT: 64 IN | WEIGHT: 164 LBS | SYSTOLIC BLOOD PRESSURE: 140 MMHG | OXYGEN SATURATION: 94 % | HEART RATE: 76 BPM | BODY MASS INDEX: 28 KG/M2

## 2023-08-31 DIAGNOSIS — I10 ESSENTIAL HYPERTENSION: ICD-10-CM

## 2023-08-31 DIAGNOSIS — T50.905D ADVERSE EFFECT OF DRUG, SUBSEQUENT ENCOUNTER: ICD-10-CM

## 2023-08-31 DIAGNOSIS — G89.29 CHRONIC BILATERAL LOW BACK PAIN WITH LEFT-SIDED SCIATICA: ICD-10-CM

## 2023-08-31 DIAGNOSIS — I65.23 BILATERAL CAROTID ARTERY STENOSIS: ICD-10-CM

## 2023-08-31 DIAGNOSIS — M54.42 CHRONIC BILATERAL LOW BACK PAIN WITH LEFT-SIDED SCIATICA: ICD-10-CM

## 2023-08-31 DIAGNOSIS — K58.0 IRRITABLE BOWEL SYNDROME WITH DIARRHEA: ICD-10-CM

## 2023-08-31 DIAGNOSIS — G62.9 NEUROPATHY: Primary | ICD-10-CM

## 2023-08-31 DIAGNOSIS — M25.59 PAIN IN OTHER JOINT: ICD-10-CM

## 2023-08-31 DIAGNOSIS — I77.9 BILATERAL CAROTID ARTERY DISEASE, UNSPECIFIED TYPE (HCC): Primary | ICD-10-CM

## 2023-08-31 DIAGNOSIS — R26.89 BALANCE DISORDER: ICD-10-CM

## 2023-08-31 PROCEDURE — 99215 OFFICE O/P EST HI 40 MIN: CPT | Performed by: INTERNAL MEDICINE

## 2023-08-31 RX ORDER — DULOXETIN HYDROCHLORIDE 20 MG/1
20 CAPSULE, DELAYED RELEASE ORAL DAILY
Qty: 30 CAPSULE | Refills: 3 | Status: SHIPPED | OUTPATIENT
Start: 2023-08-31 | End: 2023-09-18 | Stop reason: SDUPTHER

## 2023-08-31 RX ORDER — ASPIRIN 81 MG/1
81 TABLET ORAL DAILY
Qty: 90 TABLET | Refills: 3 | Status: SHIPPED | OUTPATIENT
Start: 2023-08-31

## 2023-08-31 RX ORDER — PREDNISONE 5 MG/1
5 TABLET ORAL DAILY
Qty: 20 TABLET | Refills: 0 | Status: SHIPPED | OUTPATIENT
Start: 2023-08-31

## 2023-08-31 NOTE — PROGRESS NOTES
Assessment/Plan:     Diagnosis ICD-10-CM Associated Orders   1. Neuropathy  G62.9 predniSONE 5 mg tablet      2. Chronic bilateral low back pain with left-sided sciatica  M54.42 predniSONE 5 mg tablet    G89.29       3. Balance disorder  R26.89       4. Pain in other joint  M25.59 TICK BORNE DISEASE, ACUTE MOLECULAR PANEL     LESLEY w/Reflex     C-reactive protein      5. Adverse effect of drug, subsequent encounter  T50.905D predniSONE 5 mg tablet      6. Irritable bowel syndrome with diarrhea  K58.0       7. Essential hypertension  I10       8. Bilateral carotid artery stenosis  I65.23           Problem List Items Addressed This Visit        Digestive    Irritable bowel syndrome with diarrhea       Cardiovascular and Mediastinum    Essential hypertension    Bilateral carotid artery disease (HCC)       Nervous and Auditory    Neuropathy - Primary    Relevant Medications    predniSONE 5 mg tablet       Other    Chronic low back pain    Relevant Medications    predniSONE 5 mg tablet    Balance disorder   Other Visit Diagnoses     Pain in other joint        Relevant Orders    TICK BORNE DISEASE, ACUTE MOLECULAR PANEL    LESLEY w/Reflex    C-reactive protein    Adverse effect of drug, subsequent encounter        Relevant Medications    predniSONE 5 mg tablet            Return in about 3 weeks (around 9/21/2023). Subjective:    Patient ID: Joe Núñez is a 69 y.o. female     Here for  Special appt- son is here   since I saw her last on June 28 , 2023 has  Had  multiple evaluations  1- Has had  Ongoing PT- for balance and deconditioning , which she feels has helped somewhat- goal is fall and injury prevention  2.  GI- for her irritable bowel and diarrhea issues- Dr. Chloe Castanon -8/2/23  told at that time to continue prn Bentyl and  Probiotics and magnesioum as well al L- glutamine  Was told to stop the liver detox  OTC meds she had been taking  3.neurology - Dr. Spike Hernandez- 8/10/23  Balance and poly arthritis issues- to have emg of lower extremities for the  Concern over neuropathy - also knee issues and referred to ortho and possible rheumatology evaluation- had venous doppler of lower extremites done on 8/10 stat   4 knee pain- difficult  To bend and ambulate- seen 8/11/23 with Dr. Soheila Orosco - ortho for knee and ankle pain and decreaed mobility- was given steroids by Dr. Soheila Orosco and she had relief after taht until it wore off- she is questioning additional  Steroids given ongoing pain and swelling issues- to refer to Dr. Mary Ordaz , rheumatology as per ortho  Team.         The following portions of the patient's history were reviewed and updated as appropriate: allergies, current medications and problem list.     Review of Systems   Constitutional: Positive for fatigue. Negative for chills and fever. HENT: Negative for congestion and postnasal drip. Respiratory: Negative for shortness of breath. Gastrointestinal: Negative for abdominal pain. Musculoskeletal: Positive for arthralgias. All other systems reviewed and are negative.         Objective:      Current Outpatient Medications:   •  ascorbic acid (VITAMIN C) 250 mg tablet, Take 250 mg by mouth daily, Disp: , Rfl:   •  atenolol (TENORMIN) 25 mg tablet, TAKE 1 TABLET BY MOUTH TWICE DAILY, Disp: 180 tablet, Rfl: 3  •  Cholecalciferol (VITAMIN D3) 2000 units capsule, Take by mouth daily, Disp: , Rfl:   •  clonazePAM (KlonoPIN) 0.5 mg tablet, Take 1 tablet (0.5 mg total) by mouth 2 (two) times a day as needed for anxiety, Disp: 60 tablet, Rfl: 0  •  co-enzyme Q-10 30 MG capsule, Take 30 mg by mouth daily , Disp: , Rfl:   •  cyclobenzaprine (FLEXERIL) 5 mg tablet, Take 1 tablet (5 mg total) by mouth 2 (two) times a day, Disp: 60 tablet, Rfl: 1  •  dicyclomine (BENTYL) 10 mg capsule, TAKE 2 CAPSULES BY MOUTH  TWO TIMES A DAY (Patient taking differently: Take 10 mg by mouth 2 (two) times a day as needed), Disp: 360 capsule, Rfl: 12  •  fenofibrate (TRICOR) 48 mg tablet, Take 1 tablet (48 mg total) by mouth every morning, Disp: 90 tablet, Rfl: 3  •  fluticasone (FLONASE) 50 mcg/act nasal spray, USE 1 SPRAY IN EACH NOSTRIL DAILY, Disp: 16 g, Rfl: 5  •  losartan (COZAAR) 100 MG tablet, TAKE 1 TABLET BY MOUTH  DAILY, Disp: 90 tablet, Rfl: 3  •  magnesium oxide (MAG-OX) 400 mg, Take 400 mg by mouth every other day , Disp: , Rfl:   •  methylPREDNISolone 4 MG tablet therapy pack, Use as directed on package, Disp: 21 tablet, Rfl: 0  •  multivitamin (THERAGRAN) TABS, Take 1 tablet by mouth daily, Disp: , Rfl:   •  Omega 3 1000 MG CAPS, Take by mouth daily, Disp: , Rfl:   •  predniSONE 5 mg tablet, Take 1 tablet (5 mg total) by mouth daily, Disp: 20 tablet, Rfl: 0  •  Probiotic Product (PROBIOTIC-10 PO), Take by mouth daily, Disp: , Rfl:   •  aspirin (Aspirin 81) 81 mg EC tablet, Take 1 tablet (81 mg total) by mouth daily, Disp: 90 tablet, Rfl: 3  •  DULoxetine (CYMBALTA) 20 mg capsule, Take 1 capsule (20 mg total) by mouth daily, Disp: 30 capsule, Rfl: 3  •  rosuvastatin (CRESTOR) 5 mg tablet, TAKE 1 TABLET BY MOUTH 3  TIMES WEEKLY, Disp: 39 tablet, Rfl: 3    Blood pressure 140/78, pulse 76, height 5' 3.5" (1.613 m), weight 74.4 kg (164 lb), SpO2 94 %. Physical Exam  Vitals and nursing note reviewed. Constitutional:       Appearance: She is not toxic-appearing. Comments: Distracted, pleasant and cooperative   HENT:      Head: Normocephalic. Right Ear: Tympanic membrane normal.      Left Ear: Tympanic membrane normal.      Nose: No congestion. Mouth/Throat:      Mouth: Mucous membranes are moist.   Cardiovascular:      Rate and Rhythm: Normal rate and regular rhythm. Heart sounds:      No gallop. Pulmonary:      Breath sounds: No wheezing or rhonchi. Abdominal:      Palpations: Abdomen is soft. Tenderness: There is no abdominal tenderness. There is no guarding. Musculoskeletal:      Cervical back: No rigidity or tenderness. Right lower leg: No edema. Left lower leg: No edema. Skin:     Findings: No bruising or erythema. Neurological:      Mental Status: Mental status is at baseline.       Comments: Balance issues-    speech is rambling at times   Psychiatric:      Comments: Not tearful or depressed

## 2023-09-01 ENCOUNTER — HOSPITAL ENCOUNTER (OUTPATIENT)
Dept: MRI IMAGING | Facility: HOSPITAL | Age: 78
End: 2023-09-01
Attending: PSYCHIATRY & NEUROLOGY
Payer: MEDICARE

## 2023-09-01 ENCOUNTER — TELEPHONE (OUTPATIENT)
Dept: NEUROLOGY | Facility: CLINIC | Age: 78
End: 2023-09-01

## 2023-09-01 DIAGNOSIS — R26.89 BALANCE DISORDER: ICD-10-CM

## 2023-09-01 PROCEDURE — 70553 MRI BRAIN STEM W/O & W/DYE: CPT

## 2023-09-01 PROCEDURE — G1004 CDSM NDSC: HCPCS

## 2023-09-01 PROCEDURE — A9585 GADOBUTROL INJECTION: HCPCS | Performed by: PSYCHIATRY & NEUROLOGY

## 2023-09-01 RX ORDER — GADOBUTROL 604.72 MG/ML
7 INJECTION INTRAVENOUS
Status: COMPLETED | OUTPATIENT
Start: 2023-09-01 | End: 2023-09-01

## 2023-09-01 RX ADMIN — GADOBUTROL 7 ML: 604.72 INJECTION INTRAVENOUS at 12:26

## 2023-09-01 NOTE — TELEPHONE ENCOUNTER
----- Message from Jeffery Jauregui MD sent at 8/29/2023  5:10 PM EDT -----  Nursing, Let pt know ct head with and without contrast- no acute intracranial abn. Pt with small vessel changes as well. Pt also with some atherosclerotic vascular calcifications in the carotids and vertebrals. More advanced than pt age. Also stable low lying cerebellar tonsils borderline for arnold chiari. Mri head which is already scheduled would be very helpful for further details. No acute issues on imaging currently. No enhancement on imaging. Looks like mri c spine also ordered by another provider which will also be helpful. Looks like pt ses pcp and cardiology. Pt with history of remote abnormal stress test with non obstructive CAD in 2007 per notes. Please check with pt if she is taking asprin plus cholesterol lowering med. Sending copy of report to pcp as well.

## 2023-09-03 ENCOUNTER — HOSPITAL ENCOUNTER (OUTPATIENT)
Dept: MRI IMAGING | Facility: HOSPITAL | Age: 78
Discharge: HOME | End: 2023-09-03
Payer: MEDICARE

## 2023-09-03 DIAGNOSIS — M50.30 DDD (DEGENERATIVE DISC DISEASE), CERVICAL: ICD-10-CM

## 2023-09-03 DIAGNOSIS — R26.89 BALANCE PROBLEM: ICD-10-CM

## 2023-09-03 PROCEDURE — G1004 CDSM NDSC: HCPCS

## 2023-09-03 PROCEDURE — 72141 MRI NECK SPINE W/O DYE: CPT

## 2023-09-05 NOTE — TELEPHONE ENCOUNTER
Recmariana arroyo 9/1 taken off 9/5  this is Nilda elizondo returning the call from teresita. . Yes. You may leave detailed messages. I give you permission for that.    [phone].

## 2023-09-06 ENCOUNTER — OFFICE VISIT (OUTPATIENT)
Dept: OBGYN CLINIC | Facility: HOSPITAL | Age: 78
End: 2023-09-06
Payer: MEDICARE

## 2023-09-06 VITALS — WEIGHT: 164.02 LBS | HEIGHT: 64 IN | BODY MASS INDEX: 28 KG/M2

## 2023-09-06 DIAGNOSIS — E78.00 PURE HYPERCHOLESTEROLEMIA: ICD-10-CM

## 2023-09-06 DIAGNOSIS — M54.16 LUMBAR RADICULOPATHY: Primary | ICD-10-CM

## 2023-09-06 PROCEDURE — 99213 OFFICE O/P EST LOW 20 MIN: CPT | Performed by: ORTHOPAEDIC SURGERY

## 2023-09-06 RX ORDER — ROSUVASTATIN CALCIUM 5 MG/1
TABLET, COATED ORAL
Qty: 39 TABLET | Refills: 3 | Status: SHIPPED | OUTPATIENT
Start: 2023-09-06

## 2023-09-06 NOTE — PROGRESS NOTES
NAME: Alecia Gamez  : 1945  PCP: Julieta Santana DO      Chief Complaint: back and left lower extremity pain, balance issues    HPI:  Alecia Gamez is a 69 y.o. female presenting for initial visit with chief complaint of back and left lower extremity pain. PMH L3-5 lumbar fusion surgery in 2016 at [facility]. She notes improvement in functional ability after surgery without complication. Liane Chen describes ongoing balance issues and needing to lead with her left leg frequently due to previous right knee injury. She stepped down and had onset of shooting pain into her left lower extremity with isolated hip, knee and ankle pain. She also notes ongoing joint pain due to current chemotherapy treatment. She was seen by Dr. Yoni Mendiola and given medrol dose roberto carlos which improved pain. She currently denies any back pain or significant left lower extremity pain. Denies ed lower extremity weakness. Denies numbness/tingling. Denies current neck or upper extremity pain. Has intermittent hand numbness/tingling and drops items occasionally. R hand dominant. 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 Has been ambulating with a cane over the past 2 years due to ongoing balance issues. Has fallen in past due to balance issues, but notes she is very careful with ambulation and hasn't had recent fall. She has been in physical therapy for her back without much improvement. Has been seen and evaluated for balance issues by neurology as well, currently undergoing work-up. Denies any ed trauma. Denies fever or chills. Denies any bladder or bowel changes. [facility] breast cancer, last radiation treatment in Feb, currently still on chemo. Denies diabetes or kidney disease. Denies smoking. Conservative therapy includes the following:   Medrol dose roberto carlos with improvement. Denies recent steroid injections. Had RFA a few years ago for back pain that resolved pain. Has been in balance therapy.  Denies recent therapy for her neck. These therapeutic modalities were ineffective. 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 Patient is able to function independently and perform ADLs but notes difficulty due to ongoing balance issues. Lives at home by herself. 2023 update  Nicole Barnard is here for follow-up. She obtained her cervical MRI in the interim. She also obtained a brain MRI in the interim however report is not available at this time. Her symptoms remain unchanged. She has ongoing left leg pain.       FAMILY HISTORY   Family History   Problem Relation Age of Onset   • Breast cancer Mother 61   • Depression Mother         panic attacks   • Lung cancer Mother    • Mental illness Mother    • Substance Abuse Mother         CIGS   • Coronary artery disease Father         high # of paternal family members  in their 46s   • Substance Abuse Father         CIGS   • Depression Sister         panic attacks   • Hypertension Sister    • Breast cancer Daughter 48        CHEK2 positve   • Breast cancer Maternal Aunt 40   • Lung cancer Maternal Aunt    • Ovarian cancer Maternal Grandmother 79   • Depression Other         panic attacks   • Colon cancer Other         age at dx unk   • Heart disease Family    • Hypertension Family    • Colon polyps Neg Hx        PAST MEDICAL HISTORY:   Past Medical History:   Diagnosis Date   • Anxiety    • Anxiety disorder    • Arthralgia    • Arthritis    • Asthma    • Basal cell carcinoma    • Breast cancer ([address])    • Chronic lumbar radiculopathy     last assessed: 16   • Chronic respiratory failure ([address])    • Colon cancer screening 2019    Last colonoscopy -15   • Depression    • Dysfunction of eustachian tube    • Dyslipidemia    • Emphysema lung (HCC)    • Fatigue    • HTN (hypertension) 10/29/2014   • Hypercholesterolemia    • Hypertension    • Ileus of unspecified type ([address])    • Irritable bowel syndrome with diarrhea 04/11/2019   • Lumbosacral stenosis     last assessed: 9/20/16   • TRAE (obstructive sleep apnea)    • Pancreatitis     resolved: 9/20/17   • Pneumonia    • Post-operative nausea and vomiting    • PTSD (post-traumatic stress disorder)        MEDICATIONS:  Current Outpatient Medications   Medication Sig Dispense Refill   • ascorbic acid (VITAMIN C) 250 mg tablet Take 250 mg by mouth daily     • aspirin (Aspirin 81) 81 mg EC tablet Take 1 tablet (81 mg total) by mouth daily 90 tablet 3   • atenolol (TENORMIN) 25 mg tablet TAKE 1 TABLET BY MOUTH TWICE DAILY 180 tablet 3   • Cholecalciferol (VITAMIN D3) 2000 units capsule Take by mouth daily     • clonazePAM (KlonoPIN) 0.5 mg tablet Take 1 tablet (0.5 mg total) by mouth 2 (two) times a day as needed for anxiety 60 tablet 0   • co-enzyme Q-10 30 MG capsule Take 30 mg by mouth daily      • cyclobenzaprine (FLEXERIL) 5 mg tablet Take 1 tablet (5 mg total) by mouth 2 (two) times a day 60 tablet 1   • dicyclomine (BENTYL) 10 mg capsule TAKE 2 CAPSULES BY MOUTH  TWO TIMES A DAY (Patient taking differently: Take 10 mg by mouth 2 (two) times a day as needed) 360 capsule 12   • DULoxetine (CYMBALTA) 20 mg capsule Take 1 capsule (20 mg total) by mouth daily 30 capsule 3   • fenofibrate (TRICOR) 48 mg tablet Take 1 tablet (48 mg total) by mouth every morning 90 tablet 3   • fluticasone (FLONASE) 50 mcg/act nasal spray USE 1 SPRAY IN EACH NOSTRIL DAILY 16 g 5   • losartan (COZAAR) 100 MG tablet TAKE 1 TABLET BY MOUTH  DAILY 90 tablet 3   • magnesium oxide (MAG-OX) 400 mg Take 400 mg by mouth every other day      • methylPREDNISolone 4 MG tablet therapy pack Use as directed on package 21 tablet 0   • multivitamin (THERAGRAN) TABS Take 1 tablet by mouth daily     • Omega 3 1000 MG CAPS Take by mouth daily     • predniSONE 5 mg tablet Take 1 tablet (5 mg total) by mouth daily 20 tablet 0   • Probiotic Product (PROBIOTIC-10 PO) Take by mouth daily     • rosuvastatin (CRESTOR) 5 mg tablet TAKE 1 TABLET BY MOUTH 3  TIMES WEEKLY 39 tablet 3     No current facility-administered medications for this visit. PAST SURGICAL HISTORY:  Past Surgical History:   Procedure Laterality Date   • BREAST BIOPSY Right 09/12/2022    [facility]   • BREAST LUMPECTOMY Right 10/21/2022    Procedure: LUMPECTOMY BREAST FLORENCIO;  Surgeon: Pop Hui MD;  Location: [facility];  Service: Surgical Oncology   • CHOLECYSTECTOMY     • EGD  04/15/2019   • GALLBLADDER SURGERY     • HYSTERECTOMY      pt thinks she still has one ovary, done over 20 yrs ago   • LUMBAR SPINE SURGERY  12/2016    3, 4, 5   • LYMPH NODE BIOPSY Right 10/21/2022    Procedure: SLN BX, lymphoscintigraphy;  Surgeon: Pop Hui MD;  Location: [facility];  Service: Surgical Oncology   • MOUTH SURGERY     • MULTIPLE TOOTH EXTRACTIONS N/A 2018   • OOPHORECTOMY Bilateral     1 1/2 ovaries removed   • TONSILLECTOMY     • US BREAST FLORENCIO  NEEDLE LOC RIGHT Right 09/12/2022   • US GUIDED BREAST BIOPSY RIGHT COMPLETE Right 09/12/2022       SOCIAL HISTORY:  Social History     Socioeconomic History   • Marital status:      Spouse name: Not on file   • Number of children: Not on file   • Years of education: Not on file   • Highest education level: Not on file   Occupational History   • Occupation: retired   Tobacco Use   • Smoking status: Never   • Smokeless tobacco: Never   Vaping Use   • Vaping Use: Never used   Substance and Sexual Activity   • Alcohol use:  Yes     Alcohol/week: 4.0 standard drinks of alcohol     Types: 4 Glasses of wine per week     Comment: 4 drinks every night   • Drug use: Yes     Frequency: 7.0 times per week     Types: Marijuana     Comment: uses topically daily- last used 10/20   • Sexual activity: Not Currently   Other Topics Concern   • Not on file   Social History Narrative    Person living alone    FEELS SAFE AT [address]    Wears seatbelt     Social Determinants of Health     Financial Resource Strain: Low Risk  (6/28/2023)    Overall Financial Resource Strain (CARDIA)    • Difficulty of Paying Living Expenses: Not hard at all   Food Insecurity: Not on file   Transportation Needs: No Transportation Needs (6/28/2023)    PRAPARE - Transportation    • Lack of Transportation (Medical): No    • Lack of Transportation (Non-Medical): No   Physical Activity: Not on file   Stress: Not on file   Social Connections: Not on file   Intimate Partner Violence: Not on file   Housing Stability: Not on file       ALLERGIES:  Allergies   Allergen Reactions   • Antihistamines, Diphenhydramine-Type    • Arimidex [Anastrozole] Confusion   • Bupropion    • Clarithromycin    • Codeine Other (See Comments)     increased HR   • Gabapentin    • Meloxicam Nausea Only and Visual Disturbance     Other reaction(s): Numbness  Action Taken: med stopped.; Category: Allergy;    • Ondansetron    • Pravastatin    • Propoxyphene Other (See Comments)     increased HR       ROS:  Constitutional:  No fever, chills, night sweats, loss of appetite   HEENT No no sore throat, difficulty swallowing   Cardiovascular:  No chest pain, palpitations, BLE edema, TOUSSAINT   Respiratory:  No SOB, coughing, chest congestion   Gastrointestinal:  No nausea, vomiting, abdominal pain   Genitourinary:  No dysuria, hematuria, urinary urgency/frequency   Musculoskeletal:  See HPI   Skin:  No rash, erythema, edema   Neurologic:  See HPI   Psychiatric Illness:  No depression, anxiety, mood disorder, substance abuse disorder     PHYSICAL EXAM:  LMP  (LMP Unknown)           General:  Well-developed,appears well, no acute distress   Respiratory:  No SOB, no abnormal effort (use of accessory muscles). GI / Abdominal:  Soft. No abdominal masses or tenderness. Nondistended. Gait & balance No evidence of myelopathic gait.      Lumbar spine range of motion:  -Forward flexion to 90  -Extension to neutral  -Lateral bend 45 right, 45 left  -Rotation 45 right, 45 left  There is no point tenderness with palpation along the posterior cervical, thoracic, lumbar spine.     Neurologic:  Upper Extremity Motor Function    Right  Left    Deltoid  5/5  5/5    Bicep  5/5  5/5    Wrist extension  5/5  5/5    Tricep  4/5  5/5    Finger flexion/  5/5  5/5    Hand intrinsic  5/5  5/5      Lower Extremity Motor Function    Right  Left    Iliopsoas  5/5 5/5    Quadriceps 5/5 5/5   Tibialis anterior  5/5  5/5    EHL  5/5 5/5    Gastroc. muscle  5/5 5/5    Heel rise  5/5 5/5    Toe rise  5/5 5/5      Sensory: light touch is intact to bilateral upper and lower extremities    Reflexes:    Right Left   Biceps 2+ 3+   Triceps 2+ 2+   Brachioradialis 2+ 2+   Patellar 1+ 1+   Achilles 1+ 1+   Babinski neg neg     Other tests:  Straight Leg Raise: negative  Azevedo's: positive left  Inverted radial reflex: positive left   Clonus: negative  Jayden SI: negative  AWILDA SI: negative  Greater troch: no tenderness   Internal/external hip ROM: intact, no pain   Flexion/extension knee ROM: intact, no pain  Tandem gait: positive  Romberg: positive    IMAGING: I have personally reviewed the images and these are my findings:  Cervical spine xray from 8/16/2023: Degenerative changes in cervical spine with loss of disc space height most notably at C5-6; uncovertebral arthrosis; end plate sclerosis; facet arthrosis; osteophyte formation; no apparent spondylolisthesis; no obvious dynamic instability on flexion/extension radiographs      Lumbar spine xray from 8/16/2023: Degenerative changes in lumbar spine with loss of disc space height most notably at L2-3 and L5-S1; asymmetric loss of disc space height at L2-3; fusion devika and screw construct L3-5 appears intact without obvious signs of malfunction; end plate sclerosis; facet arthrosis; osteophyte formation; no apparent spondylolisthesis; no obvious dynamic instability on flexion/extension radiographs      Cervical Spine MRI from 9/3/2023: Multilevel cervical disc degeneration most noted at C5-6 with loss of disc height, disc desiccation, there is moderate/severe stenosis however no cord signal abnormalities are appreciated, also with bilateral foraminal stenosis       ASSESSMENT / Medical Decision Making (MDM):  69 y.o. female with history of left lower extremity pain, ambulatory dysfunction. No incontinence of bowel/bladder, no fever, no chills, no night sweats. Physical exam showing left upper extremity weakness    Imaging reviewed as above. Findings most notable for cervical spondylosis, loss disc space height notably C5-6; L3-5 fusion devika/screw construct appears stable without obvious signs of loosening/malfunction; adjacent segment degeneration notably L2-3 and L5-S1, asymmetric disc space collapse L2-3. C5-6 spinal stenosis. Impression of cervical and lumbar degenerative disease    Plan: The above clinical, physical and imaging findings were reviewed with the patient. Eduar Morris  has multifactorial cervical and lumbar spine issues. We did review her cervical MRI in detail. She does have spinal stenosis at C5-6 however other than balance issues, she does not have any significant myelopathic symptoms. Discussed with patient the need to follow-up with neurology as well as radiology report for brain MRI. We did discuss the natural history of cervical myelopathy as well as the signs and symptoms. We will have the patient follow-up closely for clinical examinations. And reference to her lumbar spine issues, adjacent segment degeneration and left leg radicular pain, which does appear to be her chief complaint at this time, will obtain lumbar MRI due to her ongoing symptoms and x-ray findings. While the patient follow-up after lumbar MRI for further discussion and treatment planning. Continue with medications as previously prescribed if providing pain/symptom relief.   Patient instructed to return to office/ER sooner if symptoms are not improving, getting worse, or new worrisome/neurologic symptoms arise.

## 2023-09-07 ENCOUNTER — TELEPHONE (OUTPATIENT)
Dept: NEUROLOGY | Facility: CLINIC | Age: 78
End: 2023-09-07

## 2023-09-07 NOTE — TELEPHONE ENCOUNTER
Left detailed msg (per verbal consent rec) regarding results and recommendations below. Requested return call to confirm pt is taking aspirin and cholesterol med (Crestor). CT head report sent to PCP.

## 2023-09-07 NOTE — TELEPHONE ENCOUNTER
Spoke w/pt.  She confirmed she is taking ASA 81mg - 1 tab daily and rosuvastatin 5 mg - 1 tab 3x/week      Dr. Domenico mcdowell

## 2023-09-07 NOTE — TELEPHONE ENCOUNTER
Spoke w/pt. Advised her of results and recommendations below. Pt verbalized understanding. Confirmed 10/5/23 f/u appt w/Dr. Paula Wilkins.

## 2023-09-07 NOTE — TELEPHONE ENCOUNTER
----- Message from Kartik Carrillo MD sent at 9/7/2023  1:47 PM EDT -----  Let pt know mri head with iacs no acute intracranial abn. Nl Cpa angle. No enh. No stroke, no mass. Trace small vessel changes. Mild cerebellar tonsillar ectopia. Mild protrusion below foramen magnum. Not meet criteria for chiari one malformation. Likely congenital.  No acute pathology seen. We will further review at follow up appt.

## 2023-09-07 NOTE — TELEPHONE ENCOUNTER
Spoke w/pt. She requested we place note in chart to ensure she completes a Medical Communication Consent form at 10/5/23 f/u appt w/Dr. Stewart Vickers. Maira APARICIO - can you make note to have pt complete form at appt? Thank you!

## 2023-09-15 ENCOUNTER — TELEPHONE (OUTPATIENT)
Dept: OBGYN CLINIC | Facility: HOSPITAL | Age: 78
End: 2023-09-15

## 2023-09-18 ENCOUNTER — OFFICE VISIT (OUTPATIENT)
Dept: FAMILY MEDICINE CLINIC | Facility: HOSPITAL | Age: 78
End: 2023-09-18
Payer: MEDICARE

## 2023-09-18 VITALS
DIASTOLIC BLOOD PRESSURE: 84 MMHG | OXYGEN SATURATION: 97 % | BODY MASS INDEX: 28.03 KG/M2 | WEIGHT: 164.2 LBS | HEIGHT: 64 IN | SYSTOLIC BLOOD PRESSURE: 138 MMHG | HEART RATE: 74 BPM

## 2023-09-18 DIAGNOSIS — M25.59 PAIN IN OTHER JOINT: Primary | ICD-10-CM

## 2023-09-18 DIAGNOSIS — Z23 NEED FOR INFLUENZA VACCINATION: ICD-10-CM

## 2023-09-18 DIAGNOSIS — M79.89 LEFT LEG SWELLING: ICD-10-CM

## 2023-09-18 DIAGNOSIS — M48.061 SPINAL STENOSIS OF LUMBAR REGION, UNSPECIFIED WHETHER NEUROGENIC CLAUDICATION PRESENT: ICD-10-CM

## 2023-09-18 DIAGNOSIS — M62.89 PROXIMAL WEAKNESS OF EXTREMITY: ICD-10-CM

## 2023-09-18 DIAGNOSIS — M13.0 SYMMETRICAL POLYARTHRITIS: ICD-10-CM

## 2023-09-18 DIAGNOSIS — M17.12 LOCALIZED OSTEOARTHRITIS OF LEFT KNEE: ICD-10-CM

## 2023-09-18 DIAGNOSIS — M47.12 OSTEOARTHRITIS OF CERVICAL SPINE WITH MYELOPATHY: ICD-10-CM

## 2023-09-18 DIAGNOSIS — C77.3 SECONDARY AND UNSPECIFIED MALIGNANT NEOPLASM OF AXILLA AND UPPER LIMB LYMPH NODES (HCC): ICD-10-CM

## 2023-09-18 DIAGNOSIS — G62.9 NEUROPATHY: ICD-10-CM

## 2023-09-18 PROBLEM — M79.604 RIGHT LEG PAIN: Status: RESOLVED | Noted: 2021-11-26 | Resolved: 2023-09-18

## 2023-09-18 PROCEDURE — G0008 ADMIN INFLUENZA VIRUS VAC: HCPCS

## 2023-09-18 PROCEDURE — 90662 IIV NO PRSV INCREASED AG IM: CPT

## 2023-09-18 PROCEDURE — 99215 OFFICE O/P EST HI 40 MIN: CPT | Performed by: INTERNAL MEDICINE

## 2023-09-18 RX ORDER — DULOXETIN HYDROCHLORIDE 20 MG/1
20 CAPSULE, DELAYED RELEASE ORAL DAILY
Qty: 30 CAPSULE | Refills: 3 | Status: SHIPPED | OUTPATIENT
Start: 2023-09-18

## 2023-09-18 NOTE — ASSESSMENT & PLAN NOTE
To have lumbar mri  As per Dr. Leni Win  To assess for lumbar injury with her hip and knee ankle pain on left currently- with folllowup   had been using medical marijuan cream- through [facility].     has used gummies in past if throbbing left ankle pain

## 2023-09-18 NOTE — ASSESSMENT & PLAN NOTE
She is concerned about not having an appintment until October with Dr. Kendall Cohen  - has been off the chemo pill 2 months ago

## 2023-09-18 NOTE — PROGRESS NOTES
Assessment/Plan:     Diagnosis ICD-10-CM Associated Orders   1. Pain in other joint  M25.59 DULoxetine (CYMBALTA) 20 mg capsule     Ambulatory referral to Orthopedic Surgery      2. Need for influenza vaccination  Z23 influenza vaccine, high-dose, PF 0.7 mL (FLUZONE HIGH-DOSE)      3. Proximal weakness of extremity  M62.89       4. Spinal stenosis of lumbar region, unspecified whether neurogenic claudication present  M48.061       5. Localized osteoarthritis of left knee  M17.12       6. Osteoarthritis of cervical spine with myelopathy  M47.12       7. Neuropathy  G62.9       8. Symmetrical polyarthritis  M13.0           Problem List Items Addressed This Visit        Nervous and Auditory    Neuropathy     To have lumbar mri  As per Dr. Shilpa Cifuentes  To assess for lumbar injury with her hip and knee ankle pain on left currently- with folllowup   had been using medical marijuan cream- through [facility]. has used gummies in past if throbbing left ankle pain         Osteoarthritis of cervical spine with myelopathy     Has difficulty doing painiting with arm weakness in biceps- saw              Musculoskeletal and Integument    Symmetrical polyarthritis       Other    Lumbar spinal stenosis    Proximal weakness of extremity   Other Visit Diagnoses     Pain in other joint    -  Primary    Relevant Medications    DULoxetine (CYMBALTA) 20 mg capsule    Other Relevant Orders    Ambulatory referral to Orthopedic Surgery    Need for influenza vaccination        Relevant Orders    influenza vaccine, high-dose, PF 0.7 mL (FLUZONE HIGH-DOSE) (Completed)    Localized osteoarthritis of left knee                No follow-ups on file. Subjective:    Patient ID: Kari Steiner is a 69 y.o. female    Here with daughter from [address] [place] [address]  1.  Arm weakness and concerns over abnormal  Mri of neck- saw Dr. Shilpa Cifuentes in  [facility]    and he ordered a  Mri lo lumbar   had been given the  Medrol dose pack last month by ortho- Dr. Tim Mullen and we tapered prednisone 5 mg daily  then 5 mg every other day- has 2 left- I would like her to stop the steroids after these doses     walks dog 20 minutes twice daily- not as active a she would like to be   2.hx lymphoma - persistent Leg swelling on left with ankle and knee pain-   I am concerned that the swelling may be lymphedema related- had priior treatment  For right axillary region at pt , but will have her seen by pt for therapy for the legs. Had appt with Dr. Zaina Jarquin in past and will follow with them as she had stopped last med over concerns it had triggered a diffuse flare of arthralgias- she had not gotten labs done ordered last month and asked her to go after the appt today to get them done. The following portions of the patient's history were reviewed and updated as appropriate: allergies, current medications and problem list.     Review of Systems   Constitutional: Negative for activity change. HENT: Negative for congestion. Respiratory: Negative for shortness of breath. Cardiovascular: Negative for chest pain and palpitations. Musculoskeletal: Positive for arthralgias and joint swelling. Negative for back pain. Left knee and ankle and leg swelling- using a cane for support   Skin: Negative for rash. All other systems reviewed and are negative.         Objective:      Current Outpatient Medications:   •  ascorbic acid (VITAMIN C) 250 mg tablet, Take 250 mg by mouth daily, Disp: , Rfl:   •  aspirin (Aspirin 81) 81 mg EC tablet, Take 1 tablet (81 mg total) by mouth daily, Disp: 90 tablet, Rfl: 3  •  atenolol (TENORMIN) 25 mg tablet, TAKE 1 TABLET BY MOUTH TWICE DAILY, Disp: 180 tablet, Rfl: 3  •  Cholecalciferol (VITAMIN D3) 2000 units capsule, Take by mouth daily, Disp: , Rfl:   •  clonazePAM (KlonoPIN) 0.5 mg tablet, Take 1 tablet (0.5 mg total) by mouth 2 (two) times a day as needed for anxiety, Disp: 60 tablet, Rfl: 0  •  co-enzyme Q-10 30 MG capsule, Take 30 mg by mouth daily , Disp: , Rfl:   •  cyclobenzaprine (FLEXERIL) 5 mg tablet, Take 1 tablet (5 mg total) by mouth 2 (two) times a day, Disp: 60 tablet, Rfl: 1  •  dicyclomine (BENTYL) 10 mg capsule, TAKE 2 CAPSULES BY MOUTH  TWO TIMES A DAY (Patient taking differently: Take 10 mg by mouth 2 (two) times a day as needed), Disp: 360 capsule, Rfl: 12  •  DULoxetine (CYMBALTA) 20 mg capsule, Take 1 capsule (20 mg total) by mouth daily, Disp: 30 capsule, Rfl: 3  •  fenofibrate (TRICOR) 48 mg tablet, Take 1 tablet (48 mg total) by mouth every morning, Disp: 90 tablet, Rfl: 3  •  fluticasone (FLONASE) 50 mcg/act nasal spray, USE 1 SPRAY IN EACH NOSTRIL DAILY, Disp: 16 g, Rfl: 5  •  losartan (COZAAR) 100 MG tablet, TAKE 1 TABLET BY MOUTH  DAILY, Disp: 90 tablet, Rfl: 3  •  magnesium oxide (MAG-OX) 400 mg, Take 400 mg by mouth every other day , Disp: , Rfl:   •  multivitamin (THERAGRAN) TABS, Take 1 tablet by mouth daily, Disp: , Rfl:   •  Omega 3 1000 MG CAPS, Take by mouth daily, Disp: , Rfl:   •  predniSONE 5 mg tablet, Take 1 tablet (5 mg total) by mouth daily, Disp: 20 tablet, Rfl: 0  •  Probiotic Product (PROBIOTIC-10 PO), Take by mouth daily, Disp: , Rfl:   •  rosuvastatin (CRESTOR) 5 mg tablet, TAKE 1 TABLET BY MOUTH 3  TIMES WEEKLY, Disp: 39 tablet, Rfl: 3  •  methylPREDNISolone 4 MG tablet therapy pack, Use as directed on package, Disp: 21 tablet, Rfl: 0    Blood pressure 138/84, pulse 74, height 5' 3.5" (1.613 m), weight 74.5 kg (164 lb 3.2 oz), SpO2 97 %. Physical Exam  Vitals and nursing note reviewed. Constitutional:       General: She is not in acute distress. HENT:      Head: Normocephalic and atraumatic. Right Ear: Tympanic membrane normal. There is no impacted cerumen. Left Ear: Tympanic membrane normal. There is no impacted cerumen. Nose: No congestion. Eyes:      General:         Right eye: No discharge. Left eye: No discharge.    Cardiovascular:      Rate and Rhythm: Normal rate. Heart sounds: No murmur heard. Pulmonary:      Breath sounds: No wheezing or rhonchi. Abdominal:      Palpations: Abdomen is soft. Tenderness: There is no abdominal tenderness. There is no guarding. Musculoskeletal:         General: Swelling and tenderness present. Cervical back: No rigidity. Left lower leg: Edema present. Comments: Fullness  In left  Posterior  region   Lymphadenopathy:      Cervical: No cervical adenopathy. Neurological:      General: No focal deficit present. Mental Status: She is alert. Psychiatric:         Mood and Affect: Mood normal.         Thought Content:  Thought content normal.         Judgment: Judgment normal.

## 2023-09-22 ENCOUNTER — APPOINTMENT (OUTPATIENT)
Dept: LAB | Facility: CLINIC | Age: 78
End: 2023-09-22
Payer: MEDICARE

## 2023-09-22 DIAGNOSIS — M25.59 PAIN IN OTHER JOINT: ICD-10-CM

## 2023-09-22 LAB — CRP SERPL QL: 1.3 MG/L

## 2023-09-22 PROCEDURE — 86140 C-REACTIVE PROTEIN: CPT

## 2023-09-22 PROCEDURE — 87801 DETECT AGNT MULT DNA AMPLI: CPT

## 2023-09-22 PROCEDURE — 87484 EHRLICHA CHAFFEENSIS AMP PRB: CPT

## 2023-09-22 PROCEDURE — 87468 ANAPLSMA PHGCYTOPHLM AMP PRB: CPT

## 2023-09-22 PROCEDURE — 87478 BORRELIA MIYAMOTOI AMP PRB: CPT

## 2023-09-22 PROCEDURE — 36415 COLL VENOUS BLD VENIPUNCTURE: CPT

## 2023-09-22 PROCEDURE — 86038 ANTINUCLEAR ANTIBODIES: CPT

## 2023-09-22 PROCEDURE — 87469 BABESIA MICROTI AMP PRB: CPT

## 2023-09-22 NOTE — TELEPHONE ENCOUNTER
LMOM for patient to call back and schedule their follow up appointment for the MRI scheduled on 10/6.

## 2023-09-23 LAB — ANA SER QL IA: NEGATIVE

## 2023-09-25 ENCOUNTER — TELEPHONE (OUTPATIENT)
Age: 78
End: 2023-09-25

## 2023-09-25 NOTE — TELEPHONE ENCOUNTER
Patient called to make an appointment for her knee, I offered her the next available, patient declined

## 2023-09-28 LAB — MISCELLANEOUS LAB TEST RESULT: NORMAL

## 2023-09-29 ENCOUNTER — TELEPHONE (OUTPATIENT)
Dept: NEUROLOGY | Facility: CLINIC | Age: 78
End: 2023-09-29

## 2023-10-03 DIAGNOSIS — F41.9 ANXIETY HYPERVENTILATION: ICD-10-CM

## 2023-10-03 DIAGNOSIS — F45.8 ANXIETY HYPERVENTILATION: ICD-10-CM

## 2023-10-03 RX ORDER — CLONAZEPAM 0.5 MG/1
0.5 TABLET ORAL 2 TIMES DAILY PRN
Qty: 60 TABLET | Refills: 0 | Status: SHIPPED | OUTPATIENT
Start: 2023-10-03

## 2023-10-06 ENCOUNTER — HOSPITAL ENCOUNTER (OUTPATIENT)
Dept: MRI IMAGING | Facility: HOSPITAL | Age: 78
End: 2023-10-06
Attending: ORTHOPAEDIC SURGERY
Payer: MEDICARE

## 2023-10-06 DIAGNOSIS — M54.16 LUMBAR RADICULOPATHY: ICD-10-CM

## 2023-10-06 PROCEDURE — 72148 MRI LUMBAR SPINE W/O DYE: CPT

## 2023-10-06 PROCEDURE — G1004 CDSM NDSC: HCPCS

## 2023-10-09 ENCOUNTER — EVALUATION (OUTPATIENT)
Dept: PHYSICAL THERAPY | Facility: CLINIC | Age: 78
End: 2023-10-09
Payer: MEDICARE

## 2023-10-09 DIAGNOSIS — C77.3 SECONDARY AND UNSPECIFIED MALIGNANT NEOPLASM OF AXILLA AND UPPER LIMB LYMPH NODES (HCC): ICD-10-CM

## 2023-10-09 DIAGNOSIS — M79.89 LEFT LEG SWELLING: Primary | ICD-10-CM

## 2023-10-09 PROCEDURE — 97110 THERAPEUTIC EXERCISES: CPT | Performed by: PHYSICAL THERAPIST

## 2023-10-09 PROCEDURE — 97162 PT EVAL MOD COMPLEX 30 MIN: CPT | Performed by: PHYSICAL THERAPIST

## 2023-10-09 NOTE — PROGRESS NOTES
PT Evaluation     Today's date: 10/9/2023  Patient name: Kathe Ledbetter  : 1945  MRN: [id number]  Referring provider: John Moses DO  Dx:   Encounter Diagnosis     ICD-10-CM    1. Left leg swelling  M79.89 Ambulatory Referral to PT/OT Lymphedema Therapy      2. 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 year old female coming to outpatient PT with a diagnosis of L LE lymphedema with gradual onset. Pt presents with increased pain, increased edema, and overall decreased functional mobility. Pt would benefit from skilled PT services in order to address these deficits and reach maximum level of function. Thank you kindly for the referral!    Pt was educated in elevation of the extremity and ankle pump exercise to reduce edema. Pt would benefit from Comprilan and a pneumatic compression pump to reduce LE edema. Pt would benefit from a 15-20 mmHg knee high compression stocking to prevent further edema. Impairments: abnormal gait, activity intolerance, lacks appropriate home exercise program, pain with function and weight-bearing intolerance  Other impairment: increased edema  Understanding of Dx/Px/POC: good   Prognosis: good    Goals  STG's ( 3-4 weeks)  1. Pt will be independent in HEP  2.Pt will be independent in compression wrapping    LTG's (4-6 weeks)  1. Reduce L LE edema to max ability  2.  Pt will be independent in use of maintenance garment     Plan  Patient would benefit from: skilled physical therapy  Other planned modality interventions: pneumatic compression pump  Planned therapy interventions: home exercise program, stretching and therapeutic exercise  Other planned therapy interventions: MLD/ CLT  Frequency: 2x week  Duration in weeks: 6  Plan of Care beginning date: 10/9/2023  Plan of Care expiration date: 2023  Treatment plan discussed with: patient and PTA        Subjective Evaluation    History of Present Illness  Mechanism of injury: Pt reports her chemotherapy medication was causing joint pain and swelling. Pt tried Prednisone to decrease her joint pain. Pt reports there has been some swelling in her leg for some time, but worsened after her breast surgery. Pt had an MRI on her lumbar spine and will be following up in ortho regarding this. Pt has increased stiffness in L LE in the morning and it takes a long time for it to be mobile. Pt has increased difficulty putting on shoes and socks. Pt uses a long shoe horn. Pt does not elevate her legs. Pt does not compression stocking.     Work: retired  Hobbies: gardening, walks her dog  Gait; slow speed, unsteady with [facility]  Patient Goals  Patient goals for therapy: decreased edema  Patient goal: to learn about lymphedema treatment  Pain  At best pain ratin  At worst pain ratin  Location: L LE  Quality: radiating    Social Support  Steps to enter house: yes (1)  Stairs in house: yes (13)   Lives with: alone    Employment status: not working        Objective     Strength/Myotome Testing     Additional Strength Details  (-) stemmer sign L LE  (-) buffalo hump L LE           Dx: L LE lymphedema  EPOC:  CO-MORBIDITIES:  PERSONAL FACTORS:   Precautions: CA      Manuals 10/9            L LE MLD/ CLT                          Pneumatic compression pump L LE                          Neuro Re-Ed                                                                                                        Ther Ex             Lymphedema dx and mgt educ 8'                                                                                                       Ther Activity                                       Gait Training                                       Modalities Ambulatory

## 2023-10-13 ENCOUNTER — TELEPHONE (OUTPATIENT)
Dept: HEMATOLOGY ONCOLOGY | Facility: CLINIC | Age: 78
End: 2023-10-13

## 2023-10-13 ENCOUNTER — OFFICE VISIT (OUTPATIENT)
Dept: OBGYN CLINIC | Facility: HOSPITAL | Age: 78
End: 2023-10-13
Payer: MEDICARE

## 2023-10-13 VITALS
BODY MASS INDEX: 28.04 KG/M2 | WEIGHT: 164.24 LBS | SYSTOLIC BLOOD PRESSURE: 138 MMHG | HEART RATE: 66 BPM | HEIGHT: 64 IN | DIASTOLIC BLOOD PRESSURE: 79 MMHG

## 2023-10-13 DIAGNOSIS — M25.562 LEFT KNEE PAIN, UNSPECIFIED CHRONICITY: Primary | ICD-10-CM

## 2023-10-13 DIAGNOSIS — C50.411 MALIGNANT NEOPLASM OF UPPER-OUTER QUADRANT OF RIGHT BREAST IN FEMALE, ESTROGEN RECEPTOR POSITIVE: ICD-10-CM

## 2023-10-13 DIAGNOSIS — Z17.0 MALIGNANT NEOPLASM OF UPPER-OUTER QUADRANT OF RIGHT BREAST IN FEMALE, ESTROGEN RECEPTOR POSITIVE: ICD-10-CM

## 2023-10-13 DIAGNOSIS — D50.0 IRON DEFICIENCY ANEMIA DUE TO CHRONIC BLOOD LOSS: Primary | ICD-10-CM

## 2023-10-13 PROCEDURE — 99213 OFFICE O/P EST LOW 20 MIN: CPT | Performed by: ORTHOPAEDIC SURGERY

## 2023-10-13 NOTE — PROGRESS NOTES
NAME: Sara Earl  : 1945  PCP: Marisela Recinos DO      Chief Complaint: back and left lower extremity pain, balance issues    HPI:  Sara Earl is a 69 y.o. female presenting for initial visit with chief complaint of back and left lower extremity pain. PMH L3-5 lumbar fusion surgery in 2016 at [place]. She notes improvement in functional ability after surgery without complication. Fidelina Simmons describes ongoing balance issues and needing to lead with her left leg frequently due to previous right knee injury. She stepped down and had onset of shooting pain into her left lower extremity with isolated hip, knee and ankle pain. She also notes ongoing joint pain due to current chemotherapy treatment. She was seen by Dr. Tim Mullen and given medrol dose roberto carlos which improved pain. She currently denies any back pain or significant left lower extremity pain. Denies ed lower extremity weakness. Denies numbness/tingling. Denies current neck or upper extremity pain. Has intermittent hand numbness/tingling and drops items occasionally. R hand dominant. 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 Has been ambulating with a cane over the past 2 years due to ongoing balance issues. Has fallen in past due to balance issues, but notes she is very careful with ambulation and hasn't had recent fall. She has been in physical therapy for her back without much improvement. Has been seen and evaluated for balance issues by neurology as well, currently undergoing work-up. Denies any ed trauma. Denies fever or chills. Denies any bladder or bowel changes. [facility] breast cancer, last radiation treatment in Feb, currently still on chemo. Denies diabetes or kidney disease. Denies smoking. Conservative therapy includes the following:   Medrol dose roberto carlos with improvement. Denies recent steroid injections. Had RFA a few years ago for back pain that resolved pain. Has been in balance therapy.  Denies recent therapy for her neck. These therapeutic modalities were ineffective. 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 Patient is able to function independently and perform ADLs but notes difficulty due to ongoing balance issues. Lives at home by herself. 2023 update  Bebe Bonner is here for follow-up. She obtained her cervical MRI in the interim. She also obtained a brain MRI in the interim however report is not available at this time. Her symptoms remain unchanged. She has ongoing left leg pain. 10/13/2023 update  Bebe Bonner is here for follow up. She was recently placed on Cymbalta that has helped her lower extremity pain, noting she feels more functional throughout the day. However, continues with intermittent anterior left lower extremity pain. Able to walk about 20 minutes at a time. Leg pain > right shoulder pain. Overall joint pain has improved, but does has an appointment on Monday for new chemotherapy. Denies significant back or gluteal pain. Isolated left knee pain which is bothersome with ambulation. Continued right shoulder pain, increased pain with range of motion and pressure. Notes falling against something when she was undergoing chemo/radiation but otherwise denies any injury or trauma. Drops items more frequently. Ongoing balance issues, uses cane for ambulation. Decreased cervical range of motion.     FAMILY HISTORY   Family History   Problem Relation Age of Onset    Breast cancer Mother 61    Depression Mother         panic attacks    Lung cancer Mother     Mental illness Mother     Substance Abuse Mother         CIGS    Coronary artery disease Father         high # of paternal family members  in their 46s    Substance Abuse Father         CIGS    Depression Sister         panic attacks    Hypertension Sister     Breast cancer Daughter 48        CHEK2 positve    Breast cancer Maternal Aunt 36    Lung cancer Maternal Aunt Ovarian cancer Maternal Grandmother 79    Depression Other         panic attacks    Colon cancer Other         age at dx unk    Heart disease Family     Hypertension Family     Colon polyps Neg Hx        PAST MEDICAL HISTORY:   Past Medical History:   Diagnosis Date    Anxiety     Anxiety disorder     Arthralgia     Arthritis     Asthma     Basal cell carcinoma     Breast cancer ([address])     Chronic lumbar radiculopathy     last assessed: 12/20/16    Chronic respiratory failure ([address])     Colon cancer screening 04/11/2019    Last colonoscopy 2014-15    Depression     Dysfunction of eustachian tube     Dyslipidemia     Emphysema lung (HCC)     Fatigue     HTN (hypertension) 10/29/2014    Hypercholesterolemia     Hypertension     Ileus of unspecified type (HCC)     Irritable bowel syndrome with diarrhea 04/11/2019    Lumbosacral stenosis     last assessed: 9/20/16    TRAE (obstructive sleep apnea)     Pancreatitis     resolved: 9/20/17    Pneumonia     Post-operative nausea and vomiting     PTSD (post-traumatic stress disorder)        MEDICATIONS:  Current Outpatient Medications   Medication Sig Dispense Refill    ascorbic acid (VITAMIN C) 250 mg tablet Take 250 mg by mouth daily      aspirin (Aspirin 81) 81 mg EC tablet Take 1 tablet (81 mg total) by mouth daily 90 tablet 3    atenolol (TENORMIN) 25 mg tablet TAKE 1 TABLET BY MOUTH TWICE DAILY 180 tablet 3    Cholecalciferol (VITAMIN D3) 2000 units capsule Take by mouth daily      clonazePAM (KlonoPIN) 0.5 mg tablet Take 1 tablet (0.5 mg total) by mouth 2 (two) times a day as needed for anxiety 60 tablet 0    co-enzyme Q-10 30 MG capsule Take 30 mg by mouth daily       cyclobenzaprine (FLEXERIL) 5 mg tablet Take 1 tablet (5 mg total) by mouth 2 (two) times a day 60 tablet 1    dicyclomine (BENTYL) 10 mg capsule TAKE 2 CAPSULES BY MOUTH  TWO TIMES A DAY (Patient taking differently: Take 10 mg by mouth 2 (two) times a day as needed) 360 capsule 12    DULoxetine (CYMBALTA) 20 mg capsule Take 1 capsule (20 mg total) by mouth daily 30 capsule 3    fenofibrate (TRICOR) 48 mg tablet Take 1 tablet (48 mg total) by mouth every morning 90 tablet 3    fluticasone (FLONASE) 50 mcg/act nasal spray USE 1 SPRAY IN EACH NOSTRIL DAILY 16 g 5    losartan (COZAAR) 100 MG tablet TAKE 1 TABLET BY MOUTH  DAILY 90 tablet 3    magnesium oxide (MAG-OX) 400 mg Take 400 mg by mouth every other day       methylPREDNISolone 4 MG tablet therapy pack Use as directed on package 21 tablet 0    multivitamin (THERAGRAN) TABS Take 1 tablet by mouth daily      Omega 3 1000 MG CAPS Take by mouth daily      predniSONE 5 mg tablet Take 1 tablet (5 mg total) by mouth daily 20 tablet 0    Probiotic Product (PROBIOTIC-10 PO) Take by mouth daily      rosuvastatin (CRESTOR) 5 mg tablet TAKE 1 TABLET BY MOUTH 3  TIMES WEEKLY 39 tablet 3     No current facility-administered medications for this visit.        PAST SURGICAL HISTORY:  Past Surgical History:   Procedure Laterality Date    BREAST BIOPSY Right 09/12/2022    [facility]    BREAST LUMPECTOMY Right 10/21/2022    Procedure: LUMPECTOMY BREAST FLORENCIO;  Surgeon: Sarah Beth Reed MD;  Location: [facility];  Service: Surgical Oncology    CHOLECYSTECTOMY      EGD  04/15/2019    GALLBLADDER SURGERY      HYSTERECTOMY      pt thinks she still has one ovary, done over 20 yrs ago    [address]  12/2016    3, 4, 5    LYMPH NODE BIOPSY Right 10/21/2022    Procedure: SLN BX, lymphoscintigraphy;  Surgeon: Sarah Beth Reed MD;  Location: [facility];  Service: Surgical Oncology    MOUTH SURGERY      MULTIPLE TOOTH EXTRACTIONS N/A 2018    OOPHORECTOMY Bilateral     1 1/2 ovaries removed    TONSILLECTOMY      US BREAST FLORENCIO  NEEDLE LOC RIGHT Right 09/12/2022    US GUIDED BREAST BIOPSY RIGHT COMPLETE Right 09/12/2022       SOCIAL HISTORY:  Social History     Socioeconomic History    Marital status:      Spouse name: Not on file    Number of children: Not on file    Years of education: Not on file    Highest education level: Not on file   Occupational History    Occupation: retired   Tobacco Use    Smoking status: Never    Smokeless tobacco: Never   Vaping Use    Vaping Use: Never used   Substance and Sexual Activity    Alcohol use: Yes     Alcohol/week: 4.0 standard drinks of alcohol     Types: 4 Glasses of wine per week     Comment: 4 drinks every night    Drug use: Yes     Frequency: 7.0 times per week     Types: Marijuana     Comment: uses topically daily- last used 10/20    Sexual activity: Not Currently   Other Topics Concern    Not on file   Social History Narrative    Person living alone    FEELS SAFE AT [address]    Wears seatbelt     Social Determinants of Health     Financial Resource Strain: Low Risk  (6/28/2023)    Overall Financial Resource Strain (CARDIA)     Difficulty of Paying Living Expenses: Not hard at all   Food Insecurity: Not on file   Transportation Needs: No Transportation Needs (6/28/2023)    PRAPARE - Transportation     Lack of Transportation (Medical): No     Lack of Transportation (Non-Medical): No   Physical Activity: Not on file   Stress: Not on file   Social Connections: Not on file   Intimate Partner Violence: Not on file   Housing Stability: Not on file       ALLERGIES:  Allergies   Allergen Reactions    Antihistamines, Diphenhydramine-Type     Arimidex [Anastrozole] Confusion    Bupropion     Clarithromycin     Codeine Other (See Comments)     increased HR    Gabapentin     Meloxicam Nausea Only and Visual Disturbance     Other reaction(s): Numbness  Action Taken: med stopped.; Category: Allergy;      Ondansetron     Pravastatin     Propoxyphene Other (See Comments)     increased HR       ROS:  Constitutional:  No fever, chills, night sweats, loss of appetite   HEENT No no sore throat, difficulty swallowing   Cardiovascular:  No chest pain, palpitations, BLE edema, TOUSSAINT   Respiratory:  No SOB, coughing, chest congestion Gastrointestinal:  No nausea, vomiting, abdominal pain   Genitourinary:  No dysuria, hematuria, urinary urgency/frequency   Musculoskeletal:  See HPI   Skin:  No rash, erythema, edema   Neurologic:  See HPI   Psychiatric Illness:  No depression, anxiety, mood disorder, substance abuse disorder     PHYSICAL EXAM:  /79   Pulse 66   Ht 5' 3.5" (1.613 m)   Wt 74.5 kg (164 lb 3.9 oz)   LMP  (LMP Unknown)   BMI 28.64 kg/m²           General:  Well-developed,appears well, no acute distress   Respiratory:  No SOB, no abnormal effort (use of accessory muscles). GI / Abdominal:  Soft. No abdominal masses or tenderness. Nondistended. Gait & balance No evidence of myelopathic gait. Lumbar spine range of motion:  -Forward flexion to 90  -Extension to neutral  -Lateral bend 45 right, 45 left  -Rotation 45 right, 45 left  There is no point tenderness with palpation along the posterior cervical, thoracic, lumbar spine.     Neurologic:  Upper Extremity Motor Function    Right  Left    Deltoid  4/5  5/5    Bicep  5/5  5/5    Wrist extension  5/5  5/5    Tricep  4/5  5/5    Finger flexion/  5/5  5/5    Hand intrinsic  5/5  5/5      Lower Extremity Motor Function    Right  Left    Iliopsoas  5/5  5/5    Quadriceps 5/5 5/5   Tibialis anterior  5/5  5/5    EHL  5/5  5/5    Gastroc. muscle  5/5  5/5    Heel rise  5/5  5/5    Toe rise  5/5  5/5      Sensory: light touch is intact to bilateral upper and lower extremities    Reflexes:    Right Left   Biceps 2+ 3+   Triceps 2+ 2+   Brachioradialis 2+ 2+   Patellar 1+ 1+   Achilles 1+ 1+   Babinski neg neg     Other tests:  Straight Leg Raise: negative  Azevedo's: positive   Inverted radial reflex: positive left   Clonus: negative  Jayden SI: negative  AWILDA SI: negative  Greater troch: no tenderness   Internal/external hip ROM: intact, no pain   Flexion/extension knee ROM: intact, no pain  Tandem gait: positive  Romberg: positive  Pain with right shoulder range of motion  Empty can test: positive, right    IMAGING: I have personally reviewed the images and these are my findings:  Cervical spine xray from 8/16/2023: Degenerative changes in cervical spine with loss of disc space height most notably at C5-6; uncovertebral arthrosis; end plate sclerosis; facet arthrosis; osteophyte formation; no apparent spondylolisthesis; no obvious dynamic instability on flexion/extension radiographs      Lumbar spine xray from 8/16/2023: Degenerative changes in lumbar spine with loss of disc space height most notably at L2-3 and L5-S1; asymmetric loss of disc space height at L2-3; fusion devika and screw construct L3-5 appears intact without obvious signs of malfunction; end plate sclerosis; facet arthrosis; osteophyte formation; no apparent spondylolisthesis; no obvious dynamic instability on flexion/extension radiographs      Cervical Spine MRI from 9/3/2023: Multilevel cervical disc degeneration most noted at C5-6 with loss of disc height, disc desiccation, there is moderate/severe stenosis however no cord signal abnormalities are appreciated, also with bilateral foraminal stenosis     Lumbar MRI from 10/6/2023: L3-5 bilateral pedicle screw and devika construct with adjacent segment degeneration at L2-3 with moderate central and moderate/severe bilateral lateral recess stenosis, also with L5-S1 disc degeneration, L5-S1 facet effusions, left greater than right foraminal stenosis      ASSESSMENT / Medical Decision Making (MDM):  69 y.o. female with history of left lower extremity pain, ambulatory dysfunction. No incontinence of bowel/bladder, no fever, no chills, no night sweats. Physical exam showing left upper extremity weakness    Imaging reviewed as above.   Findings most notable for cervical spondylosis, loss disc space height notably C5-6; L3-5 fusion devika/screw construct appears stable without obvious signs of loosening/malfunction; adjacent segment degeneration notably L2-3 and L5-S1, asymmetric disc space collapse L2-3. C5-6 spinal stenosis. Impression of cervical and lumbar degenerative disease    Plan: The above clinical, physical and imaging findings were reviewed with the patient. Clemens Kehr  has multifactorial cervical and lumbar spine issues. Clemens Kehr is here for follow up. Continues with left lower extremity pain specifically isolated to her left knee. Also with ongoing right shoulder pain. She does have spinal stenosis at C5-6 however other than balance issues, she does not have any significant myelopathic symptoms. We did discuss the natural history of cervical myelopathy as well as the signs and symptoms. We will have the patient follow-up closely for clinical examinations. Referral placed for physical therapy to work on left knee strengthening and stretching exercises. Did discuss role of left knee MRI for further evaluation of possible soft tissue/ligamentous injury of left knee however will hold off until after physical therapy  Continue with medications as previously prescribed if providing pain/symptom relief. Patient instructed to return to office/ER sooner if symptoms are not improving, getting worse, or new worrisome/neurologic symptoms arise. Plan to follow up in 6 months for re-evaluation.

## 2023-10-16 ENCOUNTER — OFFICE VISIT (OUTPATIENT)
Age: 78
End: 2023-10-16
Payer: MEDICARE

## 2023-10-16 VITALS
BODY MASS INDEX: 29.23 KG/M2 | OXYGEN SATURATION: 96 % | RESPIRATION RATE: 17 BRPM | DIASTOLIC BLOOD PRESSURE: 77 MMHG | WEIGHT: 165 LBS | HEIGHT: 63 IN | SYSTOLIC BLOOD PRESSURE: 160 MMHG | HEART RATE: 69 BPM | TEMPERATURE: 98 F

## 2023-10-16 DIAGNOSIS — Z17.0 MALIGNANT NEOPLASM OF UPPER-OUTER QUADRANT OF RIGHT BREAST IN FEMALE, ESTROGEN RECEPTOR POSITIVE: Primary | ICD-10-CM

## 2023-10-16 DIAGNOSIS — C50.411 MALIGNANT NEOPLASM OF UPPER-OUTER QUADRANT OF RIGHT BREAST IN FEMALE, ESTROGEN RECEPTOR POSITIVE: Primary | ICD-10-CM

## 2023-10-16 PROCEDURE — 99214 OFFICE O/P EST MOD 30 MIN: CPT | Performed by: INTERNAL MEDICINE

## 2023-10-16 RX ORDER — EXEMESTANE 25 MG/1
25 TABLET ORAL DAILY
Qty: 30 TABLET | Refills: 1 | Status: SHIPPED | OUTPATIENT
Start: 2023-10-16

## 2023-10-18 NOTE — PROGRESS NOTES
HEMATOLOGY / [facility] FOLLOW UP NOTE    Primary Care Provider: Sweetie Estrada DO  Referring Provider:    MRN: [id number]  : 1945    Reason for Encounter: follow up breast cancer       Oncology History Overview Note   10/2022 - right partial mastectomy for invasive lobular with ductal features, ER 90-95% MN 90-95% Her2 1+ Ki 67 15%, grade 2 - pT1c(18mm)N1a(2/9)M0     2022 - adjuvant RT     2023 - start anastrozole - stop after a short time due to blurry vision    3/2023 - 2023 - letrozole - stopped due to joint pain     10/2023 - start exemestane     Malignant neoplasm of upper-outer quadrant of right breast in female, estrogen receptor positive    2022 Biopsy    Right breast biopsy:  - Invasive lobular carcinoma   -Grade 2  ER 90%  MN 90%  HER2 negative     2022 -  Cancer Staged    Staging form: Breast, AJCC 8th Edition  - Clinical stage from 2022: Stage IA (cT1c, cN0, cM0, G2, ER+, MN+, HER2-) - Signed by Beverly Epps MD on 2022  Stage prefix: Initial diagnosis  Method of lymph node assessment: Clinical  Histologic grading system: 3 grade system       10/21/2022 Surgery    Right lumpectomy with sentinel lymph node biopsy:   - Clear margins  - 2/4 lymph nodes  Dr. Iris Mercedes     10/2022 Genetic Testing    [employer] Breast Cancer STAT Panel (9 genes): SINDI, BRCA1, BRCA2, CDH1, CHEK2, PALB2, PTEN, STK11, and TP53 with reflex to [employer] Core Cancer Panel (27 additional genes): APC, AXIN2, BARD1, BRIP1, BMPR1A, CDK4, CDKN2A, DICER1, EPCAM, GREM1, HOXB13, MLH1, MSH2, MSH3, MSH6, MUTYH, NBN, NF1, NTHL1, PMS2, POLD1, POLE, RAD51C, RAD51D, RECQL SMAD4, SMARCA4    Negative     2022 -  Cancer Staged    Staging form: Breast, AJCC 8th Edition  - Pathologic stage from 2022: Stage IA (pT1c, pN1a, cM0, G2, ER+, MN+, HER2-) - Signed by Beverly Epps MD on 2022  Stage prefix: Initial diagnosis  Histologic grading system: 3 grade system       2022 - 2023 Radiation    Plan ID Energy Fractions Dose per Fraction (cGy) Dose Correction (cGy) Total Dose Delivered (cGy) Elapsed Days   R Boost 6X 4 / 4 250 0 1,000 5   R Breast 10X/6X 25 / 25 200 0 5,000 37   R Sclav_Ax # 10X 25 / 25 200 0 5,000 40   Dr. Thalia Flaherty     2/2023 -  Hormone Therapy    Anastrozole, switched to Letrozole in March of 2023  Dr. 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 She stopped the letrozole in August because she was having significant joint pain. A few days after stopping the letrozole she started prednisone and Cymbalta for her joint pain. That helped that symptom. She is also having problems with balance and follows with neurology. I checked iron and TSH levels because of her fatigue and they were both normal.  She denies any other new medical problems since last visit. No chest pain or shortness of breath. [place] REVIEW OF SYSTEMS:  Please note that a 14-point review of systems was performed to include Constitutional, HEENT, Respiratory, CVS, GI, , Musculoskeletal, Integumentary, Neurologic, Rheumatologic, Endocrinologic, Psychiatric, Lymphatic, and Hematologic/Oncologic systems were reviewed and are negative unless otherwise stated in HPI. Positive and negative findings pertinent to this evaluation are incorporated into the history of present illness.       ECOG PS: 1    PROBLEM LIST:  Patient Active Problem List   Diagnosis    Allergic rhinitis    Anxiety disorder    Chronic bronchitis with emphysema    Chronic low back pain    Essential hypertension    Myofascial pain syndrome    Obstructive sleep apnea    Panic attack    Xerostomia    Symmetrical polyarthritis    Vitamin D deficiency    Bilateral carotid artery disease (HCC)    Colon cancer screening Irritable bowel syndrome with diarrhea    Erosive gastritis    Class 1 obesity without serious comorbidity in adult    Syncope    Abnormal left aortic arch    Aberrant right subclavian artery    Nonrheumatic aortic valve insufficiency    Mild mitral regurgitation    Mild tricuspid regurgitation    Lumbar spinal stenosis    Family hx-breast malignancy    Dysphagia    Primary osteoarthritis of right knee    Effusion of right knee    Degenerative tear of posterior horn of medial meniscus of right knee    Malignant neoplasm of upper-outer quadrant of right breast in female, estrogen receptor positive     Family history of gene mutation    BRCA negative    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 I would like her to attempt to take at least 5 years of an AI because she did have positive lymph nodes. She is willing to try a course of exemestane. I sent that to her pharmacy. I will see her back in 2 months to see how she is tolerating the new medication. We also talked about the possibility of tamoxifen but with her age and joint inflammation I would be concerned about stroke and blood clot risk. We can revisit the discussion if she does not tolerate exemestane. I spent 30 minutes on chart review, face to face counseling time, coordination of care and documentation.     Past Medical History:   has a past medical history of Anxiety, Anxiety disorder, Arthralgia, Arthritis, Asthma, Basal cell carcinoma, Breast cancer ([address]), Chronic lumbar radiculopathy, Chronic respiratory failure ([address]), Colon cancer screening (04/11/2019), Depression, Dysfunction of eustachian tube, Dyslipidemia, Emphysema lung ([address]), Fatigue, HTN (hypertension) (10/29/2014), Hypercholesterolemia, Hypertension, Ileus of unspecified type ([address]), Irritable bowel syndrome with diarrhea (04/11/2019), Lumbosacral stenosis, TRAE (obstructive sleep apnea), Pancreatitis, Pneumonia, Post-operative nausea and vomiting, and PTSD (post-traumatic stress disorder). PAST SURGICAL HISTORY:   has a past surgical history that includes Cholecystectomy; Gallbladder surgery; Mouth surgery; Tonsillectomy; Multiple tooth extractions (N/A, 2018); Hysterectomy; Lumbar spine surgery (12/2016); EGD (04/15/2019); Oophorectomy (Bilateral); US guided breast biopsy right complete (Right, 09/12/2022); US breast gabrielle  right (Right, 09/12/2022); Breast biopsy (Right, 09/12/2022); Breast lumpectomy (Right, 10/21/2022); and Lymph node biopsy (Right, 10/21/2022).     CURRENT MEDICATIONS  Current Outpatient Medications   Medication Sig Dispense Refill    ascorbic acid (VITAMIN C) 250 mg tablet Take 250 mg by mouth daily      aspirin (Aspirin 81) 81 mg EC tablet Take 1 tablet (81 mg total) by mouth daily 90 tablet 3    atenolol (TENORMIN) 25 mg tablet TAKE 1 TABLET BY MOUTH TWICE DAILY 180 tablet 3    Cholecalciferol (VITAMIN D3) 2000 units capsule Take by mouth daily      clonazePAM (KlonoPIN) 0.5 mg tablet Take 1 tablet (0.5 mg total) by mouth 2 (two) times a day as needed for anxiety 60 tablet 0    co-enzyme Q-10 30 MG capsule Take 30 mg by mouth daily       cyclobenzaprine (FLEXERIL) 5 mg tablet Take 1 tablet (5 mg total) by mouth 2 (two) times a day 60 tablet 1    dicyclomine (BENTYL) 10 mg capsule TAKE 2 CAPSULES BY MOUTH  TWO TIMES A DAY (Patient taking differently: Take 10 mg by mouth 2 (two) times a day as needed) 360 capsule 12    DULoxetine (CYMBALTA) 20 mg capsule Take 1 capsule (20 mg total) by mouth daily 30 capsule 3    exemestane (AROMASIN) 25 MG tablet Take 1 tablet (25 mg total) by mouth daily 30 tablet 1    fenofibrate (TRICOR) 48 mg tablet Take 1 tablet (48 mg total) by mouth every morning 90 tablet 3    fluticasone (FLONASE) 50 mcg/act nasal spray USE 1 SPRAY IN EACH NOSTRIL DAILY 16 g 5    losartan (COZAAR) 100 MG tablet TAKE 1 TABLET BY MOUTH  DAILY 90 tablet 3    magnesium oxide (MAG-OX) 400 mg Take 400 mg by mouth every other day       methylPREDNISolone 4 MG tablet therapy pack Use as directed on package 21 tablet 0    multivitamin (THERAGRAN) TABS Take 1 tablet by mouth daily      Omega 3 1000 MG CAPS Take by mouth daily      predniSONE 5 mg tablet Take 1 tablet (5 mg total) by mouth daily 20 tablet 0    Probiotic Product (PROBIOTIC-10 PO) Take by mouth daily      rosuvastatin (CRESTOR) 5 mg tablet TAKE 1 TABLET BY MOUTH 3  TIMES WEEKLY 39 tablet 3     No current facility-administered medications for this visit. [unfilled]    SOCIAL HISTORY:   reports that she has never smoked. She has never used smokeless tobacco. She reports current alcohol use of about 4.0 standard drinks of alcohol per week. She reports current drug use. Frequency: 7.00 times per week. Drug: Marijuana. FAMILY HISTORY:  family history includes Breast cancer (age of onset: 36) in her maternal aunt; Breast cancer (age of onset: 48) in her daughter; Breast cancer (age of onset: 61) in her mother; Colon cancer in her other; Coronary artery disease in her father; Depression in her mother, other, and sister; Heart disease in her family; Hypertension in her family and sister; Lung cancer in her maternal aunt and mother; Mental illness in her mother; Ovarian cancer (age of onset: 79) in her maternal grandmother; Substance Abuse in her father and mother. ALLERGIES:  is allergic to antihistamines, diphenhydramine-type; arimidex [anastrozole]; bupropion; clarithromycin; codeine; gabapentin; meloxicam; ondansetron; pravastatin; and propoxyphene.       Physical Exam:  Vital Signs:   Visit Vitals  /77 (BP Location: Left arm, Patient Position: Sitting, Cuff Size: Adult)   Pulse 69   Temp 98 °F (36.7 °C)   Resp 17   Ht 5' 3" (1.6 m)   Wt 74.8 kg (165 lb)   LMP  (LMP Unknown)   SpO2 96%   BMI 29.23 kg/m²   OB Status Hysterectomy   Smoking Status Never   BSA 1.78 m²     Body mass index is 29.23 kg/m². Body surface area is 1.78 meters squared. GEN: Alert, awake oriented x3, in no acute distress  HEENT- No pallor, icterus, cyanosis, no oral mucosal lesions,   LAD - no palpable cervical, clavicle, axillary, inguinal LAD  Heart- normal S1 S2, regular rate and rhythm, No murmur, rubs.    Lungs- clear breathing sound bilateral.   Abdomen- soft, Non tender, bowel sounds present  Extremities- No cyanosis, clubbing, edema  Neuro- No focal neurological deficit    Labs:  Lab Results   Component Value Date    WBC 4.57 06/26/2023    HGB 12.9 06/26/2023    HCT 39.0 06/26/2023    MCV 91 06/26/2023     06/26/2023     Lab Results   Component Value Date     07/15/2016    SODIUM 140 06/26/2023    K 4.1 06/26/2023     (H) 06/26/2023    CO2 24 06/26/2023    AGAP 6 06/26/2023    BUN 24 08/18/2023    CREATININE 0.84 08/18/2023    GLUC 108 04/21/2023    GLUF 106 (H) 06/26/2023    CALCIUM 9.8 06/26/2023    AST 25 06/26/2023    ALT 25 06/26/2023    ALKPHOS 55 06/26/2023    PROT 6.7 07/15/2016    TP 7.0 06/26/2023    BILITOT 0.6 07/15/2016    TBILI 0.67 06/26/2023    EGFR 67 08/18/2023

## 2023-10-26 ENCOUNTER — OFFICE VISIT (OUTPATIENT)
Dept: PHYSICAL THERAPY | Facility: CLINIC | Age: 78
End: 2023-10-26
Payer: MEDICARE

## 2023-10-26 DIAGNOSIS — M79.89 LEFT LEG SWELLING: Primary | ICD-10-CM

## 2023-10-26 PROCEDURE — 97140 MANUAL THERAPY 1/> REGIONS: CPT | Performed by: PHYSICAL THERAPIST

## 2023-10-26 PROCEDURE — 97016 VASOPNEUMATIC DEVICE THERAPY: CPT | Performed by: PHYSICAL THERAPIST

## 2023-10-26 NOTE — PROGRESS NOTES
Daily Note     Today's date: 10/26/2023  Patient name: Kenrick Nash  : 1945  MRN: [id number]  Referring provider: Vamshi Barton DO  Dx:   Encounter Diagnosis     ICD-10-CM    1. Left leg swelling  M79.89                      Subjective: Pt reports she started taking Cymbalta and it is making her R LE feel better. Objective: See treatment diary below      Assessment: Tolerated treatment well. Patient would benefit from continued PT. Instructed pt in compression wrapping. Pt tolerated the pump well. Plan: Continue per plan of care.  Focus on reducing edema     Dx: L LE lymphedema  EPOC:  CO-MORBIDITIES:  PERSONAL FACTORS:   Precautions: CA      Manuals 10/9 10/26           L LE MLD/ CLT  25'                        Pneumatic compression pump L LE  15'                        Neuro Re-Ed                                                                                                        Ther Ex             Lymphedema dx and mgt educ 8'                                                                                                       Ther Activity                                       Gait Training                                       Modalities

## 2023-10-30 ENCOUNTER — OFFICE VISIT (OUTPATIENT)
Dept: PHYSICAL THERAPY | Facility: CLINIC | Age: 78
End: 2023-10-30
Payer: MEDICARE

## 2023-10-30 DIAGNOSIS — C77.3 SECONDARY AND UNSPECIFIED MALIGNANT NEOPLASM OF AXILLA AND UPPER LIMB LYMPH NODES (HCC): ICD-10-CM

## 2023-10-30 DIAGNOSIS — M79.89 LEFT LEG SWELLING: Primary | ICD-10-CM

## 2023-10-30 PROCEDURE — 97140 MANUAL THERAPY 1/> REGIONS: CPT

## 2023-10-30 PROCEDURE — 97016 VASOPNEUMATIC DEVICE THERAPY: CPT

## 2023-10-30 NOTE — PROGRESS NOTES
Daily Note     Today's date: 10/30/2023  Patient name: Mg Do  : 1945  MRN: [id number]  Referring provider: Dagoberto Alcaraz DO  Dx:   Encounter Diagnosis     ICD-10-CM    1. Left leg swelling  M79.89       2. 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)  C77.3                      Subjective: Pt reports she started taking Cymbalta continues to feel better. Objective: See treatment diary below      Assessment: Pt arrived to PT session with bandages on hand. Tolerated treatment well. Patient would benefit from continued PT. Instructed pt in compression wrapping. Pt tolerated the pump well. Plan: Continue per plan of care.  Focus on reducing edema     Dx: L LE lymphedema  EPOC:  CO-MORBIDITIES:  PERSONAL FACTORS:   Precautions: CA      Manuals 10/9 10/26 10/30          L LE MLD/ CLT  25' 25'                       Pneumatic compression pump L LE  15' 15'                       Neuro Re-Ed                                                                                                        Ther Ex             Lymphedema dx and mgt educ 8'                                                                                                       Ther Activity                                       Gait Training                                       Modalities

## 2023-11-01 ENCOUNTER — OFFICE VISIT (OUTPATIENT)
Dept: FAMILY MEDICINE CLINIC | Facility: HOSPITAL | Age: 78
End: 2023-11-01
Payer: MEDICARE

## 2023-11-01 VITALS
DIASTOLIC BLOOD PRESSURE: 80 MMHG | WEIGHT: 161.6 LBS | OXYGEN SATURATION: 96 % | SYSTOLIC BLOOD PRESSURE: 132 MMHG | BODY MASS INDEX: 28.63 KG/M2 | HEART RATE: 63 BPM | HEIGHT: 63 IN

## 2023-11-01 DIAGNOSIS — M25.562 CHRONIC PAIN OF LEFT KNEE: ICD-10-CM

## 2023-11-01 DIAGNOSIS — F45.8 ANXIETY HYPERVENTILATION: ICD-10-CM

## 2023-11-01 DIAGNOSIS — M25.59 PAIN IN OTHER JOINT: ICD-10-CM

## 2023-11-01 DIAGNOSIS — I89.0 LYMPHEDEMA DUE TO RADIATION: ICD-10-CM

## 2023-11-01 DIAGNOSIS — M47.12 OSTEOARTHRITIS OF CERVICAL SPINE WITH MYELOPATHY: ICD-10-CM

## 2023-11-01 DIAGNOSIS — Z79.811 AROMATASE INHIBITOR USE: ICD-10-CM

## 2023-11-01 DIAGNOSIS — M48.061 SPINAL STENOSIS OF LUMBAR REGION, UNSPECIFIED WHETHER NEUROGENIC CLAUDICATION PRESENT: ICD-10-CM

## 2023-11-01 DIAGNOSIS — G89.29 CHRONIC PAIN OF LEFT KNEE: ICD-10-CM

## 2023-11-01 DIAGNOSIS — F41.9 ANXIETY HYPERVENTILATION: ICD-10-CM

## 2023-11-01 DIAGNOSIS — F33.9 DEPRESSION, RECURRENT (HCC): Primary | ICD-10-CM

## 2023-11-01 DIAGNOSIS — M79.18 MYOFASCIAL PAIN SYNDROME: ICD-10-CM

## 2023-11-01 PROCEDURE — 99214 OFFICE O/P EST MOD 30 MIN: CPT | Performed by: INTERNAL MEDICINE

## 2023-11-01 RX ORDER — DULOXETIN HYDROCHLORIDE 30 MG/1
30 CAPSULE, DELAYED RELEASE ORAL DAILY
Qty: 90 CAPSULE | Refills: 1 | Status: SHIPPED | OUTPATIENT
Start: 2023-11-01

## 2023-11-01 RX ORDER — CLONAZEPAM 0.5 MG/1
0.5 TABLET ORAL 2 TIMES DAILY PRN
Qty: 60 TABLET | Refills: 0 | Status: SHIPPED | OUTPATIENT
Start: 2023-11-01

## 2023-11-01 NOTE — PROGRESS NOTES
Assessment/Plan:     Diagnosis ICD-10-CM Associated Orders   1. Depression, recurrent ([address])  F33.9       2. Pain in other joint  M25.59 DULoxetine (CYMBALTA) 30 mg delayed release capsule      3. Spinal stenosis of lumbar region, unspecified whether neurogenic claudication present  M48.061       4. Myofascial pain syndrome  M79.18       5. Chronic pain of left knee  M25.562 Ambulatory referral to Orthopedic Surgery    G89.29       6. Osteoarthritis of cervical spine with myelopathy  M47.12       7. Anxiety hyperventilation  F41.9 clonazePAM (KlonoPIN) 0.5 mg tablet    F45.8       8. 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 Aromatase inhibitor use  Z79.811           Problem List Items Addressed This Visit          Nervous and Auditory    Osteoarthritis of cervical spine with myelopathy     Right arm pain persists- has right right rotator cuff- saw Dr. 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 Subjective:    Patient ID: Robinson Romero is a 69 y.o. female    1. Left leg swelling-- now doing ptx2 session so far-  2.  Left knee pain- has pt order from  Dr. Danyelle Bass- will have her see Dr. Danyelle Bass again- had seen Dr. Angella Foster who felt it was not kntamir back but knee causing issues-   3. Cervical arthritis- has significant  issues there  4. Anxiety issues- since current chemo EXEMESTAANIE  is having increased anxiety- uses clonazepam - up to bid             The following portions of the patient's history were reviewed and updated as appropriate: allergies, current medications and problem list.     Review of Systems   Constitutional:  Negative for chills, fatigue and fever. Respiratory:  Negative for cough. All other systems reviewed and are negative.         Objective:      Current Outpatient Medications:     ascorbic acid (VITAMIN C) 250 mg tablet, Take 250 mg by mouth daily, Disp: , Rfl:     aspirin (Aspirin 81) 81 mg EC tablet, Take 1 tablet (81 mg total) by mouth daily, Disp: 90 tablet, Rfl: 3    atenolol (TENORMIN) 25 mg tablet, TAKE 1 TABLET BY MOUTH TWICE DAILY, Disp: 180 tablet, Rfl: 3    Cholecalciferol (VITAMIN D3) 2000 units capsule, Take by mouth daily, Disp: , Rfl:     clonazePAM (KlonoPIN) 0.5 mg tablet, Take 1 tablet (0.5 mg total) by mouth 2 (two) times a day as needed for anxiety, Disp: 60 tablet, Rfl: 0    co-enzyme Q-10 30 MG capsule, Take 30 mg by mouth daily , Disp: , Rfl:     dicyclomine (BENTYL) 10 mg capsule, TAKE 2 CAPSULES BY MOUTH  TWO TIMES A DAY (Patient taking differently: Take 10 mg by mouth 2 (two) times a day as needed), Disp: 360 capsule, Rfl: 12    DULoxetine (CYMBALTA) 30 mg delayed release capsule, Take 1 capsule (30 mg total) by mouth daily, Disp: 90 capsule, Rfl: 1    exemestane (AROMASIN) 25 MG tablet, Take 1 tablet (25 mg total) by mouth daily, Disp: 30 tablet, Rfl: 1    fenofibrate (TRICOR) 48 mg tablet, Take 1 tablet (48 mg total) by mouth every morning, Disp: 90 tablet, Rfl: 3    fluticasone (FLONASE) 50 mcg/act nasal spray, USE 1 SPRAY IN EACH NOSTRIL DAILY, Disp: 16 g, Rfl: 5    losartan (COZAAR) 100 MG tablet, TAKE 1 TABLET BY MOUTH  DAILY, Disp: 90 tablet, Rfl: 3    magnesium oxide (MAG-OX) 400 mg, Take 400 mg by mouth every other day , Disp: , Rfl:     multivitamin (THERAGRAN) TABS, Take 1 tablet by mouth daily, Disp: , Rfl:     Omega 3 1000 MG CAPS, Take by mouth daily, Disp: , Rfl:     Probiotic Product (PROBIOTIC-10 PO), Take by mouth daily, Disp: , Rfl:     rosuvastatin (CRESTOR) 5 mg tablet, TAKE 1 TABLET BY MOUTH 3  TIMES WEEKLY, Disp: 39 tablet, Rfl: 3    Blood pressure 132/80, pulse 63, height 5' 3" (1.6 m), weight 73.3 kg (161 lb 9.6 oz), SpO2 96 %. Physical Exam  Vitals and nursing note reviewed. Constitutional:       Comments: Mildly anxious   HENT:      Head: Normocephalic. Right Ear: Tympanic membrane normal. There is no impacted cerumen. Left Ear: Tympanic membrane normal. There is no impacted cerumen. Nose: No congestion. Eyes:      General:         Right eye: No discharge. Left eye: No discharge. Cardiovascular:      Rate and Rhythm: Normal rate and regular rhythm. Heart sounds: No murmur heard. Pulmonary:      Breath sounds: No wheezing or rhonchi. Abdominal:      Palpations: Abdomen is soft. Tenderness: There is no abdominal tenderness. Musculoskeletal:         General: No swelling or tenderness. Left lower leg: Edema present. Skin:     Coloration: Skin is not jaundiced. Findings: No erythema. Neurological:      General: No focal deficit present. Mental Status: She is alert. Psychiatric:         Mood and Affect: Mood normal.         Thought Content:  Thought content normal.

## 2023-11-01 NOTE — ASSESSMENT & PLAN NOTE
Right arm pain persists- has right right rotator cuff- saw Dr. Jaimee Segovia   To be seen in 6 months

## 2023-11-02 ENCOUNTER — APPOINTMENT (OUTPATIENT)
Dept: PHYSICAL THERAPY | Facility: CLINIC | Age: 78
End: 2023-11-02
Payer: MEDICARE

## 2023-11-06 ENCOUNTER — OFFICE VISIT (OUTPATIENT)
Dept: PHYSICAL THERAPY | Facility: CLINIC | Age: 78
End: 2023-11-06
Payer: MEDICARE

## 2023-11-06 DIAGNOSIS — C77.3 SECONDARY AND UNSPECIFIED MALIGNANT NEOPLASM OF AXILLA AND UPPER LIMB LYMPH NODES (HCC): ICD-10-CM

## 2023-11-06 DIAGNOSIS — M79.89 LEFT LEG SWELLING: Primary | ICD-10-CM

## 2023-11-06 PROCEDURE — 97140 MANUAL THERAPY 1/> REGIONS: CPT

## 2023-11-06 PROCEDURE — 97016 VASOPNEUMATIC DEVICE THERAPY: CPT

## 2023-11-06 NOTE — PROGRESS NOTES
Daily Note     Today's date: 2023  Patient name: Robinson Romero  : 1945  MRN: [id number]  Referring provider: Sweetie Estrada DO  Dx:   Encounter Diagnosis     ICD-10-CM    1. Left leg swelling  M79.89       2. 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)  C77.3                      Subjective: Pt reports she started taking Cymbalta continues to feel better. Objective: See treatment diary below      Assessment: Pt arrived to PT session with bandages on hand. Tolerated treatment well. Patient would benefit from continued PT. Instructed pt in compression wrapping. Pt tolerated the pump well. Plan: Continue per plan of care.  Focus on reducing edema     Dx: L LE lymphedema  EPOC:  CO-MORBIDITIES:  PERSONAL FACTORS:   Precautions: CA      Manuals 10/9 10/26 10/30 11         L LE MLD/ CLT  25' 25' 25                      Pneumatic compression pump L LE  15' 15' 15                      Neuro Re-Ed                                                                                                        Ther Ex             Lymphedema dx and mgt educ 8'                                                                                                       Ther Activity                                       Gait Training                                       Modalities

## 2023-11-08 ENCOUNTER — OFFICE VISIT (OUTPATIENT)
Dept: OBGYN CLINIC | Facility: CLINIC | Age: 78
End: 2023-11-08
Payer: MEDICARE

## 2023-11-08 VITALS
BODY MASS INDEX: 28.35 KG/M2 | SYSTOLIC BLOOD PRESSURE: 110 MMHG | DIASTOLIC BLOOD PRESSURE: 70 MMHG | WEIGHT: 160 LBS | HEIGHT: 63 IN

## 2023-11-08 DIAGNOSIS — M25.562 ACUTE PAIN OF LEFT KNEE: Primary | ICD-10-CM

## 2023-11-08 DIAGNOSIS — M23.92 INTERNAL DERANGEMENT OF LEFT KNEE: ICD-10-CM

## 2023-11-08 PROCEDURE — 99214 OFFICE O/P EST MOD 30 MIN: CPT | Performed by: ORTHOPAEDIC SURGERY

## 2023-11-08 NOTE — ASSESSMENT & PLAN NOTE
Findings consistent with left knee pain, concern for medial meniscus tear. Findings and treatment options were discussed with the patient. X-rays were reviewed with her. Recommend MRI of the left knee to further evaluate the joint at this time due to continued mechanical symptoms in the knee. Recommend icing and elevation for swelling. Follow-up after MRI and I will go over further treatment recommendations at that time. All patient's questions were answered to her satisfaction. This note is created using dictation transcription. It may contain typographical errors, grammatical errors, improperly dictated words, background noise and other errors.

## 2023-11-08 NOTE — PROGRESS NOTES
Assessment:     1. Acute pain of left knee    2. Internal derangement of left knee        Plan:     Problem List Items Addressed This Visit          Other    Acute pain of left knee - Primary     Findings consistent with left knee pain, concern for medial meniscus tear. Findings and treatment options were discussed with the patient. X-rays were reviewed with her. Recommend MRI of the left knee to further evaluate the joint at this time due to continued mechanical symptoms in the knee. Recommend icing and elevation for swelling. Follow-up after MRI and I will go over further treatment recommendations at that time. All patient's questions were answered to her satisfaction. This note is created using dictation transcription. It may contain typographical errors, grammatical errors, improperly dictated words, background noise and other errors. Relevant Orders    XR knee 4+ vw left injury    MRI knee left  wo contrast     Other Visit Diagnoses       Internal derangement of left knee               Subjective:     Patient ID: Shelli Day is a 69 y.o. female. Chief Complaint: This is a 67-year-old white female here for evaluation of her left knee. She had a sudden onset of left knee pain that occurred almost 3 months ago. She states that she stepped down off a step at her home into the garage leading with the left leg and felt a sudden and severe onset of left knee pain when she stepped down. Since then she has had pain and swelling in that knee. The pain can be aching at times and sometimes sharp. The sharp pain occurs with any twisting or pivoting. She does have the sensation and will give out on her at times. She is ambulating with a cane. She denies any locking or catching. The pain does radiate down to the ankle at times. She was evaluated by Dr. Eli Dunbar in August 2023 who thought it may be coming from the lumbar spine.   She was then evaluated by Dr. Adele Lin who referred back to us for reevaluation of the knee. He felt as if it was not coming from the lumbar spine. She is going to physical therapy for lymphedema treatment of the left lower extremity. Physical therapy feels the swelling is most likely coming from the knee. No other treatment. Allergy:  Allergies   Allergen Reactions    Antihistamines, Diphenhydramine-Type     Arimidex [Anastrozole] Confusion    Bupropion     Clarithromycin     Codeine Other (See Comments)     increased HR    Gabapentin     Meloxicam Nausea Only and Visual Disturbance     Other reaction(s): Numbness  Action Taken: med stopped.; Category: Allergy;      Ondansetron     Pravastatin     Propoxyphene Other (See Comments)     increased HR     Medications:  all current active meds have been reviewed  Past Medical History:  Past Medical History:   Diagnosis Date    Anxiety     Anxiety disorder     Arthralgia     Arthritis     Asthma     Basal cell carcinoma     Breast cancer ([address])     Chronic lumbar radiculopathy     last assessed: 12/20/16    Chronic respiratory failure ([address])     Colon cancer screening 04/11/2019    Last colonoscopy 2014-15    Depression     Dysfunction of eustachian tube     Dyslipidemia     Emphysema lung (HCC)     Fatigue     HTN (hypertension) 10/29/2014    Hypercholesterolemia     Hypertension     Ileus of unspecified type ([address])     Irritable bowel syndrome with diarrhea 04/11/2019    Lumbosacral stenosis     last assessed: 9/20/16    TRAE (obstructive sleep apnea)     Pancreatitis     resolved: 9/20/17    Pneumonia     Post-operative nausea and vomiting     PTSD (post-traumatic stress disorder)      Past Surgical History:  Past Surgical History:   Procedure Laterality Date    BREAST BIOPSY Right 09/12/2022    [facility]    BREAST LUMPECTOMY Right 10/21/2022    Procedure: LUMPECTOMY BREAST FLORENCIO;  Surgeon: Pop Hui MD;  Location: [facility];  Service: Surgical Oncology    CHOLECYSTECTOMY      EGD  04/15/2019    GALLBLADDER SURGERY HYSTERECTOMY      pt thinks she still has one ovary, done over 20 yrs ago    [address]  2016    3, 4, 5    LYMPH NODE BIOPSY Right 10/21/2022    Procedure: SLN BX, lymphoscintigraphy;  Surgeon: Zaki Dewitt MD;  Location: [facility];  Service: Surgical Oncology    MOUTH SURGERY      MULTIPLE TOOTH EXTRACTIONS N/A     OOPHORECTOMY Bilateral     1 1/2 ovaries removed    TONSILLECTOMY      US BREAST FLORENCIO  NEEDLE LOC RIGHT Right 2022    US GUIDED BREAST BIOPSY RIGHT COMPLETE Right 2022     Family History:  Family History   Problem Relation Age of Onset    Breast cancer Mother 61    Depression Mother         panic attacks    Lung cancer Mother     Mental illness Mother     Substance Abuse Mother         CIGS    Coronary artery disease Father         high # of paternal family members  in their 46s    Substance Abuse Father         CIGS    Depression Sister         panic attacks    Hypertension Sister     Breast cancer Daughter 48        CHEK2 positve    Breast cancer Maternal Aunt 40    Lung cancer Maternal Aunt     Ovarian cancer Maternal Grandmother 79    Depression Other         panic attacks    Colon cancer Other         age at dx unk    Heart disease Family     Hypertension Family     Colon polyps Neg Hx      Social History:  Social History     Substance and Sexual Activity   Alcohol Use Yes    Alcohol/week: 4.0 standard drinks of alcohol    Types: 4 Glasses of wine per week    Comment: 4 drinks every night     Social History     Substance and Sexual Activity   Drug Use Yes    Frequency: 7.0 times per week    Types: Marijuana    Comment: uses topically daily- last used 10/20     Social History     Tobacco Use   Smoking Status Never   Smokeless Tobacco Never     Review of Systems   Constitutional: Negative. HENT: Negative. Eyes: Negative. Respiratory: Negative. Cardiovascular: Negative. Gastrointestinal: Negative. Endocrine: Negative.     Genitourinary: Negative. Musculoskeletal:  Positive for arthralgias (left knee), gait problem (using a cane) and joint swelling (left knee). Skin: Negative. Allergic/Immunologic: Negative. Hematological: Negative. Psychiatric/Behavioral: Negative. Objective:  BP Readings from Last 1 Encounters:   11/08/23 110/70      Wt Readings from Last 1 Encounters:   11/08/23 72.6 kg (160 lb)      BMI:   Estimated body mass index is 28.34 kg/m² as calculated from the following:    Height as of this encounter: 5' 3" (1.6 m). Weight as of this encounter: 72.6 kg (160 lb). BSA:   Estimated body surface area is 1.76 meters squared as calculated from the following:    Height as of this encounter: 5' 3" (1.6 m). Weight as of this encounter: 72.6 kg (160 lb). Physical Exam  Vitals and nursing note reviewed. Constitutional:       General: She is not in acute distress. Appearance: Normal appearance. She is well-developed. HENT:      Head: Normocephalic and atraumatic. Right Ear: External ear normal.      Left Ear: External ear normal.   Eyes:      Extraocular Movements: Extraocular movements intact. Conjunctiva/sclera: Conjunctivae normal.   Pulmonary:      Effort: Pulmonary effort is normal. No respiratory distress. Musculoskeletal:      Cervical back: Neck supple. Left knee: Effusion (trace) present. Instability Tests: Medial Aravind test positive. Lateral Aravind test negative. Skin:     General: Skin is warm and dry. Neurological:      Mental Status: She is alert and oriented to person, place, and time. Deep Tendon Reflexes: Reflexes are normal and symmetric. Psychiatric:         Mood and Affect: Mood normal.         Behavior: Behavior normal.       Left Knee Exam     Tenderness   The patient is experiencing tenderness in the medial joint line and pes anserinus.     Range of Motion   Extension:  normal   Flexion:  100     Tests   Aravind:  Medial - positive Lateral - negative  Varus: negative Valgus: negative  Drawer:  Anterior - negative     Posterior - negative  Patellar apprehension: negative    Other   Erythema: absent  Scars: absent  Sensation: normal  Pulse: present  Swelling: mild  Effusion: effusion (trace) present            I have personally reviewed pertinent films in PACS and my interpretation is x-rays of the left knee reveal mild osteoarthritis. No soft tissue calcifications.     Scribe Attestation      I,:  aHi Fuentes PA-C am acting as a scribe while in the presence of the attending physician.:       I,:  Shyam Epps MD personally performed the services described in this documentation    as scribed in my presence.:

## 2023-11-09 ENCOUNTER — OFFICE VISIT (OUTPATIENT)
Dept: PHYSICAL THERAPY | Facility: CLINIC | Age: 78
End: 2023-11-09
Payer: MEDICARE

## 2023-11-09 DIAGNOSIS — M79.89 LEFT LEG SWELLING: Primary | ICD-10-CM

## 2023-11-09 PROCEDURE — 97016 VASOPNEUMATIC DEVICE THERAPY: CPT | Performed by: PHYSICAL THERAPIST

## 2023-11-09 PROCEDURE — 97140 MANUAL THERAPY 1/> REGIONS: CPT | Performed by: PHYSICAL THERAPIST

## 2023-11-09 NOTE — PROGRESS NOTES
Daily Note     Today's date: 2023  Patient name: Dewight Cockayne  : 1945  MRN: [id number]  Referring provider: Teresa Daniels DO  Dx:   Encounter Diagnosis     ICD-10-CM    1. Left leg swelling  M79.89                      Subjective:  Pt reports she gets temporary benefit from having her left leg wrapped and supported, which is helping her to walk better. Objective: See treatment diary below      Assessment: Tolerated treatment well. Patient  is tolerating compression wrapping well. L LE calf and ankle tissue are soft and no longer fibrotic. Pt was measured for knee high open toe compression stocking size III open toe      Plan: Continue per plan of care.       Dx: L LE lymphedema  EPOC:  CO-MORBIDITIES:  PERSONAL FACTORS:   Precautions: CA      Manuals 10/9 10/26 10/30 11/6 11/9        L LE MLD/ CLT  25' 25' 25 25'        Compression garment measure     th        Pneumatic compression pump L LE  15' 15' 15 15'                     Neuro Re-Ed                                                                                                        Ther Ex             Lymphedema dx and mgt educ 8'                                                                                                       Ther Activity                                       Gait Training                                       Modalities

## 2023-11-14 ENCOUNTER — OFFICE VISIT (OUTPATIENT)
Dept: PHYSICAL THERAPY | Facility: CLINIC | Age: 78
End: 2023-11-14
Payer: MEDICARE

## 2023-11-14 DIAGNOSIS — M79.89 LEFT LEG SWELLING: Primary | ICD-10-CM

## 2023-11-14 PROCEDURE — 97140 MANUAL THERAPY 1/> REGIONS: CPT | Performed by: PHYSICAL THERAPIST

## 2023-11-14 PROCEDURE — 97016 VASOPNEUMATIC DEVICE THERAPY: CPT | Performed by: PHYSICAL THERAPIST

## 2023-11-14 NOTE — PROGRESS NOTES
Daily Note     Today's date: 2023  Patient name: Cammie Ortega  : 1945  MRN: [id number]  Referring provider: Kaye Yusuf DO  Dx:   Encounter Diagnosis     ICD-10-CM    1. Left leg swelling  M79.89                      Subjective:  Pt reports her leg had soreness last night. Pt purchased knee high compression stockings 15-20 mmHG      Objective: See treatment diary below      Assessment: Tolerated treatment well. Patient  had soreness surrounding L lateral malleolar region at ATFL region. Compression stocking fits well. Plan:  D/c pt from skilled PT at this time. Pt will continue with use of compression stocking.      Dx: L LE lymphedema  EPOC:  CO-MORBIDITIES:  PERSONAL FACTORS:   Precautions: CA      Manuals 10/9 10/26 10/30 11/6 11/9 11/14       L LE MLD/ CLT  25' 25' 25 25' 25'       Compression garment measure     th        Pneumatic compression pump L LE  15' 15' 15 15' 15'                    Neuro Re-Ed                                                                                                        Ther Ex             Lymphedema dx and mgt educ 8'                                                                                                       Ther Activity                                       Gait Training                                       Modalities

## 2023-11-16 ENCOUNTER — APPOINTMENT (OUTPATIENT)
Dept: PHYSICAL THERAPY | Facility: CLINIC | Age: 78
End: 2023-11-16
Payer: MEDICARE

## 2023-11-26 ENCOUNTER — HOSPITAL ENCOUNTER (OUTPATIENT)
Dept: MRI IMAGING | Facility: HOSPITAL | Age: 78
Discharge: HOME | End: 2023-11-26
Payer: MEDICARE

## 2023-11-26 DIAGNOSIS — M25.562 ACUTE PAIN OF LEFT KNEE: ICD-10-CM

## 2023-11-26 PROCEDURE — G1004 CDSM NDSC: HCPCS

## 2023-11-26 PROCEDURE — 73721 MRI JNT OF LWR EXTRE W/O DYE: CPT

## 2023-11-30 ENCOUNTER — OFFICE VISIT (OUTPATIENT)
Dept: OBGYN CLINIC | Facility: CLINIC | Age: 78
End: 2023-11-30
Payer: MEDICARE

## 2023-11-30 VITALS
WEIGHT: 160 LBS | DIASTOLIC BLOOD PRESSURE: 78 MMHG | HEIGHT: 63 IN | SYSTOLIC BLOOD PRESSURE: 118 MMHG | BODY MASS INDEX: 28.35 KG/M2

## 2023-11-30 DIAGNOSIS — M17.12 PRIMARY OSTEOARTHRITIS OF LEFT KNEE: Primary | ICD-10-CM

## 2023-11-30 DIAGNOSIS — S83.242A ACUTE MEDIAL MENISCUS TEAR OF LEFT KNEE, INITIAL ENCOUNTER: ICD-10-CM

## 2023-11-30 PROCEDURE — 99214 OFFICE O/P EST MOD 30 MIN: CPT | Performed by: ORTHOPAEDIC SURGERY

## 2023-11-30 NOTE — PROGRESS NOTES
Assessment:     1. Primary osteoarthritis of left knee    2. Acute medial meniscus tear of left knee, initial encounter        Plan:     Problem List Items Addressed This Visit          Musculoskeletal and Integument    Primary osteoarthritis of left knee - Primary    Acute medial meniscus tear of left knee      Findings consistent with left knee osteoarthritis and medial meniscus tear in the posterior root. Discussed findings and treatment options with the patient. I reviewed patient's left knee MRI with her. I discussed prognosis of her knee condition. Since patient is not experiencing mechanical symptoms at this time, I recommend continue observation. I did discuss possible cortisone injection in her left knee, but since she is not having pain at this time I would recommend holding off the injection. I also discussed surgical inventions but would only proceed if she has mechanical symptoms. I think some of her leg pain may be stemming from her lumbar spine, which she has being evaluated by Dr. Juana Pearl. I advised patient to return to her daily activities as tolerated. Encouraged doing low impact exercises with stationary bike or swimming. If her symptoms increases, I recommend patient to return for further evaluation. All patient's questions were answered to her satisfaction. This note is created using dictation transcription. It may contain typographical errors, grammatical errors, improperly dictated words, background noise and other errors. Subjective:     Patient ID: Sai Richard is a 69 y.o. female. Chief Complaint: This is a 50-year-old white female follow-up left knee pain. She had a sudden onset of left knee pain that occurred almost 3 months ago. She states that she stepped down off a step at her home into the garage leading with the left leg and felt a sudden and severe onset of left knee pain when she stepped down. Since then she has had pain and swelling in that knee.   The pain can be aching at times and sometimes sharp. The sharp pain occurs with any twisting or pivoting. She does have the sensation and will give out on her at times. She is ambulating with a cane. She denies any locking or catching. The pain does radiate down to the ankle at times. She was evaluated by Dr. Delvin Bland in August 2023 who thought it may be coming from the lumbar spine. She was then evaluated by Dr. [facility] who referred back to us for reevaluation of the knee. He felt as if it was not coming from the lumbar spine. She is going to physical therapy for lymphedema treatment of the left lower extremity. Physical therapy feels the swelling is most likely coming from the knee. No other treatment. Her symptoms since last visit has improved. She is still experiencing intermittent sharp pain but no mechanical symptoms. She has been wearing a compression support stocking on her lower leg, which is helping with her ankle discomfort. She does get radiating pain down her lower legs. Her symptom typically increases after prolonged ambulation. Patient is here to review her left knee MRI. Allergy:  Allergies   Allergen Reactions    Antihistamines, Diphenhydramine-Type     Arimidex [Anastrozole] Confusion    Bupropion     Clarithromycin     Codeine Other (See Comments)     increased HR    Gabapentin     Meloxicam Nausea Only and Visual Disturbance     Other reaction(s): Numbness  Action Taken: med stopped.; Category: Allergy;      Ondansetron     Pravastatin     Propoxyphene Other (See Comments)     increased HR     Medications:  all current active meds have been reviewed  Past Medical History:  Past Medical History:   Diagnosis Date    Anxiety     Anxiety disorder     Arthralgia     Arthritis     Asthma     Basal cell carcinoma     Breast cancer ([facility])     Chronic lumbar radiculopathy     last assessed: 12/20/16    Chronic respiratory failure ([facility])     Colon cancer screening 04/11/2019    Last colonoscopy 2014-15    Depression     Dysfunction of eustachian tube     Dyslipidemia     Emphysema lung (HCC)     Fatigue     HTN (hypertension) 10/29/2014    Hypercholesterolemia     Hypertension     Ileus of unspecified type ([address])     Irritable bowel syndrome with diarrhea 2019    Lumbosacral stenosis     last assessed: 16    TRAE (obstructive sleep apnea)     Pancreatitis     resolved: 17    Pneumonia     Post-operative nausea and vomiting     PTSD (post-traumatic stress disorder)      Past Surgical History:  Past Surgical History:   Procedure Laterality Date    BREAST BIOPSY Right 2022    [facility]    BREAST LUMPECTOMY Right 10/21/2022    Procedure: LUMPECTOMY BREAST FLORENCIO;  Surgeon: Rebecca Anton MD;  [facility];  Service: Surgical Oncology    CHOLECYSTECTOMY      EGD  04/15/2019    GALLBLADDER SURGERY      HYSTERECTOMY      pt thinks she still has one ovary, done over 20 yrs ago    [address]  2016    3, 4, 5    LYMPH NODE BIOPSY Right 10/21/2022    Procedure: SLN BX, lymphoscintigraphy;  Surgeon: Rebecca Anton MD;  [facility];  Service: Surgical Oncology    MOUTH SURGERY      MULTIPLE TOOTH EXTRACTIONS N/A 2018    OOPHORECTOMY Bilateral     1 1/2 ovaries removed    TONSILLECTOMY      US BREAST FLORENCIO  NEEDLE LOC RIGHT Right 2022    US GUIDED BREAST BIOPSY RIGHT COMPLETE Right 2022     Family History:  Family History   Problem Relation Age of Onset    Breast cancer Mother 61    Depression Mother         panic attacks    Lung cancer Mother     Mental illness Mother     Substance Abuse Mother         CIGS    Coronary artery disease Father         high # of paternal family members  in their 46s    Substance Abuse Father         CIGS    Depression Sister         panic attacks    Hypertension Sister     Breast cancer Daughter 48        CHEK2 positve    Breast cancer Maternal Aunt 40    Lung cancer Maternal Aunt     Ovarian cancer Maternal Grandmother 79    Depression Other         panic attacks    Colon cancer Other         age at dx unk    Heart disease Family     Hypertension Family     Colon polyps Neg Hx      Social History:  Social History     Substance and Sexual Activity   Alcohol Use Yes    Alcohol/week: 4.0 standard drinks of alcohol    Types: 4 Glasses of wine per week    Comment: 4 drinks every night     Social History     Substance and Sexual Activity   Drug Use Yes    Frequency: 7.0 times per week    Types: Marijuana    Comment: uses topically daily- last used 10/20     Social History     Tobacco Use   Smoking Status Never   Smokeless Tobacco Never     Review of Systems   Constitutional: Negative. HENT: Negative. Eyes: Negative. Respiratory: Negative. Cardiovascular: Negative. Gastrointestinal: Negative. Endocrine: Negative. Genitourinary: Negative. Musculoskeletal:  Positive for arthralgias (left knee) and joint swelling (left knee). Negative for gait problem. Skin: Negative. Allergic/Immunologic: Negative. Hematological: Negative. Psychiatric/Behavioral: Negative. Objective:  BP Readings from Last 1 Encounters:   11/30/23 118/78      Wt Readings from Last 1 Encounters:   11/30/23 72.6 kg (160 lb)      BMI:   Estimated body mass index is 28.34 kg/m² as calculated from the following:    Height as of this encounter: 5' 3" (1.6 m). Weight as of this encounter: 72.6 kg (160 lb). BSA:   Estimated body surface area is 1.76 meters squared as calculated from the following:    Height as of this encounter: 5' 3" (1.6 m). Weight as of this encounter: 72.6 kg (160 lb). Physical Exam  Vitals and nursing note reviewed. Constitutional:       General: She is not in acute distress. Appearance: Normal appearance. She is well-developed. HENT:      Head: Normocephalic and atraumatic.       Right Ear: External ear normal.      Left Ear: External ear normal.   Eyes:      Extraocular Movements: Extraocular movements intact. Conjunctiva/sclera: Conjunctivae normal.   Pulmonary:      Effort: Pulmonary effort is normal. No respiratory distress. Musculoskeletal:      Cervical back: Neck supple. Left knee: Effusion (trace) present. Instability Tests: Medial Aravind test negative and lateral Aravind test negative. Skin:     General: Skin is warm and dry. Neurological:      Mental Status: She is alert and oriented to person, place, and time. Deep Tendon Reflexes: Reflexes are normal and symmetric. Psychiatric:         Mood and Affect: Mood normal.         Behavior: Behavior normal.       Left Knee Exam     Tenderness   The patient is experiencing tenderness in the medial joint line and pes anserinus. Range of Motion   Extension:  normal   Flexion:  100     Tests   Aravind:  Medial - negative Lateral - negative  Varus: negative Valgus: negative  Patellar apprehension: negative    Other   Erythema: absent  Scars: absent  Sensation: normal  Pulse: present  Swelling: mild  Effusion: effusion (trace) present            I have personally reviewed pertinent films in PACS and my interpretation is MRI of the left knee show medial meniscus tear in the posterior root and grade 2 medial compartment cartilage loss.

## 2023-12-07 DIAGNOSIS — C50.411 MALIGNANT NEOPLASM OF UPPER-OUTER QUADRANT OF RIGHT BREAST IN FEMALE, ESTROGEN RECEPTOR POSITIVE: ICD-10-CM

## 2023-12-07 DIAGNOSIS — Z17.0 MALIGNANT NEOPLASM OF UPPER-OUTER QUADRANT OF RIGHT BREAST IN FEMALE, ESTROGEN RECEPTOR POSITIVE: ICD-10-CM

## 2023-12-07 RX ORDER — EXEMESTANE 25 MG/1
25 TABLET ORAL DAILY
Qty: 30 TABLET | Refills: 1 | Status: SHIPPED | OUTPATIENT
Start: 2023-12-07

## 2023-12-14 ENCOUNTER — PATIENT MESSAGE (OUTPATIENT)
Dept: HEMATOLOGY ONCOLOGY | Facility: CLINIC | Age: 78
End: 2023-12-14

## 2023-12-15 ENCOUNTER — TELEPHONE (OUTPATIENT)
Dept: HEMATOLOGY ONCOLOGY | Facility: CLINIC | Age: 78
End: 2023-12-15

## 2023-12-16 ENCOUNTER — APPOINTMENT (OUTPATIENT)
Dept: LAB | Facility: CLINIC | Age: 78
End: 2023-12-16
Payer: MEDICARE

## 2023-12-16 DIAGNOSIS — Z17.0 MALIGNANT NEOPLASM OF UPPER-OUTER QUADRANT OF RIGHT BREAST IN FEMALE, ESTROGEN RECEPTOR POSITIVE: ICD-10-CM

## 2023-12-16 DIAGNOSIS — C50.411 MALIGNANT NEOPLASM OF UPPER-OUTER QUADRANT OF RIGHT BREAST IN FEMALE, ESTROGEN RECEPTOR POSITIVE: ICD-10-CM

## 2023-12-16 DIAGNOSIS — D50.0 IRON DEFICIENCY ANEMIA DUE TO CHRONIC BLOOD LOSS: ICD-10-CM

## 2023-12-16 LAB
ALBUMIN SERPL BCP-MCNC: 4.4 G/DL (ref 3.5–5)
ALP SERPL-CCNC: 54 U/L (ref 34–104)
ALT SERPL W P-5'-P-CCNC: 16 U/L (ref 7–52)
ANION GAP SERPL CALCULATED.3IONS-SCNC: 9 MMOL/L
AST SERPL W P-5'-P-CCNC: 21 U/L (ref 13–39)
BASOPHILS # BLD AUTO: 0.03 THOUSANDS/ÂΜL (ref 0–0.1)
BASOPHILS NFR BLD AUTO: 1 % (ref 0–1)
BILIRUB SERPL-MCNC: 0.79 MG/DL (ref 0.2–1)
BUN SERPL-MCNC: 20 MG/DL (ref 5–25)
CALCIUM SERPL-MCNC: 9.8 MG/DL (ref 8.4–10.2)
CHLORIDE SERPL-SCNC: 108 MMOL/L (ref 96–108)
CO2 SERPL-SCNC: 25 MMOL/L (ref 21–32)
CREAT SERPL-MCNC: 0.69 MG/DL (ref 0.6–1.3)
EOSINOPHIL # BLD AUTO: 0.13 THOUSAND/ÂΜL (ref 0–0.61)
EOSINOPHIL NFR BLD AUTO: 3 % (ref 0–6)
ERYTHROCYTE [DISTWIDTH] IN BLOOD BY AUTOMATED COUNT: 12.7 % (ref 11.6–15.1)
FERRITIN SERPL-MCNC: 190 NG/ML (ref 11–307)
GFR SERPL CREATININE-BSD FRML MDRD: 84 ML/MIN/1.73SQ M
GLUCOSE P FAST SERPL-MCNC: 113 MG/DL (ref 65–99)
HCT VFR BLD AUTO: 43.2 % (ref 34.8–46.1)
HGB BLD-MCNC: 14.1 G/DL (ref 11.5–15.4)
IMM GRANULOCYTES # BLD AUTO: 0.01 THOUSAND/UL (ref 0–0.2)
IMM GRANULOCYTES NFR BLD AUTO: 0 % (ref 0–2)
IRON SATN MFR SERPL: 20 % (ref 15–50)
IRON SERPL-MCNC: 74 UG/DL (ref 50–212)
LYMPHOCYTES # BLD AUTO: 1.37 THOUSANDS/ÂΜL (ref 0.6–4.47)
LYMPHOCYTES NFR BLD AUTO: 33 % (ref 14–44)
MCH RBC QN AUTO: 30 PG (ref 26.8–34.3)
MCHC RBC AUTO-ENTMCNC: 32.6 G/DL (ref 31.4–37.4)
MCV RBC AUTO: 92 FL (ref 82–98)
MONOCYTES # BLD AUTO: 0.29 THOUSAND/ÂΜL (ref 0.17–1.22)
MONOCYTES NFR BLD AUTO: 7 % (ref 4–12)
NEUTROPHILS # BLD AUTO: 2.39 THOUSANDS/ÂΜL (ref 1.85–7.62)
NEUTS SEG NFR BLD AUTO: 56 % (ref 43–75)
NRBC BLD AUTO-RTO: 0 /100 WBCS
PLATELET # BLD AUTO: 225 THOUSANDS/UL (ref 149–390)
PMV BLD AUTO: 11.4 FL (ref 8.9–12.7)
POTASSIUM SERPL-SCNC: 4.1 MMOL/L (ref 3.5–5.3)
PROT SERPL-MCNC: 6.8 G/DL (ref 6.4–8.4)
RBC # BLD AUTO: 4.7 MILLION/UL (ref 3.81–5.12)
SODIUM SERPL-SCNC: 142 MMOL/L (ref 135–147)
TIBC SERPL-MCNC: 375 UG/DL (ref 250–450)
UIBC SERPL-MCNC: 301 UG/DL (ref 155–355)
WBC # BLD AUTO: 4.22 THOUSAND/UL (ref 4.31–10.16)

## 2023-12-16 PROCEDURE — 80053 COMPREHEN METABOLIC PANEL: CPT

## 2023-12-16 PROCEDURE — 82728 ASSAY OF FERRITIN: CPT

## 2023-12-16 PROCEDURE — 85025 COMPLETE CBC W/AUTO DIFF WBC: CPT

## 2023-12-16 PROCEDURE — 83550 IRON BINDING TEST: CPT

## 2023-12-16 PROCEDURE — 83540 ASSAY OF IRON: CPT

## 2023-12-16 PROCEDURE — 36415 COLL VENOUS BLD VENIPUNCTURE: CPT

## 2023-12-18 ENCOUNTER — OFFICE VISIT (OUTPATIENT)
Age: 78
End: 2023-12-18
Payer: MEDICARE

## 2023-12-18 ENCOUNTER — DOCUMENTATION (OUTPATIENT)
Dept: HEMATOLOGY ONCOLOGY | Facility: CLINIC | Age: 78
End: 2023-12-18

## 2023-12-18 VITALS
HEIGHT: 63 IN | WEIGHT: 161 LBS | DIASTOLIC BLOOD PRESSURE: 70 MMHG | TEMPERATURE: 97.4 F | BODY MASS INDEX: 28.53 KG/M2 | SYSTOLIC BLOOD PRESSURE: 113 MMHG | HEART RATE: 64 BPM | OXYGEN SATURATION: 94 %

## 2023-12-18 DIAGNOSIS — Z17.0 MALIGNANT NEOPLASM OF UPPER-OUTER QUADRANT OF RIGHT BREAST IN FEMALE, ESTROGEN RECEPTOR POSITIVE: Primary | ICD-10-CM

## 2023-12-18 DIAGNOSIS — C50.411 MALIGNANT NEOPLASM OF UPPER-OUTER QUADRANT OF RIGHT BREAST IN FEMALE, ESTROGEN RECEPTOR POSITIVE: Primary | ICD-10-CM

## 2023-12-18 PROCEDURE — 99214 OFFICE O/P EST MOD 30 MIN: CPT | Performed by: INTERNAL MEDICINE

## 2023-12-18 RX ORDER — ANASTROZOLE 1 MG/1
1 TABLET ORAL DAILY
Qty: 30 TABLET | Refills: 2 | Status: SHIPPED | OUTPATIENT
Start: 2023-12-18

## 2023-12-18 NOTE — PROGRESS NOTES
HEMATOLOGY / ONCOLOGY CLINIC FOLLOW UP NOTE    Primary Care Provider: Thu Osborn DO  Referring Provider:    MRN: [id number]  : 1945    Reason for Encounter: follow up breast cancer       Oncology History Overview Note   10/2022 - right partial mastectomy for invasive lobular with ductal features, ER 90-95% DC 90-95% Her2 1+ Ki 67 15%, grade 2 - pT1c(18mm)N1a(2/9)M0     2022 - adjuvant RT     2023 - start anastrozole - stop after a short time due to blurry vision    3/2023 - 2023 - letrozole - stopped due to joint pain     10/2023 - start exemestane     Malignant neoplasm of upper-outer quadrant of right breast in female, estrogen receptor positive    2022 Biopsy    Right breast biopsy:  - Invasive lobular carcinoma   -Grade 2  ER 90%  DC 90%  HER2 negative     2022 -  Cancer Staged    Staging form: Breast, AJCC 8th Edition  - Clinical stage from 2022: Stage IA (cT1c, cN0, cM0, G2, ER+, DC+, HER2-) - Signed by Inés Singh MD on 2022  Stage prefix: Initial diagnosis  Method of lymph node assessment: Clinical  Histologic grading system: 3 grade system       10/21/2022 Surgery    Right lumpectomy with sentinel lymph node biopsy:   - Clear margins  - 2/4 lymph nodes  Dr. Singh     10/2022 Genetic Testing    [employer] Breast Cancer STAT Panel (9 genes): SINDI, BRCA1, BRCA2, CDH1, CHEK2, PALB2, PTEN, STK11, and TP53 with reflex to [employer] Core Cancer Panel (27 additional genes): APC, AXIN2, BARD1, BRIP1, BMPR1A, CDK4, CDKN2A, DICER1, EPCAM, GREM1, HOXB13, MLH1, MSH2, MSH3, MSH6, MUTYH, NBN, NF1, NTHL1, PMS2, POLD1, POLE, RAD51C, RAD51D, RECQL SMAD4, SMARCA4    Negative     2022 -  Cancer Staged    Staging form: Breast, AJCC 8th Edition  - Pathologic stage from 2022: Stage IA (pT1c, pN1a, cM0, G2, ER+, DC+, HER2-) - Signed by Inés Singh MD on 2022  Stage prefix: Initial diagnosis  Histologic grading system: 3 grade system       2022 - 2023 Radiation    Plan ID  Energy Fractions Dose per Fraction (cGy) Dose Correction (cGy) Total Dose Delivered (cGy) Elapsed Days   R Boost 6X 4 / 4 250 0 1,000 5   R Breast 10X/6X 25 / 25 200 0 5,000 37   R Sclav_Ax # 10X 25 / 25 200 0 5,000 37   Dr. Solano     2/2023 -  Hormone Therapy    Anastrozole, switched to Letrozole in March of 2023  Dr. 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  She is status post right breast lumpectomy in October 2022.  Patient was initially prescribed anastrozole, however this was stopped as patient felt that anastrozole was causing her blurry vision and mild episodes of confusion.  She was then tried on letrozole as hormonal therapy, however she stopped taking it in August as she was experiencing significant joint pain (specifically right shoulder and left knee).  Patient took prednisone and Cymbalta for her joint pain with some improvement. She is following up with the orthopedic team for her knee and back pain. Patient was recommended to start taking exemestane as the alternative hormonal therapy during office visit in October 2023.    Patient presented to the clinic today for a routine visit.  Although her joint aches have been stable with the use of Cymbalta, patient relayed that she has been experiencing progressive and significant hair loss for the past 3 weeks. Her  has noticed this during most recent visit as well.  We informed the patient that the hair loss is most likely a side effect from the exemestane.       Review of Systems   Constitutional:  Negative for appetite change, chills, fatigue, fever and unexpected weight change.   HENT:   Negative for hearing loss, mouth sores, nosebleeds and sore throat.    Eyes:  Negative for eye  problems and icterus.   Respiratory:  Negative for chest tightness, cough, shortness of breath and wheezing.    Cardiovascular:  Negative for chest pain and palpitations.   Gastrointestinal:  Negative for abdominal distention, abdominal pain, blood in stool, constipation, diarrhea, nausea and vomiting.   Endocrine: Negative for hot flashes.   Genitourinary:  Negative for difficulty urinating, dyspareunia, dysuria, frequency and hematuria.    Musculoskeletal:  Positive for arthralgias (left knee pain and right shoulder pain) and back pain (chronic). Negative for flank pain, gait problem, myalgias and neck pain.   Skin:  Negative for itching and rash.        Progressive hair loss x 3 weeks   Neurological:  Negative for dizziness, gait problem, headaches, light-headedness and speech difficulty.   Hematological:  Does not bruise/bleed easily.   Psychiatric/Behavioral:  Negative for confusion. The patient is not nervous/anxious.      ECOG PS: 1    PROBLEM LIST:  Patient Active Problem List   Diagnosis    Allergic rhinitis    Anxiety disorder    Chronic bronchitis with emphysema    Chronic low back pain    Essential hypertension    Myofascial pain syndrome    Obstructive sleep apnea    Panic attack    Xerostomia    Symmetrical polyarthritis    Vitamin D deficiency    Bilateral carotid artery disease (HCC)    Colon cancer screening    Irritable bowel syndrome with diarrhea    Erosive gastritis    Class 1 obesity without serious comorbidity in adult    Syncope    Abnormal left aortic arch    Aberrant right subclavian artery    Nonrheumatic aortic valve insufficiency    Mild mitral regurgitation    Mild tricuspid regurgitation    Lumbar spinal stenosis    Family hx-breast malignancy    Dysphagia    Primary osteoarthritis of right knee    Effusion of right knee    Degenerative tear of posterior horn of medial meniscus of right knee    Malignant neoplasm of upper-outer quadrant of right breast in female, estrogen receptor  positive     Family history of gene mutation    BRCA negative    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 Raymond is a 78 year old female with node positive ER positive breast cancer.  She is status post right breast lumpectomy in October 2022.  Patient was first started on hormonal therapy with anastrozole, however this was stopped as patient felt that anastrozole was causing blurry vision and mild episodes of confusion.  She was then tried on letrozole starting March 2023, however she stopped taking it in August as she was experiencing significant joint pain.  Patient took prednisone and Cymbalta for her joint pain with some improvement.  In October 2023, patient was recommended to start taking exemestane as the alternative hormonal therapy.     Today, patient presented to the office as a routine follow-up. Although her joint aches have been stable with the use of Cymbalta, patient relayed that she has been experiencing progressive and significant hair loss for the past 3 weeks. We informed the patient that hair loss is most likely a side effect from exemestane.    We discussed 3 options with the patient. One was switching to tamoxifen, which would carry a potential increased risk for VTE events and stroke. With patient's strong family history of CAD and stroke, she prefers to not be started on tamoxifen. We discussed the option of continuing exemestane with simultaneous initiation of supplements like biotin and minoxidil. We also  discussed about the alternative of stopping hormonal therapy altogether, although we explained that this would carry an increased risk for systemic recurrence of the breast cancer.     Patient asked about the alternative of going back on anastrozole. She recalls experiencing blurry vision and confusion after being started on anastrozole in the past, however she would like to try the anastrozole one more time to see how she tolerates it.  Of note, patient was evaluated by an ophthalmologist after she experienced vision trouble last time; exam was reported to be benign without any acute findings.  As patient would like to try anastrozole, we will begin a trial of anastrozole to see how she tolerates it, however she was strongly advised to inform us if she were to notice any recurrence of the vision or neurological complaints. Patient was advised to stop taking exemestane.  We will plan to see patient for follow-up in about 2 months via telemedicine; she knows to call the office in the interim with any questions or concerns.    Past Medical History:   has a past medical history of Anxiety, Anxiety disorder, Arthralgia, Arthritis, Asthma, Basal cell carcinoma, Breast cancer (HCC), Chronic lumbar radiculopathy, Chronic respiratory failure (HCC), Colon cancer screening (04/11/2019), Depression, Dysfunction of eustachian tube, Dyslipidemia, Emphysema lung (HCC), Fatigue, HTN (hypertension) (10/29/2014), Hypercholesterolemia, Hypertension, Ileus of unspecified type (HCC), Irritable bowel syndrome with diarrhea (04/11/2019), Lumbosacral stenosis, TRAE (obstructive sleep apnea), Pancreatitis, Pneumonia, Post-operative nausea and vomiting, and PTSD (post-traumatic stress disorder).    PAST SURGICAL HISTORY:   has a past surgical history that includes Cholecystectomy; Gallbladder surgery; Mouth surgery; Tonsillectomy; Multiple tooth extractions (N/A, 2018); Hysterectomy; Lumbar spine surgery (12/2016); EGD (04/15/2019);  Oophorectomy (Bilateral); US guided breast biopsy right complete (Right, 09/12/2022); US breast gabrielle  right (Right, 09/12/2022); Breast biopsy (Right, 09/12/2022); Breast lumpectomy (Right, 10/21/2022); and Lymph node biopsy (Right, 10/21/2022).    CURRENT MEDICATIONS  Current Outpatient Medications   Medication Sig Dispense Refill    anastrozole (ARIMIDEX) 1 mg tablet Take 1 tablet (1 mg total) by mouth daily 30 tablet 2    ascorbic acid (VITAMIN C) 250 mg tablet Take 250 mg by mouth daily      aspirin (Aspirin 81) 81 mg EC tablet Take 1 tablet (81 mg total) by mouth daily 90 tablet 3    atenolol (TENORMIN) 25 mg tablet TAKE 1 TABLET BY MOUTH TWICE DAILY 180 tablet 3    Cholecalciferol (VITAMIN D3) 2000 units capsule Take by mouth daily      clonazePAM (KlonoPIN) 0.5 mg tablet Take 1 tablet (0.5 mg total) by mouth 2 (two) times a day as needed for anxiety 60 tablet 0    co-enzyme Q-10 30 MG capsule Take 30 mg by mouth daily       dicyclomine (BENTYL) 10 mg capsule TAKE 2 CAPSULES BY MOUTH  TWO TIMES A DAY (Patient taking differently: Take 10 mg by mouth 2 (two) times a day as needed) 360 capsule 12    DULoxetine (CYMBALTA) 30 mg delayed release capsule Take 1 capsule (30 mg total) by mouth daily 90 capsule 1    fenofibrate (TRICOR) 48 mg tablet Take 1 tablet (48 mg total) by mouth every morning 90 tablet 3    fluticasone (FLONASE) 50 mcg/act nasal spray USE 1 SPRAY IN EACH NOSTRIL DAILY 16 g 5    losartan (COZAAR) 100 MG tablet TAKE 1 TABLET BY MOUTH  DAILY 90 tablet 3    magnesium oxide (MAG-OX) 400 mg Take 400 mg by mouth every other day       multivitamin (THERAGRAN) TABS Take 1 tablet by mouth daily      Omega 3 1000 MG CAPS Take by mouth daily      Probiotic Product (PROBIOTIC-10 PO) Take by mouth daily      rosuvastatin (CRESTOR) 5 mg tablet TAKE 1 TABLET BY MOUTH 3  TIMES WEEKLY 39 tablet 3     No current facility-administered medications for this visit.     [unfilled]    SOCIAL HISTORY:   reports that  "she has never smoked. She has never used smokeless tobacco. She reports current alcohol use of about 4.0 standard drinks of alcohol per week. She reports current drug use. Frequency: 7.00 times per week. Drug: Marijuana.     FAMILY HISTORY:  family history includes Breast cancer (age of onset: 40) in her maternal aunt; Breast cancer (age of onset: 50) in her daughter; Breast cancer (age of onset: 60) in her mother; Colon cancer in her other; Coronary artery disease in her father; Depression in her mother, other, and sister; Heart disease in her family; Hypertension in her family and sister; Lung cancer in her maternal aunt and mother; Mental illness in her mother; Ovarian cancer (age of onset: 70) in her maternal grandmother; Substance Abuse in her father and mother.     ALLERGIES:  is allergic to antihistamines, diphenhydramine-type; arimidex [anastrozole]; bupropion; clarithromycin; codeine; gabapentin; meloxicam; ondansetron; pravastatin; and propoxyphene.      Physical Exam:  Vital Signs:   Visit Vitals  /70 (BP Location: Left arm, Patient Position: Sitting, Cuff Size: Standard)   Pulse 64   Temp (!) 97.4 °F (36.3 °C) (Temporal)   Ht 5' 3\" (1.6 m)   Wt 73 kg (161 lb)   LMP  (LMP Unknown)   SpO2 94%   BMI 28.52 kg/m²   OB Status Hysterectomy   Smoking Status Never   BSA 1.76 m²     Body mass index is 28.52 kg/m².  Body surface area is 1.76 meters squared.    Physical Exam  Constitutional:       General: She is not in acute distress.     Appearance: Normal appearance. She is not toxic-appearing.   HENT:      Head: Normocephalic and atraumatic.      Mouth/Throat:      Mouth: Mucous membranes are moist.      Pharynx: No oropharyngeal exudate or posterior oropharyngeal erythema.   Eyes:      General: No scleral icterus.     Extraocular Movements: Extraocular movements intact.      Conjunctiva/sclera: Conjunctivae normal.      Pupils: Pupils are equal, round, and reactive to light.   Cardiovascular:      Rate " and Rhythm: Normal rate and regular rhythm.      Heart sounds: No murmur heard.     No gallop.   Pulmonary:      Effort: No respiratory distress.      Breath sounds: Normal breath sounds. No stridor. No wheezing or rhonchi.   Abdominal:      General: Bowel sounds are normal. There is no distension.      Palpations: Abdomen is soft. There is no mass.      Tenderness: There is no abdominal tenderness.   Musculoskeletal:         General: Tenderness (limited ROM in left knee 2/2 arthralgias) present. No swelling.      Cervical back: No rigidity.   Lymphadenopathy:      Cervical: No cervical adenopathy.   Skin:     Capillary Refill: Capillary refill takes less than 2 seconds.      Coloration: Skin is not jaundiced or pale.      Findings: No bruising or erythema.   Neurological:      General: No focal deficit present.      Mental Status: She is oriented to person, place, and time.      Motor: No weakness.      Gait: Gait abnormal (uses cane for ambulatory dysfunction).        Labs:  Lab Results   Component Value Date    WBC 4.22 (L) 12/16/2023    HGB 14.1 12/16/2023    HCT 43.2 12/16/2023    MCV 92 12/16/2023     12/16/2023     Lab Results   Component Value Date     07/15/2016    SODIUM 142 12/16/2023    K 4.1 12/16/2023     12/16/2023    CO2 25 12/16/2023    AGAP 9 12/16/2023    BUN 20 12/16/2023    CREATININE 0.69 12/16/2023    GLUC 108 04/21/2023    GLUF 113 (H) 12/16/2023    CALCIUM 9.8 12/16/2023    AST 21 12/16/2023    ALT 16 12/16/2023    ALKPHOS 54 12/16/2023    PROT 6.7 07/15/2016    TP 6.8 12/16/2023    BILITOT 0.6 07/15/2016    TBILI 0.79 12/16/2023    EGFR 84 12/16/2023

## 2023-12-27 ENCOUNTER — TELEPHONE (OUTPATIENT)
Dept: FAMILY MEDICINE CLINIC | Facility: HOSPITAL | Age: 78
End: 2023-12-27

## 2023-12-27 DIAGNOSIS — J01.90 ACUTE SINUSITIS, RECURRENCE NOT SPECIFIED, UNSPECIFIED LOCATION: Primary | ICD-10-CM

## 2023-12-27 RX ORDER — AMOXICILLIN AND CLAVULANATE POTASSIUM 875; 125 MG/1; MG/1
1 TABLET, FILM COATED ORAL 2 TIMES DAILY
Qty: 14 TABLET | Refills: 0 | Status: SHIPPED | OUTPATIENT
Start: 2023-12-27 | End: 2024-01-03

## 2023-12-27 NOTE — TELEPHONE ENCOUNTER
is here for a follow up visit from a total knee arthroplasty on the right  side. The patient is doing well, with no medical complications since discharge. Pain has been out of proportion to surgery since surgery,and the patient is progressing slowly with physical therapy. Patient is ambulating with a walker. Current Outpatient Medications on File Prior to Visit   Medication Sig Dispense Refill    pregabalin (LYRICA) 200 mg capsule Take 200 mg by mouth two (2) times a day. losartan (COZAAR) 25 mg tablet Take 25 mg by mouth daily. primidone (MYSOLINE) 50 mg tablet Take 50 mg by mouth every evening. metFORMIN (GLUCOPHAGE) 1,000 mg tablet Take 1 Tab by mouth two (2) times daily (with meals). START TAKING 9/23 WITH DINNER (Patient taking differently: Take 1,000 mg by mouth two (2) times daily (with meals). Patient restarted on 8/19/22) 30 Tab 0    atorvastatin (LIPITOR) 80 mg tablet Take 80 mg by mouth nightly. hydroCHLOROthiazide (HYDRODIURIL) 25 mg tablet Take 25 mg by mouth daily. levothyroxine (SYNTHROID) 200 mcg tablet Take 200 mcg by mouth Daily (before breakfast). cholecalciferol, vitamin D3, (VITAMIN D3) 2,000 unit tab Take 2,000 Units by mouth daily. nitroglycerin (NITROSTAT) 0.4 mg SL tablet 1 Tab by SubLINGual route every five (5) minutes as needed for Chest Pain. Up to 3 doses. (Patient not taking: No sig reported) 20 Tab 0     No current facility-administered medications on file prior to visit. ROS:  General: denies agitation, major chest pain, unexpected weakness  Patient complains of left knee pain which is  managed with oxycodone.   Skin: healing wound is without issue or drainage  Strength: appropriate weakness of involved extremity is resolving since surgery      Physical Examination:    Blood pressure 129/69, pulse (!) 55, temperature 97 °F (36.1 °C), temperature source Tympanic, height 5' 7.5\" (1.715 m), weight 241 lb (109.3 kg), SpO2 100 Patient calling because she has a sore throat and sinus pain and pressure for over 1 week now. She thinks it is a sinus infection, she had this before. She has been using Flonase with some relief but it is not going away. Patient asking if something else can be prescribed. Patient uses [facility].   %.    Dressing: Clean, Dry, Intact  Skin: no significant drainage, no wound dehiscence. Range of motion: passive 0-110, active, limited with minimal quadriceps effort and activation  Coronal plane stability is excellent  Sensation intact to light touch at level of wound and distally  Strength is 2+/5 with knee flexion and extension  Leg lengths are clinically equal  Distal swelling is noted, but appropriate for postoperative course  Distal capillary refill less than 2 seconds      Imaging:    Postoperative xrays are reviewed, they indicate a right total knee arthroplasty in excellent position without evidence of loosening or failure. Assessment: Status post right total knee arthroplasty    Plan:  Patient will continue physical therapy, with the goal of outpatient therapy and then home exercise program as soon as is possible  I have emphasized the need for aggressive PT, quadriceps action, and range of motion. Wound care and dental prophylaxis instructions were reviewed  Continue to weight bear as tolerated without restriction, except to keep to the positions of comfort. Emphasis was placed on range of motion and strength exercises daily. Deep Venous Thrombosis Prophylaxis: none  Follow up will be at 3 weeks from now,  no xrays on follow up. Gita Sarah Dominguez has a reminder for a \"due or due soon\" health maintenance.  I have asked that he contact his primary care provider for follow-up on this health

## 2024-01-04 ENCOUNTER — APPOINTMENT (OUTPATIENT)
Dept: LAB | Facility: CLINIC | Age: 79
End: 2024-01-04
Payer: MEDICARE

## 2024-01-04 ENCOUNTER — TELEPHONE (OUTPATIENT)
Dept: NEUROLOGY | Facility: CLINIC | Age: 79
End: 2024-01-04

## 2024-01-04 ENCOUNTER — OFFICE VISIT (OUTPATIENT)
Dept: NEUROLOGY | Facility: CLINIC | Age: 79
End: 2024-01-04
Payer: MEDICARE

## 2024-01-04 ENCOUNTER — TELEPHONE (OUTPATIENT)
Dept: FAMILY MEDICINE CLINIC | Facility: HOSPITAL | Age: 79
End: 2024-01-04

## 2024-01-04 VITALS
HEART RATE: 65 BPM | WEIGHT: 158 LBS | TEMPERATURE: 97.5 F | DIASTOLIC BLOOD PRESSURE: 86 MMHG | BODY MASS INDEX: 28 KG/M2 | SYSTOLIC BLOOD PRESSURE: 151 MMHG | HEIGHT: 63 IN

## 2024-01-04 DIAGNOSIS — H57.11 EYE PAIN, RIGHT: Primary | ICD-10-CM

## 2024-01-04 DIAGNOSIS — G62.9 NEUROPATHY: ICD-10-CM

## 2024-01-04 DIAGNOSIS — M13.0 SYMMETRICAL POLYARTHRITIS: Primary | ICD-10-CM

## 2024-01-04 DIAGNOSIS — H57.11 EYE PAIN, RIGHT: ICD-10-CM

## 2024-01-04 DIAGNOSIS — R90.82 WHITE MATTER DISEASE, UNSPECIFIED: ICD-10-CM

## 2024-01-04 LAB
CRP SERPL QL: 1.9 MG/L
ERYTHROCYTE [SEDIMENTATION RATE] IN BLOOD: 9 MM/HOUR (ref 0–29)

## 2024-01-04 PROCEDURE — 86140 C-REACTIVE PROTEIN: CPT

## 2024-01-04 PROCEDURE — 36415 COLL VENOUS BLD VENIPUNCTURE: CPT

## 2024-01-04 PROCEDURE — 85652 RBC SED RATE AUTOMATED: CPT

## 2024-01-04 PROCEDURE — 99215 OFFICE O/P EST HI 40 MIN: CPT | Performed by: PSYCHIATRY & NEUROLOGY

## 2024-01-04 NOTE — ASSESSMENT & PLAN NOTE
Pt here today for neuro follow up  Pt seen once to date  Pt with many ongoing issues, but much better since last visit  Pt has been now seen by ortho both dr okeefe and dr samson for left knee, back and neck  Pt notes she had imaging of neck and back  Studies reviewed with pt with known severe spondylosis C5/6  Normal cord  Also with post surgical changes in back fusion L3-L5  Mod canal stenosis, mod doug lateral recess narrowing at L5/S1, sever left foraminal stenosis at L5/S1  Doug lateral recess narrowing at L2/3 with impact on L3  Pt strongly recommended to follow up with ortho as both neck and back contributory to pain  Pt is now also off chemo due to issues with several meds to date  Pt just seen by oncology and will be retrialing initial chemo med shortly  Pt has noted since being off chemo, improved joint pains  Pt to follow up closely with oncology and ortho

## 2024-01-04 NOTE — ASSESSMENT & PLAN NOTE
Pt mentionned at appt having right eye pain for past 9 days  Pt notes she called pcp office due to severity of pain, like eye being extracted and pain to touch of socket and around eye  Pt with temporal pulses but noted pain in temple as well  Will check sed rate and crp  Unsure if possible elevation would be related to sinus vs other pathology  Pt noted pain was so bad at onset and took asa and went to sleep  Will also check mra head   No clear history of aneurysm  Pt notes not specific headache but more eye and eye socket related  Also strongly rec pt to call optho as soon as she gets home for appt  Informed pt if any acute headache or focal neuro sxs or loss of vision, needs to go to er  Currently positive temp pulses, and full vision  Pt notes pain to pressing of the actual orbit and even inner portion of nose and lower area of eye socket  Called pcp office thru dr line, recycled twice and only option was then to leave a detailed message  Stronlgy rec for pt to follow up with pcp to follow up about specific right eye sxs, as well as optho  Pt to have labs today and ordered mra head as well  Pt instructed to go to er for any acute ha or visual changes in meanwhile

## 2024-01-04 NOTE — TELEPHONE ENCOUNTER
Lamont Andino. Pt seen in office today for second visit for balance issues.  At timing of first appt, pt with many superimposed issues, polyarthralgia, neuropathy, neck and back issues.  Please see my newest consult from this morning.  Extended appt today for follow up from initial ie all studies as well as review from oncology notes and ortho notes dr bennett and dr samson.   Pt mri head aics no acute pathology and emg for neruopathy pending.   Neropathy labs ok.  Pt noted in later part of visit, newest issue with pain in the right eye that came on suddenly about 9 days ago.  Pt did not seek acute medical care even though she was extremely concerned by amount of pain in the eye.  Not specific headahce per se but more pain all around and thru the right eye.   Pt notes she called your office and rxed abx for possible sinus infection. In office today she is still symptomatic and asking for more abx.  I noted her pain all around the eye socket and also back into the post temple on the right.  Pt notes pain when she touches the eye at all padmini center of socket. Pain in nasal area originally.  No alteration of vision. Pos temp a pulses but feels sensitive all over the eye.  Unsure etiology. I called your office to also alert you but unfortuately after 2 rounds of cycling only option was to leave detailed message which I did.   I asked pt to follow up with you to ensure we find etiology.  I ordered mra head and also sed rate and crp.  Again unsure of etiology. I also strongly recommended pt to call optho today. She has regular optho but could not remember his name today.  She will call as soon as home.  I am out of office tomorrow but wanted to make sure she followed up with you on my concerns.   I directed her to er if any new focal sxs especially any changes in vision.

## 2024-01-04 NOTE — TELEPHONE ENCOUNTER
-----This is Doctor Diya Zhang calling for Doctor Osborn regarding a mutual patient, patient is here in the office for just a regular follow up for her neck and her back. She is complaining that she has right eye pain, pain to the touch of the socket as well as behind the eye. She said that she did call the office about 9 days ago and was given an antibiotic, but she's still having the symptoms. Now I am going to be sending doctor fly my consult for what I'm seeing her for, but I'm also concerned that she needs to have this further evaluated. Both by OPS though and again is this anything sinus And I'm also just checking for any risk for aneurysm or temporal arteritis. I would like the patient to follow up with Doctor Osbonr as soon as possible and I will also be sending a task in Epic, but I wanted to call personally for my concern on Siobhan

## 2024-01-04 NOTE — ASSESSMENT & PLAN NOTE
Pt in process of work up for neuropathy  Awaiting emg study of lowers in march  Pt had neuropathy labs done to date  Lyme neg, folate normal  Ck ok, paraneoplastic panel neg  Vit B1,6 and 12 ok  Pt to follow up with neuro after emg

## 2024-01-04 NOTE — ASSESSMENT & PLAN NOTE
Pt with close follow up with ortho Dr Jennifer Rodriguez referred pt to Dr Gallegos for neck and back

## 2024-01-04 NOTE — PROGRESS NOTES
Patient ID: Siobhan Raymond is a 78 y.o. female.    Assessment/Plan:    Symmetrical polyarthritis  Pt here today for neuro follow up  Pt seen once to date  Pt with many ongoing issues, but much better since last visit  Pt has been now seen by ortho both dr okeefe and dr gallegos for left knee, back and neck  Pt notes she had imaging of neck and back  Studies reviewed with pt with known severe spondylosis C5/6  Normal cord  Also with post surgical changes in back fusion L3-L5  Mod canal stenosis, mod doug lateral recess narrowing at L5/S1, sever left foraminal stenosis at L5/S1  Doug lateral recess narrowing at L2/3 with impact on L3  Pt strongly recommended to follow up with ortho as both neck and back contributory to pain  Pt is now also off chemo due to issues with several meds to date  Pt just seen by oncology and will be retrialing initial chemo med shortly  Pt has noted since being off chemo, improved joint pains  Pt to follow up closely with oncology and ortho    Neuropathy  Pt in process of work up for neuropathy  Awaiting emg study of lowers in march  Pt had neuropathy labs done to date  Lyme neg, folate normal  Ck ok, paraneoplastic panel neg  Vit B1,6 and 12 ok  Pt to follow up with neuro after emg    Primary osteoarthritis of left knee  Pt with close follow up with ortho Dr Jennifer Rodriguez referred pt to Dr Gallegos for neck and back    Acute right eye pain  Pt mentionned at appt having right eye pain for past 9 days  Pt notes she called pcp office due to severity of pain, like eye being extracted and pain to touch of socket and around eye  Pt with temporal pulses but noted pain in temple as well  Will check sed rate and crp  Unsure if possible elevation would be related to sinus vs other pathology  Pt noted pain was so bad at onset and took asa and went to sleep  Will also check mra head   No clear history of aneurysm  Pt notes not specific headache but more eye and eye socket related  Also strongly rec pt to  "call optho as soon as she gets home for appt  Informed pt if any acute headache or focal neuro sxs or loss of vision, needs to go to er  Currently positive temp pulses, and full vision  Pt notes pain to pressing of the actual orbit and even inner portion of nose and lower area of eye socket  Called pcp office thru dr line, recycled twice and only option was then to leave a detailed message  Stronlgy rec for pt to follow up with pcp to follow up about specific right eye sxs, as well as optho  Pt to have labs today and ordered mra head as well  Pt instructed to go to er for any acute ha or visual changes in meanwhile       Diagnoses and all orders for this visit:    Symmetrical polyarthritis    Neuropathy           Subjective:    HPI    Pt is a 79 yo f with pmh of TRAE, HTN, right breast camcer s/p surgery including lymph nodes and radiation- dx about one year ago.   Pt presents today for balance issue. Initial consult for muscle tightness and joint pain.  Pt with new onset joint pain over past month ever since new change in cancer meds. Pt initially seen on 8/10/23.   Per notes from initial appt \"Pt notes she was unable to tolerate anatrozole due to confusion. Pt was then placed on letrazole more recently and just told to stop med due to extreme joint pain. Pt notes significant issues over the past several weeks. Pt notes pain in right shoulder even to slight movement, also in right wrist, both knees and left ankle. Pt with history of low back surgery by [facility] about 7 yrs ago and still needed ablation therapy after surgery.  No [facility] records in emr and pt not know name of surgeon or location for mri lumbar imaging. Pt recalls it had to be at a specific location but unsure name. Pt is also not sure if back hardware is mri compatible, no card available.   Pt notes int falls.  Pt notes diff with ambulation for years.  Pt using cane for any distances over past 2 yrs.  In home or in garden she does not use,  Pt seen by " "dr bennett for her right knee in past. Pt notes since being on letrozole, cracking and crunching sensation in her joints.  Pt was going to PT for balance therapy and recalls issues with left knee at that time.  When she got home pt had been going down steps and had horrible severe pain and now after one week , still unable to bear full weight on leg.   Pt declined er visit. Strongly recommended er eval for imaging and ortho eval. Pt declined.  Will send referal to ortho and notify pcp as well. Pt here is same building as dr bennett,.  rec pt to go to office and see about fit in appt this week due to new knee issue.    Pt also needs to follow up with dr powell re pain in joints.   On exam pt with evidence of some apraxia on left side.  Strongly rec mri head padmini with cancer history , but pt unsure if ok to get imaging due to her hardware.  Ordered ct head with contrast in light of breast cancer.  Also rec paraneoplastic panel due to neuropathic features on exam.  Pt also with ongoing issues for months with diff getting out of chair.   Pt with left greater than right proximal weakness.  Unsure if component related to bad back history but pt also with diff with statins in past.  Currently on statin q other day.  Will check ck and emg.   Pt aware to go to er for any new or acute focal sxs including her left knee pain.   Currently no acute radicular or myelopathic findings. \"  Kept above paragraph due to complextiies of case. Pt sent for diff with balance.  Pt had work up to date to eval post fossa as well as neuropathic features from exam.  Extended appt today due to multiplicity of issue at timing of initial appt.  More acute issues at time of appt included polyarthralgia. Pt has since been seen by hematology with several med adjustments in her chemo regimen for her breast cancer. Per pt much bettter since off chemo but will be going back on initial med for retrial.   Pt notes joint pain much improved. In addition to the " arthralgias from the chemo, pt is also being seen by dr bennett for her left knee arthtirits. Per pt she was sent by dr bennett to dr samson for her neck and back issues. Pt weakness in legs likely related to prior issues with low back and prior back surgery.  Pt had updated imaging by ortho team. Pt also had imaging of neck as possible contributor as well by ortho team.  For balance issue, pt had neuropathy labs including neg paraneoplastic panel profile.  Neg lyme , nl vit B1, 6 and 12.  Nl folate,  neg sjogren.  Neg lyme.   Ck normal. Pt had crp by dr blount and neg.   Pt still awaitng emg study scheduled in march for lower ext  for neuropaty and lumbar radiculopathy testing.   Pt had mri c spine which was reviewed at pt request.  Study ordered by ortho.  Pt with known retrolisthesis of C5 on C6. Pt with severe spondylotic narrowing at this level.  Pt with normal cervical cord.  Pt also with mri lumbar from Oct 2023- Postsurgical change status post laminectomies, posterior and interbody instrumented fusion at levels L3-L5.   Degenerative bilateral lateral recesses narrowing at level L2-3 (possible impact on L3 nerve roots) with moderate canal stenosis.  Moderate bilateral lateral recess narrowing at L5-S1 with abutment of the S1 nerve roots.  Severe left foraminal stenosis at level L5-S1.  Pt aware that both necka nd back can also be contributory to her strength.   Pt notes no falls or trips since last visit,  no change in vision.  No loss of vision.  No change in bowel or bladder. No change in speech or swallowing. Pt notes about 9 days ago awakening with pain around her right eye and right side of nose.  Pt noted pain to the touch of the eye.  Pt did not seek medical attn. Pt notes she just took nasal spray and 2 asa and went back to bed and notified pcp next day.  No clear history of any visual changes. Pt notes she was given over phone rx for abx for 7 days and completed it about 2 days ago, but still with pain to  touch of the eye and all around eye and temple and particularly if pt pushes on eye ball.  Due to prolonged sxs, called pcp office while pt in office.  Unfortunately cycled twice and could only leave detailed message. From neuro directed pt to er for any acute changes ie headache, focal sxs, or visual loss or obscurations.  No jaw claudiation.  Also directed pt to pcp to address the eye pressure.  Pt wishes more abx.  Also directed pt to get mra head and also sed rate and crp. Pt with pulses mychal temporally but pain to touch all around the eye.    Pt also directed to see optho asap as well. Pt has a regular optho but could not remember name.  She will call as soon as she gets home.  I also left message for pcp to follow up on this as well.   Rev in detail mri head iacs that I had originally ordered for balance.   Study done 9/1/23-   No acute infarction, intracranial hemorrhage or mass.  2. Normal-appearing 7th and 8th cranial nerve complexes bilaterally. No cerebellopontine angle cistern mass lesion.  3. Trace, chronic microangiopathy.  4. Mild cerebellar tonsillar ectopia.    Rev with pt no acute findings in post fossa.  Rev the cerebellar ectopia in detail with model drawing of anataomy.   Rev likely developmental.  Also correlated findings with rads report from ct head as well from 8/24/23- No acute intracranial abnormality.Mild microangiopathic changes as described above.Stable low-lying cerebellar tonsils, borderline for Chiari I malformation. Both ct head and mri head done with contrast and non enh.  Pt also directed to follow up with oncology as well during her retrial with chemo medication.  Pt overall feeling better than last visit but now also with new issue with the right eye.  Extended appt to address mulitplicity of issues and further direction for newest issue with the right eye.    Total time spent today 55 minutes. Greater than 50% of total time was spent with the patient and / or family counseling  "and / or coordination of care       The following portions of the patient's history were reviewed and updated as appropriate: allergies, current medications, past family history, past medical history, past social history, past surgical history, and problem list and med rec and ros rev.         Objective:    Blood pressure 151/86, pulse 65, temperature 97.5 °F (36.4 °C), temperature source Temporal, height 5' 3\" (1.6 m), weight 71.7 kg (158 lb).    Physical Exam  Constitutional:       General: She is not in acute distress.     Appearance: She is not ill-appearing.   Eyes:      General: Lids are normal.      Extraocular Movements: Extraocular movements intact.      Pupils: Pupils are equal, round, and reactive to light.   Musculoskeletal:      Right lower leg: No edema.      Left lower leg: No edema.   Neurological:      Mental Status: She is alert.      Motor: Motor strength is normal.     Deep Tendon Reflexes: Reflexes are normal and symmetric.   Psychiatric:         Speech: Speech normal.         Neurological Exam  Mental Status  Alert. Recent and remote memory are intact. Speech is normal. Language is fluent with no aphasia. Attention and concentration are normal.    Cranial Nerves  CN II: Visual acuity is normal. Visual fields full to confrontation.  CN III, IV, VI: Extraocular movements intact bilaterally. Normal lids and orbits bilaterally. Pupils equal round and reactive to light bilaterally.  CN V: Facial sensation is normal.  CN VII: Full and symmetric facial movement.  CN VIII: Hearing is normal.  CN IX, X: Palate elevates symmetrically. Normal gag reflex.  CN XI: Shoulder shrug strength is normal.  CN XII: Tongue midline without atrophy or fasciculations.  Pos temp artery pulses mychal  Min tenderness to touch of right post temple, per pt more issue around entire eye socket and if any pressure applied to socket itself.    Motor  Normal muscle bulk throughout. Strength is 5/5 throughout all four " extremities.    Sensory  Dec vib mychal lowers.    Reflexes  Deep tendon reflexes are 2+ and symmetric in all four extremities.    Coordination  Right: Finger-to-nose normal. Rapid alternating movement normal.Left: Finger-to-nose normal. Rapid alternating movement normal.    Gait  Casual gait is normal including stance, stride, and arm swing.        ROS:    Review of Systems   Constitutional:  Negative for appetite change and fever.   HENT: Negative.  Negative for hearing loss, tinnitus, trouble swallowing and voice change.    Eyes: Negative.  Negative for photophobia and pain.   Respiratory: Negative.  Negative for shortness of breath.    Cardiovascular: Negative.  Negative for palpitations.   Gastrointestinal: Negative.  Negative for nausea and vomiting.   Endocrine: Negative.  Negative for cold intolerance.   Genitourinary: Negative.  Negative for dysuria, frequency and urgency.   Musculoskeletal: Negative.  Negative for myalgias and neck pain.   Skin: Negative.  Negative for rash.   Neurological:  Positive for weakness. Negative for dizziness, tremors, seizures, syncope, facial asymmetry, speech difficulty, light-headedness, numbness and headaches.        Weakness in hands and legs     Hematological: Negative.  Does not bruise/bleed easily.   Psychiatric/Behavioral: Negative.  Negative for confusion, hallucinations and sleep disturbance.    All other systems reviewed and are negative.

## 2024-01-05 NOTE — TELEPHONE ENCOUNTER
Siobhan Wiley called me back - she said she has an eye doctor appt. Today at 2:15 - so she won't be coming in .  Did you still want her to be seen by you?

## 2024-01-07 NOTE — TELEPHONE ENCOUNTER
Nursing, let pt know sed rate and crp normal. Pt to still follow up with pcp and optho asap.  Also still recommend getting mra head completed.  If any new or acute neuro sxs ie headache, change in vision, needs to go to er as previously directed.  Also let pt know Dr Osborn also aware.  Ccing her.

## 2024-01-08 NOTE — TELEPHONE ENCOUNTER
Spoke with pt and advised her of Dr. Zhang's message and recommendations. Pt verbalizes understanding.     Says she went to ophthalmologist and they didn't see anything, except for dry eyes which she already knows she has.     Pt already scheduled for MRA head on 1/20/24. Pt to contact PCP office.

## 2024-01-12 ENCOUNTER — OFFICE VISIT (OUTPATIENT)
Dept: FAMILY MEDICINE CLINIC | Facility: HOSPITAL | Age: 79
End: 2024-01-12
Payer: MEDICARE

## 2024-01-12 VITALS
HEART RATE: 73 BPM | WEIGHT: 159 LBS | BODY MASS INDEX: 28.17 KG/M2 | DIASTOLIC BLOOD PRESSURE: 72 MMHG | OXYGEN SATURATION: 98 % | HEIGHT: 63 IN | SYSTOLIC BLOOD PRESSURE: 130 MMHG

## 2024-01-12 DIAGNOSIS — I65.23 BILATERAL CAROTID ARTERY STENOSIS: ICD-10-CM

## 2024-01-12 DIAGNOSIS — M54.50 CHRONIC BILATERAL LOW BACK PAIN WITHOUT SCIATICA: ICD-10-CM

## 2024-01-12 DIAGNOSIS — Z17.0 MALIGNANT NEOPLASM OF UPPER-OUTER QUADRANT OF RIGHT BREAST IN FEMALE, ESTROGEN RECEPTOR POSITIVE: ICD-10-CM

## 2024-01-12 DIAGNOSIS — C50.411 MALIGNANT NEOPLASM OF UPPER-OUTER QUADRANT OF RIGHT BREAST IN FEMALE, ESTROGEN RECEPTOR POSITIVE: ICD-10-CM

## 2024-01-12 DIAGNOSIS — G89.29 CHRONIC BILATERAL LOW BACK PAIN WITHOUT SCIATICA: ICD-10-CM

## 2024-01-12 DIAGNOSIS — H57.11 ACUTE RIGHT EYE PAIN: Primary | ICD-10-CM

## 2024-01-12 PROBLEM — R07.89 CHEST PRESSURE: Status: ACTIVE | Noted: 2024-01-12

## 2024-01-12 PROBLEM — E66.9 CLASS 1 OBESITY WITHOUT SERIOUS COMORBIDITY IN ADULT: Status: RESOLVED | Noted: 2019-08-09 | Resolved: 2024-01-12

## 2024-01-12 PROBLEM — E66.811 CLASS 1 OBESITY WITHOUT SERIOUS COMORBIDITY IN ADULT: Status: RESOLVED | Noted: 2019-08-09 | Resolved: 2024-01-12

## 2024-01-12 PROCEDURE — 99214 OFFICE O/P EST MOD 30 MIN: CPT | Performed by: INTERNAL MEDICINE

## 2024-01-12 NOTE — ASSESSMENT & PLAN NOTE
Had called after [place] with sore throat and sinus pressure- we put on antibiotics and  then seen by Dr. Zhang neurology  and they contacted us for  ongoing right eye pain.   Had severe pain in right periorbital area- stat crp and  sed rate were normal.   She ws then seen by doctor at [facility] but we do not have that  record-- I will request it   Now on additional treatment for dry  eye- to use drops 4-6 x a day.   Is on flonase but no antihistamines

## 2024-01-12 NOTE — PATIENT INSTRUCTIONS
Have  patient sign release  for record from [facility] whom she see a week ago.    Keep well hydrated

## 2024-01-12 NOTE — PROGRESS NOTES
Assessment/Plan:     Diagnosis ICD-10-CM Associated Orders   1. Acute right eye pain  H57.11       2. Chronic bilateral low back pain without sciatica  M54.50     G89.29       3. Malignant neoplasm of upper-outer quadrant of right breast in female, estrogen receptor positive   C50.411     Z17.0       4. Bilateral carotid artery stenosis  I65.23           Problem List Items Addressed This Visit          Cardiovascular and Mediastinum    Bilateral carotid artery disease (HCC)       Other    Chronic low back pain    Malignant neoplasm of upper-outer quadrant of right breast in female, estrogen receptor positive     Acute right eye pain - Primary     Had called after [place] with sore throat and sinus pressure- we put on antibiotics and  then seen by Dr. Zhang neurology  and they contacted us for  ongoing right eye pain.   Had severe pain in right periorbital area- stat crp and  sed rate were normal.   She ws then seen by doctor at [facility] but we do not have that  record-- I will request it   Now on additional treatment for dry  eye- to use drops 4-6 x a day.   Is on flonase but no antihistamines              Return in about 6 weeks (around 2/23/2024).      Subjective:    Patient ID: Siobhan Raymond is a 79 y.o. female    1. Sinusitis- improved with antibiotic and having  continued use of  flonase  Seen by dentist and needs  work on left side done  2. Right eye  pain- no blurred vison-    3. Neck pain - had seen sr lopez neurology- Has not yet  scheduled for mri of brain that she had ordered  Had noted some issues with dropping things but no pins or needles or numbness but has hand stiffness- less touch  4. Oncology- to try initial  cancer pill- anatrazole- had tried the other options  5. Cardiology- to see dr land        The following portions of the patient's history were reviewed and updated as appropriate: allergies, current medications and problem list.     Review of Systems    Constitutional:  Positive for fatigue.        Ongoing fatigue even after sinus infection cleared   HENT:  Positive for congestion, postnasal drip, rhinorrhea and sinus pain.    Respiratory:  Negative for chest tightness and shortness of breath.    Gastrointestinal:  Negative for abdominal distention.   Musculoskeletal:  Positive for back pain and neck pain.        Back pain- using cane- some improvement with the use of cymbalta   Skin:  Negative for rash.   Neurological:  Negative for dizziness and headaches.   All other systems reviewed and are negative.        Objective:      Current Outpatient Medications:     ascorbic acid (VITAMIN C) 250 mg tablet, Take 250 mg by mouth daily, Disp: , Rfl:     aspirin (Aspirin 81) 81 mg EC tablet, Take 1 tablet (81 mg total) by mouth daily, Disp: 90 tablet, Rfl: 3    atenolol (TENORMIN) 25 mg tablet, TAKE 1 TABLET BY MOUTH TWICE DAILY, Disp: 180 tablet, Rfl: 3    Cholecalciferol (VITAMIN D3) 2000 units capsule, Take by mouth daily, Disp: , Rfl:     clonazePAM (KlonoPIN) 0.5 mg tablet, Take 1 tablet (0.5 mg total) by mouth 2 (two) times a day as needed for anxiety, Disp: 60 tablet, Rfl: 0    co-enzyme Q-10 30 MG capsule, Take 30 mg by mouth daily , Disp: , Rfl:     DULoxetine (CYMBALTA) 30 mg delayed release capsule, Take 1 capsule (30 mg total) by mouth daily, Disp: 90 capsule, Rfl: 1    fenofibrate (TRICOR) 48 mg tablet, Take 1 tablet (48 mg total) by mouth every morning, Disp: 90 tablet, Rfl: 3    fluticasone (FLONASE) 50 mcg/act nasal spray, USE 1 SPRAY IN EACH NOSTRIL DAILY, Disp: 16 g, Rfl: 5    losartan (COZAAR) 100 MG tablet, TAKE 1 TABLET BY MOUTH  DAILY, Disp: 90 tablet, Rfl: 3    magnesium oxide (MAG-OX) 400 mg, Take 400 mg by mouth every other day , Disp: , Rfl:     multivitamin (THERAGRAN) TABS, Take 1 tablet by mouth daily, Disp: , Rfl:     Omega 3 1000 MG CAPS, Take by mouth daily, Disp: , Rfl:     Probiotic Product (PROBIOTIC-10 PO), Take by mouth daily, Disp: ,  "Rfl:     rosuvastatin (CRESTOR) 5 mg tablet, TAKE 1 TABLET BY MOUTH 3  TIMES WEEKLY, Disp: 39 tablet, Rfl: 3    anastrozole (ARIMIDEX) 1 mg tablet, Take 1 tablet (1 mg total) by mouth daily (Patient not taking: Reported on 1/4/2024), Disp: 30 tablet, Rfl: 2    dicyclomine (BENTYL) 10 mg capsule, TAKE 2 CAPSULES BY MOUTH  TWO TIMES A DAY (Patient not taking: Reported on 1/12/2024), Disp: 360 capsule, Rfl: 12    Blood pressure 130/72, pulse 73, height 5' 3\" (1.6 m), weight 72.1 kg (159 lb), SpO2 98%.     Physical Exam  Vitals and nursing note reviewed.   Constitutional:       General: She is not in acute distress.  HENT:      Head: Normocephalic and atraumatic.      Right Ear: There is no impacted cerumen.      Left Ear: There is no impacted cerumen.      Nose: Congestion present.   Eyes:      General:         Right eye: No discharge.         Left eye: No discharge.   Cardiovascular:      Rate and Rhythm: Normal rate and regular rhythm.      Heart sounds: No murmur heard.  Pulmonary:      Breath sounds: No wheezing or rhonchi.   Abdominal:      General: There is no distension.   Musculoskeletal:         General: Tenderness present. No swelling.      Cervical back: No rigidity.      Comments: Cervical tenderness  less prnounced   Bilateral  knee tenderness and shoudler    Neurological:      General: No focal deficit present.      Mental Status: She is alert.   Psychiatric:         Mood and Affect: Mood normal.         Thought Content: Thought content normal.         Judgment: Judgment normal.        "

## 2024-01-18 ENCOUNTER — TELEPHONE (OUTPATIENT)
Dept: HEMATOLOGY ONCOLOGY | Facility: CLINIC | Age: 79
End: 2024-01-18

## 2024-01-18 NOTE — TELEPHONE ENCOUNTER
Appointment Change  Cancel, Reschedule, Change to Virtual      Who are you speaking with? Patient   If it is not the patient, is the caller listed on the communication consent form? Yes   Which provider is the appointment scheduled with? Dr. Singh   When was the original appointment scheduled?    Please list date and time 1/18 11:15am   At which location is the appointment scheduled to take place? [place]   Was the appointment rescheduled?     Was the appointment changed from an in person visit to a virtual visit?    If so, please list the details of the change. Missed appmt, will call to resched   What is the reason for the appointment change? Going to dentist       Was STAR transport scheduled? No   Does STAR transport need to be scheduled for the new visit (if applicable) No   Does the patient need an infusion appointment rescheduled? No   Does the patient have an upcoming infusion appointment scheduled? If so, when? No   Is the patient undergoing chemotherapy? No   For appointments cancelled with less than 24 hours:  Was the no-show policy reviewed? Yes

## 2024-01-20 ENCOUNTER — HOSPITAL ENCOUNTER (OUTPATIENT)
Dept: MRI IMAGING | Facility: HOSPITAL | Age: 79
Discharge: HOME | End: 2024-01-20
Attending: PSYCHIATRY & NEUROLOGY
Payer: MEDICARE

## 2024-01-20 DIAGNOSIS — H57.11 EYE PAIN, RIGHT: ICD-10-CM

## 2024-01-20 DIAGNOSIS — R90.82 WHITE MATTER DISEASE, UNSPECIFIED: ICD-10-CM

## 2024-01-20 PROCEDURE — G1004 CDSM NDSC: HCPCS

## 2024-01-20 PROCEDURE — 70544 MR ANGIOGRAPHY HEAD W/O DYE: CPT

## 2024-01-24 ENCOUNTER — TELEPHONE (OUTPATIENT)
Dept: FAMILY MEDICINE CLINIC | Facility: HOSPITAL | Age: 79
End: 2024-01-24

## 2024-01-24 DIAGNOSIS — R51.9 RIGHT FACIAL PAIN: ICD-10-CM

## 2024-01-24 DIAGNOSIS — H57.11 ACUTE RIGHT EYE PAIN: Primary | ICD-10-CM

## 2024-01-24 DIAGNOSIS — J32.0 CHRONIC MAXILLARY SINUSITIS: ICD-10-CM

## 2024-01-24 RX ORDER — FLUTICASONE PROPIONATE 50 MCG
2 SPRAY, SUSPENSION (ML) NASAL DAILY
Qty: 18 ML | Refills: 3 | Status: SHIPPED | OUTPATIENT
Start: 2024-01-24

## 2024-01-24 RX ORDER — PREDNISONE 10 MG/1
TABLET ORAL
Qty: 18 TABLET | Refills: 0 | Status: SHIPPED | OUTPATIENT
Start: 2024-01-24

## 2024-01-24 NOTE — TELEPHONE ENCOUNTER
Yes, this is Siobhan KESSLER. As in [place] [id number]. I'm calling [facility]. I wanted to I'm doctor flies patient to tell her since I saw her on my eye, pain and flashes have gotten much worse. I still have the blocked up sinus. She had said that there might be another medication that she might put me on. I also ran out of my Flonase. [facility] My number is [phone] and like I said, my by sinus, whatever it is, it's got it much worse than The pain behind my eye is now escalated to flashes as well. So she had mentioned another medication. I'm going to be out, but I'll be back. So if you could give her that message and renew my prescription to Flonase. Thank you

## 2024-01-25 ENCOUNTER — TELEPHONE (OUTPATIENT)
Dept: OBGYN CLINIC | Facility: HOSPITAL | Age: 79
End: 2024-01-25

## 2024-01-29 ENCOUNTER — TELEPHONE (OUTPATIENT)
Dept: CARDIOLOGY CLINIC | Facility: CLINIC | Age: 79
End: 2024-01-29

## 2024-01-29 NOTE — TELEPHONE ENCOUNTER
Pt cancelled stress echo because she feels she cannot walk on the treadmill. She is asking if she should complete a lexiscan instead?  She is also waiting for April schedule to open to schedule a f/u visit.

## 2024-02-06 ENCOUNTER — TELEPHONE (OUTPATIENT)
Dept: HEMATOLOGY ONCOLOGY | Facility: CLINIC | Age: 79
End: 2024-02-06

## 2024-02-06 NOTE — TELEPHONE ENCOUNTER
Per Day Godoy RN, Dr. Boyle's nurse, I phoned the patient and introduced myself and indicated that I was calling from [facility] (Dr. Boyle's office) regarding her upcoming appointment on 2/21. We discussed that the appointment would need to be moved from 1000 to 1500 that day, if possible. Siobhan indicated that this would work for her, so her appointment was updated accordingly.

## 2024-02-07 DIAGNOSIS — E78.00 PURE HYPERCHOLESTEROLEMIA: ICD-10-CM

## 2024-02-12 RX ORDER — FENOFIBRATE 48 MG/1
48 TABLET, COATED ORAL EVERY MORNING
Qty: 90 TABLET | Refills: 3 | Status: SHIPPED | OUTPATIENT
Start: 2024-02-12

## 2024-02-14 DIAGNOSIS — F45.8 ANXIETY HYPERVENTILATION: ICD-10-CM

## 2024-02-14 DIAGNOSIS — F41.9 ANXIETY HYPERVENTILATION: ICD-10-CM

## 2024-02-14 RX ORDER — CLONAZEPAM 0.5 MG/1
0.5 TABLET ORAL 2 TIMES DAILY PRN
Qty: 60 TABLET | Refills: 0 | Status: SHIPPED | OUTPATIENT
Start: 2024-02-14

## 2024-02-20 ENCOUNTER — RA CDI HCC (OUTPATIENT)
Dept: OTHER | Facility: HOSPITAL | Age: 79
End: 2024-02-20

## 2024-02-21 ENCOUNTER — TELEMEDICINE (OUTPATIENT)
Age: 79
End: 2024-02-21
Payer: MEDICARE

## 2024-02-21 DIAGNOSIS — Z78.0 ASYMPTOMATIC MENOPAUSE: ICD-10-CM

## 2024-02-21 DIAGNOSIS — Z17.0 MALIGNANT NEOPLASM OF UPPER-OUTER QUADRANT OF RIGHT BREAST IN FEMALE, ESTROGEN RECEPTOR POSITIVE: Primary | ICD-10-CM

## 2024-02-21 DIAGNOSIS — C50.411 MALIGNANT NEOPLASM OF UPPER-OUTER QUADRANT OF RIGHT BREAST IN FEMALE, ESTROGEN RECEPTOR POSITIVE: Primary | ICD-10-CM

## 2024-02-21 PROBLEM — Z12.11 COLON CANCER SCREENING: Status: RESOLVED | Noted: 2019-04-11 | Resolved: 2024-02-21

## 2024-02-21 PROCEDURE — 99442 PR PHYS/QHP TELEPHONE EVALUATION 11-20 MIN: CPT | Performed by: INTERNAL MEDICINE

## 2024-02-23 ENCOUNTER — OFFICE VISIT (OUTPATIENT)
Dept: FAMILY MEDICINE CLINIC | Facility: HOSPITAL | Age: 79
End: 2024-02-23
Payer: MEDICARE

## 2024-02-23 VITALS
DIASTOLIC BLOOD PRESSURE: 72 MMHG | HEIGHT: 63 IN | OXYGEN SATURATION: 95 % | BODY MASS INDEX: 28.17 KG/M2 | SYSTOLIC BLOOD PRESSURE: 132 MMHG | WEIGHT: 159 LBS | HEART RATE: 72 BPM

## 2024-02-23 DIAGNOSIS — I10 ESSENTIAL HYPERTENSION: ICD-10-CM

## 2024-02-23 DIAGNOSIS — I77.9 BILATERAL CAROTID ARTERY DISEASE, UNSPECIFIED TYPE (HCC): ICD-10-CM

## 2024-02-23 DIAGNOSIS — G95.19 OTHER VASCULAR MYELOPATHIES (HCC): ICD-10-CM

## 2024-02-23 DIAGNOSIS — M25.59 PAIN IN OTHER JOINT: ICD-10-CM

## 2024-02-23 DIAGNOSIS — C77.3 SECONDARY AND UNSPECIFIED MALIGNANT NEOPLASM OF AXILLA AND UPPER LIMB LYMPH NODES (HCC): ICD-10-CM

## 2024-02-23 DIAGNOSIS — J41.1 MUCOPURULENT CHRONIC BRONCHITIS (HCC): Primary | ICD-10-CM

## 2024-02-23 DIAGNOSIS — Z17.0 MALIGNANT NEOPLASM OF UPPER-OUTER QUADRANT OF RIGHT BREAST IN FEMALE, ESTROGEN RECEPTOR POSITIVE: ICD-10-CM

## 2024-02-23 DIAGNOSIS — F33.9 DEPRESSION, RECURRENT (HCC): ICD-10-CM

## 2024-02-23 DIAGNOSIS — M47.12 OSTEOARTHRITIS OF CERVICAL SPINE WITH MYELOPATHY: ICD-10-CM

## 2024-02-23 DIAGNOSIS — C50.411 MALIGNANT NEOPLASM OF UPPER-OUTER QUADRANT OF RIGHT BREAST IN FEMALE, ESTROGEN RECEPTOR POSITIVE: ICD-10-CM

## 2024-02-23 PROCEDURE — 99214 OFFICE O/P EST MOD 30 MIN: CPT | Performed by: INTERNAL MEDICINE

## 2024-02-23 RX ORDER — DULOXETIN HYDROCHLORIDE 30 MG/1
30 CAPSULE, DELAYED RELEASE ORAL 2 TIMES DAILY
Qty: 180 CAPSULE | Refills: 1 | Status: SHIPPED | OUTPATIENT
Start: 2024-02-23
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
## 2024-02-23 NOTE — ASSESSMENT & PLAN NOTE
She had stopped the anatrazole for now- has been  restarting in past 2 weeks after stopping in dec with other complex issues

## 2024-02-25 NOTE — PROGRESS NOTES
Virtual Brief Visit    This Visit is being completed by telephone. The Patient is located at Home and in the following state in which I hold an active license [place]    The patient was identified by name and date of birth. Siobhan Raymond was informed that this is a telemedicine visit and that the visit is being conducted through Telephone.  My office door was closed. No one else was in the room.  She acknowledged consent and understanding of privacy and security of the video platform. The patient has agreed to participate and understands they can discontinue the visit at any time.    Patient is aware this is a billable service.     Patient presents for follow-up of her ER positive breast cancer.  We rechallenged her with anastrozole.  She started on December 18 and stopped it on December 27.  On December 27 she began having left-sided eye pain.  She has had an ophthalmologic exam and is still getting evaluated by ophthalmology for the source of this pain.  It has improved over time.  She restarted anastrozole on February 3 and her eye pain did not get any worse.  She is also seeing neurology to see if there might be an element of migraines.  She denies any joint pain or hot flashes.  She is due for her mammogram in July and her next DEXA scan will be due in November.    Assessment/Plan: 79-year-old female with a node positive ER positive breast cancer.  I have recommended that she stay on anastrozole.  I do not think her eye pain is being caused by it.  She has had an ophthalmologic exam and there was no evidence of any problems with the retina or vitreous part of the back of the eye.  I ordered her mammogram for July and her DEXA scan for November.  I will see her back in November with a CBC and CMP prior.  She knows to call in the interim with any questions or concerns.    Problem List Items Addressed This Visit       Malignant neoplasm of upper-outer quadrant of right breast in female, estrogen receptor positive  -  Primary    Relevant Orders    CBC and differential    Comprehensive metabolic panel    Mammo diagnostic bilateral w cad     Other Visit Diagnoses       Asymptomatic menopause        Relevant Orders    DXA bone density spine hip and pelvis            Recent Visits  Date Type Provider Dept   02/23/24 Office Visit Thu Osborn DO [facility]   02/21/24 Telemedicine Anayeli [facility] DO [facility]   Showing recent visits within past 7 days and meeting all other requirements  Future Appointments  No visits were found meeting these conditions.  Showing future appointments within next 150 days and meeting all other requirements         Visit Time  Total Visit Duration: 11 min

## 2024-03-14 ENCOUNTER — HOSPITAL ENCOUNTER (OUTPATIENT)
Dept: NUCLEAR MEDICINE | Facility: HOSPITAL | Age: 79
End: 2024-03-14
Attending: INTERNAL MEDICINE
Payer: MEDICARE

## 2024-03-14 ENCOUNTER — HOSPITAL ENCOUNTER (OUTPATIENT)
Dept: NON INVASIVE DIAGNOSTICS | Facility: HOSPITAL | Age: 79
Discharge: HOME | End: 2024-03-14
Attending: INTERNAL MEDICINE
Payer: MEDICARE

## 2024-03-14 DIAGNOSIS — R07.89 CHEST DISCOMFORT: ICD-10-CM

## 2024-03-14 LAB
NUC STRESS EJECTION FRACTION: 75 %
SL CV REST NUCLEAR ISOTOPE DOSE: 10.3 MCI
SL CV STRESS NUCLEAR ISOTOPE DOSE: 31.8 MCI
STRESS ANGINA INDEX: 0
STRESS BASELINE HR: 72 BPM
STRESS POST PEAK BP: 147 MMHG
STRESS/REST PERFUSION RATIO: 0.81

## 2024-03-14 PROCEDURE — A9502 TC99M TETROFOSMIN: HCPCS

## 2024-03-14 PROCEDURE — 78452 HT MUSCLE IMAGE SPECT MULT: CPT | Performed by: INTERNAL MEDICINE

## 2024-03-14 PROCEDURE — 93016 CV STRESS TEST SUPVJ ONLY: CPT | Performed by: INTERNAL MEDICINE

## 2024-03-14 PROCEDURE — 93018 CV STRESS TEST I&R ONLY: CPT | Performed by: INTERNAL MEDICINE

## 2024-03-14 PROCEDURE — 93017 CV STRESS TEST TRACING ONLY: CPT

## 2024-03-14 PROCEDURE — 78452 HT MUSCLE IMAGE SPECT MULT: CPT

## 2024-03-14 RX ORDER — REGADENOSON 0.08 MG/ML
0.4 INJECTION, SOLUTION INTRAVENOUS ONCE
Status: COMPLETED | OUTPATIENT
Start: 2024-03-14 | End: 2024-03-14

## 2024-03-14 RX ADMIN — REGADENOSON 0.4 MG: 0.08 INJECTION, SOLUTION INTRAVENOUS at 14:42

## 2024-03-15 LAB
CHEST PAIN STATEMENT: NORMAL
MAX DIASTOLIC BP: 67 MMHG
MAX PREDICTED HEART RATE: 142 BPM
PROTOCOL NAME: NORMAL
REASON FOR TERMINATION: NORMAL
STRESS POST EXERCISE DUR MIN: 3 MIN
STRESS POST EXERCISE DUR SEC: 0 SEC
STRESS POST PEAK HR: 78 BPM
STRESS POST PEAK SYSTOLIC BP: 147 MMHG
TARGET HR FORMULA: NORMAL
TEST INDICATION: NORMAL

## 2024-03-19 ENCOUNTER — TELEPHONE (OUTPATIENT)
Dept: HEMATOLOGY ONCOLOGY | Facility: CLINIC | Age: 79
End: 2024-03-19

## 2024-03-19 NOTE — TELEPHONE ENCOUNTER
Spoke with patient regarding her side effects. I informed her that it can take about a week for her side effects to resolve. I told her that I will call her again on Friday to check in on her and see how she is feeling off of the anastrozole. Patient verbalized understanding.

## 2024-03-19 NOTE — TELEPHONE ENCOUNTER
Called pt to reschedule November appt. While assisting with rescheduling, pt stated she was experiencing side effects from her medication.  Extreme mental confusion, can't seem to process things in order or concentrate ex. reading. Pt also stated she experienced ringing in her head, towards left side. Pt stopped medication on Friday. Pt noted it was too soon to see relief of her symptoms since stopping the med.     Message forwarded to Dr Boyle's team as an FYI.

## 2024-03-27 ENCOUNTER — TELEPHONE (OUTPATIENT)
Dept: NEUROLOGY | Facility: CLINIC | Age: 79
End: 2024-03-27

## 2024-03-27 DIAGNOSIS — E78.00 PURE HYPERCHOLESTEROLEMIA: ICD-10-CM

## 2024-03-27 RX ORDER — ATENOLOL 25 MG/1
25 TABLET ORAL 2 TIMES DAILY
Qty: 180 TABLET | Refills: 3 | Status: SHIPPED | OUTPATIENT
Start: 2024-03-27

## 2024-03-27 NOTE — TELEPHONE ENCOUNTER
ALVARO to confirm your upcoming appt, 4/4/24 @10:00am (9:45am) at the [facility] office, to confirm/RS if you are unable to keep this appt please call [phone]

## 2024-04-02 ENCOUNTER — TELEPHONE (OUTPATIENT)
Dept: NEUROLOGY | Facility: CLINIC | Age: 79
End: 2024-04-02

## 2024-04-06 DIAGNOSIS — F41.9 ANXIETY HYPERVENTILATION: ICD-10-CM

## 2024-04-06 DIAGNOSIS — F45.8 ANXIETY HYPERVENTILATION: ICD-10-CM

## 2024-04-06 NOTE — TELEPHONE ENCOUNTER
Medication Refill Request     Name clonazePAM (KlonoPIN)    Dose/Frequency 0.5mg 2x a day   Quantity 60  Verified pharmacy   [x]  Verified ordering Provider   [x]  Does patient have enough for the next 3 days? Yes [x] No []

## 2024-04-09 RX ORDER — CLONAZEPAM 0.5 MG/1
0.5 TABLET ORAL 2 TIMES DAILY PRN
Qty: 60 TABLET | Refills: 0 | Status: SHIPPED | OUTPATIENT
Start: 2024-04-09

## 2024-04-12 ENCOUNTER — TELEPHONE (OUTPATIENT)
Dept: NEUROLOGY | Facility: CLINIC | Age: 79
End: 2024-04-12

## 2024-04-12 ENCOUNTER — OFFICE VISIT (OUTPATIENT)
Dept: NEUROLOGY | Facility: CLINIC | Age: 79
End: 2024-04-12

## 2024-04-12 VITALS
BODY MASS INDEX: 27.64 KG/M2 | TEMPERATURE: 97.3 F | SYSTOLIC BLOOD PRESSURE: 122 MMHG | DIASTOLIC BLOOD PRESSURE: 70 MMHG | WEIGHT: 156 LBS | HEIGHT: 63 IN | HEART RATE: 74 BPM

## 2024-04-12 DIAGNOSIS — G46.8 OTHER VASCULAR SYNDROMES OF BRAIN IN CEREBROVASCULAR DISEASES: ICD-10-CM

## 2024-04-12 DIAGNOSIS — M13.0 SYMMETRICAL POLYARTHRITIS: ICD-10-CM

## 2024-04-12 DIAGNOSIS — G62.9 NEUROPATHY: Primary | ICD-10-CM

## 2024-04-12 DIAGNOSIS — H57.11 EYE PAIN, RIGHT: Primary | ICD-10-CM

## 2024-04-12 NOTE — ASSESSMENT & PLAN NOTE
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

## 2024-04-12 NOTE — TELEPHONE ENCOUNTER
Pt seen for routine neuropathy appt today. Pt overall doing better neuropathy, generalized joint pain and right eye pain.  However steroids seemed to be most helpful.  Sed rate and crp pre steroids normal.  Mra head normal.   Pt also discontinued her chemo which was restarted by oncology in feb and pt stopped in march.   I am re ordering sed rate and c rp due to some hypersensitivy around the right eye specifically temple.  No visual sxs at all.  Also checking temporal artery duplex due to strong respnse to sterodis despite neg sed rate and crp serology.  Pt wanted you to know off chemo as well and any further direction from oncology standpoint.

## 2024-04-12 NOTE — ASSESSMENT & PLAN NOTE
Pt notes degree of joint pain subsided  Pt has been seen by ortho and pcp  Pt was rxed 3 rounds of steroids since last visit, one round by ortho for her knee and other 2 rounds by her pcp  Pt notes notable improvement in her joint sxs  Pt currently off chemo. Pt had time off in dec and then restarted chemo in feb but notified hematology of stopping again in march  Pt notes better since stoppage of chemo meds  Pt also was on steroids during this interval of time  Of note , pt had right eye pain as well which has resolved  Pt was seen by optometry twice but only one note in emr to dr blount  Rev possible trigeminal neuralgia vs shingles  Pt with resolution of sxs except for occ tenderness to touch only around the eye  Pt was previously describing stabbing sensation thru center of eye  Rec continued optho surviellance  Also sed rate and c rp normal at time of sxs in jan  Pt and I reviewed time line in detail  Pt had sed rate and crp right around time of 9 days of sxs and negative  Pt was then on steroids from ortho and pcp for 3 treatment sessions and improvement overall in joint and eye pain  Will repeat again now sed rate and crp as well as temporal artery duplex  No changes in vision at all during time course, pos temp a pulses mychal  Pt notes sensitivty around her right eye at times as only persisting sxs  Will check duplex as well  If any elevation of sed rate, will need to see vascular team and resume steroids  Pt may also benefit from seeing rheum as well  Currently feeling well  Rec revisit with hematology/ oncology due to being off chemo at this time  Pt notified hematology of cessation of chemo in march

## 2024-04-12 NOTE — PROGRESS NOTES
Patient ID: Siobhan Raymond is a 79 y.o. female.    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 Pt had time off in dec and then restarted chemo in feb but notified hematology of stopping again in march  Pt notes better since stoppage of chemo meds  Pt also was on steroids during this interval of time  Of note , pt had right eye pain as well which has resolved  Pt was seen by optometry twice but only one note in emr to dr blount  Rev possible trigeminal neuralgia vs shingles  Pt with resolution of sxs except for occ tenderness to touch only around the eye  Pt was previously describing stabbing sensation thru center of eye  Rec continued optho surviellance  Also sed rate and c rp normal at time of sxs in jan  Pt and I reviewed time line in detail  Pt had sed rate and crp right around time of 9 days of sxs and negative  Pt was then on steroids from ortho and pcp for 3 treatment sessions and improvement overall in joint and eye pain  Will repeat again now sed rate and crp as well as temporal artery duplex  No changes in vision at all during time course, pos temp a pulses mychal  Pt notes sensitivty around her right eye at times as only persisting sxs  Will check duplex as well  If any elevation of sed rate, will need to see vascular team and resume steroids  Pt may also benefit from seeing rheum as well  Currently feeling well  Rec revisit with hematology/ oncology due to being off chemo at  "this time  Pt notified hematology of cessation of chemo in march Diagnoses and all orders for this visit:    Neuropathy    Symmetrical polyarthritis           Subjective:    HPI    Pt is a 78 yo f with pmh of TRAE, HTN, right breast camcer s/p surgery including lymph nodes and radiation- dx about one year ago.   Pt presents today for balance issue. Pt last seen in Jan 4 2024.  Per my last note \"Initial consult for muscle tightness and joint pain.  Pt with new onset joint pain over past month ever since new change in cancer meds. Pt initially seen on 8/10/23.  Pt notes she was unable to tolerate anatrozole due to confusion. Pt was then placed on letrazole more recently and just told to stop med due to extreme joint pain. Pt notes significant issues over the past several weeks. Pt notes pain in right shoulder even to slight movement, also in right wrist, both knees and left ankle. Pt with history of low back surgery by omid about 7 yrs ago and still needed ablation therapy after surgery.  No [facility] records in em[facility] and pt not know name of surgeon or location for mri lumbar imaging. Pt recalls it had to be at a specific location but unsure name. Pt is also not sure if back hardware is mri compatible, no card available.   Pt notes int falls.  Pt notes diff with ambulation for years.  Pt using cane for any distances over past 2 yrs.  In home or in garden she does not use,  Pt seen by dr bennett for her right knee in past. Pt notes since being on letrozole, cracking and crunching sensation in her joints.  Pt was going to PT for balance therapy and recalls issues with left knee at that time.  When she got home pt had been going down steps and had horrible severe pain and now after one week , still unable to bear full weight on leg.   Pt declined er visit. Strongly recommended er eval for imaging and ortho eval. Pt declined.  Will send referal to ortho and notify pcp as well. Pt here is same building as dr bennett,.  rec pt " "to go to office and see about fit in appt this week due to new knee issue.    Pt also needs to follow up with dr powell re pain in joints.   On exam pt with evidence of some apraxia on left side.  Strongly rec mri head padmini with cancer history , but pt unsure if ok to get imaging due to her hardware. \"  Pt here today for neuropathy and balance followup. Since last visit, pt feels she is doing a lot better. Pt notes joint pain resolved.  Pt notes neuropathic features in feet also better.  No new nocturnal sxs.  No falls or trips.  No loc.  No sz.  No sxs into upper ext.  Pt notes no vertigo.  No ha or n or v.  Pt notes pain around the right eye also subsided.  Pt notes prior ice pick feeling into the right eye of acute onset. Pt had mentionned to me at time of initial consult with sxs for about 9 days at that time.  Pt notes resolution in feb.   Pt was seen by optho and only notes avail from optometry from feb with one assessment page noting negative workup but possible trigeminal neuralgia or pain related to shingles.  No history of rash on face or prior shingles around eye, only truncal in past.  Pt with specific eye pain/ extraction feeling. Pt noted some sensitivity around the right eye to touch.  Slight features remain to touch internittently around the right temple.  Extended appt today to re review time line with pt of sxs. Pt notes since last visit improvement in joint pains and eye pain. Pt notes she saw associate in ortho office day after I saw her and she was given session with oral steroids for her knee pain and joint pains. Pt notes she was also given 2 additional courses of steroids by her pcp which really helped with the arthralgias.  Pt notes she has also in interim of improvement stopped her chemo again. Per heme onc pt was placed back on anastrazole on feb 3. Pt has since called heme onc team in march and discontinued due to diff with tolerating medication.  Stronly encouraged pt to call for " "sooner appt for follow up. Pt will also be discussing with her pcp as well. Pt had sed rate and crp done on day of my visit in jan. Pt went right for labs on day of my appt.  Sed rate and crp both normal.  Pt also had mra head and neg for aneurysm.  No evidence of high grade stenosis , no focal occlusion.   Rev with pt.  Pt did not get steroids from ortho or pcp till after these labs. Pt noted great response to generalized joint and eye pain with the steroids.  Currently done steroids and feels closer to her baseline.   Pt with very slight sensitivy around the right temple.  No pain with palpation. Pt with pos pulses mychal. However due to location will repeat once again off steroids sed rate and c rp and also check temporal artery duplex. No visual obscurations to date.  No loss of vision.  No diplopia.  Pt aware to go to er for any acute focal neuro changes especially anything related to loss of vision.  Pt recommended to still get emg of the lowers, rx in emr.  Re rev prior mri head iacs ordered for balance.   Study done 9/1/23-   No acute infarction, intracranial hemorrhage or mass. Normal-appearing 7th and 8th cranial nerve complexes bilaterally. No cerebellopontine angle cistern mass lesion. Trace, chronic microangiopathy. Mild cerebellar tonsillar ectopia.  Extended appt again to re review multiplicity of issues and help with coordination of care with pcp, ortho, optho and also oncology.       Total time spent today 40 minutes. Greater than 50% of total time was spent with the patient and / or family counseling and / or coordination of care        The following portions of the patient's history were reviewed and updated as appropriate: allergies, current medications, past family history, past medical history, past social history, past surgical history, and problem list and med rec and ros rev.             Objective:    Blood pressure 122/70, pulse 74, temperature (!) 97.3 °F (36.3 °C), height 5' 3\" (1.6 m), " weight 70.8 kg (156 lb).    Physical Exam  Constitutional:       General: She is not in acute distress.     Appearance: She is not ill-appearing.   Eyes:      General: Lids are normal.      Extraocular Movements: Extraocular movements intact.      Pupils: Pupils are equal, round, and reactive to light.   Musculoskeletal:      Right lower leg: No edema.      Left lower leg: No edema.   Neurological:      Mental Status: She is alert.      Motor: Motor strength is normal.     Deep Tendon Reflexes:      Reflex Scores:       Tricep reflexes are 2+ on the right side and 2+ on the left side.       Bicep reflexes are 2+ on the right side and 2+ on the left side.       Brachioradialis reflexes are 2+ on the right side and 2+ on the left side.       Patellar reflexes are 1+ on the right side and 1+ on the left side.       Achilles reflexes are 1+ on the right side and 1+ on the left side.  Psychiatric:         Speech: Speech normal.         Neurological Exam  Mental Status  Alert. Recent and remote memory are intact. Speech is normal. Language is fluent with no aphasia. Attention and concentration are normal. Fund of knowledge is appropriate for level of education.    Cranial Nerves  CN II: Visual acuity is normal. Visual fields full to confrontation.  CN III, IV, VI: Extraocular movements intact bilaterally. Normal lids and orbits bilaterally. Pupils equal round and reactive to light bilaterally.  CN V: Facial sensation is normal.  CN VII: Full and symmetric facial movement.  CN VIII: Hearing is normal.  CN IX, X: Palate elevates symmetrically. Normal gag reflex.  CN XI: Shoulder shrug strength is normal.  CN XII: Tongue midline without atrophy or fasciculations.  Pos temporal artery pulses  No pain to temple pulse testing  Pt notes overall sensivity at times to touch around eye.    Motor  Normal muscle bulk throughout. Normal muscle tone. No abnormal involuntary movements. Strength is 5/5 throughout all four  extremities.    Sensory  Dec vib mychal lowers.    Reflexes                                            Right                      Left  Brachioradialis                    2+                         2+  Biceps                                 2+                         2+  Triceps                                2+                         2+  Finger flex                           2+                         2+  Hamstring                            2+                         2+  Patellar                                1+                         1+  Achilles                                1+                         1+  Right Plantar: downgoing  Left Plantar: downgoing    Coordination  Right: Finger-to-nose normal. Rapid alternating movement normal.Left: Finger-to-nose normal. Rapid alternating movement normal.    Gait  Casual gait is normal including stance, stride, and arm swing.        ROS:    Review of Systems   Constitutional:  Negative for appetite change, fatigue and fever.   HENT: Negative.  Negative for hearing loss, tinnitus, trouble swallowing and voice change.    Eyes: Negative.  Negative for photophobia, pain and visual disturbance.   Respiratory: Negative.  Negative for shortness of breath.    Cardiovascular: Negative.  Negative for palpitations.   Gastrointestinal: Negative.  Negative for nausea and vomiting.   Endocrine: Negative.  Negative for cold intolerance.   Genitourinary: Negative.  Negative for dysuria, frequency and urgency.   Musculoskeletal:  Negative for back pain, gait problem, myalgias, neck pain and neck stiffness.   Skin: Negative.  Negative for rash.   Allergic/Immunologic: Negative.    Neurological: Negative.  Negative for dizziness, tremors, seizures, syncope, facial asymmetry, speech difficulty, weakness, light-headedness, numbness and headaches.   Hematological: Negative.  Does not bruise/bleed easily.   Psychiatric/Behavioral: Negative.  Negative for confusion, hallucinations and sleep  disturbance.    All other systems reviewed and are negative.    No new issues to address

## 2024-04-15 ENCOUNTER — OFFICE VISIT (OUTPATIENT)
Dept: OBGYN CLINIC | Facility: HOSPITAL | Age: 79
End: 2024-04-15
Payer: MEDICARE

## 2024-04-15 VITALS — BODY MASS INDEX: 27.66 KG/M2 | WEIGHT: 156.09 LBS | HEIGHT: 63 IN

## 2024-04-15 DIAGNOSIS — M54.16 LUMBAR RADICULOPATHY: Primary | ICD-10-CM

## 2024-04-15 PROCEDURE — 99212 OFFICE O/P EST SF 10 MIN: CPT | Performed by: ORTHOPAEDIC SURGERY

## 2024-04-15 NOTE — PROGRESS NOTES
Assessment & Plan/Medical Decision Makin y.o. female with Back Pain and Left Leg pain and imaging findings most notable for lumbar spondylosis , L3-5 fusion construct with adjacent segment degeneration, also with cervical spondylosis    The clinical, physical and imaging findings were reviewed with the patient.  Siobhan  has a constellation of findings consistent with Lumbar Radiculopathy in the setting of lumbar degenerative disease.  In addition patient does have significant left sided greater trochanteric bursitis.    We discussed the treatment options including physical therapy, at home exercises, activity modifications, chiropractic medicine, oral medications, interventional spine procedures.  At this time recommend continued conservative treatments.   Fortunately Siobhan notes significant benefit in her back symptoms while taking Cymbalta.  She currently has no neck pain.    Referral to PAIN MANAGEMENT for evaluation and treatment. Discussed potential role of steroid injection at or near the source of pain to provide targeted relief.   Please consider lumbar ANIKET and left-sided greater trochanteric injection.    Patient instructed to return to office/ER sooner if symptoms are not improving, getting worse, or new worrisome/neurologic symptoms arise.  Patient will follow up as needed.     Subjective:      Chief Complaint: Back Pain    HPI:  Siobhan Raymond is a 79 y.o. female presenting for follow up visit with chief complaint of back and left leg pain.   Her symptoms are much improved with Cymbalta.  She has no neck pain.  Has been able to garden recently.   Denies any recent falls or any issues with balance recently.     Objective:     Family History   Problem Relation Age of Onset    Breast cancer Mother 60    Depression Mother         panic attacks    Lung cancer Mother     Mental illness Mother     Substance Abuse Mother         CIGS    Coronary artery disease Father         high # of paternal family  members  in their 50s    Substance Abuse Father         CIGS    Depression Sister         panic attacks    Hypertension Sister     Breast cancer Daughter 50        CHEK2 positve    Breast cancer Maternal Aunt 40    Lung cancer Maternal Aunt     Ovarian cancer Maternal Grandmother 70    Depression Other         panic attacks    Colon cancer Other         age at dx unk    Heart disease Family     Hypertension Family     Colon polyps Neg Hx        Past Medical History:   Diagnosis Date    Anxiety     Anxiety disorder     Arthralgia     Arthritis     Asthma     Basal cell carcinoma     Breast cancer (HCC)     Chronic lumbar radiculopathy     last assessed: 16    Chronic respiratory failure (HCC)     Colon cancer screening 2019    Last colonoscopy -15    Depression     Dysfunction of eustachian tube     Dyslipidemia     Emphysema lung (HCC)     Fatigue     HTN (hypertension) 10/29/2014    Hypercholesterolemia     Hypertension     Ileus of unspecified type (HCC)     Irritable bowel syndrome with diarrhea 2019    Lumbosacral stenosis     last assessed: 16    TRAE (obstructive sleep apnea)     Pancreatitis     resolved: 17    Pneumonia     Post-operative nausea and vomiting     PTSD (post-traumatic stress disorder)        Current Outpatient Medications   Medication Sig Dispense Refill    ascorbic acid (VITAMIN C) 250 mg tablet Take 250 mg by mouth daily      aspirin (Aspirin 81) 81 mg EC tablet Take 1 tablet (81 mg total) by mouth daily 90 tablet 3    atenolol (TENORMIN) 25 mg tablet Take 1 tablet (25 mg total) by mouth 2 (two) times a day 180 tablet 3    Cholecalciferol (VITAMIN D3) 2000 units capsule Take by mouth daily      clonazePAM (KlonoPIN) 0.5 mg tablet Take 1 tablet (0.5 mg total) by mouth 2 (two) times a day as needed for anxiety 60 tablet 0    co-enzyme Q-10 30 MG capsule Take 30 mg by mouth daily       dicyclomine (BENTYL) 10 mg capsule TAKE 2 CAPSULES BY MOUTH  TWO TIMES A  DAY (Patient taking differently: if needed) 360 capsule 12    DULoxetine (CYMBALTA) 30 mg delayed release capsule Take 1 capsule (30 mg total) by mouth 2 (two) times a day 180 capsule 1    fenofibrate (TRICOR) 48 mg tablet TAKE 1 TABLET BY MOUTH IN THE  MORNING 90 tablet 3    fluticasone (FLONASE) 50 mcg/act nasal spray USE 1 SPRAY IN EACH NOSTRIL DAILY 16 g 5    fluticasone (FLONASE) 50 mcg/act nasal spray 2 sprays into each nostril daily 18 mL 3    losartan (COZAAR) 100 MG tablet TAKE 1 TABLET BY MOUTH  DAILY 90 tablet 3    magnesium oxide (MAG-OX) 400 mg Take 400 mg by mouth every other day       multivitamin (THERAGRAN) TABS Take 1 tablet by mouth daily      Omega 3 1000 MG CAPS Take by mouth daily      predniSONE 10 mg tablet 30 mg by mouth daily for 3 days, then 20 mg by mouth daily for 3 days, then 10 mg by mouth daily for 3 days, then stop 18 tablet 0    Probiotic Product (PROBIOTIC-10 PO) Take by mouth daily      rosuvastatin (CRESTOR) 5 mg tablet TAKE 1 TABLET BY MOUTH 3  TIMES WEEKLY 39 tablet 3    anastrozole (ARIMIDEX) 1 mg tablet Take 1 tablet (1 mg total) by mouth daily (Patient not taking: Reported on 1/4/2024) 30 tablet 2     No current facility-administered medications for this visit.       Past Surgical History:   Procedure Laterality Date    BREAST BIOPSY Right 09/12/2022    [facility]    BREAST LUMPECTOMY Right 10/21/2022    Procedure: LUMPECTOMY BREAST FLORENCIO;  Surgeon: Inés Singh MD;  Location: [facility];  Service: Surgical Oncology    CHOLECYSTECTOMY      EGD  04/15/2019    GALLBLADDER SURGERY      HYSTERECTOMY      pt thinks she still has one ovary, done over 20 yrs ago    LUMBAR SPINE SURGERY  12/2016    3, 4, 5    LYMPH NODE BIOPSY Right 10/21/2022    Procedure: SLN BX, lymphoscintigraphy;  Surgeon: Inés Singh MD;  Location: [facility];  Service: Surgical Oncology    MOUTH SURGERY      MULTIPLE TOOTH EXTRACTIONS N/A 2018    OOPHORECTOMY Bilateral     1 1/2 ovaries removed    TONSILLECTOMY       US BREAST CLIP NEEDLE LOC RIGHT Right 09/12/2022    US GUIDED BREAST BIOPSY RIGHT COMPLETE Right 09/12/2022       Social History     Socioeconomic History    Marital status:      Spouse name: Not on file    Number of children: Not on file    Years of education: Not on file    Highest education level: Not on file   Occupational History    Occupation: retired   Tobacco Use    Smoking status: Never    Smokeless tobacco: Never   Vaping Use    Vaping status: Never Used   Substance and Sexual Activity    Alcohol use: Yes     Alcohol/week: 4.0 standard drinks of alcohol     Types: 4 Glasses of wine per week     Comment: 4 drinks every night    Drug use: Yes     Frequency: 7.0 times per week     Types: Marijuana     Comment: uses topically daily- last used 10/20    Sexual activity: Not Currently   Other Topics Concern    Not on file   Social History Narrative    Person living alone    FEELS SAFE AT HOME    SEES DENTIST REG    HAS LIVING WILL    Wears seatbelt     Social Determinants of Health     Financial Resource Strain: Low Risk  (6/28/2023)    Overall Financial Resource Strain (CARDIA)     Difficulty of Paying Living Expenses: Not hard at all   Food Insecurity: Not on file   Transportation Needs: No Transportation Needs (6/28/2023)    PRAPARE - Transportation     Lack of Transportation (Medical): No     Lack of Transportation (Non-Medical): No   Physical Activity: Not on file   Stress: Not on file   Social Connections: Not on file   Intimate Partner Violence: Not on file   Housing Stability: Not on file       Allergies   Allergen Reactions    Antihistamines, Diphenhydramine-Type     Arimidex [Anastrozole] Confusion    Bupropion     Clarithromycin     Codeine Other (See Comments)     increased HR    Gabapentin     Meloxicam Nausea Only and Visual Disturbance     Other reaction(s): Numbness  Action Taken: med stopped.; Category: Allergy;     Ondansetron     Pravastatin     Propoxyphene Other (See Comments)      "increased HR       Review of Systems  General- denies fever/chills  HEENT- denies hearing loss or sore throat  Eyes- denies eye pain or visual disturbances, denies red eyes  Respiratory- denies cough or SOB  Cardio- denies chest pain or palpitations  GI- denies abdominal pain  Endocrine- denies urinary frequency  Urinary- denies pain with urination  Musculoskeletal- Negative except noted above  Skin- denies rashes or wounds  Neurological- denies dizziness or headache  Psychiatric- denies anxiety or difficulty concentrating    Physical Exam  Ht 5' 3\" (1.6 m)   Wt 70.8 kg (156 lb 1.4 oz)   LMP  (LMP Unknown)   BMI 27.65 kg/m²     General/Constitutional: No apparent distress: well-nourished and well developed.  Lymphatic: No appreciable lymphadenopathy  Respiratory: Non-labored breathing  Vascular: No edema, swelling or tenderness, except as noted in detailed exam.  Integumentary: No impressive skin lesions present, except as noted in detailed exam.  Psych: Normal mood and affect, oriented to person, place and time.  MSK: normal other than stated in HPI and exam  Gait & balance: no evidence of myelopathic gait, ambulates Independently     Lumbar spine range of motion:  -Forward flexion to 90  -Extension to neutral  -Lateral bend 25 right, 25 left  -Rotation 25 right, 25 left  There tenderness with palpation along lumbar paraspinal musculature, no midline tenderness     Neurologic:  Upper Extremity Motor Function     Right  Left    Deltoid  4/5  5/5    Bicep  5/5  5/5    Wrist extension  5/5  5/5    Tricep  4/5  5/5    Finger flexion/  5/5  5/5    Hand intrinsic  5/5  5/5      Lower Extremity Motor Function    Right  Left    Iliopsoas  5/5  5/5    Quadriceps 5/5 5/5   Tibialis anterior  5/5  5/5    EHL  5/5  5/5    Gastroc. muscle  5/5  5/5    Heel rise  5/5  5/5    Toe rise  5/5  5/5      Sensory: light touch is intact to bilateral upper and lower extremities     Reflexes:     Right Left   Biceps 2+ 3+   Triceps " "2+ 2+   Brachioradialis 2+ 2+   Patellar 1+ 1+   Achilles 1+ 1+   Babinski neg neg     Other tests:  Left greater troch tenderness    Diagnostic Tests   IMAGING: I have personally reviewed the images and these are my findings:  No new imaging today, Cervical and Lumbar Xrays and MRI's previously reviewed     Procedures, if performed today     None performed       Portions of the record may have been created with voice recognition software.  Occasional wrong word or \"sound a like\" substitutions may have occurred due to the inherent limitations of voice recognition software.  Read the chart carefully and recognize, using context, where substitutions have occurred.    "

## 2024-04-18 ENCOUNTER — TELEPHONE (OUTPATIENT)
Dept: HEMATOLOGY ONCOLOGY | Facility: CLINIC | Age: 79
End: 2024-04-18

## 2024-04-18 NOTE — TELEPHONE ENCOUNTER
Patient has not been taking the Anastrozole due to reported side effects. Dr. Boyle is still recommending patient take a aromatase inhibitor. Patient asked if she would be interested in starting Letrozole instead. Patient directed to call office back so we can send the script to her pharmacy.

## 2024-04-18 NOTE — TELEPHONE ENCOUNTER
"Received from voice mail:    \"Dez, this is Siobhan Lawson. I'm Doctor Georges. Patient. I'm calling back to say that my number is [phone]. I'm calling back to find out on what I should be doing or if there's anything I can do. I am finally feeling better after stopping the chemo pill, and I want to know what the next steps should be. OK, so it's Siobhan Can. deborah Ortiz.\"    "

## 2024-04-25 ENCOUNTER — HOSPITAL ENCOUNTER (OUTPATIENT)
Dept: NON INVASIVE DIAGNOSTICS | Facility: HOSPITAL | Age: 79
Discharge: HOME | End: 2024-04-25
Payer: MEDICARE

## 2024-04-25 DIAGNOSIS — H57.11 EYE PAIN, RIGHT: ICD-10-CM

## 2024-04-25 DIAGNOSIS — G46.8 OTHER VASCULAR SYNDROMES OF BRAIN IN CEREBROVASCULAR DISEASES: ICD-10-CM

## 2024-04-25 PROCEDURE — 93882 EXTRACRANIAL UNI/LTD STUDY: CPT

## 2024-04-25 PROCEDURE — 93882 EXTRACRANIAL UNI/LTD STUDY: CPT | Performed by: INTERNAL MEDICINE

## 2024-04-30 DIAGNOSIS — M25.59 PAIN IN OTHER JOINT: ICD-10-CM

## 2024-04-30 NOTE — TELEPHONE ENCOUNTER
Reason for call:   [x] Refill   [] Prior Auth  [] Other:     Office:   [x] PCP/Provider -   [] Specialty/Provider -     Medication:     DULoxetine (CYMBALTA) 30 mg delayed release capsule       Dose/Frequency: Take 1 capsule (30 mg total) by mouth 2 (two) times a day,     Quantity:  180 capsule     Pharmacy: [facility] [address] [phone]     Does the patient have enough for 3 days?   [] Yes   [x] No - Send as HP to POD

## 2024-05-01 RX ORDER — DULOXETIN HYDROCHLORIDE 30 MG/1
30 CAPSULE, DELAYED RELEASE ORAL 2 TIMES DAILY
Qty: 180 CAPSULE | Refills: 1 | Status: SHIPPED | OUTPATIENT
Start: 2024-05-01

## 2024-05-04 ENCOUNTER — APPOINTMENT (OUTPATIENT)
Dept: RADIOLOGY | Facility: HOSPITAL | Age: 79
End: 2024-05-04
Payer: MEDICARE

## 2024-05-04 ENCOUNTER — HOSPITAL ENCOUNTER (EMERGENCY)
Facility: HOSPITAL | Age: 79
Discharge: HOME | End: 2024-05-04
Attending: EMERGENCY MEDICINE | Admitting: EMERGENCY MEDICINE
Payer: MEDICARE

## 2024-05-04 VITALS
SYSTOLIC BLOOD PRESSURE: 135 MMHG | DIASTOLIC BLOOD PRESSURE: 84 MMHG | BODY MASS INDEX: 27.63 KG/M2 | RESPIRATION RATE: 18 BRPM | WEIGHT: 156 LBS | HEART RATE: 83 BPM | TEMPERATURE: 98.4 F | OXYGEN SATURATION: 98 %

## 2024-05-04 DIAGNOSIS — M25.512 ACUTE PAIN OF LEFT SHOULDER: Primary | ICD-10-CM

## 2024-05-04 PROCEDURE — 99284 EMERGENCY DEPT VISIT MOD MDM: CPT | Performed by: EMERGENCY MEDICINE

## 2024-05-04 PROCEDURE — 99283 EMERGENCY DEPT VISIT LOW MDM: CPT

## 2024-05-04 PROCEDURE — 73030 X-RAY EXAM OF SHOULDER: CPT

## 2024-05-04 PROCEDURE — 93005 ELECTROCARDIOGRAM TRACING: CPT

## 2024-05-04 RX ORDER — IBUPROFEN 400 MG/1
400 TABLET ORAL ONCE
Status: COMPLETED | OUTPATIENT
Start: 2024-05-04 | End: 2024-05-04

## 2024-05-04 RX ADMIN — IBUPROFEN 400 MG: 400 TABLET, FILM COATED ORAL at 18:49

## 2024-05-04 NOTE — ED PROVIDER NOTES
History  Chief Complaint   Patient presents with    Shoulder Pain     Pt to ED c/o L shoulder pain. Pt states she can't lift arm, can't push outward. States there are some places on shoulder that hurt when she touches it. If positioning arm at side, there is no pain. Denies chest pain/SOB.      History from patient and medical records.  Patient complains of left shoulder soreness sharp pain intermittent with movements abduction and flexion.  Symptoms started gradually yesterday worsening over the course.  She had some improvement with Aleve but then she got anxious and wanted to make sure it was not her heart.  She denies chest pain.  No specific injury but does do gardening and plays an asian table game Mahjong        Prior to Admission Medications   Prescriptions Last Dose Informant Patient Reported? Taking?   Cholecalciferol (VITAMIN D3) 2000 units capsule  Self Yes No   Sig: Take by mouth daily   DULoxetine (CYMBALTA) 30 mg delayed release capsule   No No   Sig: Take 1 capsule (30 mg total) by mouth 2 (two) times a day   Omega 3 1000 MG CAPS  Self Yes No   Sig: Take by mouth daily   Probiotic Product (PROBIOTIC-10 PO)  Self Yes No   Sig: Take by mouth daily   anastrozole (ARIMIDEX) 1 mg tablet   No No   Sig: Take 1 tablet (1 mg total) by mouth daily   Patient not taking: Reported on 1/4/2024   ascorbic acid (VITAMIN C) 250 mg tablet  Self Yes No   Sig: Take 250 mg by mouth daily   aspirin (Aspirin 81) 81 mg EC tablet   No No   Sig: Take 1 tablet (81 mg total) by mouth daily   atenolol (TENORMIN) 25 mg tablet   No No   Sig: Take 1 tablet (25 mg total) by mouth 2 (two) times a day   clonazePAM (KlonoPIN) 0.5 mg tablet   No No   Sig: Take 1 tablet (0.5 mg total) by mouth 2 (two) times a day as needed for anxiety   co-enzyme Q-10 30 MG capsule  Self Yes No   Sig: Take 30 mg by mouth daily    dicyclomine (BENTYL) 10 mg capsule  Self No No   Sig: TAKE 2 CAPSULES BY MOUTH  TWO TIMES A DAY   Patient taking differently:  if needed   fenofibrate (TRICOR) 48 mg tablet   No No   Sig: TAKE 1 TABLET BY MOUTH IN THE  MORNING   fluticasone (FLONASE) 50 mcg/act nasal spray  Self No No   Sig: USE 1 SPRAY IN EACH NOSTRIL DAILY   fluticasone (FLONASE) 50 mcg/act nasal spray   No No   Si sprays into each nostril daily   losartan (COZAAR) 100 MG tablet   No No   Sig: TAKE 1 TABLET BY MOUTH  DAILY   magnesium oxide (MAG-OX) 400 mg  Self Yes No   Sig: Take 400 mg by mouth every other day    multivitamin (THERAGRAN) TABS   Yes No   Sig: Take 1 tablet by mouth daily   predniSONE 10 mg tablet   No No   Si mg by mouth daily for 3 days, then 20 mg by mouth daily for 3 days, then 10 mg by mouth daily for 3 days, then stop   rosuvastatin (CRESTOR) 5 mg tablet   No No   Sig: TAKE 1 TABLET BY MOUTH 3  TIMES WEEKLY      Facility-Administered Medications: None       Past Medical History:   Diagnosis Date    Anxiety     Anxiety disorder     Arthralgia     Arthritis     Asthma     Basal cell carcinoma     Breast cancer (HCC)     Chronic lumbar radiculopathy     last assessed: 16    Chronic respiratory failure (HCC)     Colon cancer screening 2019    Last colonoscopy -15    Depression     Dysfunction of eustachian tube     Dyslipidemia     Emphysema lung (HCC)     Fatigue     HTN (hypertension) 10/29/2014    Hypercholesterolemia     Hypertension     Ileus of unspecified type (HCC)     Irritable bowel syndrome with diarrhea 2019    Lumbosacral stenosis     last assessed: 16    TRAE (obstructive sleep apnea)     Pancreatitis     resolved: 17    Pneumonia     Post-operative nausea and vomiting     PTSD (post-traumatic stress disorder)        Past Surgical History:   Procedure Laterality Date    BREAST BIOPSY Right 2022    [facility]    BREAST LUMPECTOMY Right 10/21/2022    Procedure: LUMPECTOMY BREAST FLORENCIO;  Surgeon: Inés Singh MD;  Location: [facility];  Service: Surgical Oncology    CHOLECYSTECTOMY      EGD  04/15/2019     GALLBLADDER SURGERY      HYSTERECTOMY      pt thinks she still has one ovary, done over 20 yrs ago    LUMBAR SPINE SURGERY  2016    3, 4, 5    LYMPH NODE BIOPSY Right 10/21/2022    Procedure: SLN BX, lymphoscintigraphy;  Surgeon: Inés Singh MD;  Location: [facility];  Service: Surgical Oncology    MOUTH SURGERY      MULTIPLE TOOTH EXTRACTIONS N/A     OOPHORECTOMY Bilateral     1 1/2 ovaries removed    TONSILLECTOMY      US BREAST CLIP NEEDLE LOC RIGHT Right 2022    US GUIDED BREAST BIOPSY RIGHT COMPLETE Right 2022       Family History   Problem Relation Age of Onset    Breast cancer Mother 60    Depression Mother         panic attacks    Lung cancer Mother     Mental illness Mother     Substance Abuse Mother         CIGS    Coronary artery disease Father         high # of paternal family members  in their 50s    Substance Abuse Father         CIGS    Depression Sister         panic attacks    Hypertension Sister     Breast cancer Daughter 50        CHEK2 positve    Breast cancer Maternal Aunt 40    Lung cancer Maternal Aunt     Ovarian cancer Maternal Grandmother 70    Depression Other         panic attacks    Colon cancer Other         age at dx unk    Heart disease Family     Hypertension Family     Colon polyps Neg Hx      I have reviewed and agree with the history as documented.    E-Cigarette/Vaping    E-Cigarette Use Never User      E-Cigarette/Vaping Substances    Nicotine No     THC No     CBD No     Flavoring No     Other No     Unknown No      Social History     Tobacco Use    Smoking status: Never    Smokeless tobacco: Never   Vaping Use    Vaping status: Never Used   Substance Use Topics    Alcohol use: Yes     Alcohol/week: 4.0 standard drinks of alcohol     Types: 4 Glasses of wine per week     Comment: 4 drinks every night    Drug use: Yes     Frequency: 7.0 times per week     Types: Marijuana     Comment: uses topically daily- last used 10/20       Review of Systems    Constitutional:  Negative for chills and fever.   HENT:  Negative for ear pain and sore throat.    Eyes:  Negative for pain and visual disturbance.   Respiratory:  Negative for cough and shortness of breath.    Cardiovascular:  Negative for chest pain and palpitations.   Gastrointestinal:  Negative for abdominal pain and vomiting.   Genitourinary:  Negative for dysuria and hematuria.   Musculoskeletal:  Negative for arthralgias and back pain.   Skin:  Negative for color change and rash.   Neurological:  Negative for seizures and syncope.   All other systems reviewed and are negative.      Physical Exam  Physical Exam  Vitals and nursing note reviewed.   Constitutional:       General: She is not in acute distress.     Appearance: She is well-developed.   HENT:      Head: Normocephalic and atraumatic.   Eyes:      Conjunctiva/sclera: Conjunctivae normal.   Cardiovascular:      Rate and Rhythm: Normal rate and regular rhythm.      Heart sounds: No murmur heard.  Pulmonary:      Effort: Pulmonary effort is normal. No respiratory distress.      Breath sounds: Normal breath sounds.   Chest:      Chest wall: No deformity or tenderness.   Abdominal:      Palpations: Abdomen is soft.      Tenderness: There is no abdominal tenderness.   Musculoskeletal:         General: No swelling.      Right upper arm: Normal.      Left upper arm: Tenderness present. No swelling or deformity.      Right elbow: Normal.      Left elbow: Normal.        Arms:       Cervical back: Neck supple. Tenderness present. No bony tenderness.      Thoracic back: No bony tenderness.      Lumbar back: No bony tenderness.      Comments: Patient unable to flex and abduct left shoulder without pain.  NV intact on exm   Skin:     General: Skin is warm and dry.      Capillary Refill: Capillary refill takes less than 2 seconds.      Findings: No rash.   Neurological:      Mental Status: She is alert.   Psychiatric:         Mood and Affect: Mood normal.          Vital Signs  ED Triage Vitals [05/04/24 1721]   Temperature Pulse Respirations Blood Pressure SpO2   98.4 °F (36.9 °C) 83 18 135/84 98 %      Temp Source Heart Rate Source Patient Position - Orthostatic VS BP Location FiO2 (%)   Temporal Monitor Sitting Right arm --      Pain Score       10 - Worst Possible Pain           Vitals:    05/04/24 1721   BP: 135/84   Pulse: 83   Patient Position - Orthostatic VS: Sitting         Visual Acuity      ED Medications  Medications   ibuprofen (MOTRIN) tablet 400 mg (400 mg Oral Given 5/4/24 1849)       Diagnostic Studies  Results Reviewed       None                   XR shoulder 2+ views LEFT    (Results Pending)              Procedures  ECG 12 Lead Documentation Only    Date/Time: 5/4/2024 7:49 PM    Performed by: Gold Walton DO  Authorized by: Gold Walton DO    Indications / Diagnosis:  Left shoulder pain  ECG reviewed by me, the ED Provider: yes    Patient location:  ED  Previous ECG:     Previous ECG:  Unavailable  Interpretation:     Interpretation: non-specific    Rate:     ECG rate:  78    ECG rate assessment: normal    Rhythm:     Rhythm: sinus rhythm    Ectopy:     Ectopy: none    QRS:     QRS axis:  Left    QRS intervals:  Normal  Conduction:     Conduction: normal    ST segments:     ST segments:  Normal  T waves:     T waves: normal    Comments:      This EKG was interpreted by me.           ED Course                               SBIRT 22yo+      Flowsheet Row Most Recent Value   Initial Alcohol Screen: US AUDIT-C     1. How often do you have a drink containing alcohol? 0 Filed at: 05/04/2024 1723   2. How many drinks containing alcohol do you have on a typical day you are drinking?  0 Filed at: 05/04/2024 1723   3a. Male UNDER 65: How often do you have five or more drinks on one occasion? 0 Filed at: 05/04/2024 1723   3b. FEMALE Any Age, or MALE 65+: How often do you have 4 or more drinks on one occassion? 0 Filed at: 05/04/2024 1723   Audit-C Score 0  Filed at: 05/04/2024 1723   [place]: How many times in the past year have you...    Used an illegal drug or used a prescription medication for non-medical reasons? Never Filed at: 05/04/2024 1723                      Medical Decision Making  Diff includes left shoulder strain, arthritis, possible rotator cuff injury, impingement syndrome, tendonitis, less likely fracture or dislocation, less likely coronary event or shingles    Amount and/or Complexity of Data Reviewed  Radiology: ordered.    Risk  Prescription drug management.             Disposition  Final diagnoses:   Acute pain of left shoulder - musckuloskeletal     Time reflects when diagnosis was documented in both MDM as applicable and the Disposition within this note       Time User Action Codes Description Comment    5/4/2024  6:41 PM Gold Walton Add [M25.512] Acute pain of left shoulder     5/4/2024  6:41 PM Gold Walton Modify [M25.512] Acute pain of left shoulder musckuloskeletal          ED Disposition       ED Disposition   Discharge    Condition   Stable    Date/Time   Sat May 4, 2024 1841    Comment   Siobhan Raymond discharge to home/self care.                   Follow-up Information       Follow up With Specialties Details Why Contact Info Additional Information    [facility] Schedule an appointment as soon as possible for a visit   [address] [place] [id number] [phone] [facility] [address], [address] [phone]            Discharge Medication List as of 5/4/2024  6:42 PM        CONTINUE these medications which have NOT CHANGED    Details   anastrozole (ARIMIDEX) 1 mg tablet Take 1 tablet (1 mg total) by mouth daily, Starting Mon 12/18/2023, Normal      ascorbic acid (VITAMIN C) 250 mg tablet Take 250 mg by mouth daily, Historical Med      aspirin (Aspirin 81) 81 mg EC tablet Take 1  tablet (81 mg total) by mouth daily, Starting Thu 8/31/2023, Normal      atenolol (TENORMIN) 25 mg tablet Take 1 tablet (25 mg total) by mouth 2 (two) times a day, Starting Wed 3/27/2024, Normal      Cholecalciferol (VITAMIN D3) 2000 units capsule Take by mouth daily, Historical Med      clonazePAM (KlonoPIN) 0.5 mg tablet Take 1 tablet (0.5 mg total) by mouth 2 (two) times a day as needed for anxiety, Starting Tue 4/9/2024, Normal      co-enzyme Q-10 30 MG capsule Take 30 mg by mouth daily , Historical Med      dicyclomine (BENTYL) 10 mg capsule TAKE 2 CAPSULES BY MOUTH  TWO TIMES A DAY, Normal      DULoxetine (CYMBALTA) 30 mg delayed release capsule Take 1 capsule (30 mg total) by mouth 2 (two) times a day, Starting Wed 5/1/2024, Normal      fenofibrate (TRICOR) 48 mg tablet TAKE 1 TABLET BY MOUTH IN THE  MORNING, Starting Mon 2/12/2024, Normal      !! fluticasone (FLONASE) 50 mcg/act nasal spray USE 1 SPRAY IN EACH NOSTRIL DAILY, Normal      !! fluticasone (FLONASE) 50 mcg/act nasal spray 2 sprays into each nostril daily, Starting Wed 1/24/2024, Normal      losartan (COZAAR) 100 MG tablet TAKE 1 TABLET BY MOUTH  DAILY, Normal      magnesium oxide (MAG-OX) 400 mg Take 400 mg by mouth every other day , Historical Med      multivitamin (THERAGRAN) TABS Take 1 tablet by mouth daily, Historical Med      Omega 3 1000 MG CAPS Take by mouth daily, Historical Med      predniSONE 10 mg tablet 30 mg by mouth daily for 3 days, then 20 mg by mouth daily for 3 days, then 10 mg by mouth daily for 3 days, then stop, Normal      Probiotic Product (PROBIOTIC-10 PO) Take by mouth daily, Historical Med      rosuvastatin (CRESTOR) 5 mg tablet TAKE 1 TABLET BY MOUTH 3  TIMES WEEKLY, Normal       !! - Potential duplicate medications found. Please discuss with provider.          No discharge procedures on file.    PDMP Review         Value Time User    PDMP Reviewed  Yes 4/9/2024  5:06 PM Thu Osborn DO ED  Provider  Electronically Signed by             Gold Walton DO  05/04/24 3023

## 2024-05-06 ENCOUNTER — VBI (OUTPATIENT)
Dept: FAMILY MEDICINE CLINIC | Facility: HOSPITAL | Age: 79
End: 2024-05-06

## 2024-05-06 LAB
ATRIAL RATE: 78 BPM
P AXIS: -3 DEGREES
PR INTERVAL: 174 MS
QRS AXIS: -40 DEGREES
QRSD INTERVAL: 88 MS
QT INTERVAL: 370 MS
QTC INTERVAL: 421 MS
T WAVE AXIS: -19 DEGREES
VENTRICULAR RATE: 78 BPM

## 2024-05-06 PROCEDURE — 93010 ELECTROCARDIOGRAM REPORT: CPT | Performed by: INTERNAL MEDICINE

## 2024-05-06 NOTE — TELEPHONE ENCOUNTER
05/06/24 10:08 AM    Patient contacted post ED visit, first outreach attempt made. Message was left for patient to return a call to the [facility] Department at [facility]: Phone [phone].    Thank you.  [facility]  [facility]

## 2024-05-07 NOTE — TELEPHONE ENCOUNTER
05/07/24 9:57 AM    Patient contacted post ED visit, second outreach attempt made. Message was left for patient to return a call to the [facility] Department at [facility]: Phone [phone].    Thank you.  [facility]  [facility]

## 2024-05-08 NOTE — TELEPHONE ENCOUNTER
05/08/24 10:07 AM    Patient contacted post ED visit, [facility] department spoke with patient/caregiver and outreach was successful.    Thank you.  [facility]  [facility]

## 2024-05-09 ENCOUNTER — CONSULT (OUTPATIENT)
Dept: PAIN MEDICINE | Facility: CLINIC | Age: 79
End: 2024-05-09
Payer: MEDICARE

## 2024-05-09 VITALS
HEIGHT: 63 IN | BODY MASS INDEX: 28.53 KG/M2 | DIASTOLIC BLOOD PRESSURE: 84 MMHG | SYSTOLIC BLOOD PRESSURE: 122 MMHG | WEIGHT: 161 LBS | TEMPERATURE: 97.6 F | HEART RATE: 69 BPM

## 2024-05-09 DIAGNOSIS — M54.16 LUMBAR RADICULOPATHY: ICD-10-CM

## 2024-05-09 DIAGNOSIS — M79.18 MYOFASCIAL PAIN SYNDROME: ICD-10-CM

## 2024-05-09 DIAGNOSIS — Z98.890 STATUS POST LUMBAR SPINE SURGERY FOR DECOMPRESSION OF SPINAL CORD: ICD-10-CM

## 2024-05-09 DIAGNOSIS — M47.12 OSTEOARTHRITIS OF CERVICAL SPINE WITH MYELOPATHY: Primary | ICD-10-CM

## 2024-05-09 PROCEDURE — 99204 OFFICE O/P NEW MOD 45 MIN: CPT | Performed by: ANESTHESIOLOGY

## 2024-05-09 NOTE — PROGRESS NOTES
Assessment  1. Osteoarthritis of cervical spine with myelopathy    2. Lumbar radiculopathy    3. Myofascial pain syndrome    4. Status post lumbar spine surgery for decompression of spinal cord        Plan    Pleasant 79-year-old female seen in this office approximately 8 years ago.  She was referred from orthopedics secondary to back and leg pain however at this point in time she is doing well.  She is currently on Cymbalta from her primary care physician that significantly helps with her symptoms.    She also reports intermittent neck and leg pain but at this point in time she is doing well.    In the office today I did review the patient's imaging and pathology in depth regarding her low back and cervical spine.    We did discuss management as well as risk factors.    We talked about potential treatments and at this point she will call if her symptoms worsens or starts to significant her fears with her daily living activities.    My impressions and treatment recommendations were discussed in detail with the patient who verbalized understanding and had no further questions.  Discharge instructions were provided. I personally saw and examined the patient and I agree with the above discussed plan of care.  This note is created using dictation transcription.  It may contain typographical errors, grammatical errors, improperly dictated words, background noise and other errors.    Referred By: Diego Gallegos MD  History of Present Illness    Siobhan Raymond is a 79 y.o. female who presents on referral from orthopedic spine surgery.  She actually reports today she is doing extremely well in regards to her neck and low back pain.  She has minimal symptoms.  She is currently taking Cymbalta.  She has a history of prior lumbar spine surgery.  Overall her symptoms are moderate but rates it as 2 out of 10 the visual analog scale does not interfere with her daily living 30s.  She states she gets intermittent pain with  trigeminal neuralgia pain into her shoulder and occasional pain into her back and leg worse with standing and walking.  Nuys any loss of bowel or bladder function.  She reports that in the past physical therapy and nerve blocks have provided significant relief.  She obtained lumbar rhizotomy in the past at [facility].    I have personally reviewed and/or updated the patient's past medical history, past surgical history, family history, social history, current medications, allergies, and vital signs today.     Review of Systems   Constitutional:  Negative for fever and unexpected weight change.   HENT:  Negative for trouble swallowing.    Eyes:  Negative for visual disturbance.   Respiratory:  Negative for shortness of breath and wheezing.    Cardiovascular:  Negative for chest pain and palpitations.   Gastrointestinal:  Negative for constipation, diarrhea, nausea and vomiting.   Endocrine: Negative for cold intolerance, heat intolerance and polydipsia.   Genitourinary:  Negative for difficulty urinating and frequency.   Musculoskeletal:  Positive for arthralgias, joint swelling and myalgias. Negative for gait problem.   Skin:  Negative for rash.   Neurological:  Negative for dizziness, seizures, syncope, weakness and headaches.   Hematological:  Does not bruise/bleed easily.   Psychiatric/Behavioral:  Positive for dysphoric mood.    All other systems reviewed and are negative.      Patient Active Problem List   Diagnosis    Allergic rhinitis    Anxiety disorder    Chronic bronchitis with emphysema    Chronic low back pain    Essential hypertension    Myofascial pain syndrome    Obstructive sleep apnea    Panic attack    Xerostomia    Symmetrical polyarthritis    Vitamin D deficiency    Bilateral carotid artery disease (HCC)    Irritable bowel syndrome with diarrhea    Erosive gastritis    Syncope    Abnormal left aortic arch    Aberrant right subclavian artery    Nonrheumatic aortic valve insufficiency     Mild mitral regurgitation    Mild tricuspid regurgitation    Lumbar spinal stenosis    Family hx-breast malignancy    Dysphagia    Primary osteoarthritis of right knee    Effusion of right knee    Degenerative tear of posterior horn of medial meniscus of right knee    Malignant neoplasm of upper-outer quadrant of right breast in female, estrogen receptor positive (HCC)    Family history of gene mutation    BRCA negative    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)    Aromatase inhibitor use    Other vascular myelopathies (HCC)    Depression, recurrent (HCC)    Balance disorder    Neuropathy    Proximal weakness of extremity    Osteoarthritis of cervical spine with myelopathy    Acute pain of left knee    Primary osteoarthritis of left knee    Acute medial meniscus tear of left knee    Acute right eye pain    Chest pressure    Mucopurulent chronic bronchitis (HCC)       Past Medical History:   Diagnosis Date    Anxiety     Anxiety disorder     Arthralgia     Arthritis     Asthma     Basal cell carcinoma     Breast cancer (HCC)     CAD (coronary artery disease)     Chronic lumbar radiculopathy     last assessed: 12/20/16    Chronic respiratory failure (HCC)     Colon cancer screening 04/11/2019    Last colonoscopy 2014-15    Depression     Dysfunction of eustachian tube     Dyslipidemia     Emphysema lung (HCC)     Fatigue     High cholesterol     HTN (hypertension) 10/29/2014    Hypercholesterolemia     Hypertension     Ileus of unspecified type (HCC)     Irritable bowel syndrome with diarrhea 04/11/2019    Lumbosacral stenosis     last assessed: 9/20/16    TRAE (obstructive sleep apnea)     Pancreatitis     resolved: 9/20/17    Pneumonia     Post-operative nausea and vomiting     PTSD (post-traumatic stress disorder)        Past Surgical History:   Procedure Laterality Date    BREAST BIOPSY Right 09/12/2022    [facility]    BREAST LUMPECTOMY Right  10/21/2022    Procedure: LUMPECTOMY BREAST FLORENCIO;  Surgeon: Inés Singh MD;  Location: [facility];  Service: Surgical Oncology    CHOLECYSTECTOMY      EGD  04/15/2019    GALLBLADDER SURGERY      HYSTERECTOMY      pt thinks she still has one ovary, done over 20 yrs ago    LUMBAR SPINE SURGERY  2016    3, 4, 5    LYMPH NODE BIOPSY Right 10/21/2022    Procedure: SLN BX, lymphoscintigraphy;  Surgeon: Inés Singh MD;  Location: [facility];  Service: Surgical Oncology    MOUTH SURGERY      MULTIPLE TOOTH EXTRACTIONS N/A     OOPHORECTOMY Bilateral     1 1/2 ovaries removed    TONSILLECTOMY      US BREAST CLIP NEEDLE LOC RIGHT Right 2022    US GUIDED BREAST BIOPSY RIGHT COMPLETE Right 2022       Family History   Problem Relation Age of Onset    Breast cancer Mother 60    Depression Mother         panic attacks    Lung cancer Mother     Mental illness Mother     Substance Abuse Mother         CIGS    Coronary artery disease Father         high # of paternal family members  in their 50s    Substance Abuse Father         CIGS    Depression Sister         panic attacks    Hypertension Sister     Breast cancer Daughter 50        CHEK2 positve    Breast cancer Maternal Aunt 40    Lung cancer Maternal Aunt     Ovarian cancer Maternal Grandmother 70    Depression Other         panic attacks    Colon cancer Other         age at dx unk    Heart disease Family     Hypertension Family     Colon polyps Neg Hx        Social History     Occupational History    Occupation: retired   Tobacco Use    Smoking status: Never    Smokeless tobacco: Never   Vaping Use    Vaping status: Never Used   Substance and Sexual Activity    Alcohol use: Yes     Alcohol/week: 4.0 standard drinks of alcohol     Types: 4 Glasses of wine per week     Comment: 4 drinks every night    Drug use: Yes     Frequency: 7.0 times per week     Types: Marijuana     Comment: uses topically daily- last used 10/20    Sexual activity: Not Currently        Current Outpatient Medications on File Prior to Visit   Medication Sig    ascorbic acid (VITAMIN C) 250 mg tablet Take 250 mg by mouth daily    aspirin (Aspirin 81) 81 mg EC tablet Take 1 tablet (81 mg total) by mouth daily    atenolol (TENORMIN) 25 mg tablet Take 1 tablet (25 mg total) by mouth 2 (two) times a day    Cholecalciferol (VITAMIN D3) 2000 units capsule Take by mouth daily    clonazePAM (KlonoPIN) 0.5 mg tablet Take 1 tablet (0.5 mg total) by mouth 2 (two) times a day as needed for anxiety    co-enzyme Q-10 30 MG capsule Take 30 mg by mouth daily     dicyclomine (BENTYL) 10 mg capsule TAKE 2 CAPSULES BY MOUTH  TWO TIMES A DAY (Patient taking differently: if needed)    DULoxetine (CYMBALTA) 30 mg delayed release capsule Take 1 capsule (30 mg total) by mouth 2 (two) times a day    fenofibrate (TRICOR) 48 mg tablet TAKE 1 TABLET BY MOUTH IN THE  MORNING    fluticasone (FLONASE) 50 mcg/act nasal spray USE 1 SPRAY IN EACH NOSTRIL DAILY    fluticasone (FLONASE) 50 mcg/act nasal spray 2 sprays into each nostril daily    losartan (COZAAR) 100 MG tablet TAKE 1 TABLET BY MOUTH  DAILY    magnesium oxide (MAG-OX) 400 mg Take 400 mg by mouth every other day     multivitamin (THERAGRAN) TABS Take 1 tablet by mouth daily    Omega 3 1000 MG CAPS Take by mouth daily    Probiotic Product (PROBIOTIC-10 PO) Take by mouth daily    rosuvastatin (CRESTOR) 5 mg tablet TAKE 1 TABLET BY MOUTH 3  TIMES WEEKLY    anastrozole (ARIMIDEX) 1 mg tablet Take 1 tablet (1 mg total) by mouth daily (Patient not taking: Reported on 1/4/2024)    predniSONE 10 mg tablet 30 mg by mouth daily for 3 days, then 20 mg by mouth daily for 3 days, then 10 mg by mouth daily for 3 days, then stop (Patient not taking: Reported on 5/9/2024)     No current facility-administered medications on file prior to visit.       Allergies   Allergen Reactions    Antihistamines, Diphenhydramine-Type     Arimidex [Anastrozole] Confusion    Bupropion      "Clarithromycin     Codeine Other (See Comments)     increased HR    Gabapentin     Meloxicam Nausea Only and Visual Disturbance     Other reaction(s): Numbness  Action Taken: med stopped.; Category: Allergy;     Ondansetron     Pravastatin     Propoxyphene Other (See Comments)     increased HR       Physical Exam    /84 (BP Location: Left arm, Patient Position: Sitting, Cuff Size: Standard)   Pulse 69   Temp 97.6 °F (36.4 °C)   Ht 5' 3\" (1.6 m)   Wt 73 kg (161 lb)   LMP  (LMP Unknown)   BMI 28.52 kg/m²     Constitutional: normal, well developed, well nourished, alert, in no distress and non-toxic and no overt pain behavior.  Eyes: anicteric  HEENT: grossly intact  Neck: supple, symmetric, trachea midline and no masses   Pulmonary:even and unlabored  Cardiovascular:No edema or pitting edema present  Skin:Normal without rashes or lesions and well hydrated  Psychiatric:Mood and affect appropriate  Neurologic:Cranial Nerves II-XII grossly intact  Musculoskeletal:normal and ambulates with cane, difficulty going from sitting to standing sitting position; no obvious skin lesions or erythema lumbar sacral spine; mild tenderness in lumbar paravertebrals, no sacroiliac or greater trochanteric tenderness bilateral; deep tendon reflexes are diminished but symmetrical bilateral patellar and achilles; no focal motor deficit appreciated lower limbs; negative bilateral straight leg raising.    Imaging       MRI CERVICAL SPINE WITHOUT CONTRAST     INDICATION: R26.89: Other abnormalities of gait and mobility  M50.30: Other cervical disc degeneration, unspecified cervical region.     COMPARISON: 8/6/2023     TECHNIQUE:  Multiplanar, multisequence imaging of the cervical spine was performed. .        IMAGE QUALITY: Motion artifact slightly limits portions of the study. Some diagnostic information is obtained.     FINDINGS:     ALIGNMENT: Grade 1 retrolisthesis of C5 on C6 is similar to the previous radiograph.     MARROW " SIGNAL: Scattered degenerative endplate changes.  No focally suspicious marrow lesions.  No bone marrow edema or compression abnormality.     CERVICAL AND VISUALIZED THORACIC CORD:  Normal signal within the visualized cord.     PREVERTEBRAL AND PARASPINAL SOFT TISSUES:  Normal.     VISUALIZED POSTERIOR FOSSA: Mild cerebellar tonsillar ectopia of approximately 3 to 4 mm noted not quite reaching criteria for Chiari I malformation. No syrinx cavity in the visualized spinal cord to the superior endplate of T6.     CERVICAL DISC SPACES:     C2-C3:  Normal.     C3-C4: There is no focal disk herniation, central canal or neural foraminal narrowing.  Bilateral facet hypertrophy noted.     C4-C5: There is mild uncovering of the intervertebral disc space. Mild bilateral left greater than right neural foraminal narrowing. Moderate central canal narrowing.     C5-C6: There is uncovering of the intervertebral disc space. There is also a disc osteophyte complex with superimposed central/left paracentral disc protrusion. Severe central canal narrowing. Moderate bilateral neural foraminal narrowing, left greater   than right.     C6-C7: There is loss of disc height and signal. There is a left neural foraminal disc protrusion. Moderate left neural foraminal narrowing. Mild central canal narrowing. Right neural foramen patent.     C7-T1:  Normal.     UPPER THORACIC DISC SPACES: Mild spondylotic changes at T1-T2 and T2-T3.     OTHER FINDINGS:  None.     IMPRESSION:        1. Grade 1 retrolisthesis of C5 on C6 with severe spondylotic narrowing at this level. No cord signal abnormality.  2. Scattered spondylotic changes elsewhere, most pronounced at C4-5 and C6-C7.     MRI LUMBAR SPINE WITHOUT CONTRAST @  10-6-23     INDICATION: M54.16: Radiculopathy, lumbar region.     COMPARISON: X-ray lumbar spine 4/1/2013     TECHNIQUE:  Multiplanar, multisequence imaging of the lumbar spine was performed. .        IMAGE QUALITY:  Diagnostic      FINDINGS:     VERTEBRAL BODIES:  There are 5 lumbar type vertebral bodies. There is levoscoliosis with apex at L2-3. There is Modic type II endplate degenerative signal change at L3-4 and L4-5. Modic type I endplate degenerative change at left level L5-S1.     Postoperative changes status post decompressive laminectomy at levels L3-L5, posterior and interbody instrumented fusion.     SACRUM:  Normal signal within the sacrum. No evidence of insufficiency or stress fracture.     DISTAL CORD AND CONUS:  Normal size and signal within the distal cord and conus.     PARASPINAL SOFT TISSUES:  Paraspinal soft tissues are unremarkable.     LOWER THORACIC DISC SPACES:  Normal disc height and signal.  No disc herniation, canal stenosis or foraminal narrowing.     LUMBAR DISC SPACES:     L1-L2: Minimal left foraminal disc protrusion. Mild facet arthropathy. No canal stenosis. No significant foraminal narrowing.     L2-L3: Disc bulge.Moderate facet arthropathy and ligamentum flavum thickening. There is bilateral lateral recess stenosis with possible impact on L3 nerve roots. Moderate canal stenosis. Moderate right and mild to moderate left foraminal narrowing.     L3-L4: Bilateral laminectomies, posterior and interbody instrumented fusion. Surgically patent canal. No significant foraminal narrowing.     L4-L5: Bilateral laminectomies, posterior and interbody instrumented fusion. Surgically spacious canal. No significant foraminal stenosis.     L5-S1: Bilateral laminectomies and posterior instrumented fusion. Disc bulge. Bilateral facet arthropathy and thickening of residual bilateral ligamentum flavum. Moderate bilateral lateral recess narrowing with abutment of the S1 nerve roots (series 6   image 26). Surgically patent canal. Severe left and moderate right foraminal narrowing.     OTHER FINDINGS: Subcentimeter T2 hyperintense left renal lesions, statistically favored to represent benign cysts. About 1 cm T2 hyperintense  splenic lesion (2 image 7), also present on CT 11/20/2022, likely a cyst.     IMPRESSION:     1.  Postsurgical change status post laminectomies, posterior and interbody instrumented fusion at levels L3-L5.  2.  Degenerative bilateral lateral recesses narrowing at level L2-3 (possible impact on L3 nerve roots) with moderate canal stenosis.  3.  Moderate bilateral lateral recess narrowing at L5-S1 with abutment of the S1 nerve roots.  4.  Severe left foraminal stenosis at level L5-S1.    LUMBAR SPINE @  8-16-23     INDICATION:  M54.16: Radiculopathy, lumbar region.     COMPARISON: Lumbar spine radiographs 4/1/2013     VIEWS:  XR SPINE LUMBAR MINIMUM 4 VIEWS NON INJURY  Images: 4     FINDINGS:     Posterior devika and pedicle screw fusion L3-L5 with interbody grafts and laminectomy defects. Hardware appears intact.     There are 5 non rib bearing lumbar vertebral bodies.     There is no evidence of acute fracture or destructive osseous lesion.     Mild to moderate thoracolumbar levoscoliosis apex at L2-3.     Mild anterolisthesis L3-4 and L4-5, not significantly changed during flexion/extension.     Severe disc disease L2-3 with marginal endplate osteophytes L1-2 and L2-3 and facet arthropathy at L5-S1.     The pedicles appear intact.     Soft tissues are unremarkable. Cholecystectomy clips.        IMPRESSION:     Unremarkable appearance of posterior fusion L3-L5.     Mild anterolisthesis L3-4 and L4-5, not significantly changed during flexion/extension.     Degenerative changes as above.    I have personally reviewed pertinent films in PACS and my interpretation is multilevel fusion from L3-L5 affecting S1 nerve roots with cervical spondylosis.

## 2024-06-03 ENCOUNTER — TELEPHONE (OUTPATIENT)
Dept: NEUROLOGY | Facility: CLINIC | Age: 79
End: 2024-06-03

## 2024-06-03 NOTE — TELEPHONE ENCOUNTER
ALVARO to confirm your upcoming appt, 6/13/24 @ 10:30am (10:15am) at the [facility] office, to confirm/RS if you are unable to keep this appt please call [phone]

## 2024-06-05 DIAGNOSIS — F41.9 ANXIETY HYPERVENTILATION: ICD-10-CM

## 2024-06-05 DIAGNOSIS — F45.8 ANXIETY HYPERVENTILATION: ICD-10-CM

## 2024-06-05 NOTE — TELEPHONE ENCOUNTER
Reason for call:   [x] Refill   [] Prior Auth  [] Other:     Office:   [x] PCP/Provider - Thu Fly, DO   [] Specialty/Provider -     Medication: clonazePAM (KlonoPIN) 0.5 mg tablet     Dose/Frequency: 0.5 mg, Oral, 2 times daily PRN     Quantity:  60 tablet     Pharmacy: [facility] [place] [place]     Does the patient have enough for 3 days?   [x] Yes   [] No - Send as HP to POD

## 2024-06-06 RX ORDER — CLONAZEPAM 0.5 MG/1
0.5 TABLET ORAL 2 TIMES DAILY PRN
Qty: 60 TABLET | Refills: 0 | Status: SHIPPED | OUTPATIENT
Start: 2024-06-06

## 2024-06-07 ENCOUNTER — TELEPHONE (OUTPATIENT)
Dept: HEMATOLOGY ONCOLOGY | Facility: CLINIC | Age: 79
End: 2024-06-07

## 2024-06-07 NOTE — TELEPHONE ENCOUNTER
Pt's appt needs to be rescheduled d/t provider not being in the office. Attempted to reach patient about need of appointment change with no success. Appointment canceled and detailed VM left with [employer] number [phone] for patient to return call to reschedule.     Additional message sent [employer].

## 2024-06-12 ENCOUNTER — APPOINTMENT (OUTPATIENT)
Dept: LAB | Facility: CLINIC | Age: 79
End: 2024-06-12
Payer: MEDICARE

## 2024-06-12 DIAGNOSIS — H57.11 EYE PAIN, RIGHT: ICD-10-CM

## 2024-06-12 LAB
CRP SERPL QL: 2.2 MG/L
ERYTHROCYTE [SEDIMENTATION RATE] IN BLOOD: 9 MM/HOUR (ref 0–29)

## 2024-06-12 PROCEDURE — 86140 C-REACTIVE PROTEIN: CPT

## 2024-06-12 PROCEDURE — 85652 RBC SED RATE AUTOMATED: CPT

## 2024-06-12 PROCEDURE — 36415 COLL VENOUS BLD VENIPUNCTURE: CPT

## 2024-06-13 ENCOUNTER — OFFICE VISIT (OUTPATIENT)
Dept: NEUROLOGY | Facility: CLINIC | Age: 79
End: 2024-06-13
Payer: MEDICARE

## 2024-06-13 VITALS
SYSTOLIC BLOOD PRESSURE: 118 MMHG | BODY MASS INDEX: 27.64 KG/M2 | TEMPERATURE: 97.3 F | WEIGHT: 156 LBS | HEART RATE: 69 BPM | DIASTOLIC BLOOD PRESSURE: 72 MMHG | HEIGHT: 63 IN

## 2024-06-13 DIAGNOSIS — G62.9 NEUROPATHY: Primary | ICD-10-CM

## 2024-06-13 DIAGNOSIS — H57.11 EYE PAIN, RIGHT: ICD-10-CM

## 2024-06-13 PROCEDURE — 99214 OFFICE O/P EST MOD 30 MIN: CPT | Performed by: PSYCHIATRY & NEUROLOGY

## 2024-06-13 NOTE — ASSESSMENT & PLAN NOTE
Patient here today for neurology follow-up.  Patient last seen in April for her neuropathy.  Since last visit no significant changes in her neuropathic symptoms.  No worsening pain or paresthesias.  Patient notes she is currently off chemo due to issues with prior medications.  Patient is following closely with heme/oncology.  Patient has upcoming mammogram and DEXA scan.  Occasional issues with balance using cane.  No falls or trips.

## 2024-06-13 NOTE — ASSESSMENT & PLAN NOTE
At timing of last appointment, patient had complained of severe right eye pain.  Patient apparently has been doctoring for her eye since the end of December.  Patient had mentioned it at her April appointment to me.  No other localizing focality noted.  MRA of the head was performed January 20, 2024.  No high-grade stenosis.  No aneurysm or focal occlusion.  Temporal ultrasound negative bilaterally for giant cell arteritis.  Sed rate from January was 9, CRP was 1.9.  Patient is following up with ophthalmology.  Per patient no ocular symptoms.  No loss of vision.  No blurriness of vision.  No visual obscurations.  No pain to palpation of the temporal areas bilaterally.  Question a component of trigeminal neuralgia due to some tightness that patient describes in the V2 distribution.  This is unrelated to her eye symptoms.  Patient is currently asymptomatic.

## 2024-06-13 NOTE — PROGRESS NOTES
"Patient ID: Siobhan Raymond is a 79 y.o. female.    Assessment/Plan:    Neuropathy  Patient here today for neurology follow-up.  Patient last seen in April for her neuropathy.  Since last visit no significant changes in her neuropathic symptoms.  No worsening pain or paresthesias.  Patient notes she is currently off chemo due to issues with prior medications.  Patient is following closely with heme/oncology.  Patient has upcoming mammogram and DEXA scan.  Occasional issues with balance using cane.  No falls or trips.    Eye pain, right  At timing of last appointment, patient had complained of severe right eye pain.  Patient apparently has been doctoring for her eye since the end of December.  Patient had mentioned it at her April appointment to me.  No other localizing focality noted.  MRA of the head was performed January 20, 2024.  No high-grade stenosis.  No aneurysm or focal occlusion.  Temporal ultrasound negative bilaterally for giant cell arteritis.  Sed rate from January was 9, CRP was 1.9.  Patient is following up with ophthalmology.  Per patient no ocular symptoms.  No loss of vision.  No blurriness of vision.  No visual obscurations.  No pain to palpation of the temporal areas bilaterally.  Question a component of trigeminal neuralgia due to some tightness that patient describes in the V2 distribution.  This is unrelated to her eye symptoms.  Patient is currently asymptomatic.       Diagnoses and all orders for this visit:    Neuropathy    Eye pain, right           Subjective:    HPI    Pt is a 80 yo f with pmh of TRAE, HTN, right breast camcer s/p surgery including lymph nodes and radiation- dx about one year ago.   Pt presents today for balance issue. Pt last seen in4/12/24.  Per my last note \"Initial consult for muscle tightness and joint pain.  Pt with new onset joint pain over past month ever since new change in cancer meds. Pt initially seen on 8/10/23.  Pt notes she was unable to tolerate " "anatrozole due to confusion. Pt was then placed on letrazole more recently and just told to stop med due to extreme joint pain. Pt notes significant issues over the past several weeks. Pt notes pain in right shoulder even to slight movement, also in right wrist, both knees and left ankle. Pt with history of low back surgery by [facility] about 7 yrs ago and still needed ablation therapy after surgery.  No [facility] records in emr and pt not know name of surgeon or location for mri lumbar imaging. Pt recalls it had to be at a specific location but unsure name. Pt is also not sure if back hardware is mri compatible, no card available.   Pt notes int falls.  Pt notes diff with ambulation for years.  Pt using cane for any distances over past 2 yrs.  In home or in garden she does not use,  Pt seen by dr bennett for her right knee in past. Pt notes since being on letrozole, cracking and crunching sensation in her joints.  Pt was going to PT for balance therapy and recalls issues with left knee at that time.  When she got home pt had been going down steps and had horrible severe pain and now after one week , still unable to bear full weight on leg.   Pt declined er visit. Strongly recommended er eval for imaging and ortho eval. Pt declined.  Will send referal to ortho and notify pcp as well. Pt here is same building as dr bennett,.  rec pt to go to office and see about fit in appt this week due to new knee issue.    Pt also needs to follow up with dr powell re pain in joints.   On exam pt with evidence of some apraxia on left side.  Strongly rec mri head padmini with cancer history , but pt unsure if ok to get imaging due to her hardware. \"  Pt here today for neuropathy and balance followup.  Patient last seen in April.  As her last visit, patient feels that her feet are about the same.  No worsening neuropathic symptoms.  No falls or trips.  Patient does occasionally feel some imbalance especially on uneven surfaces.  Patient is using " her cane more.  No recent hospitalization.  No recent infection.  Patient currently remains off of chemotherapy as she has had difficulty tolerating some prior agents over the past year.  Patient is following closely with hematology and oncology.  Patient has upcoming mammogram and DEXA scan and follow-up in November with her team.  At timing of last appointment, patient had complained of some acute right eye pain.  She has been following with the ophthalmologist since December 2023.  I also completed some additional workup including an MRA of the head dated January 20, 2024.  MRA head no high-grade stenosis no aneurysm or focal occlusion.  Patient also had a sed rate and CRP done in January with a sed rate of 9 and a CRP of 1.9.  Patient denies any temporal pain.  Patient denies any visual loss.  Patient denies any visual obscurations or jaw pain.  Patient complains of occasional tightness in the V2 distribution.  No rash or shingles in this area.  No change with hearing.  No vesicles in the ear.  No facial weakness.  Patient also had a temporal artery Dopplers done on April 25, 2024.  No evidence of giant cell arteritis bilaterally.  Studies were reviewed in depth with patient.  Patient is currently asymptomatic in the face.  No eye pain today.  Patient has also been following Dr. Gallegos for orthopedic issues particularly her knees.  Patient also with some intermittent coughing with back of throat feeling like she has phlegm or mucus.  Patient is going to be checking with her primary care doctor to review this complaint.  No fever or chills.  Patient occasionally feels some sinus pressure.  No headache nausea or vomiting.  No falls or trips.  No change in bowel or bladder.  Patient also with known dry eyes.  Patient had prior Lyme and Sjogren testing negative in past years.  Paraneoplastic serology was also negative back in August 2023.  Overall patient is doing well from her neuropathy.  Currently on no  "chemotherapeutic agents.  Patient to call for any changes in her walking or balance.  Patient has prescription for EMG already in EMR.  Patient is debating whether to have the study performed.       The following portions of the patient's history were reviewed and updated as appropriate: allergies, current medications, past family history, past medical history, past social history, past surgical history, and problem list and med rec and ros rev.         Objective:    Blood pressure 118/72, pulse 69, temperature (!) 97.3 °F (36.3 °C), height 5' 3\" (1.6 m), weight 70.8 kg (156 lb).    Physical Exam  Constitutional:       General: She is not in acute distress.     Appearance: She is not ill-appearing.   Eyes:      General: Lids are normal.      Extraocular Movements: Extraocular movements intact.      Pupils: Pupils are equal, round, and reactive to light.      Comments: Positive temp artery pulses mychal  No pain to palp   Musculoskeletal:      Right lower leg: No edema.      Left lower leg: No edema.   Neurological:      Motor: Motor strength is normal.     Deep Tendon Reflexes:      Reflex Scores:       Tricep reflexes are 2+ on the right side and 2+ on the left side.       Bicep reflexes are 2+ on the right side and 2+ on the left side.       Brachioradialis reflexes are 2+ on the right side and 2+ on the left side.       Patellar reflexes are 1+ on the right side and 1+ on the left side.       Achilles reflexes are 1+ on the right side and 1+ on the left side.  Psychiatric:         Speech: Speech normal.         Neurological Exam  Mental Status  Awake, alert and oriented to person, place and time. Recent and remote memory are intact. Speech is normal. Language is fluent with no aphasia. Attention and concentration are normal.    Cranial Nerves  CN II: Vision test: Positive temp artery pulses mychal  No pain to palp. Visual acuity is normal. Visual fields full to confrontation.  CN III, IV, VI: Extraocular movements " intact bilaterally. Normal lids and orbits bilaterally. Pupils equal round and reactive to light bilaterally.  CN V: Facial sensation is normal.  CN VII: Full and symmetric facial movement.  CN VIII: Hearing is normal.  CN IX, X: Palate elevates symmetrically. Normal gag reflex.  CN XI: Shoulder shrug strength is normal.  CN XII: Tongue midline without atrophy or fasciculations.    Motor  Normal muscle bulk throughout. Normal muscle tone. Strength is 5/5 throughout all four extremities.    Sensory  Dec vib mychal lowers.    Reflexes                                            Right                      Left  Brachioradialis                    2+                         2+  Biceps                                 2+                         2+  Triceps                                2+                         2+  Finger flex                           2+                         2+  Hamstring                            2+                         2+  Patellar                                1+                         1+  Achilles                                1+                         1+  Right Plantar: downgoing  Left Plantar: downgoing    Coordination  Right: Finger-to-nose normal. Rapid alternating movement normal.Left: Finger-to-nose normal. Rapid alternating movement normal.    Gait  Casual gait: Wide stance.  Using cane.        ROS:    Review of Systems   Constitutional:  Negative for appetite change, fatigue and fever.   HENT: Negative.  Negative for hearing loss, tinnitus, trouble swallowing and voice change.         R side of face still having issues. Has to clear throat constantly bringing up mucus and phlegm.  Not sore just annoying   Eyes: Negative.  Negative for photophobia, pain and visual disturbance.   Respiratory: Negative.  Negative for shortness of breath.    Cardiovascular: Negative.  Negative for palpitations.   Gastrointestinal: Negative.  Negative for nausea and vomiting.   Endocrine: Negative.   Negative for cold intolerance.   Genitourinary: Negative.  Negative for dysuria, frequency and urgency.   Musculoskeletal:  Negative for back pain, gait problem, myalgias, neck pain and neck stiffness.   Skin: Negative.  Negative for rash.   Allergic/Immunologic: Negative.    Neurological: Negative.  Negative for dizziness, tremors, seizures, syncope, facial asymmetry, speech difficulty, weakness, light-headedness, numbness and headaches.   Hematological: Negative.  Does not bruise/bleed easily.   Psychiatric/Behavioral: Negative.  Negative for confusion, hallucinations and sleep disturbance.    All other systems reviewed and are negative.    No new issues to address

## 2024-07-29 ENCOUNTER — OFFICE VISIT (OUTPATIENT)
Dept: URGENT CARE | Facility: CLINIC | Age: 79
End: 2024-07-29
Payer: MEDICARE

## 2024-07-29 ENCOUNTER — HOSPITAL ENCOUNTER (OUTPATIENT)
Dept: ULTRASOUND IMAGING | Facility: CLINIC | Age: 79
Discharge: HOME | End: 2024-07-29
Payer: MEDICARE

## 2024-07-29 ENCOUNTER — HOSPITAL ENCOUNTER (OUTPATIENT)
Dept: MAMMOGRAPHY | Facility: CLINIC | Age: 79
Discharge: HOME | End: 2024-07-29
Payer: MEDICARE

## 2024-07-29 VITALS
HEART RATE: 75 BPM | WEIGHT: 165 LBS | BODY MASS INDEX: 29.23 KG/M2 | DIASTOLIC BLOOD PRESSURE: 84 MMHG | RESPIRATION RATE: 18 BRPM | OXYGEN SATURATION: 99 % | SYSTOLIC BLOOD PRESSURE: 130 MMHG | HEIGHT: 63 IN | TEMPERATURE: 97.8 F

## 2024-07-29 VITALS — WEIGHT: 165 LBS | HEIGHT: 63 IN | BODY MASS INDEX: 29.23 KG/M2

## 2024-07-29 DIAGNOSIS — C50.411 MALIGNANT NEOPLASM OF UPPER-OUTER QUADRANT OF RIGHT BREAST IN FEMALE, ESTROGEN RECEPTOR POSITIVE (HCC): ICD-10-CM

## 2024-07-29 DIAGNOSIS — Z17.0 MALIGNANT NEOPLASM OF UPPER-OUTER QUADRANT OF RIGHT BREAST IN FEMALE, ESTROGEN RECEPTOR POSITIVE (HCC): ICD-10-CM

## 2024-07-29 DIAGNOSIS — J01.90 ACUTE NON-RECURRENT SINUSITIS, UNSPECIFIED LOCATION: Primary | ICD-10-CM

## 2024-07-29 PROCEDURE — 77066 DX MAMMO INCL CAD BI: CPT

## 2024-07-29 PROCEDURE — 99213 OFFICE O/P EST LOW 20 MIN: CPT | Performed by: PHYSICIAN ASSISTANT

## 2024-07-29 PROCEDURE — G0279 TOMOSYNTHESIS, MAMMO: HCPCS

## 2024-07-29 PROCEDURE — G0463 HOSPITAL OUTPT CLINIC VISIT: HCPCS | Performed by: PHYSICIAN ASSISTANT

## 2024-07-29 PROCEDURE — 76642 ULTRASOUND BREAST LIMITED: CPT

## 2024-07-29 RX ORDER — AMOXICILLIN AND CLAVULANATE POTASSIUM 875; 125 MG/1; MG/1
1 TABLET, FILM COATED ORAL EVERY 12 HOURS SCHEDULED
Qty: 14 TABLET | Refills: 0 | Status: SHIPPED | OUTPATIENT
Start: 2024-07-29 | End: 2024-08-05

## 2024-07-29 NOTE — PROGRESS NOTES
[facility]        NAME: Siobhan Raymond is a 79 y.o. female  : 1945    MRN: [id number]  DATE: 2024  TIME: 4:51 PM    Assessment and Plan   Acute non-recurrent sinusitis, unspecified location [J01.90]  1. Acute non-recurrent sinusitis, unspecified location  amoxicillin-clavulanate (AUGMENTIN) 875-125 mg per tablet            Patient Instructions       Follow up with PCP in 3-5 days.  Proceed to  ER if symptoms worsen.    If tests have been performed at [facility], our office will contact you with results if changes need to be made to the care plan discussed with you at the visit.  You can review your full results on [facility].    Chief Complaint     Chief Complaint   Patient presents with    Cold Like Symptoms     Pt reports productive cough and chest congestion with onset four weeks ago. States managing with mucinex with some relief. C/o sinus congestion and right sided facial pain. C/o chills and diaphoresis. Denies any fever.         History of Present Illness       Patient presents with 4 weeks of chest congestion, chills, right sided facial pain, cough, congestion, runny nose.   Denies fever, chest pains, sick contacts.   Has had the facial pain before and has seen a neurologist for it before.         Review of Systems   Review of Systems   Constitutional:  Positive for chills. Negative for fatigue and fever.   HENT:  Positive for congestion, rhinorrhea and sinus pressure. Negative for ear discharge, ear pain, postnasal drip and sore throat.    Respiratory:  Positive for cough. Negative for chest tightness, shortness of breath and wheezing.    Cardiovascular:  Negative for chest pain and palpitations.   Musculoskeletal:  Negative for arthralgias and myalgias.   Neurological:  Negative for weakness.   Psychiatric/Behavioral:  Negative for confusion.          Current Medications       Current Outpatient Medications:     amoxicillin-clavulanate (AUGMENTIN) 875-125 mg per tablet,  Take 1 tablet by mouth every 12 (twelve) hours for 7 days, Disp: 14 tablet, Rfl: 0    ascorbic acid (VITAMIN C) 250 mg tablet, Take 250 mg by mouth daily, Disp: , Rfl:     aspirin (Aspirin 81) 81 mg EC tablet, Take 1 tablet (81 mg total) by mouth daily, Disp: 90 tablet, Rfl: 3    Cholecalciferol (VITAMIN D3) 2000 units capsule, Take by mouth daily, Disp: , Rfl:     clonazePAM (KlonoPIN) 0.5 mg tablet, Take 1 tablet (0.5 mg total) by mouth 2 (two) times a day as needed for anxiety, Disp: 60 tablet, Rfl: 0    co-enzyme Q-10 30 MG capsule, Take 30 mg by mouth daily , Disp: , Rfl:     DULoxetine (CYMBALTA) 30 mg delayed release capsule, Take 1 capsule (30 mg total) by mouth 2 (two) times a day, Disp: 180 capsule, Rfl: 1    fenofibrate (TRICOR) 48 mg tablet, TAKE 1 TABLET BY MOUTH IN THE  MORNING, Disp: 90 tablet, Rfl: 3    fluticasone (FLONASE) 50 mcg/act nasal spray, USE 1 SPRAY IN EACH NOSTRIL DAILY, Disp: 16 g, Rfl: 5    fluticasone (FLONASE) 50 mcg/act nasal spray, 2 sprays into each nostril daily, Disp: 18 mL, Rfl: 3    losartan (COZAAR) 100 MG tablet, TAKE 1 TABLET BY MOUTH  DAILY, Disp: 90 tablet, Rfl: 3    magnesium oxide (MAG-OX) 400 mg, Take 400 mg by mouth every other day , Disp: , Rfl:     multivitamin (THERAGRAN) TABS, Take 1 tablet by mouth daily, Disp: , Rfl:     Omega 3 1000 MG CAPS, Take by mouth daily, Disp: , Rfl:     Probiotic Product (PROBIOTIC-10 PO), Take by mouth daily, Disp: , Rfl:     rosuvastatin (CRESTOR) 5 mg tablet, TAKE 1 TABLET BY MOUTH 3  TIMES WEEKLY, Disp: 39 tablet, Rfl: 3    anastrozole (ARIMIDEX) 1 mg tablet, Take 1 tablet (1 mg total) by mouth daily (Patient not taking: Reported on 1/4/2024), Disp: 30 tablet, Rfl: 2    atenolol (TENORMIN) 25 mg tablet, Take 1 tablet (25 mg total) by mouth 2 (two) times a day (Patient not taking: Reported on 6/13/2024), Disp: 180 tablet, Rfl: 3    dicyclomine (BENTYL) 10 mg capsule, TAKE 2 CAPSULES BY MOUTH  TWO TIMES A DAY (Patient not taking:  Reported on 7/29/2024), Disp: 360 capsule, Rfl: 12    predniSONE 10 mg tablet, 30 mg by mouth daily for 3 days, then 20 mg by mouth daily for 3 days, then 10 mg by mouth daily for 3 days, then stop (Patient not taking: Reported on 5/9/2024), Disp: 18 tablet, Rfl: 0    Current Allergies     Allergies as of 07/29/2024 - Reviewed 07/29/2024   Allergen Reaction Noted    Antihistamines, diphenhydramine-type  06/05/2013    Arimidex [anastrozole] Confusion 06/28/2023    Bupropion  06/05/2013    Clarithromycin  06/05/2013    Codeine Other (See Comments) 06/05/2013    Gabapentin  10/26/2016    Meloxicam Nausea Only and Visual Disturbance 07/18/2016    Ondansetron  11/30/2016    Pravastatin  06/05/2013    Propoxyphene Other (See Comments) 06/05/2013            The following portions of the patient's history were reviewed and updated as appropriate: allergies, current medications, past family history, past medical history, past social history, past surgical history and problem list.     Past Medical History:   Diagnosis Date    Anxiety     Anxiety disorder     Arthralgia     Arthritis     Asthma     Basal cell carcinoma     Breast cancer (HCC)     Right    CAD (coronary artery disease)     Chronic lumbar radiculopathy     last assessed: 12/20/16    Chronic respiratory failure (HCC)     Colon cancer screening 04/11/2019    Last colonoscopy 2014-15    Depression     Dysfunction of eustachian tube     Dyslipidemia     Emphysema lung (HCC)     Fatigue     High cholesterol     HTN (hypertension) 10/29/2014    Hypercholesterolemia     Hypertension     Ileus of unspecified type (HCC)     Irritable bowel syndrome with diarrhea 04/11/2019    Lumbosacral stenosis     last assessed: 9/20/16    TRAE (obstructive sleep apnea)     Pancreatitis     resolved: 9/20/17    Pneumonia     Post-operative nausea and vomiting     PTSD (post-traumatic stress disorder)        Past Surgical History:   Procedure Laterality Date    BREAST BIOPSY Right  "2022    [facility]    BREAST LUMPECTOMY Right 10/21/2022    Procedure: LUMPECTOMY BREAST FLORENCIO;  Surgeon: Inés Singh MD;  Location: [facility];  Service: Surgical Oncology    CHOLECYSTECTOMY      EGD  04/15/2019    GALLBLADDER SURGERY      HYSTERECTOMY      pt thinks she still has one ovary, done over 20 yrs ago    LUMBAR SPINE SURGERY  2016    3, 4, 5    LYMPH NODE BIOPSY Right 10/21/2022    Procedure: SLN BX, lymphoscintigraphy;  Surgeon: nIés Singh MD;  Location: [facility];  Service: Surgical Oncology    MOUTH SURGERY      MULTIPLE TOOTH EXTRACTIONS N/A 2018    OOPHORECTOMY Bilateral     1 1/2 ovaries removed    TONSILLECTOMY      US BREAST CLIP NEEDLE LOC RIGHT Right 2022    US GUIDED BREAST BIOPSY RIGHT COMPLETE Right 2022       Family History   Problem Relation Age of Onset    Breast cancer Mother 60    Depression Mother         panic attacks    Lung cancer Mother     Mental illness Mother     Substance Abuse Mother         CIGS    Coronary artery disease Father         high # of paternal family members  in their 50s    Substance Abuse Father         CIGS    Depression Sister         panic attacks    Hypertension Sister     Breast cancer Daughter 50        CHEK2 positve    Breast cancer Maternal Aunt 40    Lung cancer Maternal Aunt     Ovarian cancer Maternal Grandmother 70    Depression Other         panic attacks    Colon cancer Other         age at dx unk    Heart disease Family     Hypertension Family     Colon polyps Neg Hx          Medications have been verified.        Objective   /84 (BP Location: Right arm, Patient Position: Sitting)   Pulse 75   Temp 97.8 °F (36.6 °C)   Resp 18   Ht 5' 3\" (1.6 m)   Wt 74.8 kg (165 lb)   LMP  (LMP Unknown)   SpO2 99%   BMI 29.23 kg/m²   No LMP recorded (lmp unknown). Patient has had a hysterectomy.       Physical Exam     Physical Exam  Constitutional:       General: She is not in acute distress.     Appearance: Normal appearance. " She is not ill-appearing or diaphoretic.   HENT:      Right Ear: Tympanic membrane, ear canal and external ear normal.      Left Ear: Tympanic membrane, ear canal and external ear normal.      Nose: Nose normal.      Mouth/Throat:      Mouth: Mucous membranes are moist.      Pharynx: Oropharynx is clear.   Eyes:      Conjunctiva/sclera: Conjunctivae normal.   Cardiovascular:      Rate and Rhythm: Normal rate and regular rhythm.      Heart sounds: Normal heart sounds.   Pulmonary:      Effort: Pulmonary effort is normal.      Breath sounds: Normal breath sounds.   Skin:     General: Skin is warm and dry.   Neurological:      Mental Status: She is alert.   Psychiatric:         Mood and Affect: Mood normal.         Behavior: Behavior normal.

## 2024-08-02 ENCOUNTER — OFFICE VISIT (OUTPATIENT)
Dept: CARDIOLOGY CLINIC | Facility: CLINIC | Age: 79
End: 2024-08-02
Payer: MEDICARE

## 2024-08-02 VITALS
HEIGHT: 63 IN | BODY MASS INDEX: 28.69 KG/M2 | OXYGEN SATURATION: 96 % | HEART RATE: 84 BPM | DIASTOLIC BLOOD PRESSURE: 74 MMHG | SYSTOLIC BLOOD PRESSURE: 124 MMHG | WEIGHT: 161.9 LBS

## 2024-08-02 DIAGNOSIS — E78.00 PURE HYPERCHOLESTEROLEMIA: ICD-10-CM

## 2024-08-02 DIAGNOSIS — I34.0 MILD MITRAL REGURGITATION: ICD-10-CM

## 2024-08-02 DIAGNOSIS — I65.23 BILATERAL CAROTID ARTERY STENOSIS: ICD-10-CM

## 2024-08-02 DIAGNOSIS — I10 ESSENTIAL HYPERTENSION: ICD-10-CM

## 2024-08-02 DIAGNOSIS — I35.1 NONRHEUMATIC AORTIC VALVE INSUFFICIENCY: Primary | ICD-10-CM

## 2024-08-02 DIAGNOSIS — I07.1 MILD TRICUSPID REGURGITATION: ICD-10-CM

## 2024-08-02 PROCEDURE — G2211 COMPLEX E/M VISIT ADD ON: HCPCS | Performed by: INTERNAL MEDICINE

## 2024-08-02 PROCEDURE — 99214 OFFICE O/P EST MOD 30 MIN: CPT | Performed by: INTERNAL MEDICINE

## 2024-08-02 RX ORDER — ROSUVASTATIN CALCIUM 5 MG/1
TABLET, COATED ORAL
Qty: 39 TABLET | Refills: 3 | Status: SHIPPED | OUTPATIENT
Start: 2024-08-02

## 2024-08-02 RX ORDER — LOSARTAN POTASSIUM 100 MG/1
100 TABLET ORAL DAILY
Qty: 90 TABLET | Refills: 3 | Status: SHIPPED | OUTPATIENT
Start: 2024-08-02
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
## 2024-08-05 ENCOUNTER — TELEPHONE (OUTPATIENT)
Age: 79
End: 2024-08-05

## 2024-08-05 NOTE — TELEPHONE ENCOUNTER
Patient reports URI symptoms x1 week - was seen at  on 7/29 and finished full course of Augmentin. At Cardiology appointment on Friday, noted audible wheezing on exam, message sent to PCP. Persistent symptoms include chills/sweats (but denies fever), sore throat, swollen lymph nodes in her neck causing pain/stiffness. Patient denies CP and SOB. Multiple negative home COVID tests. Patient states her Cardiologist recommended an inhaler. Has another appointment this morning and unavailable until [id number]. Asking for provider's recommendation(s), unsure of next steps. No same-day appointments available. Please review and advise.

## 2024-08-09 ENCOUNTER — OFFICE VISIT (OUTPATIENT)
Dept: FAMILY MEDICINE CLINIC | Facility: HOSPITAL | Age: 79
End: 2024-08-09
Payer: MEDICARE

## 2024-08-09 VITALS
TEMPERATURE: 98.8 F | DIASTOLIC BLOOD PRESSURE: 68 MMHG | SYSTOLIC BLOOD PRESSURE: 118 MMHG | OXYGEN SATURATION: 97 % | BODY MASS INDEX: 28.52 KG/M2 | WEIGHT: 161 LBS | HEART RATE: 69 BPM

## 2024-08-09 DIAGNOSIS — R05.9 COUGH, UNSPECIFIED TYPE: Primary | ICD-10-CM

## 2024-08-09 PROCEDURE — G2211 COMPLEX E/M VISIT ADD ON: HCPCS

## 2024-08-09 PROCEDURE — 99213 OFFICE O/P EST LOW 20 MIN: CPT

## 2024-08-09 RX ORDER — BENZONATATE 200 MG/1
200 CAPSULE ORAL 3 TIMES DAILY PRN
Qty: 20 CAPSULE | Refills: 0 | Status: SHIPPED | OUTPATIENT
Start: 2024-08-09

## 2024-08-09 RX ORDER — ALBUTEROL SULFATE 90 UG/1
2 AEROSOL, METERED RESPIRATORY (INHALATION) EVERY 6 HOURS PRN
Qty: 20.1 G | Refills: 3 | Status: SHIPPED | OUTPATIENT
Start: 2024-08-09

## 2024-08-09 NOTE — ASSESSMENT & PLAN NOTE
- Likely viral etiology, with postviral cough and lingering congestion    Plan:  -Mucinex, Tessalon Perles  -Patient requesting albuterol inhaler due to wheezing and recommendation from cardiologist, I am agreeable to this plan  -Pharmacy confirmed  -Follow-up as needed

## 2024-08-09 NOTE — PROGRESS NOTES
Ambulatory Visit  Name: Siobhan Raymond      : 1945      MRN: [id number]  Encounter Provider: Selma Ramsay DO  Encounter Date: 2024   Encounter department: [facility] CARE [address]    Assessment & Plan   1. Cough, unspecified type  Assessment & Plan:  - Likely viral etiology, with postviral cough and lingering congestion    Plan:  -Mucinex, Tessalon Perles  -Patient requesting albuterol inhaler due to wheezing and recommendation from cardiologist, I am agreeable to this plan  -Pharmacy confirmed  -Follow-up as needed  Orders:  -     albuterol (Proventil HFA) 90 mcg/act inhaler; Inhale 2 puffs every 6 (six) hours as needed for wheezing  -     guaiFENesin (ROBITUSSIN) 100 mg/5 mL syrup; Take 10 mL (200 mg total) by mouth 3 (three) times a day as needed for cough for up to 10 days  -     benzonatate (TESSALON) 200 MG capsule; Take 1 capsule (200 mg total) by mouth 3 (three) times a day as needed for cough       History of Present Illness     79-year-old female presenting complaining of:    Cough  - Began 2 weeks ago - productive cough, chest congestion, fatigue. Was having sore throat, but that is now resolved. Is now concerned about chest heaviness & fatigue  - Seen at urgent care 24. Was prescribed 7-day course of augmentin  - Home COVID tests negative  - Has been managing symptoms with Flonase, Motrin, abx when prescribed, steroids when prescribed.  Was taking Mucinex intermittently, with improvement, but not complete resolution  - Was feeling better by Tuesday, but is still having some persistent symptoms  - Denies sick contacts  - Denies seasonal allergies, denies tobacco use        Review of Systems   Constitutional:  Negative for appetite change, chills and fever.   HENT:  Positive for congestion, sinus pressure and voice change. Negative for ear discharge, ear pain and trouble swallowing.    Respiratory:  Positive for cough and wheezing. Negative for shortness of  breath.    Cardiovascular:  Negative for chest pain.     Current Outpatient Medications on File Prior to Visit   Medication Sig Dispense Refill    ascorbic acid (VITAMIN C) 250 mg tablet Take 250 mg by mouth daily      aspirin (Aspirin 81) 81 mg EC tablet Take 1 tablet (81 mg total) by mouth daily 90 tablet 3    atenolol (TENORMIN) 25 mg tablet Take 1 tablet (25 mg total) by mouth 2 (two) times a day 180 tablet 3    Cholecalciferol (VITAMIN D3) 2000 units capsule Take by mouth daily      clonazePAM (KlonoPIN) 0.5 mg tablet Take 1 tablet (0.5 mg total) by mouth 2 (two) times a day as needed for anxiety 60 tablet 0    co-enzyme Q-10 30 MG capsule Take 30 mg by mouth daily       dicyclomine (BENTYL) 10 mg capsule TAKE 2 CAPSULES BY MOUTH  TWO TIMES A  capsule 12    DULoxetine (CYMBALTA) 30 mg delayed release capsule Take 1 capsule (30 mg total) by mouth 2 (two) times a day 180 capsule 1    fenofibrate (TRICOR) 48 mg tablet TAKE 1 TABLET BY MOUTH IN THE  MORNING 90 tablet 3    fluticasone (FLONASE) 50 mcg/act nasal spray 2 sprays into each nostril daily 18 mL 3    losartan (COZAAR) 100 MG tablet Take 1 tablet (100 mg total) by mouth daily 90 tablet 3    magnesium oxide (MAG-OX) 400 mg Take 400 mg by mouth every other day       multivitamin (THERAGRAN) TABS Take 1 tablet by mouth daily      Omega 3 1000 MG CAPS Take by mouth daily      Probiotic Product (PROBIOTIC-10 PO) Take by mouth daily      rosuvastatin (CRESTOR) 5 mg tablet TAKE 1 TABLET BY MOUTH 3  TIMES WEEKLY 39 tablet 3    anastrozole (ARIMIDEX) 1 mg tablet Take 1 tablet (1 mg total) by mouth daily (Patient not taking: Reported on 1/4/2024) 30 tablet 2    fluticasone (FLONASE) 50 mcg/act nasal spray USE 1 SPRAY IN EACH NOSTRIL DAILY (Patient not taking: Reported on 8/9/2024) 16 g 5    predniSONE 10 mg tablet 30 mg by mouth daily for 3 days, then 20 mg by mouth daily for 3 days, then 10 mg by mouth daily for 3 days, then stop (Patient not taking: Reported  on 5/9/2024) 18 tablet 0     No current facility-administered medications on file prior to visit.      Social History     Tobacco Use    Smoking status: Never    Smokeless tobacco: Never   Vaping Use    Vaping status: Never Used   Substance and Sexual Activity    Alcohol use: Yes     Alcohol/week: 4.0 standard drinks of alcohol     Types: 4 Glasses of wine per week     Comment: 4 drinks every night    Drug use: Yes     Frequency: 7.0 times per week     Types: Marijuana     Comment: uses topically daily- last used 10/20    Sexual activity: Not Currently     Objective     /68   Pulse 69   Temp 98.8 °F (37.1 °C) (Tympanic)   Wt 73 kg (161 lb)   LMP  (LMP Unknown)   SpO2 97%   BMI 28.52 kg/m²     Physical Exam  Vitals reviewed.   Constitutional:       General: She is not in acute distress.     Appearance: Normal appearance. She is well-developed. She is not ill-appearing.   HENT:      Head: Normocephalic and atraumatic.      Right Ear: Tympanic membrane, ear canal and external ear normal. There is no impacted cerumen.      Left Ear: Tympanic membrane, ear canal and external ear normal. There is no impacted cerumen.      Nose: No rhinorrhea.      Mouth/Throat:      Mouth: Mucous membranes are moist.      Pharynx: Posterior oropharyngeal erythema present.   Eyes:      Conjunctiva/sclera: Conjunctivae normal.   Cardiovascular:      Rate and Rhythm: Normal rate and regular rhythm.      Heart sounds: Normal heart sounds.   Pulmonary:      Effort: Pulmonary effort is normal.      Breath sounds: Normal breath sounds. No wheezing.   Abdominal:      Tenderness: There is no abdominal tenderness.   Musculoskeletal:         General: Normal range of motion.   Skin:     General: Skin is warm and dry.   Neurological:      Mental Status: She is alert.      Gait: Gait normal.   Psychiatric:         Mood and Affect: Mood normal.         Behavior: Behavior normal.       Administrative Statements     Selma Ramsay,  DO  Family Medicine

## 2024-08-13 DIAGNOSIS — F41.9 ANXIETY HYPERVENTILATION: ICD-10-CM

## 2024-08-13 DIAGNOSIS — F45.8 ANXIETY HYPERVENTILATION: ICD-10-CM

## 2024-08-13 RX ORDER — CLONAZEPAM 0.5 MG/1
0.5 TABLET ORAL 2 TIMES DAILY PRN
Qty: 60 TABLET | Refills: 0 | Status: SHIPPED | OUTPATIENT
Start: 2024-08-13

## 2024-08-13 NOTE — TELEPHONE ENCOUNTER
Reason for call:   [x] Refill   [] Prior Auth  [] Other:     Office: [facility] [address]  [x] PCP/Provider - Thu Osborn   [] Specialty/Provider -     Medication: clonazepam     Dose/Frequency: 0.5 mg/ twice daily     Quantity: 30 day supply     Pharmacy: [facility]     Does the patient have enough for 3 days?   [x] Yes   [] No - Send as HP to POD

## 2024-08-28 ENCOUNTER — OFFICE VISIT (OUTPATIENT)
Dept: FAMILY MEDICINE CLINIC | Facility: HOSPITAL | Age: 79
End: 2024-08-28
Payer: MEDICARE

## 2024-08-28 VITALS
WEIGHT: 164 LBS | BODY MASS INDEX: 29.06 KG/M2 | OXYGEN SATURATION: 96 % | HEART RATE: 80 BPM | DIASTOLIC BLOOD PRESSURE: 84 MMHG | SYSTOLIC BLOOD PRESSURE: 142 MMHG | HEIGHT: 63 IN

## 2024-08-28 DIAGNOSIS — Z00.00 MEDICARE ANNUAL WELLNESS VISIT, SUBSEQUENT: Primary | ICD-10-CM

## 2024-08-28 DIAGNOSIS — J02.9 PHARYNGITIS, UNSPECIFIED ETIOLOGY: ICD-10-CM

## 2024-08-28 DIAGNOSIS — M17.12 PRIMARY OSTEOARTHRITIS OF LEFT KNEE: ICD-10-CM

## 2024-08-28 DIAGNOSIS — M25.552 LEFT HIP PAIN: ICD-10-CM

## 2024-08-28 DIAGNOSIS — M25.572 ACUTE LEFT ANKLE PAIN: ICD-10-CM

## 2024-08-28 DIAGNOSIS — M17.11 PRIMARY OSTEOARTHRITIS OF RIGHT KNEE: ICD-10-CM

## 2024-08-28 PROCEDURE — G0439 PPPS, SUBSEQ VISIT: HCPCS | Performed by: INTERNAL MEDICINE

## 2024-08-28 PROCEDURE — 99214 OFFICE O/P EST MOD 30 MIN: CPT | Performed by: INTERNAL MEDICINE

## 2024-08-28 NOTE — PATIENT INSTRUCTIONS
Medicare Preventive Visit Patient Instructions  Thank you for completing your Welcome to Medicare Visit or Medicare Annual Wellness Visit today. Your next wellness visit will be due in one year (8/29/2025).  The screening/preventive services that you may require over the next 5-10 years are detailed below. Some tests may not apply to you based off risk factors and/or age. Screening tests ordered at today's visit but not completed yet may show as past due. Also, please note that scanned in results may not display below.  Preventive Screenings:  Service Recommendations Previous Testing/Comments   Colorectal Cancer Screening  * Colonoscopy    * Fecal Occult Blood Test (FOBT)/Fecal Immunochemical Test (FIT)  * Fecal DNA/Cologuard Test  * Flexible Sigmoidoscopy Age: 45-75 years old   Colonoscopy: every 10 years (may be performed more frequently if at higher risk)  OR  FOBT/FIT: every 1 year  OR  Cologuard: every 3 years  OR  Sigmoidoscopy: every 5 years  Screening may be recommended earlier than age 45 if at higher risk for colorectal cancer. Also, an individualized decision between you and your healthcare provider will decide whether screening between the ages of 76-85 would be appropriate. Colonoscopy: 06/04/2015  FOBT/FIT: Not on file  Cologuard: Not on file  Sigmoidoscopy: Not on file          Breast Cancer Screening Age: 40+ years old  Frequency: every 1-2 years  Not required if history of left and right mastectomy Mammogram: 07/29/2024    History Breast Cancer   Cervical Cancer Screening Between the ages of 21-29, pap smear recommended once every 3 years.   Between the ages of 30-65, can perform pap smear with HPV co-testing every 5 years.   Recommendations may differ for women with a history of total hysterectomy, cervical cancer, or abnormal pap smears in past. Pap Smear: Not on file    Screening Not Indicated   Hepatitis C Screening Once for adults born between 1945 and 1965  More frequently in patients at high  risk for Hepatitis C Hep C Antibody: 04/13/2019    Screening Current   Diabetes Screening 1-2 times per year if you're at risk for diabetes or have pre-diabetes Fasting glucose: 113 mg/dL (12/16/2023)  A1C: 5.6 % (4/21/2023)  Screening Current   Cholesterol Screening Once every 5 years if you don't have a lipid disorder. May order more often based on risk factors. Lipid panel: 06/26/2023    Screening Not Indicated  History Lipid Disorder     Other Preventive Screenings Covered by Medicare:  Abdominal Aortic Aneurysm (AAA) Screening: covered once if your at risk. You're considered to be at risk if you have a family history of AAA.  Lung Cancer Screening: covers low dose CT scan once per year if you meet all of the following conditions: (1) Age 55-77; (2) No signs or symptoms of lung cancer; (3) Current smoker or have quit smoking within the last 15 years; (4) You have a tobacco smoking history of at least 20 pack years (packs per day multiplied by number of years you smoked); (5) You get a written order from a healthcare provider.  Glaucoma Screening: covered annually if you're considered high risk: (1) You have diabetes OR (2) Family history of glaucoma OR (3)  aged 50 and older OR (4)  American aged 65 and older  Osteoporosis Screening: covered every 2 years if you meet one of the following conditions: (1) You're estrogen deficient and at risk for osteoporosis based off medical history and other findings; (2) Have a vertebral abnormality; (3) On glucocorticoid therapy for more than 3 months; (4) Have primary hyperparathyroidism; (5) On osteoporosis medications and need to assess response to drug therapy.   Last bone density test (DXA Scan): 11/17/2022.  HIV Screening: covered annually if you're between the age of 15-65. Also covered annually if you are younger than 15 and older than 65 with risk factors for HIV infection. For pregnant patients, it is covered up to 3 times per  pregnancy.    Immunizations:  Immunization Recommendations   Influenza Vaccine Annual influenza vaccination during flu season is recommended for all persons aged >= 6 months who do not have contraindications   Pneumococcal Vaccine   * Pneumococcal conjugate vaccine = PCV13 (Prevnar 13), PCV15 (Vaxneuvance), PCV20 (Prevnar 20)  * Pneumococcal polysaccharide vaccine = PPSV23 (Pneumovax) Adults 19-63 yo with certain risk factors or if 65+ yo  If never received any pneumonia vaccine: recommend Prevnar 20 (PCV20)  Give PCV20 if previously received 1 dose of PCV13 or PPSV23   Hepatitis B Vaccine 3 dose series if at intermediate or high risk (ex: diabetes, end stage renal disease, liver disease)   Respiratory syncytial virus (RSV) Vaccine - COVERED BY MEDICARE PART D  * RSVPreF3 (Arexvy) CDC recommends that adults 60 years of age and older may receive a single dose of RSV vaccine using shared clinical decision-making (SCDM)   Tetanus (Td) Vaccine - COST NOT COVERED BY MEDICARE PART B Following completion of primary series, a booster dose should be given every 10 years to maintain immunity against tetanus. Td may also be given as tetanus wound prophylaxis.   Tdap Vaccine - COST NOT COVERED BY MEDICARE PART B Recommended at least once for all adults. For pregnant patients, recommended with each pregnancy.   Shingles Vaccine (Shingrix) - COST NOT COVERED BY MEDICARE PART B  2 shot series recommended in those 19 years and older who have or will have weakened immune systems or those 50 years and older     Health Maintenance Due:      Topic Date Due   • Breast Cancer Screening: Mammogram  07/29/2025   • Hepatitis C Screening  Completed   • Colorectal Cancer Screening  Discontinued     Immunizations Due:      Topic Date Due   • COVID-19 Vaccine (5 - 2023-24 season) 09/01/2023   • Influenza Vaccine (1) 09/01/2024     Advance Directives   What are advance directives?  Advance directives are legal documents that state your wishes  and plans for medical care. These plans are made ahead of time in case you lose your ability to make decisions for yourself. Advance directives can apply to any medical decision, such as the treatments you want, and if you want to donate organs.   What are the types of advance directives?  There are many types of advance directives, and each state has rules about how to use them. You may choose a combination of any of the following:  Living will:  This is a written record of the treatment you want. You can also choose which treatments you do not want, which to limit, and which to stop at a certain time. This includes surgery, medicine, IV fluid, and tube feedings.   Durable power of  for healthcare (DPAHC):  This is a written record that states who you want to make healthcare choices for you when you are unable to make them for yourself. This person, called a proxy, is usually a family member or a friend. You may choose more than 1 proxy.  Do not resuscitate (DNR) order:  A DNR order is used in case your heart stops beating or you stop breathing. It is a request not to have certain forms of treatment, such as CPR. A DNR order may be included in other types of advance directives.  Medical directive:  This covers the care that you want if you are in a coma, near death, or unable to make decisions for yourself. You can list the treatments you want for each condition. Treatment may include pain medicine, surgery, blood transfusions, dialysis, IV or tube feedings, and a ventilator (breathing machine).  Values history:  This document has questions about your views, beliefs, and how you feel and think about life. This information can help others choose the care that you would choose.  Why are advance directives important?  An advance directive helps you control your care. Although spoken wishes may be used, it is better to have your wishes written down. Spoken wishes can be misunderstood, or not followed.  Treatments may be given even if you do not want them. An advance directive may make it easier for your family to make difficult choices about your care.   Fall Prevention    Fall prevention  includes ways to make your home and other areas safer. It also includes ways you can move more carefully to prevent a fall. Health conditions that cause changes in your blood pressure, vision, or muscle strength and coordination may increase your risk for falls. Medicines may also increase your risk for falls if they make you dizzy, weak, or sleepy.   Fall prevention tips:   Stand or sit up slowly.    Use assistive devices as directed.    Wear shoes that fit well and have soles that .    Wear a personal alarm.    Stay active.    Manage your medical conditions.    Home Safety Tips:  Add items to prevent falls in the bathroom.    Keep paths clear.    Install bright lights in your home.    Keep items you use often on shelves within reach.    Paint or place reflective tape on the edges of your stairs.    Urinary Incontinence   Urinary incontinence (UI)  is when you lose control of your bladder. UI develops because your bladder cannot store or empty urine properly. The 3 most common types of UI are stress incontinence, urge incontinence, or both.  Medicines:   May be given to help strengthen your bladder control. Report any side effects of medication to your healthcare provider.  Do pelvic muscle exercises often:  Your pelvic muscles help you stop urinating. Squeeze these muscles tight for 5 seconds, then relax for 5 seconds. Gradually work up to squeezing for 10 seconds. Do 3 sets of 15 repetitions a day, or as directed. This will help strengthen your pelvic muscles and improve bladder control.  Train your bladder:  Go to the bathroom at set times, such as every 2 hours, even if you do not feel the urge to go. You can also try to hold your urine when you feel the urge to go. For example, hold your urine for 5 minutes when you  feel the urge to go. As that becomes easier, hold your urine for 10 minutes.   Self-care:   Keep a UI record.  Write down how often you leak urine and how much you leak. Make a note of what you were doing when you leaked urine.  Drink liquids as directed. You may need to limit the amount of liquid you drink to help control your urine leakage. Do not drink any liquid right before you go to bed. Limit or do not have drinks that contain caffeine or alcohol.   Prevent constipation.  Eat a variety of high-fiber foods. Good examples are high-fiber cereals, beans, vegetables, and whole-grain breads. Walking is the best way to trigger your intestines to have a bowel movement.  Exercise regularly and maintain a healthy weight.  Weight loss and exercise will decrease pressure on your bladder and help you control your leakage.   Use a catheter as directed  to help empty your bladder. A catheter is a tiny, plastic tube that is put into your bladder to drain your urine.   Go to behavior therapy as directed.  Behavior therapy may be used to help you learn to control your urge to urinate.    Weight Management   Why it is important to manage your weight:  Being overweight increases your risk of health conditions such as heart disease, high blood pressure, type 2 diabetes, and certain types of cancer. It can also increase your risk for osteoarthritis, sleep apnea, and other respiratory problems. Aim for a slow, steady weight loss. Even a small amount of weight loss can lower your risk of health problems.  How to lose weight safely:  A safe and healthy way to lose weight is to eat fewer calories and get regular exercise. You can lose up about 1 pound a week by decreasing the number of calories you eat by 500 calories each day.   Healthy meal plan for weight management:  A healthy meal plan includes a variety of foods, contains fewer calories, and helps you stay healthy. A healthy meal plan includes the following:  Eat whole-grain  foods more often.  A healthy meal plan should contain fiber. Fiber is the part of grains, fruits, and vegetables that is not broken down by your body. Whole-grain foods are healthy and provide extra fiber in your diet. Some examples of whole-grain foods are whole-wheat breads and pastas, oatmeal, brown rice, and bulgur.  Eat a variety of vegetables every day.  Include dark, leafy greens such as spinach, kale, leandro greens, and mustard greens. Eat yellow and orange vegetables such as carrots, sweet potatoes, and winter squash.   Eat a variety of fruits every day.  Choose fresh or canned fruit (canned in its own juice or light syrup) instead of juice. Fruit juice has very little or no fiber.  Eat low-fat dairy foods.  Drink fat-free (skim) milk or 1% milk. Eat fat-free yogurt and low-fat cottage cheese. Try low-fat cheeses such as mozzarella and other reduced-fat cheeses.  Choose meat and other protein foods that are low in fat.  Choose beans or other legumes such as split peas or lentils. Choose fish, skinless poultry (chicken or turkey), or lean cuts of red meat (beef or pork). Before you cook meat or poultry, cut off any visible fat.   Use less fat and oil.  Try baking foods instead of frying them. Add less fat, such as margarine, sour cream, regular salad dressing and mayonnaise to foods. Eat fewer high-fat foods. Some examples of high-fat foods include french fries, doughnuts, ice cream, and cakes.  Eat fewer sweets.  Limit foods and drinks that are high in sugar. This includes candy, cookies, regular soda, and sweetened drinks.  Exercise:  Exercise at least 30 minutes per day on most days of the week. Some examples of exercise include walking, biking, dancing, and swimming. You can also fit in more physical activity by taking the stairs instead of the elevator or parking farther away from stores. Ask your healthcare provider about the best exercise plan for you.   Alcohol Use and Your Health    Drinking too  "much can harm your health.  Excessive alcohol use leads to about 88,000 death in the [place] each year, and shortens the life of those who diet by almost 30 years.  Further, excessive drinking cost the economy $249 billion in 2010.  Most excessive drinkers are not alcohol dependent.    Excessive alcohol use has immediate effects that increase the risk of many harmful health conditions.  These are most often the result of binge drinking.  Over time, excessive alcohol use can lead to the development of chronic diseases and other series health problems.    What is considered a \"drink\"?        Excessive alcohol use includes:  Binge Drinking: For women, 4 or more drinks consumed on one occasion. For men, 5 or more drinks consumed on one occasion.  Heavy Drinking: For women, 8 or more drinks per week. For men, 15 or more drinks per week  Any alcohol used by pregnant women  Any alcohol used by those under the age of 21 years    If you choose to drink, do so in moderation:  Do not drink at all if you are under the age of 21, or if you are or may be pregnant, or have health problems that could be made worse by drinking.  For women, up to 1 drink per day  For men, up to 2 drinks a day    No one should begin drinking or drink more frequently based on potential health benefits    Short-Term Health Risks:  Injuries: motor vehicle crashes, falls, drownings, burns  Violence: homicide, suicide, sexual assault, intimate partner violence  Alcohol poisoning  Reproductive health: risky sexual behaviors, unintended prengnacy, sexually transmitted diseases, miscarriage, stillbirth, fetal alcohol syndrome    Long-Term Health Risks:  Chronic diseases: high blood pressure, heart disease, stroke, liver disease, digestive problems  Cancers: breast, mouth and throat, liver, colon  Learning and memory problems: dementia, poor school performance  Mental health: depression, anxiety, insomnia  Social problems: lost productivity, family " problems, unemployment  Alcohol dependence    For support and more information:  Substance Abuse and Mental Health Services Administration  [address]  [place] [address]  Web Address: http://www.samhsa.gov    Alcoholics Anonymous        Web Address: http://www.aa.org    https://www.cdc.gov/alcohol/fact-sheets/alcohol-use.htm     © Copyright [employer] 2018 Information is for End User's use only and may not be sold, redistributed or otherwise used for commercial purposes. All illustrations and images included in CareNotes® are the copyrighted property of [employer] or [employer]

## 2024-08-28 NOTE — PROGRESS NOTES
Ambulatory Visit  Name: Siobhan Raymond      : 1945      MRN: [id number]  Encounter Provider: Thu Osborn DO  Encounter Date: 2024   Encounter department: [facility] [address]    Assessment & Plan   1. Medicare annual wellness visit, subsequent  2. Primary osteoarthritis of right knee  3. Primary osteoarthritis of left knee  4. Acute left ankle pain  -     XR ankle 2 vw left; Future; Expected date: 2024  5. Left hip pain  -     XR hip/pelv 2-3 vws left if performed; Future; Expected date: 2024  -     XR ankle 2 vw left; Future; Expected date: 2024  6. Pharyngitis, unspecified etiology  -     Ambulatory Referral to Otolaryngology; Future      Depression Screening and Follow-up Plan: Patient's depression screening was positive with a PHQ-9 score of 6. Patient assessed for underlying major depression. Brief counseling provided and recommend additional follow-up/re-evaluation next office visit. Some  issues with weather and frustrated that her garden is not thriving this year with the hot weather    Falls Plan of Care: balance, strength, and gait training instructions were provided. Recommended assistive device to help with gait and balance. Had falls in garden with old gardening shoes- cane  Uses walking sticks when walking the dog- lists to right side      Preventive health issues were discussed with patient, and age appropriate screening tests were ordered as noted in patient's After Visit Summary. Personalized health advice and appropriate referrals for health education or preventive services given if needed, as noted in patient's After Visit Summary.    History of Present Illness     Here for follow up   1. Pharyngitis- - hoarseness since end of July- no discolored drainage or fevers   Was given albuterol for sob from Dr. Ramsay  2. Right facial pain- had this in last December and then seen by neurology and had mri etc. Had evalaution for possible  temporal  artery issues with Dr. Zhang and then improved- then had recurrence in June  and still present  3. 2 falls in garden - landed on left side  in early July- still has some left hip and  ankle pain    Medicare wellness- medical poa is daughter Vicky       Patient Care Team:  Thu Osborn DO as PCP - General  DO Hawk Ontiveros MD Ida Mazza, MD Erin Fly, DO Daryl Gordon, CRNP Tricia Kelly, MD (General Surgery)  Anayeli Boyle DO (Oncology)  Cortney Solano MD (Radiation Oncology)  VITOR Schroeder (Oncology)    Review of Systems   Constitutional:  Negative for fatigue.   Respiratory:  Negative for shortness of breath.    Cardiovascular:  Negative for chest pain and palpitations.   Gastrointestinal:  Negative for abdominal distention and abdominal pain.   All other systems reviewed and are negative.    Medical History Reviewed by provider this encounter:  Problems       Annual Wellness Visit Questionnaire   Siobhan is here for her Subsequent Wellness visit.     Health Risk Assessment:   Patient rates overall health as good. Patient feels that their physical health rating is slightly worse. Patient is very satisfied with their life. Eyesight was rated as same. Hearing was rated as same. Patient feels that their emotional and mental health rating is same. Patients states they are never, rarely angry. Patient states they are often unusually tired/fatigued. Pain experienced in the last 7 days has been some. Patient's pain rating has been 6/10. Patient states that she has experienced no weight loss or gain in last 6 months.     Depression Screening:   PHQ-9 Score: 6      Fall Risk Screening:   In the past year, patient has experienced: history of falling in past year    Number of falls: 2 or more  Injured during fall?: Yes    Feels unsteady when standing or walking?: Yes    Worried about falling?: No      Urinary Incontinence Screening:   Patient has not leaked urine accidently in the last six  months.     Home Safety:  Patient does not have trouble with stairs inside or outside of their home. Patient has working smoke alarms and has working carbon monoxide detector. Home safety hazards include: none.     Nutrition:   Current diet is Regular.     Medications:   Patient is currently taking over-the-counter supplements. OTC medications include: see medication list. Patient is able to manage medications.     Activities of Daily Living (ADLs)/Instrumental Activities of Daily Living (IADLs):   Walk and transfer into and out of bed and chair?: Yes  Dress and groom yourself?: Yes    Bathe or shower yourself?: Yes    Feed yourself? Yes  Do your laundry/housekeeping?: Yes  Manage your money, pay your bills and track your expenses?: Yes  Make your own meals?: Yes    Do your own shopping?: Yes    Previous Hospitalizations:   Any hospitalizations or ED visits within the last 12 months?: No      Advance Care Planning:   Living will: Yes    Durable POA for healthcare: Yes    Advanced directive: Yes    Advanced directive counseling given: Yes    End of Life Decisions reviewed with patient: Yes    Provider agrees with end of life decisions: Yes      PREVENTIVE SCREENINGS      Cardiovascular Screening:    General: Screening Not Indicated and History Lipid Disorder      Diabetes Screening:     General: Screening Current      Breast Cancer Screening:     General: History Breast Cancer      Cervical Cancer Screening:    General: Screening Not Indicated      Lung Cancer Screening:     General: Screening Not Indicated      Hepatitis C Screening:    General: Screening Current    Screening, Brief Intervention, and Referral to Treatment (SBIRT)    Screening      AUDIT-C Screenin) How often did you have a drink containing alcohol in the past year? 4 or more times a week  2) How many drinks did you have on a typical day when you were drinking in the past year? 3 to 4  3) How often did you have 6 or more drinks on one occasion  in the past year? never    AUDIT-C Score: 4  Interpretation: Score 3-12 (female): POSITIVE screen for alcohol misuse    AUDIT Screenin) How often during the last year have you found that you were not able to stop drinking once you had started? 0 - never  5) How often during the last year have you failed to do what was normally expected from you because of drinking? 0 - never  6) How often during the last year have you needed a first drink in the morning to get yourself going after a heavy drinking session? 0 - never  7) How often during the last year have you had a feeling of guilt or remorse after drinking? 0 - never  8) How often during the last year have you been unable to remember what happened the night before because you had been drinking? 0 - never  9) Have you or someone else been injured as a result of your drinking? 0 - no  10) Has a relative or friend or a doctor or another health worker been concerned about your drinking or suggested you cut down? 0 - no    AUDIT Score: 4  Interpretation: Low risk alcohol consumption    Other Counseling Topics:   Calcium and vitamin D intake and regular weightbearing exercise.     Social Determinants of Health     Financial Resource Strain: Low Risk  (2023)    Overall Financial Resource Strain (CARDIA)    • Difficulty of Paying Living Expenses: Not hard at all   Food Insecurity: No Food Insecurity (2024)    Hunger Vital Sign    • Worried About Running Out of Food in the Last Year: Never true    • Ran Out of Food in the Last Year: Never true   Transportation Needs: No Transportation Needs (2024)    PRAPARE - Transportation    • Lack of Transportation (Medical): No    • Lack of Transportation (Non-Medical): No   Housing Stability: Unknown (2024)    Housing Stability Vital Sign    • Unable to Pay for Housing in the Last Year: No    • Homeless in the Last Year: No   Utilities: Not At Risk (2024)    [facility] Utilities    • Threatened with loss of  "utilities: No     No results found.    Objective     /84   Pulse 80   Ht 5' 3\" (1.6 m)   Wt 74.4 kg (164 lb)   LMP  (LMP Unknown)   SpO2 96%   BMI 29.05 kg/m²     Physical Exam  Vitals and nursing note reviewed.   HENT:      Head: Normocephalic.      Right Ear: There is no impacted cerumen.      Left Ear: There is no impacted cerumen.      Mouth/Throat:      Pharynx: No posterior oropharyngeal erythema.   Eyes:      General:         Right eye: No discharge.         Left eye: No discharge.   Cardiovascular:      Rate and Rhythm: Normal rate and regular rhythm.      Heart sounds: No murmur heard.  Pulmonary:      Effort: No respiratory distress.      Comments: Mild prolonged exp phase  Abdominal:      General: There is no distension.      Palpations: Abdomen is soft.      Tenderness: There is no abdominal tenderness.   Musculoskeletal:      Cervical back: No rigidity or tenderness.      Right lower leg: No edema.      Left lower leg: No edema.   Lymphadenopathy:      Cervical: No cervical adenopathy.   Skin:     Findings: No erythema.   Neurological:      Mental Status: She is alert and oriented to person, place, and time.      Motor: No weakness.   Psychiatric:         Mood and Affect: Mood normal.         Thought Content: Thought content normal.         Judgment: Judgment normal.           "

## 2024-09-06 ENCOUNTER — APPOINTMENT (OUTPATIENT)
Dept: RADIOLOGY | Facility: CLINIC | Age: 79
End: 2024-09-06
Payer: MEDICARE

## 2024-09-06 DIAGNOSIS — M25.552 LEFT HIP PAIN: ICD-10-CM

## 2024-09-06 DIAGNOSIS — M25.572 ACUTE LEFT ANKLE PAIN: ICD-10-CM

## 2024-09-06 PROCEDURE — 73502 X-RAY EXAM HIP UNI 2-3 VIEWS: CPT

## 2024-09-06 PROCEDURE — 73610 X-RAY EXAM OF ANKLE: CPT

## 2024-09-08 PROBLEM — R05.9 COUGH: Status: RESOLVED | Noted: 2024-08-09 | Resolved: 2024-09-08

## 2024-09-17 DIAGNOSIS — M25.552 LEFT HIP PAIN: Primary | ICD-10-CM

## 2024-09-24 DIAGNOSIS — F41.9 ANXIETY HYPERVENTILATION: ICD-10-CM

## 2024-09-24 DIAGNOSIS — F45.8 ANXIETY HYPERVENTILATION: ICD-10-CM

## 2024-09-24 RX ORDER — CLONAZEPAM 0.5 MG/1
0.5 TABLET ORAL 2 TIMES DAILY PRN
Qty: 60 TABLET | Refills: 0 | Status: SHIPPED | OUTPATIENT
Start: 2024-09-24

## 2024-09-24 NOTE — TELEPHONE ENCOUNTER
Reason for call:   [x] Refill   [] Prior Auth  [] Other:     Office:   [x] PCP/Provider -   [] Specialty/Provider -     Medication: Clonazepam     Dose/Frequency: 0.5 mg tablet taken by mouth 2x daily PRN for anxiety     Quantity: 60    Pharmacy: [facility] [address] [phone]     Does the patient have enough for 3 days?   [x] Yes   [] No - Send as HP to POD

## 2024-10-11 ENCOUNTER — OFFICE VISIT (OUTPATIENT)
Dept: OBGYN CLINIC | Facility: CLINIC | Age: 79
End: 2024-10-11
Payer: MEDICARE

## 2024-10-11 VITALS
HEIGHT: 63 IN | WEIGHT: 164 LBS | BODY MASS INDEX: 29.06 KG/M2 | DIASTOLIC BLOOD PRESSURE: 80 MMHG | SYSTOLIC BLOOD PRESSURE: 140 MMHG

## 2024-10-11 DIAGNOSIS — M25.552 LEFT HIP PAIN: ICD-10-CM

## 2024-10-11 DIAGNOSIS — M76.32 ILIOTIBIAL BAND SYNDROME OF LEFT SIDE: Primary | ICD-10-CM

## 2024-10-11 DIAGNOSIS — M16.12 PRIMARY OSTEOARTHRITIS OF LEFT HIP: ICD-10-CM

## 2024-10-11 PROCEDURE — 99214 OFFICE O/P EST MOD 30 MIN: CPT | Performed by: STUDENT IN AN ORGANIZED HEALTH CARE EDUCATION/TRAINING PROGRAM

## 2024-10-11 NOTE — PROGRESS NOTES
Hip New Office Note    Assessment:     1. Primary osteoarthritis of left hip    2. Left hip pain    3. Iliotibial band syndrome of left side        Plan:     Problem List Items Addressed This Visit    None  Visit Diagnoses       Primary osteoarthritis of left hip    -  Primary    Left hip pain        Iliotibial band syndrome of left side               Findings consistent with left trochanteric bursitis, IT band syndrome, asymptomatic hip arthritis. X-ray and prognosis reviewed with patient. It was decided to hold off on CSI as symptoms have improved since onset. She will be referred to physical therapy to rehab hip. Daily stretching recommended. She can apply topical cream to hip which she applies to her knee's. If she continues to be symptomatic instructed to make follow up in 8-10 weeks for CSI troch bursa. All patients questions answered to her satisfaction.   Subjective:     Patient ID: Siobhan Raymond is a 79 y.o. female.  Chief Complaint:  HPI:  79 y.o. female in for evaluation of left hip pain. Referred by Dr Osborn. She states since summer time she has been having lateral hip pain. Pain has improved since onset. Denies any injury or trauma. She denies any groin pain. She states pain currently in mild in nature. She does have history of lumbar fusion, 2 ablations due to radiating pain down her legs. At night time she gets pain radiating to her foot and once she gets up and walks it resolves. She does using walking sticks to ambulate due to gait imbalance.       Allergy:  Allergies   Allergen Reactions    Antihistamines, Diphenhydramine-Type     Arimidex [Anastrozole] Confusion    Bupropion     Clarithromycin     Codeine Other (See Comments)     increased HR    Gabapentin     Meloxicam Nausea Only and Visual Disturbance     Other reaction(s): Numbness  Action Taken: med stopped.; Category: Allergy;     Ondansetron     Pravastatin     Propoxyphene Other (See Comments)     increased HR     Medications:  all  current active meds have been reviewed  Past Medical History:  Past Medical History:   Diagnosis Date    Anxiety     Anxiety disorder     Arthralgia     Arthritis     Asthma     Basal cell carcinoma     Breast cancer (HCC)     Right    CAD (coronary artery disease)     Chronic lumbar radiculopathy     last assessed: 12/20/16    Chronic respiratory failure (HCC)     Colon cancer screening 04/11/2019    Last colonoscopy 2014-15    Depression     Dysfunction of eustachian tube     Dyslipidemia     Emphysema lung (HCC)     Fatigue     High cholesterol     HTN (hypertension) 10/29/2014    Hypercholesterolemia     Hypertension     Ileus of unspecified type (HCC)     Irritable bowel syndrome with diarrhea 04/11/2019    Lumbosacral stenosis     last assessed: 9/20/16    TRAE (obstructive sleep apnea)     Pancreatitis     resolved: 9/20/17    Pneumonia     Post-operative nausea and vomiting     PTSD (post-traumatic stress disorder)      Past Surgical History:  Past Surgical History:   Procedure Laterality Date    BREAST BIOPSY Right 09/12/2022    ILC    BREAST LUMPECTOMY Right 10/21/2022    Procedure: LUMPECTOMY BREAST FLORENCIO;  Surgeon: Inés Singh MD;  Location: [facility];  Service: Surgical Oncology    CHOLECYSTECTOMY      EGD  04/15/2019    GALLBLADDER SURGERY      HYSTERECTOMY      pt thinks she still has one ovary, done over 20 yrs ago    LUMBAR SPINE SURGERY  12/2016    3, 4, 5    LYMPH NODE BIOPSY Right 10/21/2022    Procedure: SLN BX, lymphoscintigraphy;  Surgeon: Inés Singh MD;  Location: [facility];  Service: Surgical Oncology    MOUTH SURGERY      MULTIPLE TOOTH EXTRACTIONS N/A 2018    OOPHORECTOMY Bilateral     1 1/2 ovaries removed    TONSILLECTOMY      US BREAST CLIP NEEDLE LOC RIGHT Right 09/12/2022    US GUIDED BREAST BIOPSY RIGHT COMPLETE Right 09/12/2022     Family History:  Family History   Problem Relation Age of Onset    Breast cancer Mother 60    Depression Mother         panic attacks    Lung cancer  Mother     Mental illness Mother     Substance Abuse Mother         CIGS    Coronary artery disease Father         high # of paternal family members  in their 50s    Substance Abuse Father         CIGS    Depression Sister         panic attacks    Hypertension Sister     Breast cancer Daughter 50        CHEK2 positve    Breast cancer Maternal Aunt 40    Lung cancer Maternal Aunt     Ovarian cancer Maternal Grandmother 70    Depression Other         panic attacks    Colon cancer Other         age at dx unk    Heart disease Family     Hypertension Family     Colon polyps Neg Hx      Social History:  Social History     Substance and Sexual Activity   Alcohol Use Yes    Alcohol/week: 4.0 standard drinks of alcohol    Types: 4 Glasses of wine per week    Comment: 4 drinks every night     Social History     Substance and Sexual Activity   Drug Use Yes    Frequency: 7.0 times per week    Types: Marijuana    Comment: uses topically daily- last used 10/20     Social History     Tobacco Use   Smoking Status Never   Smokeless Tobacco Never           ROS:  General: Per HPI  Skin: Negative, except if noted below  HEENT: Negative  Respiratory: Negative  Cardiovascular: Negative  Gastrointestinal: Negative  Urinary: Negative  Vascular: Negative  Musculoskeletal: Positive per HPI   Neurologic: Positive per HPI  Endocrine: Negative    Objective:  BP Readings from Last 1 Encounters:   10/11/24 140/80      Wt Readings from Last 1 Encounters:   10/11/24 74.4 kg (164 lb)        Respiratory:   non-labored respirations    Lymphatics:  no palpable lymph nodes    Gait and Station:   antalgic    Neurologic:   Alert and oriented times x3  Patient with normal sensation except as noted below  Deep tendon reflexes 2+ except as noted in MSK exam    Left Hip     Inspection: intact     Range of Motion: no anterior/groin pain with passive motion of hip    -Trendelenburg sign    + TTP trochanteric bursa and IT band    Motor: 5/5 Q/HS/TA/GS/P     "Pulses: 2+ DP / 2+ PT    SILT DP/SP/S/S/TN      Imaging:  My interpretation XR AP pelvis/ left  hip: mild joint space narrowing, subchondral sclerosis, subchondral cysts, osteophyte formation. No fracture or dislocation.     BMI:   Estimated body mass index is 29.05 kg/m² as calculated from the following:    Height as of this encounter: 5' 3\" (1.6 m).    Weight as of this encounter: 74.4 kg (164 lb).  BSA:   Estimated body surface area is 1.78 meters squared as calculated from the following:    Height as of this encounter: 5' 3\" (1.6 m).    Weight as of this encounter: 74.4 kg (164 lb).           Scribe Attestation      I,:  Olu Smith am acting as a scribe while in the presence of the attending physician.:       I,:  Grey Pace, DO personally performed the services described in this documentation    as scribed in my presence.:            "

## 2024-10-28 ENCOUNTER — EVALUATION (OUTPATIENT)
Dept: PHYSICAL THERAPY | Facility: CLINIC | Age: 79
End: 2024-10-28
Payer: MEDICARE

## 2024-10-28 DIAGNOSIS — M76.32 ILIOTIBIAL BAND SYNDROME OF LEFT SIDE: ICD-10-CM

## 2024-10-28 PROCEDURE — 97161 PT EVAL LOW COMPLEX 20 MIN: CPT | Performed by: PHYSICAL THERAPIST

## 2024-10-28 PROCEDURE — 97140 MANUAL THERAPY 1/> REGIONS: CPT | Performed by: PHYSICAL THERAPIST

## 2024-10-28 PROCEDURE — 97110 THERAPEUTIC EXERCISES: CPT | Performed by: PHYSICAL THERAPIST

## 2024-10-28 NOTE — PROGRESS NOTES
PT Evaluation     Today's date: 10/28/2024  Patient name: Siobhan Raymond  : 1945  MRN: [id number]  Referring provider: Grey Pace, *  Dx:   Encounter Diagnosis     ICD-10-CM    1. Iliotibial band syndrome of left side  M76.32 Ambulatory Referral to Physical Therapy                     Assessment  Impairments: abnormal or restricted ROM, activity intolerance, impaired physical strength and pain with function  Symptom irritability: moderate    Assessment details: Siobhan Raymond is a 79 y.o. female presenting to outpatient physical therapy at [facility] with complaints of L hip, lateral thigh & knee pain, stiffness and weakness.  She presents with decreased range of motion, decreased strength, limited flexibility, poor postural awareness, poor body mechanics, altered gait pattern, poor balance, decreased tolerance to activity and decreased functional mobility due to Iliotibial band syndrome of left side.  She would benefit from skilled PT services in order to address these deficits and reach maximum level of function.  Thank you for the referral!    Understanding of Dx/Px/POC: good     Prognosis: good    Goals  STG  1. Independent with HEP in 4 weeks  2. Decrease pain at worst by 50% in 4 weeks    LTG  1. Increase LLE strength in all planes to 5/5 in 8 weeks  2. Return to full, pain-free and unrestricted standing, walking and sleeping in 8 weeks          Subjective Evaluation    History of Present Illness  Mechanism of injury: Pt presents with chronic histoyr L hip, lateral thigh & knee pain with insidious onset. Pain is worse with laying down.   Patient Goals  Patient goals for therapy: decreased pain and increased strength  Patient goal: lay down and sleep with no pain    Diagnostic Tests  X-ray: abnormal (Moderate degenerative change of the left hip as above with marginal osteophytes and partial joint space narrowing.  Extensive degenerative changes and fusion of the lower lumbar spine  "Degenerative change of the pubic symphysis.)      Objective     Palpation   Left   Hypertonic in the TFL.   Tenderness of the gluteus domenic.   Trigger point to gluteus domenic and TFL.     Active Range of Motion   Left Hip   Flexion: WFL and with pain  Extension: WFL and with pain  Abduction: WFL and with pain  Adduction: WFL and with pain  External rotation (90/90): WFL and with pain  Internal rotation (90/90): WFL and with pain    Right Hip   Normal active range of motion    Strength/Myotome Testing     Left Hip   Planes of Motion   Flexion: 4  Extension: 4  Abduction: 4  Adduction: 4    Right Hip   Normal muscle strength    Tests     Left Hip   Positive Ely's, AWILDA, Berta and piriformis.   Negative FADIR.           Daily Treatment Diary       HEP ACCESS CODE: [id number]     FOTO Completed On: 10/28/2024    POC Expires Reeval for Medicare to be completed  Unit LImit Auth Expiration Date PT/OT/STVisit Limit   12/28/2024 By visit 10 na na na    Completed on visit                    Auth Status DATE 10/28        NA Visit # 1         Remaining         MANUAL THERAPY         STM & TrP L quadriceps & glut NS        L quad stretch         L piriformis stretch         L hamstring stretch                           THERAPEUTIC EXERCISE HEP         LTR 10/28 10x10\"        HS stretch          Quad stretch          SL step up          Mini squat                                                                                                              NEUROMUSCULAR REEDUCATION           L Clam R sidelying 10/28 15x5\"        SLR 4 way          Bridge          Standing hip ext & abd          SLS          Lat res walk                                                                                          THERAPEUTIC ACTIVITY          Patient education: pathoanatomy, nature of sxs, POC, HEP  NS                                      GAIT TRAINING                                                  MODALITIES         Heat on L hip PRN "

## 2024-11-01 DIAGNOSIS — M25.59 PAIN IN OTHER JOINT: ICD-10-CM

## 2024-11-01 RX ORDER — DULOXETIN HYDROCHLORIDE 30 MG/1
30 CAPSULE, DELAYED RELEASE ORAL 2 TIMES DAILY
Qty: 180 CAPSULE | Refills: 1 | Status: SHIPPED | OUTPATIENT
Start: 2024-11-01

## 2024-11-05 ENCOUNTER — OFFICE VISIT (OUTPATIENT)
Dept: PHYSICAL THERAPY | Facility: CLINIC | Age: 79
End: 2024-11-05
Payer: MEDICARE

## 2024-11-05 DIAGNOSIS — M76.32 ILIOTIBIAL BAND SYNDROME OF LEFT SIDE: Primary | ICD-10-CM

## 2024-11-05 PROCEDURE — 97112 NEUROMUSCULAR REEDUCATION: CPT

## 2024-11-05 PROCEDURE — 97110 THERAPEUTIC EXERCISES: CPT

## 2024-11-05 PROCEDURE — 97140 MANUAL THERAPY 1/> REGIONS: CPT

## 2024-11-05 NOTE — PROGRESS NOTES
"Daily Note     Today's date: 2024  Patient name: Siobhan Raymond  : 1945  MRN: [id number]  Referring provider: Grey Pace, *  Dx:   Encounter Diagnosis     ICD-10-CM    1. Iliotibial band syndrome of left side  M76.32                      Subjective: Pt reports feeling ok psot LV and went home to take nap.  Pt c/o L knee pain just happening lately.      Objective: See treatment diary below      Assessment: Tolerated treatment fair. Patient demonstrated fatigue post treatment and would benefit from continued PT for cont'd PT for LE strengthening.  Pt tends to limit hip ext w/ gait.  Pt very unsteady w/ gait reaching for furniture to help balance.  Pt uses the cane in her R stating that is her weaker LE and falls that direction.  Pt may or may not benefit from changing cane to L UE.      Plan: Continue per plan of care.  Progress treatment as tolerated.       Daily Treatment Diary       HEP ACCESS CODE: [id number]     FOTO Completed On: 10/28/2024    POC Expires Reeval for Medicare to be completed  Unit LImit Auth Expiration Date PT/OT/STVisit Limit   2024 By visit 10 na na na    Completed on visit                    Auth Status DATE 10/28 11/5       NA Visit # 1 2        Remaining         MANUAL THERAPY         STM & TrP L quadriceps & glut NS JK       L quad stretch  JK/ psoas       L piriformis stretch         L hamstring stretch                           THERAPEUTIC EXERCISE HEP         LTR 10/28 10x10\"        HS stretch   Seated 10\"x5       Quad stretch          SL step up          Mini squat          ER fig 4 stretch   10x10\"                                                                                                 NEUROMUSCULAR REEDUCATION           L Clam R sidelying 10/28 15x5\"        SLR 4 way          Bridge          Standing hip ext & abd   10x ea BL       SLS   2 laps       Lat res walk                                                                                        "   THERAPEUTIC ACTIVITY          Patient education: pathoanatomy, nature of sxs, POC, HEP  NS        Bike JAIME   4'                           GAIT TRAINING                                                  MODALITIES         Heat on L hip PRN

## 2024-11-07 ENCOUNTER — APPOINTMENT (OUTPATIENT)
Dept: PHYSICAL THERAPY | Facility: CLINIC | Age: 79
End: 2024-11-07
Payer: MEDICARE

## 2024-11-12 ENCOUNTER — TELEPHONE (OUTPATIENT)
Dept: HEMATOLOGY ONCOLOGY | Facility: CLINIC | Age: 79
End: 2024-11-12

## 2024-11-12 ENCOUNTER — TELEPHONE (OUTPATIENT)
Age: 79
End: 2024-11-12

## 2024-11-12 ENCOUNTER — APPOINTMENT (OUTPATIENT)
Dept: PHYSICAL THERAPY | Facility: CLINIC | Age: 79
End: 2024-11-12
Payer: MEDICARE

## 2024-11-12 NOTE — TELEPHONE ENCOUNTER
Patient called to confirm if she will still needs to get her Dexa scan and blood work prior to her 11/25 appointment with Dr Boyle, being that she has stopped taking the Anastrozole.     She is asking for a return call with recommendations, so she knows if to cancel the scan or leave as scheduled.

## 2024-11-12 NOTE — TELEPHONE ENCOUNTER
Returned patient's call as to whether she needed to get her DEXA scan. Stated in voicemail Dr. Boyle still recommends getting scan. Provided call back number if she had any questions.

## 2024-11-13 ENCOUNTER — APPOINTMENT (OUTPATIENT)
Dept: LAB | Facility: CLINIC | Age: 79
End: 2024-11-13
Payer: MEDICARE

## 2024-11-13 DIAGNOSIS — I10 ESSENTIAL HYPERTENSION: ICD-10-CM

## 2024-11-13 DIAGNOSIS — C50.411 MALIGNANT NEOPLASM OF UPPER-OUTER QUADRANT OF RIGHT BREAST IN FEMALE, ESTROGEN RECEPTOR POSITIVE (HCC): ICD-10-CM

## 2024-11-13 DIAGNOSIS — Z17.0 MALIGNANT NEOPLASM OF UPPER-OUTER QUADRANT OF RIGHT BREAST IN FEMALE, ESTROGEN RECEPTOR POSITIVE (HCC): ICD-10-CM

## 2024-11-13 LAB
ALBUMIN SERPL BCG-MCNC: 4.2 G/DL (ref 3.5–5)
ALP SERPL-CCNC: 55 U/L (ref 34–104)
ALT SERPL W P-5'-P-CCNC: 24 U/L (ref 7–52)
ANION GAP SERPL CALCULATED.3IONS-SCNC: 8 MMOL/L (ref 4–13)
AST SERPL W P-5'-P-CCNC: 25 U/L (ref 13–39)
BASOPHILS # BLD AUTO: 0.04 THOUSANDS/ÂΜL (ref 0–0.1)
BASOPHILS NFR BLD AUTO: 1 % (ref 0–1)
BILIRUB SERPL-MCNC: 0.88 MG/DL (ref 0.2–1)
BUN SERPL-MCNC: 17 MG/DL (ref 5–25)
CALCIUM SERPL-MCNC: 9.6 MG/DL (ref 8.4–10.2)
CHLORIDE SERPL-SCNC: 105 MMOL/L (ref 96–108)
CHOLEST SERPL-MCNC: 206 MG/DL (ref ?–200)
CO2 SERPL-SCNC: 27 MMOL/L (ref 21–32)
CREAT SERPL-MCNC: 0.66 MG/DL (ref 0.6–1.3)
EOSINOPHIL # BLD AUTO: 0.14 THOUSAND/ÂΜL (ref 0–0.61)
EOSINOPHIL NFR BLD AUTO: 3 % (ref 0–6)
ERYTHROCYTE [DISTWIDTH] IN BLOOD BY AUTOMATED COUNT: 12.3 % (ref 11.6–15.1)
GFR SERPL CREATININE-BSD FRML MDRD: 84 ML/MIN/1.73SQ M
GLUCOSE P FAST SERPL-MCNC: 112 MG/DL (ref 65–99)
HCT VFR BLD AUTO: 42.1 % (ref 34.8–46.1)
HDLC SERPL-MCNC: 57 MG/DL
HGB BLD-MCNC: 14 G/DL (ref 11.5–15.4)
IMM GRANULOCYTES # BLD AUTO: 0.02 THOUSAND/UL (ref 0–0.2)
IMM GRANULOCYTES NFR BLD AUTO: 1 % (ref 0–2)
LDLC SERPL CALC-MCNC: 109 MG/DL (ref 0–100)
LYMPHOCYTES # BLD AUTO: 1.77 THOUSANDS/ÂΜL (ref 0.6–4.47)
LYMPHOCYTES NFR BLD AUTO: 42 % (ref 14–44)
MCH RBC QN AUTO: 31.3 PG (ref 26.8–34.3)
MCHC RBC AUTO-ENTMCNC: 33.3 G/DL (ref 31.4–37.4)
MCV RBC AUTO: 94 FL (ref 82–98)
MONOCYTES # BLD AUTO: 0.26 THOUSAND/ÂΜL (ref 0.17–1.22)
MONOCYTES NFR BLD AUTO: 6 % (ref 4–12)
NEUTROPHILS # BLD AUTO: 1.97 THOUSANDS/ÂΜL (ref 1.85–7.62)
NEUTS SEG NFR BLD AUTO: 47 % (ref 43–75)
NRBC BLD AUTO-RTO: 0 /100 WBCS
PLATELET # BLD AUTO: 221 THOUSANDS/UL (ref 149–390)
PMV BLD AUTO: 11 FL (ref 8.9–12.7)
POTASSIUM SERPL-SCNC: 4.3 MMOL/L (ref 3.5–5.3)
PROT SERPL-MCNC: 6.7 G/DL (ref 6.4–8.4)
RBC # BLD AUTO: 4.48 MILLION/UL (ref 3.81–5.12)
SODIUM SERPL-SCNC: 140 MMOL/L (ref 135–147)
TRIGL SERPL-MCNC: 198 MG/DL (ref ?–150)
WBC # BLD AUTO: 4.2 THOUSAND/UL (ref 4.31–10.16)

## 2024-11-13 PROCEDURE — 85025 COMPLETE CBC W/AUTO DIFF WBC: CPT

## 2024-11-13 PROCEDURE — 80053 COMPREHEN METABOLIC PANEL: CPT

## 2024-11-13 PROCEDURE — 80061 LIPID PANEL: CPT

## 2024-11-13 PROCEDURE — 36415 COLL VENOUS BLD VENIPUNCTURE: CPT

## 2024-11-14 ENCOUNTER — RESULTS FOLLOW-UP (OUTPATIENT)
Dept: FAMILY MEDICINE CLINIC | Facility: HOSPITAL | Age: 79
End: 2024-11-14

## 2024-11-14 ENCOUNTER — OFFICE VISIT (OUTPATIENT)
Dept: PHYSICAL THERAPY | Facility: CLINIC | Age: 79
End: 2024-11-14
Payer: MEDICARE

## 2024-11-14 DIAGNOSIS — M76.32 ILIOTIBIAL BAND SYNDROME OF LEFT SIDE: Primary | ICD-10-CM

## 2024-11-14 PROCEDURE — 97112 NEUROMUSCULAR REEDUCATION: CPT

## 2024-11-14 PROCEDURE — 97140 MANUAL THERAPY 1/> REGIONS: CPT

## 2024-11-14 PROCEDURE — 97110 THERAPEUTIC EXERCISES: CPT

## 2024-11-14 NOTE — PROGRESS NOTES
"Daily Note     Today's date: 2024  Patient name: Siobhan Raymond  : 1945  MRN: [id number]  Referring provider: Grey Pace, *  Dx:   Encounter Diagnosis     ICD-10-CM    1. Iliotibial band syndrome of left side  M76.32                      Subjective: Pt reports her marijuana card  and she has not gotten the new one.  Reports due to no meds her knees are bothering her.  C/o IBS this AM.        Objective: See treatment diary below      Assessment: Tolerated treatment fair. Patient demonstrated fatigue post treatment and would benefit from continued PT for cotn'd work on LE strengthening and gait training.  Pt slightly less unsteady.  Pt cont's to reach for furniture.      Plan: Continue per plan of care.  Progress treatment as tolerated.       Daily Treatment Diary       HEP ACCESS CODE: [id number]     FOTO Completed On: 10/28/2024    POC Expires Reeval for Medicare to be completed  Unit LImit Auth Expiration Date PT/OT/STVisit Limit   2024 By visit 10 na na na    Completed on visit                    Auth Status DATE 10/28 11/5 11/14      NA Visit # 1 2 3       Remaining         MANUAL THERAPY         STM & TrP L quadriceps & glut NS JK JK      L quad stretch  JK/ psoas JK      L piriformis stretch         L hamstring stretch                           THERAPEUTIC EXERCISE HEP         LTR 10/28 10x10\"  10x10\"      HS stretch   Seated 10\"x5 Seated 10\"x10      Quad stretch          SL step up          Mini squat    x10      ER fig 4 stretch   10x10\" np                                                                                                NEUROMUSCULAR REEDUCATION           L Clam R sidelying 10/28 15x5\"  HL OTB 20x3\"      SLR 4 way    Flex       Bridge    15x3\"      Standing hip ext & abd   10x ea BL 10x ea BL      SLS   2 laps 2 laps      Lat res walk          SL  on R L hip abd    10x2                                                                            THERAPEUTIC " ACTIVITY          Patient education: pathoanatomy, nature of sxs, POC, HEP  NS        Bike JAIME   4' 4'                          GAIT TRAINING                                                  MODALITIES         Heat on L hip PRN

## 2024-11-18 ENCOUNTER — HOSPITAL ENCOUNTER (OUTPATIENT)
Dept: BONE DENSITY | Facility: CLINIC | Age: 79
Discharge: HOME | End: 2024-11-18
Payer: MEDICARE

## 2024-11-18 DIAGNOSIS — Z78.0 ASYMPTOMATIC MENOPAUSE: ICD-10-CM

## 2024-11-18 PROCEDURE — 77080 DXA BONE DENSITY AXIAL: CPT

## 2024-11-19 ENCOUNTER — OFFICE VISIT (OUTPATIENT)
Dept: PHYSICAL THERAPY | Facility: CLINIC | Age: 79
End: 2024-11-19
Payer: MEDICARE

## 2024-11-19 DIAGNOSIS — M76.32 ILIOTIBIAL BAND SYNDROME OF LEFT SIDE: Primary | ICD-10-CM

## 2024-11-19 PROCEDURE — 97110 THERAPEUTIC EXERCISES: CPT

## 2024-11-19 PROCEDURE — 97140 MANUAL THERAPY 1/> REGIONS: CPT

## 2024-11-19 PROCEDURE — 97112 NEUROMUSCULAR REEDUCATION: CPT

## 2024-11-19 NOTE — PROGRESS NOTES
"Daily Note     Today's date: 2024  Patient name: Siobhan Raymond  : 1945  MRN: [id number]  Referring provider: Grey Pace, *  Dx:   Encounter Diagnosis     ICD-10-CM    1. Iliotibial band syndrome of left side  M76.32                      Subjective: Pt reports having an episode of trigeminal neuralgia.   Reports having some LBP from gardening yesterday.  Reports due to all other issues she has not noticed any hip pain.  Pt reports she tries to do the ex's on a yoga mat on the floor but can't get up.      Objective: See treatment diary below      Assessment: Tolerated treatment fair.   Pt w/ LOB x2-3 w/ MIN A to correct or self by grabbing furniture.  Patient demonstrated fatigue post treatment and would benefit from continued PT for cont'd work on LE strengthening.  Pt seems to have no fear when LOB occurs.      Plan: Continue per plan of care.  Progress treatment as tolerated.       Daily Treatment Diary       HEP ACCESS CODE: [id number]     FOTO Completed On: 10/28/2024    POC Expires Reeval for Medicare to be completed  Unit LImit Auth Expiration Date PT/OT/STVisit Limit   2024 By visit 10 na na na    Completed on visit                    Auth Status DATE 10/28 11/5 11/14 11/19     NA Visit # 1 2 3 4      Remaining         MANUAL THERAPY         STM & TrP L quadriceps & glut NS JK JK JK     L quad stretch  JK/ psoas JK      L piriformis stretch         L hamstring stretch    JK                       THERAPEUTIC EXERCISE HEP         LTR 10/28 10x10\"  10x10\"      HS stretch   Seated 10\"x5 Seated 10\"x10      Quad stretch          SL step up          Mini squat    x10 x10     ER fig 4 stretch   10x10\" np                                                                                                NEUROMUSCULAR REEDUCATION           L Clam R sidelying 10/28 15x5\"  HL OTB 20x3\" HL OTB 20x3\"     SLR 4 way    Flex  Felx 10x2     Bridge    15x3\" 15x3\"     Standing hip ext & abd   10x ea BL " 10x ea BL 10x2 ea BL     SLS          Lat res walk   2 laps 2 laps 3 laps     SL  on R L hip abd    10x2 nv                                                                           THERAPEUTIC ACTIVITY          Patient education: pathoanatomy, nature of sxs, POC, HEP  NS        Bike SANDOVAL   4' 4' 4'                         GAIT TRAINING                                                  MODALITIES         Heat on L hip PRN

## 2024-11-21 ENCOUNTER — OFFICE VISIT (OUTPATIENT)
Dept: PHYSICAL THERAPY | Facility: CLINIC | Age: 79
End: 2024-11-21
Payer: MEDICARE

## 2024-11-21 DIAGNOSIS — M76.32 ILIOTIBIAL BAND SYNDROME OF LEFT SIDE: Primary | ICD-10-CM

## 2024-11-21 PROCEDURE — 97140 MANUAL THERAPY 1/> REGIONS: CPT | Performed by: PHYSICAL THERAPIST

## 2024-11-21 PROCEDURE — 97110 THERAPEUTIC EXERCISES: CPT | Performed by: PHYSICAL THERAPIST

## 2024-11-21 NOTE — PROGRESS NOTES
"Daily Note     Today's date: 2024  Patient name: Siobhan Raymond  : 1945  MRN: [id number]  Referring provider: Grey Pace, *  Dx:   Encounter Diagnosis     ICD-10-CM    1. Iliotibial band syndrome of left side  M76.32                      Subjective: Compliant with HEP, no questions regarding POC, motivated to continue PT      Objective: See treatment diary below      Assessment: Tolerated treatment fair w/cont TrP in L quad laterally, pt reports improved sxs following crossed LTRs.  Patient demonstrated fatigue post treatment and would benefit from continued PT for cont'd work on LE strengthening.        Plan: Continue per plan of care.  Progress treatment as tolerated.       Daily Treatment Diary       HEP ACCESS CODE: [id number]     FOTO Completed On: 10/28/2024    POC Expires Reeval for Medicare to be completed  Unit LImit Auth Expiration Date PT/OT/STVisit Limit   2024 By visit 10 na na na    Completed on visit                    Auth Status DATE 10/28 11/5 11/14 11/19 11/21    NA Visit # 1 2 3 4 5     Remaining         MANUAL THERAPY         STM & TrP L quadriceps & glut NS JK JK JK NS    L quad stretch  JK/ psoas JK      L piriformis stretch     NS    L hamstring stretch    JK NS                      THERAPEUTIC EXERCISE HEP         LTR 10/28 10x10\"  10x10\"      HS stretch   Seated 10\"x5 Seated 10\"x10      Quad stretch          SL step up          Mini squat    x10 x10     ER fig 4 stretch   10x10\" np      Crossed LTR      10x10\" w/L knee ext (L over R)    Supine piriformis stretch      10x10\" LLE                                                                          NEUROMUSCULAR REEDUCATION           L Clam R sidelying 10/28 15x5\"  HL OTB 20x3\" HL OTB 20x3\" HL OTB 20x3\"    SLR 4 way    Flex  Felx 10x2     Bridge    15x3\" 15x3\"     Standing hip ext & abd   10x ea BL 10x ea BL 10x2 ea BL     SLS          Lat res walk   2 laps 2 laps 3 laps     SL  on R L hip abd    10x2 nv   " "  Glut iso      10x10\"                                                                THERAPEUTIC ACTIVITY          Patient education: pathoanatomy, nature of sxs, POC, HEP  NS    NS    Bike SANDOVAL   4' 4' 4' 6' L1                        GAIT TRAINING                                                  MODALITIES         Heat on L hip PRN                                   "

## 2024-11-25 ENCOUNTER — OFFICE VISIT (OUTPATIENT)
Age: 79
End: 2024-11-25
Payer: MEDICARE

## 2024-11-25 VITALS
OXYGEN SATURATION: 94 % | RESPIRATION RATE: 16 BRPM | DIASTOLIC BLOOD PRESSURE: 78 MMHG | HEIGHT: 63 IN | TEMPERATURE: 97.5 F | BODY MASS INDEX: 29.77 KG/M2 | WEIGHT: 168 LBS | HEART RATE: 77 BPM | SYSTOLIC BLOOD PRESSURE: 142 MMHG

## 2024-11-25 DIAGNOSIS — C50.411 MALIGNANT NEOPLASM OF UPPER-OUTER QUADRANT OF RIGHT BREAST IN FEMALE, ESTROGEN RECEPTOR POSITIVE (HCC): Primary | ICD-10-CM

## 2024-11-25 DIAGNOSIS — Z17.0 MALIGNANT NEOPLASM OF UPPER-OUTER QUADRANT OF RIGHT BREAST IN FEMALE, ESTROGEN RECEPTOR POSITIVE (HCC): Primary | ICD-10-CM

## 2024-11-25 PROCEDURE — G2211 COMPLEX E/M VISIT ADD ON: HCPCS | Performed by: INTERNAL MEDICINE

## 2024-11-25 PROCEDURE — 99213 OFFICE O/P EST LOW 20 MIN: CPT | Performed by: INTERNAL MEDICINE

## 2024-11-26 ENCOUNTER — OFFICE VISIT (OUTPATIENT)
Dept: PHYSICAL THERAPY | Facility: CLINIC | Age: 79
End: 2024-11-26
Payer: MEDICARE

## 2024-11-26 DIAGNOSIS — M76.32 ILIOTIBIAL BAND SYNDROME OF LEFT SIDE: Primary | ICD-10-CM

## 2024-11-26 PROCEDURE — 97110 THERAPEUTIC EXERCISES: CPT

## 2024-11-26 PROCEDURE — 97140 MANUAL THERAPY 1/> REGIONS: CPT

## 2024-11-26 PROCEDURE — 97112 NEUROMUSCULAR REEDUCATION: CPT

## 2024-11-26 NOTE — PROGRESS NOTES
"Daily Note     Today's date: 2024  Patient name: Siobhan Raymond  : 1945  MRN: [id number]  Referring provider: Grey Pace, *  Dx:   Encounter Diagnosis     ICD-10-CM    1. Iliotibial band syndrome of left side  M76.32                      Subjective: Patient reports that she is doing well today, no new complaints at this time.       Objective: See treatment diary below      Assessment: Tolerated treatment well. Patient demonstrated fatigue post treatment      Plan: Continue per plan of care.      Daily Treatment Diary       HEP ACCESS CODE: [id number]     FOTO Completed On: 10/28/2024    POC Expires Reeval for Medicare to be completed  Unit LImit Auth Expiration Date PT/OT/STVisit Limit   2024 By visit 10 na na na    Completed on visit                    Auth Status DATE 10/28 11/5 11/14 11/19 11/21 11/26   NA Visit # 1 2 3 4 5 6    Remaining         MANUAL THERAPY         STM & TrP L quadriceps & glut NS JK JK JK NS HA   L quad stretch  JK/ psoas JK      L piriformis stretch     NS HA   L hamstring stretch    JK NS [place]                     THERAPEUTIC EXERCISE HEP         LTR 10/28 10x10\"  10x10\"      HS stretch   Seated 10\"x5 Seated 10\"x10      Quad stretch          SL step up          Mini squat    x10 x10     ER fig 4 stretch   10x10\" np      Crossed LTR      10x10\" w/L knee ext (L over R)    Supine piriformis stretch      10x10\" LLE 10x10\" LLE                                                                         NEUROMUSCULAR REEDUCATION           L Clam R sidelying 10/28 15x5\"  HL OTB 20x3\" HL OTB 20x3\" HL OTB 20x3\" HL OTB 20x3\"   SLR 4 way    Flex  Felx 10x2  Flex 2x10   Bridge    15x3\" 15x3\"  15x3\"   Standing hip ext & abd   10x ea BL 10x ea BL 10x2 ea BL  2x10 ea B/L   SLS          Lat res walk   2 laps 2 laps 3 laps     SL  on R L hip abd    10x2 nv     Glut iso      10x10\"    Glut kicks w/ knee bent       2x10                                                     THERAPEUTIC " ACTIVITY          Patient education: pathoanatomy, nature of sxs, POC, HEP  NS    NS    Bike SANDOVAL   4' 4' 4' 6' L1 6' L1                       GAIT TRAINING                                                  MODALITIES         Heat on L hip PRN

## 2024-11-27 NOTE — PROGRESS NOTES
Name: Siobhan Raymond      : 1945      MRN: [id number]  Encounter Provider: Anayeli Boyle DO  Encounter Date: 2024   Encounter department: [facility]     Cancer Staging   Malignant neoplasm of upper-outer quadrant of right breast in female, estrogen receptor positive (HCC)  Staging form: Breast, AJCC 8th Edition  - Clinical stage from 2022: Stage IA (cT1c, cN0, cM0, G2, ER+, CO+, HER2-) - Signed by Inés Singh MD on 2022  - Pathologic stage from 2022: Stage IA (pT1c, pN1a, cM0, G2, ER+, CO+, HER2-) - Signed by Inés Singh MD on 2022  :  Assessment & Plan  Malignant neoplasm of upper-outer quadrant of right breast in female, estrogen receptor positive (HCC)    Orders:    CBC and differential; Future    Comprehensive metabolic panel; Future    79-year-old female with a pT1c N1a ER positive breast cancer with 2 positive lymph nodes.  She declines any further adjuvant endocrine therapy attempts.  She values quality of life over quantity.  She will still see us in this office once a year to monitor for any signs of disease relapse.  She will see my nurse practitioner in 1 year with a CBC and CMP prior.  Her next mammogram is scheduled.  She knows to call in the interim with any questions or concerns.    History of Present Illness     Reason for Visit / CC: Breast cancer   HPI  Siobhan Raymond is a 79 y.o. female with a history of a pT1c N1a ER positive breast cancer.  She had 2 positive lymph nodes.  She had a DEXA scan prior to this visit is normal.  Her next mammogram is scheduled.  Since our last visit she got diagnosed with trigeminal neuralgia and will be working with neurology for this.  She had 2 falls in July.  She has stopped her letrozole in March and did not inform me of this.  She stated that it caused too much cognitive dysfunction and she values quality of life.  Still working with physical therapy for some  problems with her left IT band.     Oncology History   Oncology History Overview Note   10/2022 - right partial mastectomy for invasive lobular with ductal features, ER 90-95% KS 90-95% Her2 1+ Ki 67 15%, grade 2 - pT1c(18mm)N1a(2/9)M0     12/2022 - adjuvant RT     2/2023 - start anastrozole - stop after a short time due to blurry vision    3/2023 - 8/2023 - letrozole - stopped due to joint pain     10/2023 - start exemestane    2/2024 - switch back to letrozole - patient stopped in march 2024 due to cognitive dysfunction     Malignant neoplasm of upper-outer quadrant of right breast in female, estrogen receptor positive (HCC)   9/12/2022 Biopsy    Right breast biopsy:  - Invasive lobular carcinoma   -Grade 2  ER 90%  KS 90%  HER2 negative     9/29/2022 -  Cancer Staged    Staging form: Breast, AJCC 8th Edition  - Clinical stage from 9/29/2022: Stage IA (cT1c, cN0, cM0, G2, ER+, KS+, HER2-) - Signed by Inés Singh MD on 9/29/2022  Stage prefix: Initial diagnosis  Method of lymph node assessment: Clinical  Histologic grading system: 3 grade system       10/21/2022 Surgery    Right lumpectomy with sentinel lymph node biopsy:   - Clear margins  - 2/4 lymph nodes  Dr. Singh     10/2022 Genetic Testing    [employer] Breast Cancer STAT Panel (9 genes): SINDI, BRCA1, BRCA2, CDH1, CHEK2, PALB2, PTEN, STK11, and TP53 with reflex to [employer] Core Cancer Panel (27 additional genes): APC, AXIN2, BARD1, BRIP1, BMPR1A, CDK4, CDKN2A, DICER1, EPCAM, GREM1, HOXB13, MLH1, MSH2, MSH3, MSH6, MUTYH, NBN, NF1, NTHL1, PMS2, POLD1, POLE, RAD51C, RAD51D, RECQL SMAD4, SMARCA4    Negative     11/7/2022 -  Cancer Staged    Staging form: Breast, AJCC 8th Edition  - Pathologic stage from 11/7/2022: Stage IA (pT1c, pN1a, cM0, G2, ER+, KS+, HER2-) - Signed by Inés Singh MD on 11/7/2022  Stage prefix: Initial diagnosis  Histologic grading system: 3 grade system       12/12/2022 - 1/24/2023 Radiation    Plan ID Energy Fractions Dose per Fraction  "(cGy) Dose Correction (cGy) Total Dose Delivered (cGy) Elapsed Days   R Boost 6X 4 / 4 250 0 1,000 5   R Breast 10X/6X 25 / 25 200 0 5,000 37   R Sclav_Ax # 10X 25 / 25 200 0 5,000 37   Dr. Solano     2/2023 -  Hormone Therapy    Anastrozole, switched to Letrozole in March of 2023  Dr. 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        Objective   /78 (BP Location: Left arm, Patient Position: Sitting, Cuff Size: Adult)   Pulse 77   Temp 97.5 °F (36.4 °C) (Temporal)   Resp 16   Ht 5' 3\" (1.6 m)   Wt 76.2 kg (168 lb)   LMP  (LMP Unknown)   SpO2 94%   BMI 29.76 kg/m²     Blood pressure 142/78, pulse 77, temperature 97.5 °F (36.4 °C), temperature source Temporal, resp. rate 16, height 5' 3\" (1.6 m), weight 76.2 kg (168 lb), SpO2 94%.  ECOG    Physical Exam      GEN: Alert, awake oriented x3, in no acute distress  HEENT- No pallor, icterus, cyanosis, no oral mucosal lesions,   LAD - no palpable cervical, clavicle, axillary, inguinal LAD  Heart- normal S1 S2, regular rate and rhythm, No murmur, rubs.   Lungs- clear breathing sound bilateral.   Abdomen- soft, Non tender, bowel sounds present  Extremities- No cyanosis, clubbing, edema  Neuro- No focal neurological deficit      Labs: All pertinent labs and imaging reviewed.    "

## 2024-11-29 DIAGNOSIS — F41.9 ANXIETY HYPERVENTILATION: ICD-10-CM

## 2024-11-29 DIAGNOSIS — F45.8 ANXIETY HYPERVENTILATION: ICD-10-CM

## 2024-11-29 RX ORDER — CLONAZEPAM 0.5 MG/1
0.5 TABLET ORAL 2 TIMES DAILY PRN
Qty: 60 TABLET | Refills: 0 | Status: SHIPPED | OUTPATIENT
Start: 2024-11-29

## 2024-11-29 NOTE — TELEPHONE ENCOUNTER
Reason for call:   [x] Refill   [] Prior Auth  [] Other:     Office:   [x] PCP/Provider - [facility]  - Thu Osborn,    [] Specialty/Provider -     Medication: clonazePAM (KlonoPIN) 0.5 mg tablet     Dose/Frequency: Take 1 tablet (0.5 mg total) by mouth 2 (two) times a day as needed for anxiety,     Quantity: 60 tablet     Pharmacy: [facility] [address] [phone]    Does the patient have enough for 3 days?   [x] Yes   [] No - Send as HP to POD

## 2024-12-05 ENCOUNTER — APPOINTMENT (OUTPATIENT)
Dept: PHYSICAL THERAPY | Facility: CLINIC | Age: 79
End: 2024-12-05
Payer: MEDICARE

## 2024-12-10 ENCOUNTER — OFFICE VISIT (OUTPATIENT)
Dept: PHYSICAL THERAPY | Facility: CLINIC | Age: 79
End: 2024-12-10
Payer: MEDICARE

## 2024-12-10 DIAGNOSIS — M76.32 ILIOTIBIAL BAND SYNDROME OF LEFT SIDE: Primary | ICD-10-CM

## 2024-12-10 PROCEDURE — 97140 MANUAL THERAPY 1/> REGIONS: CPT

## 2024-12-10 PROCEDURE — 97112 NEUROMUSCULAR REEDUCATION: CPT

## 2024-12-10 PROCEDURE — 97110 THERAPEUTIC EXERCISES: CPT

## 2024-12-10 NOTE — PROGRESS NOTES
"Daily Note     Today's date: 12/10/2024  Patient name: Siobhan Raymond  : 1945  MRN: [id number]  Referring provider: Grey Pace, *  Dx:   Encounter Diagnosis     ICD-10-CM    1. Iliotibial band syndrome of left side  M76.32                      Subjective: Pt reports she is not feeling well in general today.  Pt states she had an incident at home lasat week were she fell over an object stepping down from standing on the mantel.      Objective: See treatment diary below      Assessment: Tolerated treatment well w/ minor c/o's from previous fall. Patient demonstrated fatigue post treatment and would benefit from continued PT for con'td LE strengthening, working towards a home stretching program.  Pt w/ palpable tightness in the hip flexors and piriformis.      Plan: Continue per plan of care.  Progress treatment as tolerated.       Daily Treatment Diary       HEP ACCESS CODE: [id number]     FOTO Completed On: 10/28/2024    POC Expires Reeval for Medicare to be completed  Unit LImit Auth Expiration Date PT/OT/STVisit Limit   2024 By visit 10 na na na    Completed on visit                    Auth Status DATE 10/28 11/5 11/14 11/19 11/21 11/26 12/10   NA Visit # 1 2 3 4 5 6 7    Remaining          MANUAL THERAPY          STM & TrP L quadriceps & glut NS JK JK JK NS HA JK   L quad stretch  JK/ psoas JK       L piriformis stretch     NS [place] JK   L hamstring stretch    JK NS [place]                        THERAPEUTIC EXERCISE HEP          LTR 10/28 10x10\"  10x10\"       HS stretch   Seated 10\"x5 Seated 10\"x10       Quad stretch           SL step up           Mini squat    x10 x10      ER fig 4 stretch   10x10\" np       Crossed LTR      10x10\" w/L knee ext (L over R)     Supine piriformis stretch      10x10\" LLE 10x10\" LLE            10x2 BL   SL hip abd                                                                  NEUROMUSCULAR REEDUCATION            L Clam R sidelying 10/28 15x5\"  HL OTB 20x3\" HL OTB " "20x3\" HL OTB 20x3\" HL OTB 20x3\" HL OTB 20x3\"   SLR 4 way    Flex  Felx 10x2  Flex 2x10 Flex 2x10   Bridge    15x3\" 15x3\"  15x3\" 20x3\"   Standing hip ext & abd   10x ea BL 10x ea BL 10x2 ea BL  2x10 ea B/L OTB 2x10 ea B/L   SLS           Lat res walk   2 laps 2 laps 3 laps   OTB 3 laps   SL  on R L hip abd    10x2 nv      Glut iso      10x10\"     Glut kicks w/ knee bent       2x10                                                           THERAPEUTIC ACTIVITY           Patient education: pathoanatomy, nature of sxs, POC, HEP  NS    NS     Bike SANDOVAL   4' 4' 4' 6' L1 6' L1 6' L1                         GAIT TRAINING                                                       MODALITIES          Heat on L hip PRN                                         "

## 2024-12-12 ENCOUNTER — EVALUATION (OUTPATIENT)
Dept: PHYSICAL THERAPY | Facility: CLINIC | Age: 79
End: 2024-12-12
Payer: MEDICARE

## 2024-12-12 DIAGNOSIS — M76.32 ILIOTIBIAL BAND SYNDROME OF LEFT SIDE: Primary | ICD-10-CM

## 2024-12-12 PROCEDURE — 97112 NEUROMUSCULAR REEDUCATION: CPT | Performed by: PHYSICAL THERAPIST

## 2024-12-12 PROCEDURE — 97110 THERAPEUTIC EXERCISES: CPT | Performed by: PHYSICAL THERAPIST

## 2024-12-12 PROCEDURE — 97140 MANUAL THERAPY 1/> REGIONS: CPT | Performed by: PHYSICAL THERAPIST

## 2024-12-12 NOTE — PROGRESS NOTES
PT Re-Evaluation     Today's date: 2024  Patient name: Siobhan Raymond  : 1945  MRN: [id number]  Referring provider: Grey Pace, *  Dx:   Encounter Diagnosis     ICD-10-CM    1. Iliotibial band syndrome of left side  M76.32 Ambulatory Referral to Physical Therapy          Assessment: Siobhan Raymond is a 79 y.o. female seen in outpatient physical therapy at [facility] with complaints of L hip, lateral thigh & knee pain, stiffness and weakness.  She has been treated for decreased range of motion, decreased strength, limited flexibility, poor postural awareness, poor body mechanics, altered gait pattern, poor balance, decreased tolerance to activity and decreased functional mobility due to Iliotibial band syndrome of left side.  Re-eval was performed today to assess if she would benefit from skilled PT services in order to address these deficits and reach maximum level of function.  Thank you for the referral!    Understanding of Dx/Px/POC: good     Prognosis: good    Goals  STG  1. Independent with HEP in 4 weeks     Goal met  2. Decrease pain at worst by 50% in 4 weeks   Goal met    LTG  1. Increase LLE strength in all planes to 5/5 in 8 weeks      Good progress  2. Return to full, pain-free and unrestricted standing, walking and sleeping in 8 weeks  Good progress      Plan: Pt has made good progress since start of care and will benefit from continued skilled PT 1x/week for 2 weeks in order to reach all goals.     Subjective Evaluation    History of Present Illness  Mechanism of injury: Pt presents with chronic history L hip, lateral thigh & knee pain with insidious onset. Pain is worse with laying down.   Patient Goals  Patient goals for therapy: decreased pain and increased strength  Patient goal: lay down and sleep with no pain      Pain at worst: 10/10     510    Diagnostic Tests  X-ray: abnormal (Moderate degenerative change of the left hip as above with marginal osteophytes and  "partial joint space narrowing.  Extensive degenerative changes and fusion of the lower lumbar spine Degenerative change of the pubic symphysis.)      Objective     Palpation   Left   Hypertonic in the TFL.   Tenderness of the gluteus domenic.        Trigger point to gluteus domenic and TFL.     Active Range of Motion   Left Hip   Flexion: WFL and with pain     WFL painfree  Extension: WFL and with pain    WFL painfree  Abduction: WFL and with pain    WFL painfree  Adduction: WFL and with pain    WFL painfree  External rotation (90/90): WFL and with pain   WFL painfree  Internal rotation (90/90): WFL and with pain   WFL painfree    Right Hip   Normal active range of motion    Strength/Myotome Testing     Left Hip   Planes of Motion   Flexion: 4   4+  Extension: 4   4+  Abduction: 4   4+  Adduction: 4   4+    Right Hip   Normal muscle strength    Tests   Bridge iso test:      55 sec painfree    Left Hip   Positive Ely's, AWILDA, Berta and piriformis.    Negative   Negative FADIR.         Daily Treatment Diary       HEP ACCESS CODE: [id number]     FOTO Completed On: 12/12/24    POC Expires Reeval for Medicare to be completed  Unit LImit Auth Expiration Date PT/OT/STVisit Limit   12/28/2024 By visit 10 na na na    Completed on visit                    Auth Status DATE 12/12   NA Visit # 8    Remaining    MANUAL THERAPY    STM & TrP L quadriceps & glut    L quad stretch    L piriformis stretch    L hamstring stretch            THERAPEUTIC EXERCISE HEP    LTR 10/28    HS stretch     Quad stretch     SL step up     Mini squat     ER fig 4 stretch     Crossed LTR     Supine piriformis stretch       10x2 BL   SL hip abd                              NEUROMUSCULAR REEDUCATION      L Clam R sidelying 10/28 HL OTB 20x3\"   SLR 4 way  Flex 2x10   Bridge  20x3\"   Standing hip ext & abd  OTB 3x10 ea B/L   SLS     Lat res walk  OTB 3 laps   SL  on R L hip abd     Glut iso     Glut kicks w/ knee bent                            "   THERAPEUTIC ACTIVITY     Patient education: pathoanatomy, nature of sxs, POC, HEP  NS   Bike JAIME  6' L1             GAIT TRAINING                         MODALITIES    Heat on L hip PRN

## 2024-12-13 ENCOUNTER — OFFICE VISIT (OUTPATIENT)
Dept: NEUROLOGY | Facility: CLINIC | Age: 79
End: 2024-12-13
Payer: MEDICARE

## 2024-12-13 VITALS
BODY MASS INDEX: 28.88 KG/M2 | SYSTOLIC BLOOD PRESSURE: 122 MMHG | DIASTOLIC BLOOD PRESSURE: 76 MMHG | HEIGHT: 63 IN | WEIGHT: 163 LBS | HEART RATE: 75 BPM | TEMPERATURE: 97.7 F

## 2024-12-13 DIAGNOSIS — G62.9 NEUROPATHY: Primary | ICD-10-CM

## 2024-12-13 DIAGNOSIS — G50.0 TRIGEMINAL NEURALGIA OF RIGHT SIDE OF FACE: ICD-10-CM

## 2024-12-13 DIAGNOSIS — G50.0 TRIGEMINAL NEURALGIA OF RIGHT SIDE OF FACE: Primary | ICD-10-CM

## 2024-12-13 PROCEDURE — 99214 OFFICE O/P EST MOD 30 MIN: CPT | Performed by: PSYCHIATRY & NEUROLOGY

## 2024-12-13 RX ORDER — BACLOFEN 5 MG/1
TABLET ORAL
Qty: 30 TABLET | Refills: 2 | Status: SHIPPED | OUTPATIENT
Start: 2024-12-13

## 2024-12-13 NOTE — PROGRESS NOTES
Name: Siobhan Raymond      : 1945      MRN: [id number]  Encounter Provider: Diya Zhang MD  Encounter Date: 2024   Encounter department: [facility]  :  Assessment & Plan  Neuropathy  Patient here today for neuro follow-up.  Patient last seen in .  Patient notes no new neuropathic features since our last visit.  No new nocturnal symptoms.  Patient notes occasionally her feet will feel cold.  This symptom is improved by putting on socks.  Occasional numbness and tingling.  No significant interruption of sleep.  Patient continues to follow-up with her breast cancer team on a regular basis.             Trigeminal neuralgia of right side of face  Patient notes intermittent symptoms in the right side of the face.  Symptoms include the scalp line at V1 as well as nasally on the V2 distribution right side of the face.  Most symptoms predominate for patient in the V2 maxillary region.  Patient has had 30 seconds to 2 minutes of occasional pain in the V2 distribution.  Patient has not been interested in any neuropathic medication.  Patient has had history of paradoxical reactions with other meds  Patient does not wish to be on a medicine on a regular basis.  Patient is looking for more of a as needed medication just if the bouts recur.  Patient has not had recurrent bouts, however we discussed various options.  Patient is unable to tolerate gabapentin as this has been tried twice in the past and patient describes the reaction of feeling very antsy and anxious.  Last episode was about 6 weeks ago that lasted for about 2 minutes.  Patient will consider as needed baclofen if there is a recurrent event.  If patient continues to have episodes we would need to consider Lyrica and Tegretol as options.  Patient is not interested in these meds at this time.               History of Present Illness   HPI    Pt is a 80 yo f with pmh of TRAE, HTN, right breast camcer s/p surgery  "including lymph nodes and radiation- dx about one year ago.  Pt presents today for balance issue. Pt last seen in 613/24.  Per my last note \"Initial consult for muscle tightness and joint pain.  Pt with new onset joint pain over past month ever since new change in cancer meds. Pt initially seen on 8/10/23.  Pt notes she was unable to tolerate anatrozole due to confusion. Pt was then placed on letrazole more recently and just told to stop med due to extreme joint pain. Pt notes significant issues over the past several weeks. Pt notes pain in right shoulder even to slight movement, also in right wrist, both knees and left ankle. Pt with history of low back surgery by [facility] about 7 yrs ago and still needed ablation therapy after surgery.  No [facility] records in [facility] and pt not know name of surgeon or location for mri lumbar imaging. Pt recalls it had to be at a specific location but unsure name. Pt is also not sure if back hardware is mri compatible, no card available.   Pt notes int falls.  Pt notes diff with ambulation for years.  Pt using cane for any distances over past 2 yrs.  In home or in garden she does not use,  Pt seen by dr bennett for her right knee in past. Pt notes since being on letrozole, cracking and crunching sensation in her joints.  Pt was going to PT for balance therapy and recalls issues with left knee at that time.  When she got home pt had been going down steps and had horrible severe pain and now after one week , still unable to bear full weight on leg.   Pt declined er visit. Strongly recommended er eval for imaging and ortho eval. Pt declined.  Will send referal to ortho and notify pcp as well. Pt here is same building as dr bennett,.  rec pt to go to office and see about fit in appt this week due to new knee issue.    Pt also needs to follow up with dr powell re pain in joints.   On exam pt with evidence of some apraxia on left side.  Strongly rec mri head padmini with cancer history , but pt unsure " "if ok to get imaging due to her hardware.  Patient currently remains off of chemotherapy as she has had difficulty tolerating some prior agents over the past year.  Patient is following closely with hematology and oncology.  Patient has upcoming mammogram and DEXA scan and follow-up in November with her team.  At timing of last appointment, patient had complained of some acute right eye pain.  She has been following with the ophthalmologist since December 2023.  I also completed some additional workup including an MRA of the head dated January 20, 2024.  MRA head no high-grade stenosis no aneurysm or focal occlusion.  Patient also had a sed rate and CRP done in January with a sed rate of 9 and a CRP of 1.9.  Patient denies any temporal pain.  Patient denies any visual loss.  Patient denies any visual obscurations or jaw pain.  Patient complains of occasional tightness in the V2 distribution.  No rash or shingles in this area.  No change with hearing.  No vesicles in the ear.  No facial weakness.  Patient also had a temporal artery Dopplers done on April 25, 2024.  No evidence of giant cell arteritis bilaterally.  Studies were reviewed in depth with patient.  Patient is currently asymptomatic in the face.  No eye pain today.  Patient has also been following Dr. Gallegos for orthopedic issues particularly her knees.  Patient also with some intermittent coughing with back of throat feeling like she has phlegm or mucus.  Patient is going to be checking with her primary care doctor to review this complaint.  No fever or chills.  Patient occasionally feels some sinus pressure.  No headache nausea or vomiting.  No falls or trips.  No change in bowel or bladder.  Patient also with known dry eyes.  Patient had prior Lyme and Sjogren testing negative in past years.  Paraneoplastic serology was also negative back in August 2023.  Overall patient is doing well from her neuropathy.  Currently on no chemotherapeutic agents. \"   " Patient here today for neuro follow-up.  Patient was last seen in June.  Since her last visit, patient reports no new neuropathic symptoms.  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  Patient continues to follow-up with hematology and oncology.  Patient denies any trips.  Patient did have a few falls that were mechanical.  Patient was decorating her mantle and walked backwards to look at her decorations and had a fall in her shoulders.  Patient had another episode falling in her garden while tripping over her flip-flops.  And lastly had 1 episode well walking on wet slate.  No change in speech or swallowing.  No weakness.  No change in vision.  No double vision.  No loss of vision.  No seizure or loss of consciousness.  Patient also reports today that she has had a few more episodes of right sided facial electricity.  Patient notes occasional pain of pain along the scalp line at V1 as well as nasally at V2.  Patient has had occasional symptoms in the cheekbone area as well.  Patient has been seen by ENT for her throat and trigeminal neuralgia was also considered to diagnosis by that specialtyv too.  Patient is not specifically looking for medication as episodes are very short-lived usually seconds.  However she did have 1 bout that lasted up to 2 minutes.  Reviewed with patient medication options for trigeminal neuralgia with the right face.  Patient has been tried by her PCP on gabapentin in the past.  Patient did have a reaction to the medication and does not wish to ever be on the medicine again.  Patient felt on edge and itchy.  No hives reported or shortness of breath or chest pain or chest pressure.  Reviewed Lyrica as well as Tegretol as options that patient would need to take daily.  Patient is also not interested in a regular daily medication.  She is only looking for as needed medicine if the bowels become recurrent in an of the same day.  Baclofen may be an option for this.  I  did give patient a small supply of this medicine as a trial again only if symptoms become recurrent.  No other associated symptoms noted.  Patient notes touch on the right side of the face is more super sensitized.  Patient also avoids significant chewing on the right side of her face or overt tactile stimuli.  Patient will call if any symptoms worsen.  We would need to consider Tegretol or Lyrica as possible options in the future.  The following portions of the patient's history were reviewed and updated as appropriate: allergies, current medications, past family history, past medical history, past social history, past surgical history, and problem list and med rec and ros rev.     Review of Systems   Constitutional:  Negative for appetite change, fatigue and fever.   HENT: Negative.  Negative for hearing loss, tinnitus, trouble swallowing and voice change.         Went to dentist and ENT and they both mentioned Trigeminal Neuralgia as being a possible DX  3 episodes of facial pain in a row, lasting minute or 2 R side of face only.   Eyes: Negative.  Negative for photophobia, pain and visual disturbance.   Respiratory: Negative.  Negative for shortness of breath.    Cardiovascular: Negative.  Negative for palpitations.   Gastrointestinal: Negative.  Negative for nausea and vomiting.   Endocrine: Negative.  Negative for cold intolerance.   Genitourinary: Negative.  Negative for dysuria, frequency and urgency.   Musculoskeletal:  Negative for back pain, gait problem, myalgias, neck pain and neck stiffness.        Fell twice in July when working in the garden.  Fell week ago when decorating stepped back and forgot something she left on floor, Did hit shoulder. no ER/LOC   Skin: Negative.  Negative for rash.   Allergic/Immunologic: Negative.    Neurological: Negative.  Negative for dizziness, tremors, seizures, syncope, facial asymmetry, speech difficulty, weakness, light-headedness, numbness and headaches.  "  Hematological: Negative.  Does not bruise/bleed easily.   Psychiatric/Behavioral: Negative.  Negative for confusion, hallucinations and sleep disturbance.    All other systems reviewed and are negative.   I have personally reviewed the MA's review of systems and made changes as necessary.         Objective   /76   Pulse 75   Temp 97.7 °F (36.5 °C)   Ht 5' 3\" (1.6 m)   Wt 73.9 kg (163 lb)   LMP  (LMP Unknown)   BMI 28.87 kg/m²     Physical Exam  Constitutional:       General: She is not in acute distress.     Appearance: She is not ill-appearing.   Eyes:      General: Lids are normal.      Extraocular Movements: Extraocular movements intact.      Pupils: Pupils are equal, round, and reactive to light.   Musculoskeletal:      Right lower leg: No edema.      Left lower leg: No edema.   Neurological:      Mental Status: She is alert.      Motor: Motor strength is normal.     Deep Tendon Reflexes:      Reflex Scores:       Patellar reflexes are 1+ on the right side and 1+ on the left side.  Psychiatric:         Speech: Speech normal.       Neurological Exam  Mental Status  Alert. Recent and remote memory are intact. Speech is normal. Language is fluent with no aphasia. Attention and concentration are normal. Fund of knowledge is appropriate for level of education.    Cranial Nerves  CN II: Visual acuity is normal. Visual fields full to confrontation.  CN III, IV, VI: Extraocular movements intact bilaterally. Normal lids and orbits bilaterally. Pupils equal round and reactive to light bilaterally.  CN V: Facial sensation is normal.  CN VII: Full and symmetric facial movement.  CN VIII: Hearing is normal.  CN IX, X: Palate elevates symmetrically. Normal gag reflex.  CN XI: Shoulder shrug strength is normal.  CN XII: Tongue midline without atrophy or fasciculations.    Motor  Normal muscle bulk throughout. Normal muscle tone. No abnormal involuntary movements. Strength is 5/5 throughout all four " extremities.    Sensory  Dec vib mychal lowers.    Reflexes  Deep tendon reflexes are 2+ and symmetric except as noted.                                            Right                      Left  Patellar                                1+                         1+  Achilles                                Tr                         Tr  Right Plantar: downgoing  Left Plantar: downgoing    Coordination  Right: Finger-to-nose normal. Rapid alternating movement normal.Left: Finger-to-nose normal. Rapid alternating movement normal.    Gait  Casual gait is normal including stance, stride, and arm swing.

## 2024-12-13 NOTE — ASSESSMENT & PLAN NOTE
Patient notes intermittent symptoms in the right side of the face.  Symptoms include the scalp line at V1 as well as nasally on the V2 distribution right side of the face.  Most symptoms predominate for patient in the V2 maxillary region.  Patient has had 30 seconds to 2 minutes of occasional pain in the V2 distribution.  Patient has not been interested in any neuropathic medication.  Patient has had history of paradoxical reactions with other meds  Patient does not wish to be on a medicine on a regular basis.  Patient is looking for more of a as needed medication just if the bouts recur.  Patient has not had recurrent bouts, however we discussed various options.  Patient is unable to tolerate gabapentin as this has been tried twice in the past and patient describes the reaction of feeling very antsy and anxious.  Last episode was about 6 weeks ago that lasted for about 2 minutes.  Patient will consider as needed baclofen if there is a recurrent event.  If patient continues to have episodes we would need to consider Lyrica and Tegretol as options.  Patient is not interested in these meds at this time.

## 2024-12-13 NOTE — ASSESSMENT & PLAN NOTE
Patient here today for neuro follow-up.  Patient last seen in June.  Patient notes no new neuropathic features since our last visit.  No new nocturnal symptoms.  Patient notes occasionally her feet will feel cold.  This symptom is improved by putting on socks.  Occasional numbness and tingling.  No significant interruption of sleep.  Patient continues to follow-up with her breast cancer team on a regular basis.

## 2024-12-17 ENCOUNTER — OFFICE VISIT (OUTPATIENT)
Dept: PHYSICAL THERAPY | Facility: CLINIC | Age: 79
End: 2024-12-17
Payer: MEDICARE

## 2024-12-17 DIAGNOSIS — M76.32 ILIOTIBIAL BAND SYNDROME OF LEFT SIDE: Primary | ICD-10-CM

## 2024-12-17 PROCEDURE — 97110 THERAPEUTIC EXERCISES: CPT | Performed by: PHYSICAL THERAPIST

## 2024-12-17 PROCEDURE — 97112 NEUROMUSCULAR REEDUCATION: CPT | Performed by: PHYSICAL THERAPIST

## 2024-12-17 PROCEDURE — 97530 THERAPEUTIC ACTIVITIES: CPT | Performed by: PHYSICAL THERAPIST

## 2024-12-17 NOTE — PROGRESS NOTES
"Daily Note     Today's date: 2024  Patient name: Siobhan Raymond  : 1945  MRN: [id number]  Referring provider: Grey Pace, *  Dx:   Encounter Diagnosis     ICD-10-CM    1. Iliotibial band syndrome of left side  M76.32           Subjective: not having a good day      Objective: See treatment diary below      Assessment: Tolerated treatment well with progression of treatment program as noted below requiring verbal and tactile cues from PT for safe execution of therapeutic exercise. Patient demonstrated fatigue post treatment, exhibited good technique with therapeutic exercises, and would benefit from continued PT      Plan: Progress treatment as tolerated.       Daily Treatment Diary       HEP ACCESS CODE: [id number]     FOTO Completed On: 24    POC Expires Reeval for Medicare to be completed  Unit LImit Auth Expiration Date PT/OT/STVisit Limit   2024 By visit 10 na na na                     Auth Status DATE    NA Visit # 9    Remaining    MANUAL THERAPY    STM & TrP L quadriceps & glut    L quad stretch    L piriformis stretch    L hamstring stretch    FOTO & RE NEXT VISIT       THERAPEUTIC EXERCISE HEP    LTR 10/28    HS stretch     Quad stretch     SL step up     Mini squat     ER fig 4 stretch     Crossed LTR  LLE extended 10\"x5   Supine piriformis stretch       10x10\" ea BL   SL hip abd                              NEUROMUSCULAR REEDUCATION      L Clam R sidelying 10/28 HL OTB 30x3\"   SLR 4 way  Flex 2x10   Bridge  20x3\"   Standing hip ext & abd  OTB 3x10 ea B/L   SLS     Lat res walk  OTB 5 laps   SL  on R L hip abd     Glut iso     Glut kicks w/ knee bent     Standing glut set iso  10x5\"                       THERAPEUTIC ACTIVITY     Patient education: pathoanatomy, nature of sxs, POC, HEP  NS   Bike SANDOVAL  6' L1             GAIT TRAINING                         MODALITIES    Heat on L hip PRN                     "

## 2024-12-19 ENCOUNTER — OFFICE VISIT (OUTPATIENT)
Dept: PHYSICAL THERAPY | Facility: CLINIC | Age: 79
End: 2024-12-19
Payer: MEDICARE

## 2024-12-19 DIAGNOSIS — M76.32 ILIOTIBIAL BAND SYNDROME OF LEFT SIDE: Primary | ICD-10-CM

## 2024-12-19 PROCEDURE — 97110 THERAPEUTIC EXERCISES: CPT

## 2024-12-19 PROCEDURE — 97112 NEUROMUSCULAR REEDUCATION: CPT

## 2024-12-19 PROCEDURE — 97140 MANUAL THERAPY 1/> REGIONS: CPT

## 2024-12-19 NOTE — PROGRESS NOTES
"Daily Note     Today's date: 2024  Patient name: Siobhan Raymond  : 1945  MRN: [id number]  Referring provider: Grey Pace, *  Dx:   Encounter Diagnosis     ICD-10-CM    1. Iliotibial band syndrome of left side  M76.32                      Subjective: Pt reports she has good days and bad days.  Pt reports today so far the only thing that hurts is her shld.  Reports no recent falls but she is very very careful.      Objective: See treatment diary below      Assessment: Tolerated treatment well. Patient demonstrated fatigue post treatment and would benefit from continued PT for cont'd LE strengthening for improved gait and balance.  Pt reported sometimes her R hurts worse than her L.      Plan: Continue per plan of care.  Progress treatment as tolerated.       Daily Treatment Diary       HEP ACCESS CODE: [id number]     FOTO Completed On: 24    POC Expires Reeval for Medicare to be completed  Unit LImit Auth Expiration Date PT/OT/STVisit Limit   2024 By visit 10 na na na                     Auth Status DATE    NA Visit # 9 8    Remaining     MANUAL THERAPY     STM & TrP L quadriceps & glut  JK L glut med   L quad stretch     L piriformis stretch     L hamstring stretch     FOTO & RE NEXT VISIT JK done        THERAPEUTIC EXERCISE HEP     LTR 10/28     HS stretch      Quad stretch      SL step up   6\" 10x BL   Mini squat   15x3\"   ER fig 4 stretch      Crossed LTR  LLE extended 10\"x5 LLE extended 10\"x5   Supine piriformis stretch        10x10\" ea BL    SL hip abd                                    NEUROMUSCULAR REEDUCATION       L Clam R sidelying 10/28 HL OTB 30x3\" nv   SLR 4 way  Flex 2x10    Bridge  20x3\"    Standing hip ext & abd  OTB 3x10 ea B/L OTB 3x10 ea B/L   SLS      Lat res walk  OTB 5 laps OTB 5 laps   SL  on R L hip abd      Glut iso      Glut kicks w/ knee bent      Standing glut set iso  10x5\" 15x5\"                           THERAPEUTIC ACTIVITY      Patient " education: pathoanatomy, nature of sxs, POC, HEP  NS    Bike SANDOVAL  6' L1 6' L1               GAIT TRAINING                              MODALITIES     Heat on L hip PRN

## 2024-12-26 ENCOUNTER — OFFICE VISIT (OUTPATIENT)
Dept: PHYSICAL THERAPY | Facility: CLINIC | Age: 79
End: 2024-12-26
Payer: MEDICARE

## 2024-12-26 DIAGNOSIS — M76.32 ILIOTIBIAL BAND SYNDROME OF LEFT SIDE: Primary | ICD-10-CM

## 2024-12-26 PROCEDURE — 97112 NEUROMUSCULAR REEDUCATION: CPT

## 2024-12-26 PROCEDURE — 97110 THERAPEUTIC EXERCISES: CPT

## 2024-12-26 PROCEDURE — 97140 MANUAL THERAPY 1/> REGIONS: CPT

## 2024-12-26 NOTE — PROGRESS NOTES
"Daily Note     Today's date: 2024  Patient name: Siobhan Raymond  : 1945  MRN: [id number]  Referring provider: Grey Pace, *  Dx:   Encounter Diagnosis     ICD-10-CM    1. Iliotibial band syndrome of left side  M76.32                      Subjective: Pt reports possible some increase in soreness since his LV.     Objective: See treatment diary below      Assessment: Tolerated treatment well. She still has a higher level of irritability and tenderness to touch of the lateral L hip and glute. She is unsteady at times while ambulating with her cane and requires close therapist supervision when on her feet. She benefited from a seated figure 4 glute stretch to end session.  Patient demonstrated fatigue post treatment and would benefit from continued PT .    Plan: Continue per plan of care.  Progress treatment as tolerated.       Daily Treatment Diary       HEP ACCESS CODE: [id number]     FOTO Completed On: 24    POC Expires Reeval for Medicare to be completed  Unit LImit Auth Expiration Date PT/OT/STVisit Limit   2024 By visit 10 na na na                     Auth Status DATE    NA Visit # 9 10 11    Remaining      MANUAL THERAPY      STM & TrP L quadriceps & glut  JK L glut med    L quad stretch      L piriformis stretch      L hamstring stretch      FOTO & RE NEXT VISIT JK done          THERAPEUTIC EXERCISE HEP      LTR 10/28      HS stretch       Quad stretch       SL step up   6\" 10x BL 6\"x10 ea   Mini squat   15x3\" 3\"x15   ER fig 4 stretch       Crossed LTR  LLE extended 10\"x5 LLE extended 10\"x5 LLE extended 10\"x5   Supine piriformis stretch    5\"x10   Seated figure 4 glute stretch/ lean fwd    5\"x20     10x10\" ea BL     SL hip abd                                          NEUROMUSCULAR REEDUCATION        L Clam R sidelying 10/28 HL OTB 30x3\" nv OTB  x10   SLR 4 way  Flex 2x10  10x2   Bridge  20x3\"     Standing hip ext & abd  OTB 3x10 ea B/L OTB 3x10 ea B/L OTB 3x10 " Called patient to check on her UTI sx. Culture back +E. Coli. She was prescribed nitrofurantoin. Reports ongoing UTI sx and she is on day 2 out of 5 for treatment. Change to cipro. Reinforced hydration and  hygiene. She has been taking K 40 meq daily and increased her K in diet. Reminded her of K recheck in about a week or so. She verbalized understanding.      "ea B/L   SLS       Lat res walk  OTB 5 laps OTB 5 laps OTB  5 laps   SL  on R L hip abd       Glut iso       Glut kicks w/ knee bent       Standing glut set iso  10x5\" 15x5\" 5\"x15                               THERAPEUTIC ACTIVITY       Patient education: pathoanatomy, nature of sxs, POC, HEP  NS     Bike SANDOVAL  6' L1 6' L1 6'                 GAIT TRAINING                                   MODALITIES      Heat on L hip PRN                           "

## 2024-12-31 ENCOUNTER — OFFICE VISIT (OUTPATIENT)
Dept: PHYSICAL THERAPY | Facility: CLINIC | Age: 79
End: 2024-12-31
Payer: MEDICARE

## 2024-12-31 DIAGNOSIS — M76.32 ILIOTIBIAL BAND SYNDROME OF LEFT SIDE: Primary | ICD-10-CM

## 2024-12-31 PROCEDURE — 97112 NEUROMUSCULAR REEDUCATION: CPT | Performed by: PHYSICAL THERAPIST

## 2024-12-31 PROCEDURE — 97110 THERAPEUTIC EXERCISES: CPT | Performed by: PHYSICAL THERAPIST

## 2024-12-31 PROCEDURE — 97530 THERAPEUTIC ACTIVITIES: CPT | Performed by: PHYSICAL THERAPIST

## 2024-12-31 NOTE — PROGRESS NOTES
"Daily Note     Today's date: 2024  Patient name: Siobhan Raymond  : 1945  MRN: [id number]  Referring provider: Grey Pace, *  Dx:   Encounter Diagnosis     ICD-10-CM    1. Iliotibial band syndrome of left side  M76.32                      Subjective: Compliant with HEP, no questions regarding POC, motivated to continue PT    Objective: See treatment diary below      Assessment: Tolerated treatment well with progression of treatment program as noted below requiring verbal and tactile cues from PT for safe execution of therapeutic exercise.  She benefited from a seated piriformis stretch. Patient demonstrated fatigue post treatment and would benefit from continued PT .    Plan: Continue per plan of care.  Progress treatment as tolerated.       Daily Treatment Diary       HEP ACCESS CODE: [id number]     FOTO Completed On: 24    POC Expires Reeval for Medicare to be completed  Unit LImit Auth Expiration Date PT/OT/STVisit Limit   2025 By visit 20 na na na                     Auth Status DATE    NA Visit # 12    Remaining 8   MANUAL THERAPY    STM & TrP L quadriceps & glut    L quad stretch    L piriformis stretch    L hamstring stretch    FOTO & RE        THERAPEUTIC EXERCISE HEP    LTR 10/28    HS stretch     Quad stretch     SL step up  6\"x10 ea   Mini squat  3\"x15   ER fig 4 stretch     Crossed LTR  LLE extended 10\"x5   Supine piriformis stretch  5\"x10 seated   Seated figure 4 glute stretch/ lean fwd  5\"x20   Active h/s stretch  20x5\"   SL hip abd                              NEUROMUSCULAR REEDUCATION      L Clam R sidelying 10/28 OTB  x10   SLR 4 way  10x2   Bridge  2x10   Standing hip ext & abd  OTB 3x10 ea B/L   SLS     Lat res walk  OTB  5 laps   SL  on R L hip abd     Glut iso     Glut kicks w/ knee bent     Standing glut set iso  5\"x15                       THERAPEUTIC ACTIVITY     Patient education: pathoanatomy, nature of sxs, POC, HEP  NS   Bike SANDOVAL  6' L2           "   GAIT TRAINING                         MODALITIES    Heat on L hip PRN

## 2025-01-02 ENCOUNTER — OFFICE VISIT (OUTPATIENT)
Dept: PHYSICAL THERAPY | Facility: CLINIC | Age: 80
End: 2025-01-02
Payer: MEDICARE

## 2025-01-02 DIAGNOSIS — M76.32 ILIOTIBIAL BAND SYNDROME OF LEFT SIDE: Primary | ICD-10-CM

## 2025-01-02 PROCEDURE — 97110 THERAPEUTIC EXERCISES: CPT | Performed by: PHYSICAL THERAPIST

## 2025-01-02 NOTE — PROGRESS NOTES
"Daily Note     Today's date: 2025  Patient name: Siobhan Raymond  : 1945  MRN: [id number]  Referring provider: Grey Pace, *  Dx:   Encounter Diagnosis     ICD-10-CM    1. Iliotibial band syndrome of left side  M76.32           Start Time: 1045          Subjective: Felt tired after last session because she also had shopping to do. Yesterday morning she thinks she twisted in bed wrong and had L hip pain. This continued throughout the day, limiting her ability to stand and cook. She would stand for 15 mins and then required 15 mins rest before beginning again. Today she is feeling better.    Objective: See treatment diary below      Assessment: Occasional instances of instability while standing and walking, which was recovered quickly by the patient. She did comment feeling dizzy once, but denied rest break or water. No issues with program as outlined, pt appeared appropriately challenged. Patient demonstrated fatigue post treatment and would benefit from continued PT .    Plan: Continue per plan of care.  Progress treatment as tolerated.       Daily Treatment Diary       HEP ACCESS CODE: [id number]     FOTO Completed On: 24    POC Expires Reeval for Medicare to be completed  Unit LImit Auth Expiration Date PT/OT/STVisit Limit   2025 By visit 20 na na na                     Auth Status DATE    NA Visit # 12 13    Remaining 8 7   MANUAL THERAPY     STM & TrP L quadriceps & glut     L quad stretch     L piriformis stretch     L hamstring stretch     FOTO & RE          THERAPEUTIC EXERCISE HEP     LTR 10/28     HS stretch      Quad stretch      SL step up  6\"x10 ea 6\"x10 ea   Mini squat  3\"x15 3\"x15   ER fig 4 stretch      Crossed LTR  LLE extended 10\"x5 LLE extended 10\"x5 ea   Supine piriformis stretch  5\"x10 seated    Seated figure 4 glute stretch/ lean fwd  5\"x20 5\"x10   Active h/s stretch  20x5\" 5\"x20   SL hip abd                                    NEUROMUSCULAR REEDUCATION    " "   L Clam R sidelying 10/28 OTB  x10 OTB  2x10 ea   SLR 4 way  10x2    SLR   2x10 ea   Bridge  2x10 2x10   Standing hip ext & abd  OTB 3x10 ea B/L OTB 3x10 ea B/L   SLS      Lat res walk  OTB  5 laps OTB  5 laps   SL  on R L hip abd      Glut iso      Glut kicks w/ knee bent      Standing glut set iso  5\"x15                            THERAPEUTIC ACTIVITY      Patient education: pathoanatomy, nature of sxs, POC, HEP  NS    Bike SANDOVAL  6' L2 6'               GAIT TRAINING                              MODALITIES     Heat on L hip PRN                         "

## 2025-01-07 ENCOUNTER — APPOINTMENT (OUTPATIENT)
Dept: PHYSICAL THERAPY | Facility: CLINIC | Age: 80
End: 2025-01-07
Payer: MEDICARE

## 2025-01-14 ENCOUNTER — EVALUATION (OUTPATIENT)
Dept: PHYSICAL THERAPY | Facility: CLINIC | Age: 80
End: 2025-01-14
Payer: MEDICARE

## 2025-01-14 DIAGNOSIS — M76.32 ILIOTIBIAL BAND SYNDROME OF LEFT SIDE: Primary | ICD-10-CM

## 2025-01-14 PROCEDURE — 97110 THERAPEUTIC EXERCISES: CPT | Performed by: PHYSICAL THERAPIST

## 2025-01-14 PROCEDURE — 97140 MANUAL THERAPY 1/> REGIONS: CPT | Performed by: PHYSICAL THERAPIST

## 2025-01-14 NOTE — PROGRESS NOTES
PT Re-Evaluation     Today's date: 2025  Patient name: Siobhan Raymond  : 1945  MRN: [id number]  Referring provider: Grey Pace, *  Dx:   Encounter Diagnosis     ICD-10-CM    1. Iliotibial band syndrome of left side  M76.32 Ambulatory Referral to Physical Therapy        Assessment: Siobhan Raymond is a 79 y.o. female seen in outpatient physical therapy at [facility] with complaints of L hip, lateral thigh & knee pain, stiffness and weakness.  She has been treated for decreased range of motion, decreased strength, limited flexibility, poor postural awareness, poor body mechanics, altered gait pattern, poor balance, decreased tolerance to activity and decreased functional mobility due to Iliotibial band syndrome of left side.  Re-eval was performed today to assess if she would benefit from skilled PT services in order to address these deficits and reach maximum level of function.  Thank you for the referral!    Understanding of Dx/Px/POC: good     Prognosis: good    Goals  STG  1. Independent with HEP in 4 weeks     Goal met  2. Decrease pain at worst by 50% in 4 weeks   Goal met    LTG  1. Increase LLE strength in all planes to 5/5 in 8 weeks      Goal met  2. Return to full, pain-free and unrestricted standing, walking and sleeping in 8 weeks  No progress      Plan: Pt has not made significant progress since last RE and will be discharged from skilled PT today and return to physician for further diagnostic testing &/or treatment.     Subjective Evaluation    History of Present Illness  Mechanism of injury: Pt presents with chronic history L hip, lateral thigh & knee pain with insidious onset. Pain is worse with laying down.     Patient Goals  Patient goals for therapy: decreased pain and increased strength  Patient goal: lay down and sleep with no pain      Pain at worst: 10/10      5/10    Diagnostic Tests  X-ray: abnormal (Moderate degenerative change of the left hip as above with marginal  "osteophytes and partial joint space narrowing.  Extensive degenerative changes and fusion of the lower lumbar spine Degenerative change of the pubic symphysis.)      Objective     Palpation   Left   Hypertonic in the TFL.   Tenderness of the gluteus domenic.        Trigger point to gluteus domenic and TFL.     Active Range of Motion   Left Hip   Flexion: WFL and with pain     WFL painfree  Extension: WFL and with pain    WFL painfree  Abduction: WFL and with pain    WFL painfree  Adduction: WFL and with pain    WFL painfree  External rotation (90/90): WFL and with pain   WFL painfree  Internal rotation (90/90): WFL and with pain   WFL painfree    Right Hip   Normal active range of motion    Strength/Myotome Testing     Left Hip   Planes of Motion   Flexion: 4   5  Extension: 4   5  Abduction: 4   5  Adduction: 4   5    Right Hip   Normal muscle strength    Tests   Bridge iso test:      55 sec painfree    Left Hip   Positive Ely's, AWILDA, Berta and piriformis.    Negative   Negative FADIR.         Daily Treatment Diary       HEP ACCESS CODE: [id number]     FOTO Completed On: 12/12/24, 1/14/2025    POC Expires Reeval for Medicare to be completed  Unit LImit Auth Expiration Date PT/OT/STVisit Limit   1/30/2025 By visit 20 na na na                       Auth Status DATE 1/14   NA Visit # 14    Remaining    MANUAL THERAPY    STM & TrP L quadriceps & glut NS   L quad stretch    L piriformis stretch    L hamstring stretch    FOTO & RE NS       THERAPEUTIC EXERCISE HEP    LTR 10/28    HS stretch     Quad stretch     SL step up     Mini squat     ER fig 4 stretch     Crossed LTR     Supine piriformis stretch       10x2 BL   SL hip abd                              NEUROMUSCULAR REEDUCATION      L Clam R sidelying 10/28 HL OTB 20x3\"   SLR 4 way  Flex 2x10   Bridge  20x3\"   Standing hip ext & abd  OTB 3x10 ea B/L   SLS     Lat res walk  OTB 3 laps   SL  on R L hip abd     Glut iso     Glut kicks w/ knee bent                       "        THERAPEUTIC ACTIVITY     Patient education: pathoanatomy, nature of sxs, POC, HEP  NS   Bike JAIME  6' L1             GAIT TRAINING                         MODALITIES    Heat on L hip PRN

## 2025-01-16 ENCOUNTER — APPOINTMENT (OUTPATIENT)
Dept: PHYSICAL THERAPY | Facility: CLINIC | Age: 80
End: 2025-01-16
Payer: MEDICARE

## 2025-01-21 ENCOUNTER — APPOINTMENT (OUTPATIENT)
Dept: PHYSICAL THERAPY | Facility: CLINIC | Age: 80
End: 2025-01-21
Payer: MEDICARE

## 2025-01-21 DIAGNOSIS — F41.9 ANXIETY HYPERVENTILATION: ICD-10-CM

## 2025-01-21 DIAGNOSIS — F45.8 ANXIETY HYPERVENTILATION: ICD-10-CM

## 2025-01-21 RX ORDER — CLONAZEPAM 0.5 MG/1
0.5 TABLET ORAL 2 TIMES DAILY PRN
Qty: 60 TABLET | Refills: 0 | Status: SHIPPED | OUTPATIENT
Start: 2025-01-21

## 2025-01-21 NOTE — TELEPHONE ENCOUNTER
Reason for call:   [x] Refill   [] Prior Auth  [] Other:     Office:   [x] PCP/Provider - gillian fly  [] Specialty/Provider -     Medication: clonazePAM (KlonoPIN) 0.5 mg tablet     Dose/Frequency: 0.5 mg    Quantity: 60     Pharmacy: [facility] [place] [place] - [address]     Does the patient have enough for 3 days?   [] Yes   [x] No - Send as HP to POD

## 2025-01-23 ENCOUNTER — APPOINTMENT (OUTPATIENT)
Dept: PHYSICAL THERAPY | Facility: CLINIC | Age: 80
End: 2025-01-23
Payer: MEDICARE

## 2025-01-28 ENCOUNTER — APPOINTMENT (OUTPATIENT)
Dept: PHYSICAL THERAPY | Facility: CLINIC | Age: 80
End: 2025-01-28
Payer: MEDICARE

## 2025-01-29 ENCOUNTER — HOSPITAL ENCOUNTER (OUTPATIENT)
Dept: MAMMOGRAPHY | Facility: CLINIC | Age: 80
Discharge: HOME | End: 2025-01-29
Payer: MEDICARE

## 2025-01-29 DIAGNOSIS — R92.8 FOLLOW-UP EXAMINATION OF ABNORMAL MAMMOGRAM: ICD-10-CM

## 2025-01-29 PROCEDURE — G0279 TOMOSYNTHESIS, MAMMO: HCPCS

## 2025-01-29 PROCEDURE — 77065 DX MAMMO INCL CAD UNI: CPT

## 2025-01-30 ENCOUNTER — APPOINTMENT (OUTPATIENT)
Dept: PHYSICAL THERAPY | Facility: CLINIC | Age: 80
End: 2025-01-30
Payer: MEDICARE

## 2025-02-28 ENCOUNTER — OFFICE VISIT (OUTPATIENT)
Dept: FAMILY MEDICINE CLINIC | Facility: HOSPITAL | Age: 80
End: 2025-02-28
Payer: MEDICARE

## 2025-02-28 VITALS
BODY MASS INDEX: 29.23 KG/M2 | DIASTOLIC BLOOD PRESSURE: 80 MMHG | SYSTOLIC BLOOD PRESSURE: 142 MMHG | OXYGEN SATURATION: 98 % | HEIGHT: 63 IN | WEIGHT: 165 LBS | HEART RATE: 78 BPM

## 2025-02-28 DIAGNOSIS — G47.33 OBSTRUCTIVE SLEEP APNEA: ICD-10-CM

## 2025-02-28 DIAGNOSIS — I35.1 NONRHEUMATIC AORTIC VALVE INSUFFICIENCY: ICD-10-CM

## 2025-02-28 DIAGNOSIS — I10 ESSENTIAL HYPERTENSION: Primary | ICD-10-CM

## 2025-02-28 DIAGNOSIS — J41.1 MUCOPURULENT CHRONIC BRONCHITIS (HCC): ICD-10-CM

## 2025-02-28 DIAGNOSIS — Z86.39 PERSONAL HISTORY OF OTHER ENDOCRINE, NUTRITIONAL AND METABOLIC DISEASE: ICD-10-CM

## 2025-02-28 DIAGNOSIS — C50.419 MALIGNANT NEOPLASM OF UPPER-OUTER QUADRANT OF FEMALE BREAST, UNSPECIFIED ESTROGEN RECEPTOR STATUS, UNSPECIFIED LATERALITY (HCC): ICD-10-CM

## 2025-02-28 DIAGNOSIS — G95.19 OTHER VASCULAR MYELOPATHIES (HCC): ICD-10-CM

## 2025-02-28 DIAGNOSIS — R73.03 PREDIABETES: ICD-10-CM

## 2025-02-28 DIAGNOSIS — I77.9 BILATERAL CAROTID ARTERY DISEASE, UNSPECIFIED TYPE (HCC): ICD-10-CM

## 2025-02-28 DIAGNOSIS — R53.82 CHRONIC FATIGUE: ICD-10-CM

## 2025-02-28 DIAGNOSIS — J44.89 CHRONIC BRONCHITIS WITH EMPHYSEMA (HCC): ICD-10-CM

## 2025-02-28 DIAGNOSIS — I65.23 BILATERAL CAROTID ARTERY STENOSIS: ICD-10-CM

## 2025-02-28 DIAGNOSIS — C50.411 MALIGNANT NEOPLASM OF UPPER-OUTER QUADRANT OF RIGHT BREAST IN FEMALE, ESTROGEN RECEPTOR POSITIVE (HCC): ICD-10-CM

## 2025-02-28 DIAGNOSIS — Z17.0 MALIGNANT NEOPLASM OF UPPER-OUTER QUADRANT OF RIGHT BREAST IN FEMALE, ESTROGEN RECEPTOR POSITIVE (HCC): ICD-10-CM

## 2025-02-28 DIAGNOSIS — C77.3 SECONDARY AND UNSPECIFIED MALIGNANT NEOPLASM OF AXILLA AND UPPER LIMB LYMPH NODES (HCC): ICD-10-CM

## 2025-02-28 PROCEDURE — 99214 OFFICE O/P EST MOD 30 MIN: CPT | Performed by: INTERNAL MEDICINE

## 2025-02-28 PROCEDURE — G2211 COMPLEX E/M VISIT ADD ON: HCPCS | Performed by: INTERNAL MEDICINE

## 2025-02-28 NOTE — ASSESSMENT & PLAN NOTE
Home  - is not rechecking bp readings  Follows with Dr. Fleming  Losartan 100 mg daily, atenolol 25 mg bid to  continue

## 2025-02-28 NOTE — PROGRESS NOTES
Assessment/Plan:     Diagnosis ICD-10-CM Associated Orders   1. Essential hypertension  I10 Hemoglobin A1C     Comprehensive metabolic panel     CBC and differential     TSH, 3rd generation with Free T4 reflex      2. Bilateral carotid artery stenosis  I65.23 VAS carotid complete study      3. 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 Other vascular myelopathies (HCC)  G95.19       5. Mucopurulent chronic bronchitis (HCC)  J41.1       6. Nonrheumatic aortic valve insufficiency  I35.1       7. Chronic bronchitis with emphysema (HCC)  J44.89       8. Chronic fatigue  R53.82 Hemoglobin A1C     Comprehensive metabolic panel     CBC and differential     TSH, 3rd generation with Free T4 reflex     Iron Panel (Includes Ferritin, Iron Sat%, Iron, and TIBC)     Vitamin B12     Vitamin D 25 hydroxy      9. Obstructive sleep apnea  G47.33 Hemoglobin A1C     Comprehensive metabolic panel     CBC and differential     TSH, 3rd generation with Free T4 reflex     Iron Panel (Includes Ferritin, Iron Sat%, Iron, and TIBC)     Vitamin B12     Vitamin D 25 hydroxy     Ambulatory Referral to Pulmonology      10. Prediabetes  R73.03 Hemoglobin A1C     Comprehensive metabolic panel      11. Bilateral carotid artery disease, unspecified type (HCC)  I77.9       12. Malignant neoplasm of upper-outer quadrant of female breast, unspecified estrogen receptor status, unspecified laterality (HCC)  C50.419 Iron Panel (Includes Ferritin, Iron Sat%, Iron, and TIBC)      13. Personal history of other endocrine, nutritional and metabolic disease  Z86.39 Vitamin B12      14. Body mass index (BMI) 30.0-30.9, adult  Z68.30 Vitamin D 25 hydroxy      15. Malignant neoplasm of upper-outer quadrant of right breast in female, estrogen receptor positive (HCC)  C50.411 Ambulatory Referral to Hematology / Oncology    Z17.0           Problem List Items Addressed This Visit        Cardiovascular and  Mediastinum    Bilateral carotid artery disease (HCC)    Will reorder vascular doppler study- had cta in 2023         Relevant Orders    VAS carotid complete study    Essential hypertension - Primary    Home  - is not rechecking bp readings  Follows with Dr. Fleming  Losartan 100 mg daily, atenolol 25 mg bid to  continue         Relevant Orders    Hemoglobin A1C    Comprehensive metabolic panel    CBC and differential    TSH, 3rd generation with Free T4 reflex    Nonrheumatic aortic valve insufficiency    Sees Dr. Fleming            Respiratory    Chronic bronchitis with emphysema (HCC)    Has prn albuterol         Mucopurulent chronic bronchitis (HCC)    Obstructive sleep apnea    Stopped using cpap after  dog did not tolerate it in the house         Relevant Orders    Hemoglobin A1C    Comprehensive metabolic panel    CBC and differential    TSH, 3rd generation with Free T4 reflex    Iron Panel (Includes Ferritin, Iron Sat%, Iron, and TIBC)    Vitamin B12    Vitamin D 25 hydroxy    Ambulatory Referral to Pulmonology       Nervous and Auditory    Other vascular myelopathies (HCC)       Immune and Lymphatic    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)       Oncology    Malignant neoplasm of upper-outer quadrant of right breast in female, estrogen receptor positive (HCC)    Relevant Orders    Ambulatory Referral to Hematology / Oncology       Other    Chronic fatigue    Feels worsening fatigue in past 6 months- walks dog and naps in am and pm at times         Relevant Orders    Hemoglobin A1C    Comprehensive metabolic panel    CBC and differential    TSH, 3rd generation with Free T4 reflex    Iron Panel (Includes Ferritin, Iron Sat%, Iron, and TIBC)    Vitamin B12    Vitamin D 25 hydroxy   Other Visit Diagnoses       Prediabetes        Relevant Orders    Hemoglobin A1C    Comprehensive metabolic panel      Malignant neoplasm of upper-outer quadrant of female breast,  unspecified estrogen receptor status, unspecified laterality (HCC)        Relevant Orders    Iron Panel (Includes Ferritin, Iron Sat%, Iron, and TIBC)      Personal history of other endocrine, nutritional and metabolic disease        Relevant Orders    Vitamin B12      Body mass index (BMI) 30.0-30.9, adult        Relevant Orders    Vitamin D 25 hydroxy            No follow-ups on file.      Subjective:    Patient ID: Siobhan Raymond is a 80 y.o. female    Here for 6 months appt  having severe fatigue  Had graeme testing many years a go but did not stay on cpap- will refer to pulmonary team- had had hosptial stay at [facility] over [facility] years ago with severe pneumonia and was on 3 months of  oxygen   Hx of dense breasts- had recent mammogram        The following portions of the patient's history were reviewed and updated as appropriate: allergies, current medications and problem list.     Review of Systems   HENT:  Negative for congestion.    Respiratory:  Negative for shortness of breath.    Cardiovascular:  Negative for leg swelling.   Gastrointestinal:  Negative for abdominal distention.   All other systems reviewed and are negative.        Objective:      Current Outpatient Medications:   •  albuterol (Proventil HFA) 90 mcg/act inhaler, Inhale 2 puffs every 6 (six) hours as needed for wheezing, Disp: 20.1 g, Rfl: 3  •  ascorbic acid (VITAMIN C) 250 mg tablet, Take 250 mg by mouth daily, Disp: , Rfl:   •  aspirin (Aspirin 81) 81 mg EC tablet, Take 1 tablet (81 mg total) by mouth daily, Disp: 90 tablet, Rfl: 3  •  atenolol (TENORMIN) 25 mg tablet, Take 1 tablet (25 mg total) by mouth 2 (two) times a day, Disp: 180 tablet, Rfl: 3  •  Baclofen 5 MG TABS, 1tab po q day prn, Disp: 30 tablet, Rfl: 2  •  Cholecalciferol (VITAMIN D3) 2000 units capsule, Take by mouth daily, Disp: , Rfl:   •  clonazePAM (KlonoPIN) 0.5 mg tablet, Take 1 tablet (0.5 mg total) by mouth 2 (two) times a day as needed for anxiety, Disp: 60 tablet, Rfl:  "0  •  co-enzyme Q-10 30 MG capsule, Take 30 mg by mouth daily , Disp: , Rfl:   •  dicyclomine (BENTYL) 10 mg capsule, TAKE 2 CAPSULES BY MOUTH  TWO TIMES A DAY, Disp: 360 capsule, Rfl: 12  •  DULoxetine (CYMBALTA) 30 mg delayed release capsule, Take 1 capsule by mouth 2 times a day, Disp: 180 capsule, Rfl: 1  •  fenofibrate (TRICOR) 48 mg tablet, TAKE 1 TABLET BY MOUTH IN THE  MORNING, Disp: 90 tablet, Rfl: 3  •  fluticasone (FLONASE) 50 mcg/act nasal spray, 2 sprays into each nostril daily, Disp: 18 mL, Rfl: 3  •  losartan (COZAAR) 100 MG tablet, Take 1 tablet (100 mg total) by mouth daily, Disp: 90 tablet, Rfl: 3  •  magnesium oxide (MAG-OX) 400 mg, Take 400 mg by mouth every other day , Disp: , Rfl:   •  multivitamin (THERAGRAN) TABS, Take 1 tablet by mouth daily, Disp: , Rfl:   •  Omega 3 1000 MG CAPS, Take by mouth daily, Disp: , Rfl:   •  predniSONE 10 mg tablet, 30 mg by mouth daily for 3 days, then 20 mg by mouth daily for 3 days, then 10 mg by mouth daily for 3 days, then stop, Disp: 18 tablet, Rfl: 0  •  Probiotic Product (PROBIOTIC-10 PO), Take by mouth daily, Disp: , Rfl:   •  rosuvastatin (CRESTOR) 5 mg tablet, TAKE 1 TABLET BY MOUTH 3  TIMES WEEKLY, Disp: 39 tablet, Rfl: 3  •  benzonatate (TESSALON) 200 MG capsule, Take 1 capsule (200 mg total) by mouth 3 (three) times a day as needed for cough (Patient not taking: Reported on 2/28/2025), Disp: 20 capsule, Rfl: 0    Blood pressure 142/80, pulse 78, height 5' 3\" (1.6 m), weight 74.8 kg (165 lb), SpO2 98%.     Physical Exam  Vitals and nursing note reviewed.   Constitutional:       General: She is not in acute distress.  HENT:      Head: Normocephalic.      Right Ear: There is no impacted cerumen.      Mouth/Throat:      Pharynx: No posterior oropharyngeal erythema.   Eyes:      General:         Right eye: No discharge.         Left eye: No discharge.   Cardiovascular:      Rate and Rhythm: Normal rate and regular rhythm.      Heart sounds: No murmur " heard.  Pulmonary:      Breath sounds: No wheezing or rhonchi.   Abdominal:      General: There is no distension.      Tenderness: There is no abdominal tenderness.   Skin:     Findings: No erythema.   Neurological:      Mental Status: She is alert.      Motor: No weakness.   Psychiatric:         Mood and Affect: Mood normal.         Behavior: Behavior normal.

## 2025-03-03 ENCOUNTER — DOCUMENTATION (OUTPATIENT)
Dept: HEMATOLOGY ONCOLOGY | Facility: CLINIC | Age: 80
End: 2025-03-03

## 2025-03-03 NOTE — PROGRESS NOTES
Referral to medical oncology received.  Chart reviewed by oncology nurse navigator at this time.      Diagnosis: h/o breast cancer.  Patient already established and seen by med onc.  Has next appointment scheduled for 11/2025.  Two messages sent to the referring provider.  Referral closed as a duplicate.  Provided number for [facility] if patient needs to schedule a sooner follow up visit.

## 2025-03-20 ENCOUNTER — HOSPITAL ENCOUNTER (OUTPATIENT)
Dept: RADIOLOGY | Facility: HOSPITAL | Age: 80
End: 2025-03-20
Payer: MEDICARE

## 2025-03-20 ENCOUNTER — HOSPITAL ENCOUNTER (OUTPATIENT)
Dept: NON INVASIVE DIAGNOSTICS | Facility: HOSPITAL | Age: 80
Discharge: HOME | End: 2025-03-20
Attending: INTERNAL MEDICINE
Payer: MEDICARE

## 2025-03-20 DIAGNOSIS — I65.23 BILATERAL CAROTID ARTERY STENOSIS: ICD-10-CM

## 2025-03-20 PROCEDURE — 93880 EXTRACRANIAL BILAT STUDY: CPT | Performed by: SURGERY

## 2025-03-20 PROCEDURE — 93880 EXTRACRANIAL BILAT STUDY: CPT

## 2025-03-21 ENCOUNTER — RESULTS FOLLOW-UP (OUTPATIENT)
Dept: FAMILY MEDICINE CLINIC | Facility: HOSPITAL | Age: 80
End: 2025-03-21

## 2025-04-01 DIAGNOSIS — F41.9 ANXIETY HYPERVENTILATION: ICD-10-CM

## 2025-04-01 DIAGNOSIS — F45.8 ANXIETY HYPERVENTILATION: ICD-10-CM

## 2025-04-01 NOTE — TELEPHONE ENCOUNTER
Reason for call:   [x] Refill   [] Prior Auth  [] Other:     Office:   [x] PCP/Provider - [facility]  [] Specialty/Provider -     Medication: Clonazepam    Dose/Frequency: 0.5mg                One two times daily as needed    Quantity: 60    Pharmacy: [facility]   Does the patient have enough for 3 days?   [x] Yes   [] No - Send as HP to POD    Mail Away Pharmacy   Does the patient have enough for 10 days?   [] Yes   [] No - Send as HP to POD

## 2025-04-02 RX ORDER — CLONAZEPAM 0.5 MG/1
0.5 TABLET ORAL 2 TIMES DAILY PRN
Qty: 60 TABLET | Refills: 0 | Status: SHIPPED | OUTPATIENT
Start: 2025-04-02

## 2025-04-10 ENCOUNTER — OFFICE VISIT (OUTPATIENT)
Dept: NEUROLOGY | Facility: CLINIC | Age: 80
End: 2025-04-10
Payer: MEDICARE

## 2025-04-10 VITALS
HEART RATE: 76 BPM | HEIGHT: 63 IN | SYSTOLIC BLOOD PRESSURE: 124 MMHG | BODY MASS INDEX: 28.7 KG/M2 | WEIGHT: 162 LBS | TEMPERATURE: 97.8 F | DIASTOLIC BLOOD PRESSURE: 82 MMHG

## 2025-04-10 DIAGNOSIS — G62.9 NEUROPATHY: ICD-10-CM

## 2025-04-10 DIAGNOSIS — G50.0 TRIGEMINAL NEURALGIA OF RIGHT SIDE OF FACE: Primary | ICD-10-CM

## 2025-04-10 PROCEDURE — 99214 OFFICE O/P EST MOD 30 MIN: CPT | Performed by: PSYCHIATRY & NEUROLOGY

## 2025-04-10 NOTE — PROGRESS NOTES
Name: Siobhan Raymond      : 1945      MRN: [id number]  Encounter Provider: Diya Zhang MD  Encounter Date: 4/10/2025   [facility]  :  Assessment & Plan  Trigeminal neuralgia of right side of face  Patient here today for neuro follow-up.  Patient last seen in December.  Patient notes no exacerbation of her facial pain.  Patient has had no bouts since her last visit.  Patient notes on rare occasion while washing make-up off of her face or using a make-up wipe she will have a brief sensation particularly in the right V2 distribution which resolves as soon as touch stimulus is removed.  Patient feels lids are smaller on her eyes.  Reviewed with patient unlikely related to her trigeminal neuralgia.  Patient appears to have put it lids.  Patient reports she was told this by her eye doctor in years past and even was recommended to see a surgeon for lid elevation.  Patient had declined at prior time.  Positive temporal artery pulses bilaterally.  Symmetrical facial muscles of expression.  No headache nausea or vomiting.  No change in vision.  No double vision  Patient has been unable to tolerate gabapentin in the past.  Patient is not interested in any medication since she is doing very well symptomatically.  Neuroexam is stable.           Neuropathy  Patient here today for neuro follow-up.  Patient last seen in December.  Patient notes no new neuropathic symptoms.  Patient denies any nocturnal symptoms.  Patient with 2 falls since last visit while patient was gardening.  Patient tripped on the lip of her step going into the garage while carrying plants.  Patient also had other fall in the garden.  No head injury.  Reminded patient of fall safety precautions.  Gave patient several suggestions of how to still work with her plants without carrying them as she needs to use her cane at all times.  Neuroexam stable.  Follow-up in 6 months.               History of  "Present Illness   HPI   Pt is a 79 yo f with pmh of TRAE, HTN, right breast camcer s/p surgery including lymph nodes and radiation- dx about one year ago.  Pt presents today for balance issue. Pt last seen in 12/13/24. Per my last note \"Initial consult for muscle tightness and joint pain.  Pt with new onset joint pain over past month ever since new change in cancer meds. Pt initially seen on 8/10/23.  Pt notes she was unable to tolerate anatrozole due to confusion. Pt was then placed on letrazole more recently and just told to stop med due to extreme joint pain. Pt notes significant issues over the past several weeks. Pt notes pain in right shoulder even to slight movement, also in right wrist, both knees and left ankle. Pt with history of low back surgery by [facility] about 7 yrs ago and still needed ablation therapy after surgery.  No [facility] records in [facility] and pt not know name of surgeon or location for mri lumbar imaging. Pt recalls it had to be at a specific location but unsure name. Pt is also not sure if back hardware is mri compatible, no card available.   Pt notes int falls.  Pt notes diff with ambulation for years.  Pt using cane for any distances over past 2 yrs.  In home or in garden she does not use,  Pt seen by dr bennett for her right knee in past. Pt notes since being on letrozole, cracking and crunching sensation in her joints.  Pt was going to PT for balance therapy and recalls issues with left knee at that time.  When she got home pt had been going down steps and had horrible severe pain and now after one week , still unable to bear full weight on leg.   Pt declined er visit. Strongly recommended er eval for imaging and ortho eval. Pt declined.  Will send referal to ortho and notify pcp as well. Pt here is same building as dr bennett,.  rec pt to go to office and see about fit in appt this week due to new knee issue.    Pt also needs to follow up with dr powell re pain in joints.   On exam pt with " evidence of some apraxia on left side.  Strongly rec mri head padmini with cancer history , but pt unsure if ok to get imaging due to her hardware.  Patient currently remains off of chemotherapy as she has had difficulty tolerating some prior agents over the past year.  Patient is following closely with hematology and oncology.  Patient has upcoming mammogram and DEXA scan and follow-up in November with her team.  At timing of last appointment, patient had complained of some acute right eye pain.  She has been following with the ophthalmologist since December 2023.  I also completed some additional workup including an MRA of the head dated January 20, 2024.  MRA head no high-grade stenosis no aneurysm or focal occlusion.  Patient also had a sed rate and CRP done in January with a sed rate of 9 and a CRP of 1.9.  Patient denies any temporal pain.  Patient denies any visual loss.  Patient denies any visual obscurations or jaw pain.  Patient complains of occasional tightness in the V2 distribution.  No rash or shingles in this area.  No change with hearing.  No vesicles in the ear.  No facial weakness.  Patient also had a temporal artery Dopplers done on April 25, 2024.  No evidence of giant cell arteritis bilaterally.  Studies were reviewed in depth with patient.  Patient is currently asymptomatic in the face.  No eye pain today.  Patient has also been following Dr. Gallegos for orthopedic issues particularly her knees.  Patient also with some intermittent coughing with back of throat feeling like she has phlegm or mucus.  Patient is going to be checking with her primary care doctor to review this complaint.  No fever or chills.  Patient occasionally feels some sinus pressure.  No headache nausea or vomiting.  No falls or trips.  No change in bowel or bladder.  Patient also with known dry eyes.  Patient had prior Lyme and Sjogren testing negative in past years.  Paraneoplastic serology was also negative back in August  "2023.  Overall patient is doing well from her neuropathy.  Currently on no chemotherapeutic agents.  Patient notes occasional pain of pain along the scalp line at V1 as well as nasally at V2.  Patient has had occasional symptoms in the cheekbone area as well.  Patient has been seen by ENT for her throat and trigeminal neuralgia was also considered to diagnosis by that specialtyv too.  Patient is not specifically looking for medication as episodes are very short-lived usually seconds.  However she did have 1 bout that lasted up to 2 minutes.  Reviewed with patient medication options for trigeminal neuralgia with the right face.  Patient has been tried by her PCP on gabapentin in the past.  Patient did have a reaction to the medication and does not wish to ever be on the medicine again.  Patient felt on edge and itchy.\"  Patient here today for neuro follow-up.  Patient last seen on December 13, 2024.  Patient is follow-up for her right-sided trigeminal neuralgia as well as neuropathy.  Patient notes her trigeminal neuralgia is doing very well.  No recent flares or bout of pain in the face.  Patient denies any electricity or lightening bolt sensation.  Patient denies any facial numbness.  Patient notes rare times of trigger due to tactile stimuli i.e. washcloth or make-up wipe touching the right side of the cheek.  Patient notes brief sensitivity but once this stimulus is removed symptoms resolved.  Patient notes no other symptoms.  Patient remains not interested in any medications since she is doing very well from a neuropathic standpoint.  No issues with eating or chewing.  Patient with complaint of eyelids being smaller.  Patient appears to have more hooded eyelid.  Patient's mother had the same.  Looked at patient's license as well and this has been longstanding.  When I mentioned this to the patient she also remembered her ophthalmologist several years ago recommending consideration for surgery to elevate her " lids.  Patient is overdue to see ophthalmology and will also bring this up at her next visit.  No loss of vision.  No eye pain no eye pressure.  No visual obscurations.  We reviewed prior studies done with patient including MRA head temporal artery Dopplers and MRI head.  Patient also had multiple sed rate and CRP levels in the past all normal.  Patient notes longstanding tinnitus.  Patient has had a prior study with attention to the internal auditory canals which was unremarkable.  Patient strongly recommended to follow-up with ENT.  Patient's MRI from September 1, 2023 internal auditory canals revealed no cerebellopontine angle mass or abnormal enhancement.  Patient has just recently seen Dr. Denton back in the summer 2024 from ENT.  Patient was having mucus and nasal drainage.  Recommend patient to touch base with ENT due to longstanding history i.e. over 10 years of bilateral tinnitus.  From a neuropathy standpoint, patient remains stable.  No new neuropathic features.  No new nocturnal symptoms.  Very rare numbness and tingling in the feet bilaterally.  Patient again reminded of fall safety precautions.  Patient has done balance and physical therapy numerous times.  Patient is not interested in further sessions.  The following portions of the patient's history were reviewed and updated as appropriate: allergies, current medications, past family history, past medical history, past social history, past surgical history, and problem list and med rec and ros rev.      Review of Systems   Constitutional:  Negative for appetite change, fatigue and fever.   HENT:  Positive for tinnitus. Negative for hearing loss, trouble swallowing and voice change.         Per patient has ringing every day but severity varies   Eyes: Negative.  Negative for photophobia, pain and visual disturbance.   Respiratory: Negative.  Negative for shortness of breath.    Cardiovascular: Negative.  Negative for palpitations.   Gastrointestinal:  "Negative.  Negative for nausea and vomiting.   Endocrine: Negative.  Negative for cold intolerance.   Genitourinary: Negative.  Negative for dysuria, frequency and urgency.   Musculoskeletal:  Positive for gait problem. Negative for back pain, myalgias, neck pain and neck stiffness.        Fell twice since last visit, was not using her cane. No ER/LOC   Skin: Negative.  Negative for rash.   Allergic/Immunologic: Negative.    Neurological:  Negative for dizziness, tremors, seizures, syncope, facial asymmetry, speech difficulty, weakness, light-headedness, numbness and headaches.   Hematological: Negative.  Does not bruise/bleed easily.   Psychiatric/Behavioral: Negative.  Negative for confusion, hallucinations and sleep disturbance.    All other systems reviewed and are negative.   I have personally reviewed the MA's review of systems and made changes as necessary.         Objective   /82   Pulse 76   Temp 97.8 °F (36.6 °C)   Ht 5' 3\" (1.6 m)   Wt 73.5 kg (162 lb)   LMP  (LMP Unknown)   BMI 28.70 kg/m²     Physical Exam  Constitutional:       General: She is not in acute distress.     Appearance: She is not ill-appearing.   Eyes:      General: Lids are normal.      Extraocular Movements: Extraocular movements intact.      Pupils: Pupils are equal, round, and reactive to light.   Musculoskeletal:      Right lower leg: No edema.      Left lower leg: No edema.   Neurological:      Mental Status: She is alert.      Motor: Motor strength is normal.     Deep Tendon Reflexes: Reflexes are normal and symmetric.   Psychiatric:         Speech: Speech normal.       Neurological Exam  Mental Status  Alert. Recent and remote memory are intact. Speech is normal. Language is fluent with no aphasia. Attention and concentration are normal. Fund of knowledge is appropriate for level of education.    Cranial Nerves  CN II: Visual acuity is normal. Visual fields full to confrontation.  CN III, IV, VI: Extraocular movements " intact bilaterally. Normal lids and orbits bilaterally. Pupils equal round and reactive to light bilaterally.  CN V: Facial sensation is normal.  CN VII: Full and symmetric facial movement.  CN VIII: Hearing is normal.  CN IX, X: Palate elevates symmetrically. Normal gag reflex.  CN XI: Shoulder shrug strength is normal.  CN XII: Tongue midline without atrophy or fasciculations.    Motor  Normal muscle bulk throughout. Normal muscle tone. No abnormal involuntary movements. Strength is 5/5 throughout all four extremities.    Sensory  Dec vib mychal lowers.    Reflexes  Deep tendon reflexes are 2+ and symmetric in all four extremities.    Coordination  Right: Finger-to-nose normal. Rapid alternating movement normal.Left: Finger-to-nose normal. Rapid alternating movement normal.    Gait  Casual gait: Wide stance.  Using cane.

## 2025-04-10 NOTE — ASSESSMENT & PLAN NOTE
Patient here today for neuro follow-up.  Patient last seen in December.  Patient notes no new neuropathic symptoms.  Patient denies any nocturnal symptoms.  Patient with 2 falls since last visit while patient was gardening.  Patient tripped on the lip of her step going into the garage while carrying plants.  Patient also had other fall in the garden.  No head injury.  Reminded patient of fall safety precautions.  Gave patient several suggestions of how to still work with her plants without carrying them as she needs to use her cane at all times.  Neuroexam stable.  Follow-up in 6 months.

## 2025-04-10 NOTE — ASSESSMENT & PLAN NOTE
Patient here today for neuro follow-up.  Patient last seen in December.  Patient notes no exacerbation of her facial pain.  Patient has had no bouts since her last visit.  Patient notes on rare occasion while washing make-up off of her face or using a make-up wipe she will have a brief sensation particularly in the right V2 distribution which resolves as soon as touch stimulus is removed.  Patient feels lids are smaller on her eyes.  Reviewed with patient unlikely related to her trigeminal neuralgia.  Patient appears to have put it lids.  Patient reports she was told this by her eye doctor in years past and even was recommended to see a surgeon for lid elevation.  Patient had declined at prior time.  Positive temporal artery pulses bilaterally.  Symmetrical facial muscles of expression.  No headache nausea or vomiting.  No change in vision.  No double vision  Patient has been unable to tolerate gabapentin in the past.  Patient is not interested in any medication since she is doing very well symptomatically.  Neuroexam is stable.

## 2025-04-11 ENCOUNTER — OFFICE VISIT (OUTPATIENT)
Dept: OBGYN CLINIC | Facility: CLINIC | Age: 80
End: 2025-04-11
Payer: MEDICARE

## 2025-04-11 VITALS — WEIGHT: 162 LBS | HEIGHT: 63 IN | BODY MASS INDEX: 28.7 KG/M2

## 2025-04-11 DIAGNOSIS — E78.00 PURE HYPERCHOLESTEROLEMIA: ICD-10-CM

## 2025-04-11 DIAGNOSIS — M76.32 ILIOTIBIAL BAND SYNDROME OF LEFT SIDE: ICD-10-CM

## 2025-04-11 DIAGNOSIS — M70.62 TROCHANTERIC BURSITIS OF LEFT HIP: Primary | ICD-10-CM

## 2025-04-11 PROCEDURE — 99213 OFFICE O/P EST LOW 20 MIN: CPT | Performed by: STUDENT IN AN ORGANIZED HEALTH CARE EDUCATION/TRAINING PROGRAM

## 2025-04-11 PROCEDURE — 20610 DRAIN/INJ JOINT/BURSA W/O US: CPT | Performed by: STUDENT IN AN ORGANIZED HEALTH CARE EDUCATION/TRAINING PROGRAM

## 2025-04-11 RX ADMIN — LIDOCAINE HYDROCHLORIDE 4 ML: 5 INJECTION, SOLUTION INFILTRATION; PERINEURAL at 14:45

## 2025-04-11 RX ADMIN — TRIAMCINOLONE ACETONIDE 40 MG: 40 INJECTION, SUSPENSION INTRA-ARTICULAR; INTRAMUSCULAR at 14:45

## 2025-04-11 NOTE — PROGRESS NOTES
Hip Follow-up Office Note    Assessment:     1. Iliotibial band syndrome of left side    2. Trochanteric bursitis of left hip          Plan:     Problem List Items Addressed This Visit    None  Visit Diagnoses         Iliotibial band syndrome of left side    -  Primary      Trochanteric bursitis of left hip                 Findings consistent with left trochanteric bursitis, IT band syndrome, asymptomatic hip arthritis. Patient was given left trochanteric bursa CSI today, tolerated procedure well,cold compress today. Repeat in 3-4 months if needed. Maintain HEP as shown by PT.  Daily stretching recommended. She can apply topical cream to hip which she applies to her knee's.  All patients questions answered to her satisfaction. See back as needed.    Subjective:     Patient ID: Siobhan Raymnod is a 80 y.o. female.  Chief Complaint:  HPI:  80 y.o. female in for follow up of her left hip. Seen in October and main issues trochanteric bursitis and declined CSI. She did physical therapy but feels this really didn't help her. She continues to note pain lateral aspect of hip worse with gardening, laying on left side, getting up from seated positions. Pain goes down lateral thigh to knee and not below. Denies any groin pain. Using oral medications as needed for pain.   She does have history of lumbar fusion, 2 ablations due to radiating pain down her legs. At night time she gets pain radiating to her foot and once she gets up and walks it resolves. She does using walking sticks to ambulate due to gait imbalance.       Allergy:  Allergies   Allergen Reactions    Antihistamines, Diphenhydramine-Type     Arimidex [Anastrozole] Confusion    Bupropion     Clarithromycin     Codeine Other (See Comments)     increased HR    Hives    Gabapentin     Meloxicam Nausea Only and Visual Disturbance     Other reaction(s): Numbness  Action Taken: med stopped.; Category: Allergy;     Ondansetron     Pravastatin     Propoxyphene Other (See  Comments)     increased HR    Hives     Medications:  all current active meds have been reviewed  Past Medical History:  Past Medical History:   Diagnosis Date    Anxiety     Anxiety disorder     Arthralgia     Arthritis     Asthma     Basal cell carcinoma     Breast cancer (HCC)     Right    CAD (coronary artery disease)     Chronic lumbar radiculopathy     last assessed: 12/20/16    Chronic respiratory failure (HCC)     Colon cancer screening 04/11/2019    Last colonoscopy 2014-15    Depression     Dysfunction of eustachian tube     Dyslipidemia     Emphysema lung (HCC)     Fatigue     High cholesterol     HTN (hypertension) 10/29/2014    Hypercholesterolemia     Hypertension     Ileus of unspecified type (HCC)     Irritable bowel syndrome with diarrhea 04/11/2019    Lumbosacral stenosis     last assessed: 9/20/16    TRAE (obstructive sleep apnea)     Pancreatitis     resolved: 9/20/17    Pneumonia     Post-operative nausea and vomiting     PTSD (post-traumatic stress disorder)      Past Surgical History:  Past Surgical History:   Procedure Laterality Date    BREAST BIOPSY Right 09/12/2022    [facility]    BREAST LUMPECTOMY Right 10/21/2022    Procedure: LUMPECTOMY BREAST FLORENCIO;  Surgeon: Inés Singh MD;  Location: [facility];  Service: Surgical Oncology    CHOLECYSTECTOMY      EGD  04/15/2019    GALLBLADDER SURGERY      HYSTERECTOMY      pt thinks she still has one ovary, done over 20 yrs ago    LUMBAR SPINE SURGERY  12/2016    3, 4, 5    LYMPH NODE BIOPSY Right 10/21/2022    Procedure: SLN BX, lymphoscintigraphy;  Surgeon: Inés Singh MD;  Location: [facility];  Service: Surgical Oncology    MOUTH SURGERY      MULTIPLE TOOTH EXTRACTIONS N/A 2018    OOPHORECTOMY Bilateral     1 1/2 ovaries removed    TONSILLECTOMY      US BREAST CLIP NEEDLE LOC RIGHT Right 09/12/2022    US GUIDED BREAST BIOPSY RIGHT COMPLETE Right 09/12/2022     Family History:  Family History   Problem Relation Age of Onset    Breast cancer Mother 60     Depression Mother         panic attacks    Lung cancer Mother     Mental illness Mother     Substance Abuse Mother         CIGS    Coronary artery disease Father         high # of paternal family members  in their 50s    Substance Abuse Father         CIGS    Depression Sister         panic attacks    Hypertension Sister     Breast cancer Daughter 50        CHEK2 positve    Breast cancer Maternal Aunt 40    Lung cancer Maternal Aunt     Ovarian cancer Maternal Grandmother 70    Depression Other         panic attacks    Colon cancer Other         age at dx unk    Heart disease Family     Hypertension Family     Colon polyps Neg Hx      Social History:  Social History     Substance and Sexual Activity   Alcohol Use Yes    Alcohol/week: 4.0 standard drinks of alcohol    Types: 4 Glasses of wine per week    Comment: 4 drinks every night     Social History     Substance and Sexual Activity   Drug Use Yes    Frequency: 7.0 times per week    Types: Marijuana    Comment: uses topically daily- last used 10/20     Social History     Tobacco Use   Smoking Status Never   Smokeless Tobacco Never           ROS:  General: Per HPI  Skin: Negative, except if noted below  HEENT: Negative  Respiratory: Negative  Cardiovascular: Negative  Gastrointestinal: Negative  Urinary: Negative  Vascular: Negative  Musculoskeletal: Positive per HPI   Neurologic: Positive per HPI  Endocrine: Negative    Objective:  BP Readings from Last 1 Encounters:   04/10/25 124/82      Wt Readings from Last 1 Encounters:   25 73.5 kg (162 lb)        Respiratory:   non-labored respirations    Lymphatics:  no palpable lymph nodes    Gait and Station:   antalgic    Neurologic:   Alert and oriented times x3  Patient with normal sensation except as noted below  Deep tendon reflexes 2+ except as noted in MSK exam    Left Hip     Inspection: intact     Range of Motion: no anterior/groin pain with passive motion of hip    -Trendelenburg sign    + TTP  "trochanteric bursa and IT band    Motor: 5/5 Q/HS/TA/GS/P    Pulses: 2+ DP / 2+ PT    SILT DP/SP/S/S/TN      Imaging:  My interpretation XR AP pelvis/ left  hip: mild joint space narrowing, subchondral sclerosis, subchondral cysts, osteophyte formation. No fracture or dislocation.     BMI:   Estimated body mass index is 28.7 kg/m² as calculated from the following:    Height as of this encounter: 5' 3\" (1.6 m).    Weight as of this encounter: 73.5 kg (162 lb).  BSA:   Estimated body surface area is 1.77 meters squared as calculated from the following:    Height as of this encounter: 5' 3\" (1.6 m).    Weight as of this encounter: 73.5 kg (162 lb).     Large joint arthrocentesis: L greater trochanteric bursa  Universal Protocol:  Consent: Verbal consent obtained.  Risks and benefits: risks, benefits and alternatives were discussed  Consent given by: patient  Patient understanding: patient states understanding of the procedure being performed  Supporting Documentation  Indications: pain   Procedure Details  Location: hip - L greater trochanteric bursa  Preparation: Patient was prepped and draped in the usual sterile fashion  Needle size: 22 G  Ultrasound guidance: no  Approach: lateral  Medications administered: 4 mL lidocaine 0.5 %; 40 mg triamcinolone acetonide 40 mg/mL    Patient tolerance: patient tolerated the procedure well with no immediate complications  Dressing:  Sterile dressing applied                Scribe Attestation      I,:  Olu Smith am acting as a scribe while in the presence of the attending physician.:       I,:  Grey Pace DO personally performed the services described in this documentation    as scribed in my presence.:            "

## 2025-04-12 RX ORDER — TRIAMCINOLONE ACETONIDE 40 MG/ML
40 INJECTION, SUSPENSION INTRA-ARTICULAR; INTRAMUSCULAR
Status: COMPLETED | OUTPATIENT
Start: 2025-04-11 | End: 2025-04-11

## 2025-04-12 RX ORDER — LIDOCAINE HYDROCHLORIDE 5 MG/ML
4 INJECTION, SOLUTION INFILTRATION; PERINEURAL
Status: COMPLETED | OUTPATIENT
Start: 2025-04-11 | End: 2025-04-11

## 2025-04-14 DIAGNOSIS — E78.00 PURE HYPERCHOLESTEROLEMIA: ICD-10-CM

## 2025-04-15 RX ORDER — FENOFIBRATE 48 MG/1
48 TABLET, FILM COATED ORAL EVERY MORNING
Qty: 90 TABLET | Refills: 1 | Status: SHIPPED | OUTPATIENT
Start: 2025-04-15

## 2025-04-16 RX ORDER — ATENOLOL 25 MG/1
25 TABLET ORAL 2 TIMES DAILY
Qty: 180 TABLET | Refills: 3 | Status: SHIPPED | OUTPATIENT
Start: 2025-04-16

## 2025-04-22 NOTE — PATIENT INSTRUCTIONS
"----- Message from Pharmacist Ana sent at 4/22/2025 12:11 PM CDT -----  Regarding: Repatha  Hello,We fill Repatha for Mr. French here at [facility]. He has reported some side effects he believes to be related to the medication. He is reporting fatigue and slight body aches. Reports no issues after his 1st and 2nd dose of Repatha. But after his 3rd dose [4/3] he reported feeling like he had "the flu or covid" stating fatigue, increased sleep, and body aches. States it got a little better and then after his 4th dose [4/19] he again experienced similar symptoms. At this time, he has declined to set up refill as he is waiting to hear from you in regards to this. I informed him flu-like symptoms have been reported after starting Repatha and typically resolve after a few doses. Please let us know how you wish to proceed.Thanks,Ana Kearns, PharmDClinical PharmacistOchsner Speciality Pharmacy Phone: [phone]  " Continue Bentyl 20 mg at bedtime  Try Levsin under the tongue every 6 hours as needed for intermittent severe right upper quadrant pain  Continue magnesium every other day  Start fiber supplementation daily    Continue Protonix as needed <-- Click to add NO significant Past Surgical History

## 2025-05-01 ENCOUNTER — APPOINTMENT (OUTPATIENT)
Dept: RADIOLOGY | Facility: CLINIC | Age: 80
End: 2025-05-01
Attending: STUDENT IN AN ORGANIZED HEALTH CARE EDUCATION/TRAINING PROGRAM
Payer: MEDICARE

## 2025-05-01 ENCOUNTER — OFFICE VISIT (OUTPATIENT)
Dept: OBGYN CLINIC | Facility: CLINIC | Age: 80
End: 2025-05-01
Payer: MEDICARE

## 2025-05-01 VITALS — HEIGHT: 63 IN | BODY MASS INDEX: 28.7 KG/M2 | WEIGHT: 162 LBS

## 2025-05-01 DIAGNOSIS — M19.011 PRIMARY OSTEOARTHRITIS OF RIGHT SHOULDER: ICD-10-CM

## 2025-05-01 DIAGNOSIS — M67.911 DYSFUNCTION OF RIGHT ROTATOR CUFF: ICD-10-CM

## 2025-05-01 DIAGNOSIS — M67.911 DYSFUNCTION OF RIGHT ROTATOR CUFF: Primary | ICD-10-CM

## 2025-05-01 PROCEDURE — 73030 X-RAY EXAM OF SHOULDER: CPT

## 2025-05-01 PROCEDURE — 20610 DRAIN/INJ JOINT/BURSA W/O US: CPT | Performed by: STUDENT IN AN ORGANIZED HEALTH CARE EDUCATION/TRAINING PROGRAM

## 2025-05-01 PROCEDURE — 99214 OFFICE O/P EST MOD 30 MIN: CPT | Performed by: STUDENT IN AN ORGANIZED HEALTH CARE EDUCATION/TRAINING PROGRAM

## 2025-05-01 RX ORDER — FENOFIBRATE 48 MG/1
TABLET, FILM COATED ORAL
COMMUNITY
End: 2025-05-01

## 2025-05-01 RX ORDER — BUPIVACAINE HYDROCHLORIDE 2.5 MG/ML
4 INJECTION, SOLUTION INFILTRATION; PERINEURAL
Status: COMPLETED | OUTPATIENT
Start: 2025-05-01 | End: 2025-05-01

## 2025-05-01 RX ORDER — TRIAMCINOLONE ACETONIDE 40 MG/ML
40 INJECTION, SUSPENSION INTRA-ARTICULAR; INTRAMUSCULAR
Status: COMPLETED | OUTPATIENT
Start: 2025-05-01 | End: 2025-05-01

## 2025-05-01 RX ORDER — CHOLECALCIFEROL (VITAMIN D3) 10(400)/ML
DROPS ORAL
COMMUNITY

## 2025-05-01 RX ADMIN — TRIAMCINOLONE ACETONIDE 40 MG: 40 INJECTION, SUSPENSION INTRA-ARTICULAR; INTRAMUSCULAR at 13:00

## 2025-05-01 RX ADMIN — BUPIVACAINE HYDROCHLORIDE 4 ML: 2.5 INJECTION, SOLUTION INFILTRATION; PERINEURAL at 13:00

## 2025-05-01 NOTE — PROGRESS NOTES
"ORTHO CARE [facility] [place]  [address]  [facility] [address]  [facility] [address]  [phone]       Siobhan Raymond  [id number]  1945    ORTHOPAEDIC SURGERY OUTPATIENT NOTE  5/1/2025  Assessment & Plan  Dysfunction of right rotator cuff  Date of Injury: Fall approximately 1 month ago  Discussed if her symptoms worsen or fail to improve following the cortisone injection and outpatient physical therapy we will consider obtaining an MRI for further evaluation  CSI of right subacromial bursa  was performed  Activity as tolerated  Begin outpatient PT for rotator cuff dysfunction  Anti-inflammatories or Tylenol prn pain  Ice and heat as needed  Return in about 2 months (around 7/1/2025) for Recheck.    Orders:    XR shoulder 2+ vw right; Future    Ambulatory referral to Physical Therapy; Future    Large joint arthrocentesis    Primary osteoarthritis of right shoulder  Activity as tolerated  Begin outpatient PT for glenohumeral joint osteoarthritis  Anti-inflammatories or Tylenol prn pain  Ice and heat as needed  Return in about 2 months (around 7/1/2025) for Recheck.    Orders:    Ambulatory referral to Physical Therapy; Future            The patient's diagnosis and treatment were discussed at length today. We discussed no treatment, non-operative treatment, and operative treatment.    Large joint arthrocentesis: R subacromial bursa  Universal Protocol:  Consent: Verbal consent obtained.  Risks and benefits: risks, benefits and alternatives were discussed  Consent given by: patient  Time out: Immediately prior to procedure a \"time out\" was called to verify the correct patient, procedure, equipment, support staff and site/side marked as required.  Timeout called at: 5/1/2025 1:49 PM.  Patient understanding: patient states understanding of the procedure being performed  Patient consent: the patient's understanding of the procedure matches consent given  Site marked: the " "operative site was marked  Patient identity confirmed: verbally with patient  Supporting Documentation  Indications: pain and diagnostic evaluation     Is this a Visco injection? NoProcedure Details  Location: shoulder - R subacromial bursa  Preparation: Patient was prepped and draped in the usual sterile fashion  Needle size: 22 G  Ultrasound guidance: no  Approach: posterior  Medications administered: 4 mL bupivacaine 0.25 %; 40 mg triamcinolone acetonide 40 mg/mL    Patient tolerance: patient tolerated the procedure well with no immediate complications  Dressing:  Sterile dressing applied             Translation: N/A    HISTORY:  80 y.o. female  who is right hand dominant and whose occupation is retired. Referred to me by Dr. Pace, seen in clinic for evaluation of right shoulder pain.  She states she has been having ongoing right shoulder pain for approximately 1 month after she had a fall.  She states that her pain is predominantly in the lateral aspect of her shoulder.  She states that her pain is worse with repetitive overhead motion as well as reaching out to the side.  She states that the pain has made activities of daily living difficult to perform.  She also reports weakness and pain that does keep her up at night.  She does have a history of breast cancer that was treated with radiation.  She has not attempted any cortisone injections or outpatient physical therapy in regards to her right shoulder.  She denies any prior history of injury or trauma to her shoulder.  She denies any subluxation dislocation events.  She denies any numbness or tingling.    Surgical History:  None    Previous Injection(s): none      The following portions of the patient's history were reviewed and updated as appropriate: allergies, current medications, past family history, past social history, past surgical history and problem list.    Ht 5' 3\" (1.6 m)   Wt 73.5 kg (162 lb)   LMP  (LMP Unknown)   BMI 28.70 kg/m²    Lab " Results   Component Value Date    HGBA1C 5.6 04/21/2023         Past Medical History:   Diagnosis Date    Anxiety     Anxiety disorder     Arthralgia     Arthritis     Asthma     Basal cell carcinoma     Breast cancer (HCC)     Right    CAD (coronary artery disease)     Chronic lumbar radiculopathy     last assessed: 12/20/16    Chronic respiratory failure (HCC)     Colon cancer screening 04/11/2019    Last colonoscopy 2014-15    Depression     Dysfunction of eustachian tube     Dyslipidemia     Emphysema lung (HCC)     Fatigue     High cholesterol     HTN (hypertension) 10/29/2014    Hypercholesterolemia     Hypertension     Ileus of unspecified type (HCC)     Irritable bowel syndrome with diarrhea 04/11/2019    Lumbosacral stenosis     last assessed: 9/20/16    TRAE (obstructive sleep apnea)     Pancreatitis     resolved: 9/20/17    Pneumonia     Post-operative nausea and vomiting     PTSD (post-traumatic stress disorder)        Past Surgical History:   Procedure Laterality Date    BREAST BIOPSY Right 09/12/2022    IL[facility]    BREAST LUMPECTOMY Right 10/21/2022    Procedure: LUMPECTOMY BREAST FLORENCIO;  Surgeon: Inés Singh MD;  Location: [facility];  Service: Surgical Oncology    CHOLECYSTECTOMY      EGD  04/15/2019    GALLBLADDER SURGERY      HYSTERECTOMY      pt thinks she still has one ovary, done over 20 yrs ago    LUMBAR SPINE SURGERY  12/2016    3, 4, 5    LYMPH NODE BIOPSY Right 10/21/2022    Procedure: SLN BX, lymphoscintigraphy;  Surgeon: Inés Singh MD;  Location: [facility];  Service: Surgical Oncology    MOUTH SURGERY      MULTIPLE TOOTH EXTRACTIONS N/A 2018    OOPHORECTOMY Bilateral     1 1/2 ovaries removed    TONSILLECTOMY      US BREAST CLIP NEEDLE LOC RIGHT Right 09/12/2022    US GUIDED BREAST BIOPSY RIGHT COMPLETE Right 09/12/2022       Social History     Socioeconomic History    Marital status:      Spouse name: Not on file    Number of children: Not on file    Years of education: Not on file     Highest education level: Not on file   Occupational History    Occupation: retired   Tobacco Use    Smoking status: Never    Smokeless tobacco: Never   Vaping Use    Vaping status: Never Used   Substance and Sexual Activity    Alcohol use: Yes     Alcohol/week: 4.0 standard drinks of alcohol     Types: 4 Glasses of wine per week     Comment: 4 drinks every night    Drug use: Yes     Frequency: 7.0 times per week     Types: Marijuana     Comment: uses topically daily- last used 10/20    Sexual activity: Not Currently   Other Topics Concern    Not on file   Social History Narrative    Person living alone    FEELS SAFE AT HOME    SEES DENTIST REG    HAS LIVING WILL    Wears seatbelt     Social Drivers of Health     Financial Resource Strain: Low Risk  (6/28/2023)    Overall Financial Resource Strain (CARDIA)     Difficulty of Paying Living Expenses: Not hard at all   Food Insecurity: No Food Insecurity (8/28/2024)    Nursing - Inadequate Food Risk Classification     Worried About Running Out of Food in the Last Year: Never true     Ran Out of Food in the Last Year: Never true     Ran Out of Food in the Last Year: Not on file   Transportation Needs: No Transportation Needs (8/28/2024)    PRAPARE - Transportation     Lack of Transportation (Medical): No     Lack of Transportation (Non-Medical): No   Physical Activity: Not on file   Stress: Not on file   Social Connections: Not on file   Intimate Partner Violence: Not on file   Housing Stability: Unknown (8/28/2024)    Housing Stability Vital Sign     Unable to Pay for Housing in the Last Year: No     Number of Times Moved in the Last Year: Not on file     Homeless in the Last Year: No       Family History   Problem Relation Age of Onset    Breast cancer Mother 60    Depression Mother         panic attacks    Lung cancer Mother     Mental illness Mother     Substance Abuse Mother         CIGS    Coronary artery disease Father         high # of paternal family members   in their 50s    Substance Abuse Father         CIGS    Depression Sister         panic attacks    Hypertension Sister     Breast cancer Daughter 50        CHEK2 positve    Breast cancer Maternal Aunt 40    Lung cancer Maternal Aunt     Ovarian cancer Maternal Grandmother 70    Depression Other         panic attacks    Colon cancer Other         age at dx unk    Heart disease Family     Hypertension Family     Colon polyps Neg Hx         Patient's Medications   New Prescriptions    No medications on file   Previous Medications    ALBUTEROL (PROVENTIL HFA) 90 MCG/ACT INHALER    Inhale 2 puffs every 6 (six) hours as needed for wheezing    ASCORBIC ACID (VITAMIN C) 250 MG TABLET    Take 250 mg by mouth daily    ASPIRIN (ASPIRIN 81) 81 MG EC TABLET    Take 1 tablet (81 mg total) by mouth daily    ASPIRIN 1 MG/ML SUSP        ATENOLOL (TENORMIN) 25 MG TABLET    Take 1 tablet (25 mg total) by mouth 2 (two) times a day    BACLOFEN 5 MG TABS    1tab po q day prn    CHOLECALCIFEROL (VITAMIN D) 400 UNITS/1 ML        CHOLECALCIFEROL (VITAMIN D3) 2000 UNITS CAPSULE    Take by mouth daily    CLONAZEPAM (KLONOPIN) 0.5 MG TABLET    Take 1 tablet (0.5 mg total) by mouth 2 (two) times a day as needed for anxiety    CO-ENZYME Q-10 30 MG CAPSULE    Take 30 mg by mouth daily     DICYCLOMINE (BENTYL) 10 MG CAPSULE    TAKE 2 CAPSULES BY MOUTH  TWO TIMES A DAY    DULOXETINE (CYMBALTA) 30 MG DELAYED RELEASE CAPSULE    Take 1 capsule by mouth 2 times a day    FENOFIBRATE (TRICOR) 48 MG TABLET    TAKE 1 TABLET BY MOUTH IN THE  MORNING    FLUTICASONE (FLONASE) 50 MCG/ACT NASAL SPRAY    2 sprays into each nostril daily    LOSARTAN (COZAAR) 100 MG TABLET    Take 1 tablet (100 mg total) by mouth daily    MAGNESIUM OXIDE (MAG-OX) 400 MG    Take 400 mg by mouth every other day     MULTIPLE VITAMINS-MINERALS (MULTI VITAMIN/MINERALS PO)        MULTIVITAMIN (THERAGRAN) TABS    Take 1 tablet by mouth daily    OMEGA 3 1000 MG CAPS    Take by mouth  daily    PROBIOTIC PRODUCT (PROBIOTIC-10 PO)    Take by mouth daily    ROSUVASTATIN (CRESTOR) 5 MG TABLET    TAKE 1 TABLET BY MOUTH 3  TIMES WEEKLY   Modified Medications    No medications on file   Discontinued Medications    BENZONATATE (TESSALON) 200 MG CAPSULE    Take 1 capsule (200 mg total) by mouth 3 (three) times a day as needed for cough    FENOFIBRATE (TRICOR) 48 MG TABLET        OMEGA 3 340 MG CPDR        PREDNISONE 10 MG TABLET    30 mg by mouth daily for 3 days, then 20 mg by mouth daily for 3 days, then 10 mg by mouth daily for 3 days, then stop       Allergies   Allergen Reactions    Antihistamines, Diphenhydramine-Type     Arimidex [Anastrozole] Confusion    Bupropion     Clarithromycin     Codeine Other (See Comments)     increased HR    Hives    Gabapentin     Meloxicam Nausea Only and Visual Disturbance     Other reaction(s): Numbness  Action Taken: med stopped.; Category: Allergy;     Ondansetron     Pravastatin     Propoxyphene Other (See Comments)     increased HR    Hives          REVIEW OF SYSTEMS:  Constitutional: Negative.    HEENT: Negative.    Respiratory: Negative.    Skin: Negative.    Neurological: Negative.    Psychiatric/Behavioral: Negative.  Musculoskeletal: Negative except for that mentioned in the HPI.    Gen: No acute distress, resting comfortably in bed  HEENT: Eyes clear, moist mucus membranes, hearing intact  Respiratory: No audible wheezing or stridor  Cardiovascular: Well Perfused peripherally, 2+ distal pulse  Abdomen: nondistended, no peritoneal signs     PHYSICAL EXAM:      Right Shoulder Exam  Alignment / Posture:  Normal shoulder posture.  Inspection:  No swelling. No edema. No erythema. No ecchymosis. No muscle atrophy. No deformity.  Palpation:   Subacromial tenderness. No effusion. No warmth. No crepitus.  ROM:   Forward elevation to 140 degrees before scapular assistance, abduction to 80 degrees, external rotation with arm at the side to 45 degrees, able to reach  "back of head, internal rotation to L1  Strength:   Forward elevation is 4+ out of 5.  Abduction 4+ out of 5.  External rotation 4+ out of 5.  Internal rotation is 5 out of 5.  Stability:  No objective shoulder instability.  Tests: (+) Neer.  Neurovascular:  Sensation intact in Ax/R/M/U nerve distributions. 2+ radial pulse.    IMAGING:  I have personally reviewed pertinent films in PACS.  XR of right shoulder - performed 5/1/2025 demonstrates no acute osseous abnormalities with mild degenerative changes      Electronic Medical Records were reviewed including imaging    Crow Soto    Scribe Attestation      I,:  Crow Soto am acting as a scribe while in the presence of the attending physician.:       I,:  Arnol Jovel, DO personally performed the services described in this documentation    as scribed in my presence.:               Portions of the record may have been created with voice recognition software.  Occasional wrong word or \"sound a like\" substitutions may have occurred due to the inherent limitations of voice recognition software.  Read the chart carefully and recognize, using context, where substitutions have occurred. All patient's questions were answered to their satisfaction.   "

## 2025-05-22 DIAGNOSIS — F45.8 ANXIETY HYPERVENTILATION: ICD-10-CM

## 2025-05-22 DIAGNOSIS — F41.9 ANXIETY HYPERVENTILATION: ICD-10-CM

## 2025-05-22 RX ORDER — CLONAZEPAM 0.5 MG/1
0.5 TABLET ORAL 2 TIMES DAILY PRN
Qty: 60 TABLET | Refills: 0 | Status: SHIPPED | OUTPATIENT
Start: 2025-05-22

## 2025-05-22 NOTE — TELEPHONE ENCOUNTER
Reason for call:   [x] Refill   [] Prior Auth  [] Other:     Office:   [x] PCP/Provider - Thu Fly, DO   [] Specialty/Provider -     Medication: clonazePAM (KlonoPIN) 0.5 mg tablet     Dose/Frequency:     0.5 mg, Oral, 2 times daily PRN       Quantity: 60    Pharmacy: [facility]   Does the patient have enough for 3 days?   [x] Yes   [] No - Send as HP to POD    Mail Away Pharmacy   Does the patient have enough for 10 days?   [] Yes   [] No - Send as HP to POD

## 2025-06-04 ENCOUNTER — APPOINTMENT (OUTPATIENT)
Dept: LAB | Facility: CLINIC | Age: 80
End: 2025-06-04
Payer: MEDICARE

## 2025-06-04 DIAGNOSIS — R73.03 PREDIABETES: ICD-10-CM

## 2025-06-04 DIAGNOSIS — R53.82 CHRONIC FATIGUE: ICD-10-CM

## 2025-06-04 DIAGNOSIS — C50.419 MALIGNANT NEOPLASM OF UPPER-OUTER QUADRANT OF FEMALE BREAST, UNSPECIFIED ESTROGEN RECEPTOR STATUS, UNSPECIFIED LATERALITY (HCC): ICD-10-CM

## 2025-06-04 DIAGNOSIS — G47.33 OBSTRUCTIVE SLEEP APNEA: ICD-10-CM

## 2025-06-04 DIAGNOSIS — Z86.39 PERSONAL HISTORY OF OTHER ENDOCRINE, NUTRITIONAL AND METABOLIC DISEASE: ICD-10-CM

## 2025-06-04 DIAGNOSIS — I10 ESSENTIAL HYPERTENSION: ICD-10-CM

## 2025-06-04 LAB
25(OH)D3 SERPL-MCNC: 73.5 NG/ML (ref 30–100)
ALBUMIN SERPL BCG-MCNC: 4.1 G/DL (ref 3.5–5)
ALP SERPL-CCNC: 54 U/L (ref 34–104)
ALT SERPL W P-5'-P-CCNC: 18 U/L (ref 7–52)
ANION GAP SERPL CALCULATED.3IONS-SCNC: 7 MMOL/L (ref 4–13)
AST SERPL W P-5'-P-CCNC: 20 U/L (ref 13–39)
BASOPHILS # BLD AUTO: 0.03 THOUSANDS/ÂΜL (ref 0–0.1)
BASOPHILS NFR BLD AUTO: 1 % (ref 0–1)
BILIRUB SERPL-MCNC: 0.69 MG/DL (ref 0.2–1)
BUN SERPL-MCNC: 20 MG/DL (ref 5–25)
CALCIUM SERPL-MCNC: 10 MG/DL (ref 8.4–10.2)
CHLORIDE SERPL-SCNC: 106 MMOL/L (ref 96–108)
CO2 SERPL-SCNC: 29 MMOL/L (ref 21–32)
CREAT SERPL-MCNC: 0.69 MG/DL (ref 0.6–1.3)
EOSINOPHIL # BLD AUTO: 0.08 THOUSAND/ÂΜL (ref 0–0.61)
EOSINOPHIL NFR BLD AUTO: 2 % (ref 0–6)
ERYTHROCYTE [DISTWIDTH] IN BLOOD BY AUTOMATED COUNT: 13.5 % (ref 11.6–15.1)
EST. AVERAGE GLUCOSE BLD GHB EST-MCNC: 123 MG/DL
FERRITIN SERPL-MCNC: 184 NG/ML (ref 30–307)
GFR SERPL CREATININE-BSD FRML MDRD: 83 ML/MIN/1.73SQ M
GLUCOSE P FAST SERPL-MCNC: 136 MG/DL (ref 65–99)
HBA1C MFR BLD: 5.9 %
HCT VFR BLD AUTO: 43.4 % (ref 34.8–46.1)
HGB BLD-MCNC: 13.6 G/DL (ref 11.5–15.4)
IMM GRANULOCYTES # BLD AUTO: 0.03 THOUSAND/UL (ref 0–0.2)
IMM GRANULOCYTES NFR BLD AUTO: 1 % (ref 0–2)
IRON SATN MFR SERPL: 25 % (ref 15–50)
IRON SERPL-MCNC: 90 UG/DL (ref 50–212)
LYMPHOCYTES # BLD AUTO: 1.21 THOUSANDS/ÂΜL (ref 0.6–4.47)
LYMPHOCYTES NFR BLD AUTO: 28 % (ref 14–44)
MCH RBC QN AUTO: 30.8 PG (ref 26.8–34.3)
MCHC RBC AUTO-ENTMCNC: 31.3 G/DL (ref 31.4–37.4)
MCV RBC AUTO: 98 FL (ref 82–98)
MONOCYTES # BLD AUTO: 0.31 THOUSAND/ÂΜL (ref 0.17–1.22)
MONOCYTES NFR BLD AUTO: 7 % (ref 4–12)
NEUTROPHILS # BLD AUTO: 2.68 THOUSANDS/ÂΜL (ref 1.85–7.62)
NEUTS SEG NFR BLD AUTO: 61 % (ref 43–75)
NRBC BLD AUTO-RTO: 0 /100 WBCS
PLATELET # BLD AUTO: 234 THOUSANDS/UL (ref 149–390)
PMV BLD AUTO: 10.4 FL (ref 8.9–12.7)
POTASSIUM SERPL-SCNC: 4.2 MMOL/L (ref 3.5–5.3)
PROT SERPL-MCNC: 6.4 G/DL (ref 6.4–8.4)
RBC # BLD AUTO: 4.42 MILLION/UL (ref 3.81–5.12)
SODIUM SERPL-SCNC: 142 MMOL/L (ref 135–147)
TIBC SERPL-MCNC: 354.2 UG/DL (ref 250–450)
TRANSFERRIN SERPL-MCNC: 253 MG/DL (ref 203–362)
TSH SERPL DL<=0.05 MIU/L-ACNC: 3.6 UIU/ML (ref 0.45–4.5)
UIBC SERPL-MCNC: 264 UG/DL (ref 155–355)
VIT B12 SERPL-MCNC: 510 PG/ML (ref 180–914)
WBC # BLD AUTO: 4.34 THOUSAND/UL (ref 4.31–10.16)

## 2025-06-04 PROCEDURE — 36415 COLL VENOUS BLD VENIPUNCTURE: CPT

## 2025-06-04 PROCEDURE — 85025 COMPLETE CBC W/AUTO DIFF WBC: CPT

## 2025-06-04 PROCEDURE — 82306 VITAMIN D 25 HYDROXY: CPT

## 2025-06-04 PROCEDURE — 83036 HEMOGLOBIN GLYCOSYLATED A1C: CPT

## 2025-06-04 PROCEDURE — 80053 COMPREHEN METABOLIC PANEL: CPT

## 2025-06-04 PROCEDURE — 84443 ASSAY THYROID STIM HORMONE: CPT

## 2025-06-04 PROCEDURE — 83540 ASSAY OF IRON: CPT

## 2025-06-04 PROCEDURE — 82607 VITAMIN B-12: CPT

## 2025-06-04 PROCEDURE — 83550 IRON BINDING TEST: CPT

## 2025-06-04 PROCEDURE — 82728 ASSAY OF FERRITIN: CPT

## 2025-06-11 ENCOUNTER — HOSPITAL ENCOUNTER (OUTPATIENT)
Dept: MAMMOGRAPHY | Facility: CLINIC | Age: 80
Discharge: HOME | End: 2025-06-11
Payer: MEDICARE

## 2025-06-11 DIAGNOSIS — R92.8 FOLLOW-UP EXAMINATION OF ABNORMAL MAMMOGRAM: ICD-10-CM

## 2025-06-11 PROCEDURE — G0279 TOMOSYNTHESIS, MAMMO: HCPCS

## 2025-06-11 PROCEDURE — 77066 DX MAMMO INCL CAD BI: CPT

## 2025-06-24 ENCOUNTER — CONSULT (OUTPATIENT)
Age: 80
End: 2025-06-24
Payer: MEDICARE

## 2025-06-24 VITALS
HEIGHT: 63 IN | TEMPERATURE: 97.3 F | SYSTOLIC BLOOD PRESSURE: 130 MMHG | BODY MASS INDEX: 28.77 KG/M2 | DIASTOLIC BLOOD PRESSURE: 82 MMHG | OXYGEN SATURATION: 97 % | HEART RATE: 66 BPM | WEIGHT: 162.4 LBS

## 2025-06-24 DIAGNOSIS — G47.33 OBSTRUCTIVE SLEEP APNEA: ICD-10-CM

## 2025-06-24 DIAGNOSIS — R53.82 CHRONIC FATIGUE: ICD-10-CM

## 2025-06-24 DIAGNOSIS — I10 ESSENTIAL HYPERTENSION: Primary | ICD-10-CM

## 2025-06-24 DIAGNOSIS — G47.19 EXCESSIVE DAYTIME SLEEPINESS: ICD-10-CM

## 2025-06-24 PROCEDURE — 99204 OFFICE O/P NEW MOD 45 MIN: CPT | Performed by: INTERNAL MEDICINE

## 2025-06-24 PROCEDURE — G2211 COMPLEX E/M VISIT ADD ON: HCPCS | Performed by: INTERNAL MEDICINE

## 2025-06-24 NOTE — PATIENT INSTRUCTIONS
"Patient Education     Sleep apnea in adults   The Basics   Written by the doctors and editors at [facility]   What is sleep apnea? -- Sleep apnea is a condition that makes you stop breathing for short periods while you are asleep. There are 2 types of sleep apnea. One is called \"obstructive sleep apnea.\" The other is called \"central sleep apnea.\"  In obstructive sleep apnea, you stop breathing because your throat narrows or closes (figure 1). In central sleep apnea, you stop breathing because your brain does not send the right signals to your muscles to make you breathe. When people talk about sleep apnea, they are usually referring to obstructive sleep apnea, which is what this article is about.  People with sleep apnea do not know that they stop breathing when they are asleep. But they do sometimes wake up startled or gasping for breath. They also often hear from loved ones that they snore.  What are the symptoms of sleep apnea? -- The main symptoms of sleep apnea are loud snoring, tiredness, and daytime sleepiness. Other symptoms can include:   Restless sleep   Waking up choking or gasping   Morning headaches, dry mouth, or sore throat   Waking up often to urinate   Waking up feeling unrested or groggy   Trouble thinking clearly or remembering things  Some people with sleep apnea don't have symptoms, or don't realize that they have them.  Should I see a doctor or nurse? -- Yes. If you think that you might have sleep apnea, see your doctor.  Is there a test for sleep apnea? -- Yes. First, your doctor or nurse will ask about your symptoms. If you have a bed partner, they might also ask that person if you snore or gasp in your sleep. If the doctor or nurse suspects that you have sleep apnea, they might send you for a \"sleep study.\"  Sleep studies can sometimes be done at home, but they are usually done in a sleep lab. For the study, you spend the night in the lab, and you are hooked up to different machines that " "monitor your heart rate, breathing, and other body functions. The results of the test tell your doctor or nurse if you have the disorder.  Is there anything I can do on my own to help my sleep apnea? -- Yes. Some things that might help:   Try to avoid sleeping on your back, if possible. This might help some people.   Lose weight, if you have excess body weight.   Get regular physical activity. This might help you lose weight. But even if it doesn't, being active is good for your health.   Avoid alcohol, especially in the evening. Alcohol can make sleep apnea worse.  How is sleep apnea treated? -- Treatment can include:   Weight loss - As mentioned above, weight loss can help if you have excess weight or obesity. But losing weight can be challenging, and it takes time to lose enough weight to help with your sleep apnea. Most people need other treatment while they work on losing weight.   CPAP - The most effective treatment for sleep apnea is a device that keeps your airway open while you sleep. Treatment with this device is called \"continuous positive airway pressure\" (\"CPAP\"). People getting CPAP wear a face mask at night that keeps them breathing (figure 2).  If your doctor or nurse recommends a CPAP machine, be patient about using it. The mask might seem uncomfortable to wear at first, and the machine might seem noisy, but using the machine can really help you. People with sleep apnea who use a CPAP machine feel more rested and generally feel better.  If CPAP does not work, your doctor might suggest other treatment. Options might include:   An oral device - This is a device that you wear in your mouth. It is called an \"oral appliance\" or \"mandibular advancement device.\" It helps keep your airway open while you sleep.   Hypoglossal nerve stimulation - This involves a procedure to implant a small device into your chest. The device has a wire that connects to the nerve under your tongue. While you are sleeping, it " sends an electrical signal that causes the tongue to push forward. This helps open up your airway.   Surgery to widen your airway - This might involve removing your tonsils or other tissue that blocks the airway.  Is sleep apnea dangerous? -- It can be. Risks include:   Accidents - People with sleep apnea do not get good-quality sleep, so they are often tired and not alert. This puts them at risk for car accidents and other types of accidents.   Other health problems - Studies show that people with sleep apnea are more likely than others to have high blood pressure, heart attacks, and other serious heart problems. Some people also have mood changes or depression.  In people with severe sleep apnea, getting treated (for example, with CPAP) can help lower these risks.  All topics are updated as new evidence becomes available and our peer review process is complete.  This topic retrieved from [employer] on: Feb 28, 2024.  Topic 09548 Version 18.0  Release: 32.2.4 - C32.58  © 2024 UpToDate, Inc. and/or its affiliates. All rights reserved.  figure 1: Airway in a person with sleep apnea     Normally, when a person sleeps, the airway remains open, and air can pass from the nose and mouth to the lungs. In a person with sleep apnea, parts of the throat and mouth drop into the airway and block off the flow of air. This can cause loud snoring and interrupt breathing for short periods.  Graphic 63606 Version 6.0  figure 2: Continuous positive airway pressure (CPAP) for sleep apnea     The CPAP mask gently blows air into your nose while you sleep. It puts just enough pressure on your airway to keep it from closing. The mask in this picture fits over just the nose. Other CPAP devices have masks that fit over the nose and mouth.  Graphic 40619 Version 5.0  Consumer Information Use and Disclaimer   Disclaimer: This generalized information is a limited summary of diagnosis, treatment, and/or medication information. It is not meant to  be comprehensive and should be used as a tool to help the user understand and/or assess potential diagnostic and treatment options. It does NOT include all information about conditions, treatments, medications, side effects, or risks that may apply to a specific patient. It is not intended to be medical advice or a substitute for the medical advice, diagnosis, or treatment of a health care provider based on the health care provider's examination and assessment of a patient's specific and unique circumstances. Patients must speak with a health care provider for complete information about their health, medical questions, and treatment options, including any risks or benefits regarding use of medications. This information does not endorse any treatments or medications as safe, effective, or approved for treating a specific patient. UpToDate, Inc. and its affiliates disclaim any warranty or liability relating to this information or the use thereof.The use of this information is governed by the Terms of Use, available at https://www.woltersThe car easily beatuwer.com/en/know/clinical-effectiveness-terms. 2024© UpToDate, Inc. and its affiliates and/or licensors. All rights reserved.  Copyright   © 2024 UpToDate, Inc. and/or its affiliates. All rights reserved.

## 2025-06-24 NOTE — PROGRESS NOTES
Consultation - Pulmonary Medicine   Name: Siobhan Raymond      : 1945      MRN: [id number]  Encounter Provider: Lorenzo Dumas MD  Encounter Date: 2025   Encounter department: [facility] [place]    Requesting Provider:   Thu Osborn Do  [facility]  [address] [place] [id number]     Reason for Consult: TRAE not on CPAP:  Assessment & Plan  Obstructive sleep apnea  History of TRAE, did not tolerate CPAP in the past because of environmental factors such as her dog did not tolerated, and also she has history of claustrophobia from history of PTSD.  But she only tried fullface mask  She is open to try CPAP again with nasal mask  I reminded  the patient the pathophysiology of obstructive sleep apnea (TRAE), explaining that the condition involves intermittent blockage of the upper airway during sleep, which leads to brief pauses in breathing and drops in oxygen levels. These frequent episodes of hypoxia and disrupted sleep trigger an increase in sympathetic nervous system activity, which raises blood pressure and stresses the cardiovascular system. I emphasized that inadequately treated TRAE is strongly associated with a range of serious health consequences. Specifically, chronic untreated TRAE significantly raises the risk of developing hypertension, atrial fibrillation (A-fib), and heart failure due to the prolonged strain on the heart and blood vessels. I discussed how untreated TRAE is also a major risk factor for stroke, as intermittent hypoxia and elevated blood pressure can lead to the development of atherosclerosis and the formation of blood clots. Furthermore, I explained the growing body of evidence suggesting that untreated TRAE is linked to cognitive decline, including an increased risk of early-onset dementia, particularly Alzheimer's disease. This is thought to result from the effects of chronic sleep disruption and hypoxia on brain function.     I stressed that effective  management of TRAE, through adherence to CPAP/BiPAP therapy, and weight loss, are essential to reducing these risks.     Orders:    Ambulatory Referral to Pulmonology    Home Sleep Study; Future    Essential hypertension  Treatment of her symptoms control blood pressure  Orders:    Home Sleep Study; Future    Chronic fatigue  Poor sleep quality could be resulting in chronic fatigue and excessive daytime sleepiness  Orders:    Home Sleep Study; Future    Excessive daytime sleepiness  Poor sleep quality could be resulting in chronic fatigue and excessive daytime sleepiness  Orders:    Home Sleep Study; Future      Return in about 3 months (around 9/24/2025).  History of Present Illness   Siobhan Raymond is a 80 y.o. female who presents for evaluation of underlying history of TRAE diagnosed in the past did not tolerate CPAP as detailed in assessment plan she is retired , she goes to bed at 11 PM sometimes takes her up to 2 hours she likes to read in bed and she describes herself as a night owl, she wakes up at 8:30 AM feels sleepy during the day takes 1 and half to 2 hours nap every day feels refreshed afterwards  She was told that she snores loudly now currently she is not sure if she does.  She has chronic headache history of head injury, history of PTSD poor short-term memory and decreased concentration show, she has significant anxiety that she takes clonazepam for  She drinks coffee and tea, denies tobacco or recreational drugs however she drinks 2-3 alcoholic drinks per day which we discussed the effects of alcohol on sleep causing paradoxical effects    Britton scale is 6 out of 24    Review of Systems   All other systems reviewed and are negative.      Aside from what is mentioned in the HPI, ROS is otherwise negative    Medical History Reviewed by provider this encounter:     .    Historical Information   Past Medical History[1]  Past Surgical History[2]  Social History   Tobacco Use  "History[3]        Family History:   Family History[4]    Objective   /82 (BP Location: Left arm, Patient Position: Sitting, Cuff Size: Standard)   Pulse 66   Temp (!) 97.3 °F (36.3 °C) (Tympanic)   Ht 5' 3\" (1.6 m)   Wt 73.7 kg (162 lb 6.4 oz)   LMP  (LMP Unknown)   SpO2 97%   BMI 28.77 kg/m²      Physical Exam  Constitutional:       Appearance: Normal appearance. She is not ill-appearing or diaphoretic.   HENT:      Head: Normocephalic and atraumatic.      Nose: No congestion or rhinorrhea.      Mouth/Throat:      Mouth: Mucous membranes are moist.      Pharynx: Oropharynx is clear.     Eyes:      General: No scleral icterus.        Right eye: No discharge.         Left eye: No discharge.      Extraocular Movements: Extraocular movements intact.       Cardiovascular:      Rate and Rhythm: Normal rate and regular rhythm.      Pulses: Normal pulses.      Heart sounds: Normal heart sounds. No murmur heard.     No gallop.   Pulmonary:      Effort: Pulmonary effort is normal. No respiratory distress.      Breath sounds: Normal breath sounds. No wheezing, rhonchi or rales.     Musculoskeletal:         General: No swelling, tenderness or deformity.      Cervical back: No rigidity.     Skin:     General: Skin is warm and dry.     Neurological:      General: No focal deficit present.      Mental Status: She is alert and oriented to person, place, and time. Mental status is at baseline.     Psychiatric:         Mood and Affect: Mood normal.         Behavior: Behavior normal.         Thought Content: Thought content normal.         Judgment: Judgment normal.           Diagnostic Data:  Labs: I personally reviewed the most recent laboratory data pertinent to today's visit.      Radiology results:  Radiology Results Review : No pertinent imaging studies reviewed.      Administrative Statements   I have spent a total time of 46 minutes in caring for this patient on the day of the visit/encounter including " Impressions, Documenting in the medical record, and Reviewing/placing orders in the medical record (including tests, medications, and/or procedures)  .  Lorenzo Dumas MD           [1]   Past Medical History:  Diagnosis Date    Anxiety     Anxiety disorder     Arthralgia     Arthritis     Asthma     Basal cell carcinoma     Breast cancer (HCC)     Right    CAD (coronary artery disease)     Chronic lumbar radiculopathy     last assessed: 12/20/16    Chronic respiratory failure (HCC)     Colon cancer screening 04/11/2019    Last colonoscopy 2014-15    Depression     Dysfunction of eustachian tube     Dyslipidemia     Emphysema lung (HCC)     Fatigue     High cholesterol     HTN (hypertension) 10/29/2014    Hypercholesterolemia     Hypertension     Ileus of unspecified type (HCC)     Irritable bowel syndrome with diarrhea 04/11/2019    Lumbosacral stenosis     last assessed: 9/20/16    TRAE (obstructive sleep apnea)     Pancreatitis     resolved: 9/20/17    Pneumonia     Post-operative nausea and vomiting     PTSD (post-traumatic stress disorder)    [2]   Past Surgical History:  Procedure Laterality Date    BREAST BIOPSY Right 09/12/2022    [facility]    BREAST LUMPECTOMY Right 10/21/2022    Procedure: LUMPECTOMY BREAST FLORENCIO;  Surgeon: Inés Singh MD;  Location: [facility];  Service: Surgical Oncology    CHOLECYSTECTOMY      EGD  04/15/2019    GALLBLADDER SURGERY      HYSTERECTOMY      pt thinks she still has one ovary, done over 20 yrs ago    LUMBAR SPINE SURGERY  12/2016    3, 4, 5    LYMPH NODE BIOPSY Right 10/21/2022    Procedure: SLN BX, lymphoscintigraphy;  Surgeon: Inés Singh MD;  Location: [facility];  Service: Surgical Oncology    MOUTH SURGERY      MULTIPLE TOOTH EXTRACTIONS N/A 2018    OOPHORECTOMY Bilateral     1 1/2 ovaries removed    TONSILLECTOMY      US BREAST CLIP NEEDLE LOC RIGHT Right 09/12/2022    US GUIDED BREAST BIOPSY RIGHT COMPLETE Right 09/12/2022   [3]   Social History  Tobacco Use   Smoking  Status Never   Smokeless Tobacco Never   [4]   Family History  Problem Relation Name Age of Onset    Breast cancer Mother  60    Depression Mother          panic attacks    Lung cancer Mother      Mental illness Mother      Substance Abuse Mother          CIGS    Coronary artery disease Father          high # of paternal family members  in their 50s    Substance Abuse Father          CIGS    Depression Sister          panic attacks    Hypertension Sister      Breast cancer Daughter  50        CHEK2 positve    Ovarian cancer Maternal Grandmother  70    No Known Problems Maternal Grandfather      No Known Problems Paternal Grandmother      No Known Problems Paternal Grandfather      No Known Problems Son      No Known Problems Son      Breast cancer Maternal Aunt  40    Lung cancer Maternal Aunt      No Known Problems Paternal Aunt      No Known Problems Paternal Aunt      Heart disease Family      Hypertension Family      Depression Other grandparent         panic attacks    Colon cancer Other mat great uncle         age at dx unk    Colon polyps Neg Hx

## 2025-06-24 NOTE — ASSESSMENT & PLAN NOTE
History of TRAE, did not tolerate CPAP in the past because of environmental factors such as her dog did not tolerated, and also she has history of claustrophobia from history of PTSD.  But she only tried fullface mask  She is open to try CPAP again with nasal mask  I reminded  the patient the pathophysiology of obstructive sleep apnea (TRAE), explaining that the condition involves intermittent blockage of the upper airway during sleep, which leads to brief pauses in breathing and drops in oxygen levels. These frequent episodes of hypoxia and disrupted sleep trigger an increase in sympathetic nervous system activity, which raises blood pressure and stresses the cardiovascular system. I emphasized that inadequately treated TRAE is strongly associated with a range of serious health consequences. Specifically, chronic untreated TRAE significantly raises the risk of developing hypertension, atrial fibrillation (A-fib), and heart failure due to the prolonged strain on the heart and blood vessels. I discussed how untreated TRAE is also a major risk factor for stroke, as intermittent hypoxia and elevated blood pressure can lead to the development of atherosclerosis and the formation of blood clots. Furthermore, I explained the growing body of evidence suggesting that untreated TRAE is linked to cognitive decline, including an increased risk of early-onset dementia, particularly Alzheimer's disease. This is thought to result from the effects of chronic sleep disruption and hypoxia on brain function.     I stressed that effective management of TRAE, through adherence to CPAP/BiPAP therapy, and weight loss, are essential to reducing these risks.     Orders:    Ambulatory Referral to Pulmonology    Home Sleep Study; Future

## 2025-06-24 NOTE — ASSESSMENT & PLAN NOTE
Poor sleep quality could be resulting in chronic fatigue and excessive daytime sleepiness  Orders:    Home Sleep Study; Future

## 2025-07-01 ENCOUNTER — HOSPITAL ENCOUNTER (OUTPATIENT)
Facility: HOSPITAL | Age: 80
Discharge: HOME | End: 2025-07-01
Attending: INTERNAL MEDICINE
Payer: MEDICARE

## 2025-07-01 ENCOUNTER — TELEPHONE (OUTPATIENT)
Age: 80
End: 2025-07-01

## 2025-07-01 DIAGNOSIS — G47.33 OBSTRUCTIVE SLEEP APNEA: ICD-10-CM

## 2025-07-01 DIAGNOSIS — R53.82 CHRONIC FATIGUE: ICD-10-CM

## 2025-07-01 DIAGNOSIS — I10 ESSENTIAL HYPERTENSION: ICD-10-CM

## 2025-07-01 DIAGNOSIS — E78.00 PURE HYPERCHOLESTEROLEMIA: ICD-10-CM

## 2025-07-01 DIAGNOSIS — G47.19 EXCESSIVE DAYTIME SLEEPINESS: ICD-10-CM

## 2025-07-01 PROCEDURE — G0399 HOME SLEEP TEST/TYPE 3 PORTA: HCPCS

## 2025-07-01 RX ORDER — ROSUVASTATIN CALCIUM 5 MG/1
TABLET, COATED ORAL
Qty: 39 TABLET | Refills: 1 | Status: SHIPPED | OUTPATIENT
Start: 2025-07-01

## 2025-07-01 NOTE — TELEPHONE ENCOUNTER
Hello,    Please advise if a forced appointment can be accommodated for the patient:    Call back #: [phone]     Insurance: Medicare/HOP    Reason for appointment: f/u left hip pain, pt fell and is having a lot of pain    Requested doctor and/or location: Dr Pace/[facility]      Thank you.

## 2025-07-01 NOTE — PROGRESS NOTES
Home Sleep Study Documentation    HOME STUDY DEVICE: Noxturnal no                                           Geetha G3 yes device # 26      Pre-Sleep Home Study:    Set-up and instructions performed by: Falguni    Technician performed demonstration for Patient: yes    Return demonstration performed by Patient: yes    Written instructions provided to Patient: yes    Patient signed consent form: yes          Post-Sleep Home Study:    Post Test Questionnaire Data: Pending    Additional comments by Patient: pending    Home Sleep Study Failed:pending    Failure reason: pending    Reported or Detected: pending    Scored by: pending

## 2025-07-08 ENCOUNTER — OFFICE VISIT (OUTPATIENT)
Dept: OBGYN CLINIC | Facility: CLINIC | Age: 80
End: 2025-07-08
Payer: MEDICARE

## 2025-07-08 ENCOUNTER — APPOINTMENT (OUTPATIENT)
Dept: RADIOLOGY | Facility: CLINIC | Age: 80
End: 2025-07-08
Attending: PHYSICIAN ASSISTANT
Payer: MEDICARE

## 2025-07-08 VITALS — WEIGHT: 162 LBS | BODY MASS INDEX: 28.7 KG/M2 | HEIGHT: 63 IN

## 2025-07-08 DIAGNOSIS — M79.605 ACUTE LEG PAIN, LEFT: ICD-10-CM

## 2025-07-08 DIAGNOSIS — M16.12 PRIMARY OSTEOARTHRITIS OF LEFT HIP: ICD-10-CM

## 2025-07-08 DIAGNOSIS — M25.552 LEFT HIP PAIN: Primary | ICD-10-CM
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
## 2025-07-08 DIAGNOSIS — M25.552 LEFT HIP PAIN: ICD-10-CM

## 2025-07-08 PROCEDURE — 99213 OFFICE O/P EST LOW 20 MIN: CPT | Performed by: PHYSICIAN ASSISTANT

## 2025-07-08 PROCEDURE — 73502 X-RAY EXAM HIP UNI 2-3 VIEWS: CPT

## 2025-07-08 NOTE — PROGRESS NOTES
Orthopaedic Surgery - Office Note  Siobhan Raymond (80 y.o. female)   : 1945   MRN: [id number]  Encounter Date: 2025    No chief complaint on file.        Assessment & Plan  Left hip pain    Orders:    XR hip/pelv 2-3 vws left if performed; Future    Ambulatory Referral to Physical Therapy; Future    Fall, initial encounter  Fall precautions were encouraged.  I would recommend a physical therapy consult for evaluation and treatment  Orders:    Ambulatory Referral to Physical Therapy; Future    Primary osteoarthritis of left hip-mild  I reviewed with the patient that her x-rays today do not show any acute fracture or dislocation.  She has mild degenerative changes.  Her symptoms are not concerning for a trochanteric bursitis that would benefit from an injection at this time.  She may follow-up with Dr. Pace for this as needed  Orders:    Ambulatory Referral to Physical Therapy; Future    Acute leg pain, left  The diagnosis as well as treatment options were reviewed with the patient in the office today.  I explained to the patient I believe some of her left leg symptoms are likely related to her chronic lumbar pathology.  She may follow-up with her treating lumbar surgeon for this condition  Orders:    Ambulatory Referral to Physical Therapy; Future    If patient's left lower leg symptoms do not improve with physical therapy she may schedule dedicated appointment for left foot and ankle.  Fracture felt unlikely at this time due to ability to bear weight, no bruising, no swelling x 2 weeks.  An ankle sprain remains in the differential diagnosis, this would benefit from therapy but patient declines considering physical therapy at this time    Return for Recheck with spine surgeon for back/leg pain.  may follow up with Dr. Pace for left hip pain.        History of Present Illness  This is a previous patient here for left hip evaluation.  Patient reports she had a recent fall.  She has known left hip DJD  "and treats with Dr. Pace.  She has been treating for a trochanteric bursitis and most recently had an injection on 4/11/2025.  She also has a contributing medical history of chronic back pain with 2 lumbar fusions, 2 ablations, and chronic radiating pain to her feet.  She ambulates with a cane.  She reports the fall occurred about 2 weeks ago when she landed on her right side of ribs.  She reports she thinks she broke her ribs but did not seek any medical care.  She reports that since the fall she has had left sided leg pain in the hip.  She localizes the pain to the buttocks and lateral hip.  She reports she also has a burning pain from the ankle that radiates up her leg into the lateral calf region.  She denies ever having ecchymosis or swelling in the ankle.  She denies the symptoms being similar to her trochanteric bursal pain which she has had injections for in the past.  She denies any groin pain.  She has baseline back pain.  She denies any red flag symptoms.  She denies any pain with weightbearing in the left lower extremity.  She reports she has been ambulating on the leg for 2 weeks.    Review of Systems  Pertinent items are noted in HPI.  All other systems were reviewed and are negative.    Physical Exam  Ht 5' 3\" (1.6 m)   Wt 73.5 kg (162 lb)   LMP  (LMP Unknown)   BMI 28.70 kg/m²   Cons: Appears well.  No apparent distress.  Psych: Alert. Oriented x3.  Mood and affect normal.    Patient's left hip is nontender to palpation in the trochanteric bursa.  She has well-maintained hip range of motion to active and passive forward flexion, abduction, adduction, and extension.  She has no reproducible pain to forced internal rotation or logrolling.  There is a negative jar sign for fracture.  She has moderate pain with straight leg raise reproducing left-sided low back pain, lateral tibia and foot pain.  EHL and dorsiflexion strength are 5 out of 5.  There is no ecchymosis or soft tissue swelling in the " left lower extremity.  Her gait is antalgic.          Studies Reviewed    Narrative & Impression        LEFT HIP     INDICATION:   Pain in left hip.      COMPARISON:  None.     VIEWS:  XR HIP/PELV 2-3 VWS LEFT  W PELVIS IF PERFORMED      FINDINGS:     There is no acute fracture or dislocation.     No significant hip degenerative changes.     No lytic or blastic osseous lesion.     Soft tissues are unremarkable.     Degenerative changes pubic symphysis and visualized lower lumbar spine. Fusion of the lower lumbar spine with pedicle screws and interlocking rods. Laminectomy defects and scoliosis.     Moderate degenerative change of the left hip. There is partial narrowing of the hip joint space. The femoral head appears intact.        IMPRESSION:        Moderate degenerative change of the left hip as above with marginal osteophytes and partial joint space narrowing.   Extensive degenerative changes and fusion of the lower lumbar spine  Degenerative change of the pubic symphysis.     Electronically signed: 09/06/2024 04:49 PM Norman Cruz MD  X-ray images as well as reports were reviewed by myself in the office today and I agree with radiologist interpretation.  Previous orthopedic notes procedure notes and 5 messages were reviewed for today's visit.    Procedures  No procedures today.    Medical, Surgical, Family, and Social History  The patient's medical history, family history, and social history, were reviewed and updated as appropriate.    Past Medical History[1]    Past Surgical History[2]    Family History[3]    Social History     Occupational History    Occupation: retired   Tobacco Use    Smoking status: Never    Smokeless tobacco: Never   Vaping Use    Vaping status: Never Used   Substance and Sexual Activity    Alcohol use: Yes     Alcohol/week: 4.0 standard drinks of alcohol     Types: 4 Glasses of wine per week     Comment: 4 drinks every night    Drug use: Yes     Frequency: 7.0 times per week      Types: Marijuana     Comment: uses topically daily- last used 10/20    Sexual activity: Not Currently       Allergies[4]    Current Medications[5]      Kaden Leal PA-C         [1]   Past Medical History:  Diagnosis Date    Anxiety     Anxiety disorder     Arthralgia     Arthritis     Asthma     Basal cell carcinoma     Breast cancer (HCC)     Right    CAD (coronary artery disease)     Chronic lumbar radiculopathy     last assessed: 12/20/16    Chronic respiratory failure (HCC)     Colon cancer screening 04/11/2019    Last colonoscopy 2014-15    Depression     Dysfunction of eustachian tube     Dyslipidemia     Emphysema lung (HCC)     Fatigue     High cholesterol     HTN (hypertension) 10/29/2014    Hypercholesterolemia     Hypertension     Ileus of unspecified type (HCC)     Irritable bowel syndrome with diarrhea 04/11/2019    Lumbosacral stenosis     last assessed: 9/20/16    TRAE (obstructive sleep apnea)     Pancreatitis     resolved: 9/20/17    Pneumonia     Post-operative nausea and vomiting     PTSD (post-traumatic stress disorder)    [2]   Past Surgical History:  Procedure Laterality Date    BREAST BIOPSY Right 09/12/2022    [facility]    BREAST LUMPECTOMY Right 10/21/2022    Procedure: LUMPECTOMY BREAST FLORENCIO;  Surgeon: Inés Singh MD;  [facility];  Service: Surgical Oncology    CHOLECYSTECTOMY      EGD  04/15/2019    GALLBLADDER SURGERY      HYSTERECTOMY      pt thinks she still has one ovary, done over 20 yrs ago    LUMBAR SPINE SURGERY  12/2016    3, 4, 5    LYMPH NODE BIOPSY Right 10/21/2022    Procedure: SLN BX, lymphoscintigraphy;  Surgeon: Inés Singh MD;  [facility];  Service: Surgical Oncology    MOUTH SURGERY      MULTIPLE TOOTH EXTRACTIONS N/A 2018    OOPHORECTOMY Bilateral     1 1/2 ovaries removed    TONSILLECTOMY      US BREAST CLIP NEEDLE LOC RIGHT Right 09/12/2022    US GUIDED BREAST BIOPSY RIGHT COMPLETE Right 09/12/2022   [3]   Family History  Problem Relation Name  Age of Onset    Breast cancer Mother  60    Depression Mother          panic attacks    Lung cancer Mother      Mental illness Mother      Substance Abuse Mother          CIGS    Coronary artery disease Father          high # of paternal family members  in their 50s    Substance Abuse Father          CIGS    Depression Sister          panic attacks    Hypertension Sister      Breast cancer Daughter  50        CHEK2 positve    Ovarian cancer Maternal Grandmother  70    No Known Problems Maternal Grandfather      No Known Problems Paternal Grandmother      No Known Problems Paternal Grandfather      No Known Problems Son      No Known Problems Son      Breast cancer Maternal Aunt  40    Lung cancer Maternal Aunt      No Known Problems Paternal Aunt      No Known Problems Paternal Aunt      Heart disease Family      Hypertension Family      Depression Other grandparent         panic attacks    Colon cancer Other mat great uncle         age at dx unk    Colon polyps Neg Hx     [4]   Allergies  Allergen Reactions    Antihistamines, Diphenhydramine-Type     Arimidex [Anastrozole] Confusion    Bupropion     Clarithromycin     Codeine Other (See Comments)     increased HR    Hives    Gabapentin     Meloxicam Nausea Only and Visual Disturbance     Other reaction(s): Numbness  Action Taken: med stopped.; Category: Allergy;     Ondansetron     Pravastatin     Propoxyphene Other (See Comments)     increased HR    Hives   [5]   Current Outpatient Medications:     albuterol (Proventil HFA) 90 mcg/act inhaler, Inhale 2 puffs every 6 (six) hours as needed for wheezing, Disp: 20.1 g, Rfl: 3    ascorbic acid (VITAMIN C) 250 mg tablet, Take 250 mg by mouth in the morning., Disp: , Rfl:     aspirin (Aspirin 81) 81 mg EC tablet, Take 1 tablet (81 mg total) by mouth daily, Disp: 90 tablet, Rfl: 3    atenolol (TENORMIN) 25 mg tablet, Take 1 tablet (25 mg total) by mouth 2 (two) times a day, Disp: 180 tablet, Rfl: 3    Baclofen 5 MG  TABS, 1tab po q day prn, Disp: 30 tablet, Rfl: 2    cholecalciferol (VITAMIN D) 400 units/1 mL, , Disp: , Rfl:     Cholecalciferol (VITAMIN D3) 2000 units capsule, Take by mouth in the morning., Disp: , Rfl:     clonazePAM (KlonoPIN) 0.5 mg tablet, Take 1 tablet (0.5 mg total) by mouth 2 (two) times a day as needed for anxiety, Disp: 60 tablet, Rfl: 0    co-enzyme Q-10 30 MG capsule, Take 30 mg by mouth in the morning., Disp: , Rfl:     dicyclomine (BENTYL) 10 mg capsule, TAKE 2 CAPSULES BY MOUTH  TWO TIMES A DAY, Disp: 360 capsule, Rfl: 12    DULoxetine (CYMBALTA) 30 mg delayed release capsule, Take 1 capsule by mouth 2 times a day, Disp: 180 capsule, Rfl: 1    fenofibrate (TRICOR) 48 mg tablet, TAKE 1 TABLET BY MOUTH IN THE  MORNING, Disp: 90 tablet, Rfl: 1    fluticasone (FLONASE) 50 mcg/act nasal spray, 2 sprays into each nostril daily, Disp: 18 mL, Rfl: 3    losartan (COZAAR) 100 MG tablet, Take 1 tablet (100 mg total) by mouth daily, Disp: 90 tablet, Rfl: 3    magnesium oxide (MAG-OX) 400 mg, Take 400 mg by mouth every other day, Disp: , Rfl:     Multiple Vitamins-Minerals (MULTI VITAMIN/MINERALS PO), , Disp: , Rfl:     multivitamin (THERAGRAN) TABS, Take 1 tablet by mouth in the morning., Disp: , Rfl:     Omega 3 1000 MG CAPS, Take by mouth in the morning., Disp: , Rfl:     Probiotic Product (PROBIOTIC-10 PO), Take by mouth in the morning., Disp: , Rfl:     rosuvastatin (CRESTOR) 5 mg tablet, TAKE 1 TABLET BY MOUTH 3 TIMES  WEEKLY, Disp: 39 tablet, Rfl: 1    aspirin 1 mg/mL SUSP, , Disp: , Rfl:

## 2025-07-10 DIAGNOSIS — G47.33 OSA (OBSTRUCTIVE SLEEP APNEA): Primary | ICD-10-CM

## 2025-07-10 PROCEDURE — 95806 SLEEP STUDY UNATT&RESP EFFT: CPT | Performed by: INTERNAL MEDICINE

## 2025-07-10 NOTE — PROGRESS NOTES
69 y o  female is here today s/p right lumpectomy and sentinel node biopsy  She reports decreasing swelling of the axilla  Physical Exam  Constitutional:       General: She is not in acute distress  Chest:   Breasts:      Right: Swelling ( in the axilla), skin change (Well-healing incision in the breast and axilla with no signs of infection) and tenderness ( in the axilla secondary to swelling) present  Neurological:      Mental Status: She is alert and oriented to person, place, and time  Psychiatric:         Mood and Affect: Mood normal          Data:   10/21/2022 right lumpectomy and sentinel node biopsy    Stagin mm invasive lobular with ductal features  Tumor grade two  LVI absent  Margins clean with the additional margins  Estrogen receptor and progesterone receptor status positive  HER2 status and test method negative    Lymph node assessment/status 2/4 positive sentinel nodes      Neoadjuvant therapy:  Not applicable  Stage: IA prognostic        Diagnoses and all orders for this visit:    Malignant neoplasm of upper-outer quadrant of right breast in female, estrogen receptor positive ([facility] [address] )  -     Ambulatory referral to Radiation Oncology; Future    BRCA negative        Assessment/Plan:  60-year-old female status post right lumpectomy and sentinel node biopsy for a T1c N1a invasive lobular carcinoma with ductal features  This is grade 2 highly ER/PA positive, HER2 negative, low Ki 67  Her genetic testing was negative  She is healing well with no signs of infection  She does have a seroma in the axilla  I offered to aspirate this today  She prefers to continue the warm compresses  She should start light arm exercises at this point  She is already scheduled with Medical Oncology for later this week  I will place a Radiation Oncology consult later for her  She will be seen here in six months in our survivorship Clinic or sooner should the need arise  [id number]

## 2025-07-15 ENCOUNTER — TELEPHONE (OUTPATIENT)
Dept: SLEEP CENTER | Facility: CLINIC | Age: 80
End: 2025-07-15

## 2025-07-18 ENCOUNTER — TELEPHONE (OUTPATIENT)
Dept: SLEEP CENTER | Facility: CLINIC | Age: 80
End: 2025-07-18

## 2025-07-18 NOTE — TELEPHONE ENCOUNTER
Pt called into the office after receiving a message from one of our nurses. Patient was insisting that a cpap order was placed but when I let patient know that the Doctor recommends that she gets Cpap titration study she asked me if this study is done at home, I let her know that it is an in lab study and she would have to stay over night.  patient said that she refuses to do any in lab study due to her PTSD and said that the doctor should remember that from her in office visit. Patient asked for someone to reach back out to her after they talk to the doctor.

## 2025-07-18 NOTE — TELEPHONE ENCOUNTER
Patient of Dr. Dumas at [facility].    Message forwarded to above.

## 2025-07-19 DIAGNOSIS — G47.33 OSA (OBSTRUCTIVE SLEEP APNEA): Primary | ICD-10-CM

## 2025-07-22 NOTE — TELEPHONE ENCOUNTER
Spoke to patient regarding results.  She would like to use [employer] [facility] for AutoPAP.  She saw Dr. Dumas in Pulm office for Sleep, she wanted me to schedule her follow up compliance appointment.  I explained that I can't schedule for Pulmonary, she said she was fine seeing any provider in the [place] area.  I scheduled compliance with Dr. Gilman.

## 2025-07-23 ENCOUNTER — TELEPHONE (OUTPATIENT)
Dept: SLEEP CENTER | Facility: CLINIC | Age: 80
End: 2025-07-23

## 2025-07-23 DIAGNOSIS — F41.9 ANXIETY HYPERVENTILATION: ICD-10-CM

## 2025-07-23 DIAGNOSIS — F45.8 ANXIETY HYPERVENTILATION: ICD-10-CM

## 2025-07-23 NOTE — TELEPHONE ENCOUNTER
Reason for call:   [x] Refill   [] Prior Auth  [] Other:     Office:   [x] PCP/Provider -   [] Specialty/Provider -     Medication: clonazePAM (KlonoPIN) 0.5 mg tablet     Dose/Frequency: Take 1 tablet (0.5 mg total) by mouth 2 (two) times a day as needed for anxiety     Quantity: 60    [facility]    Local Pharmacy   Does the patient have enough for 3 days?   [x] Yes   [] No - Send as HP to POD    Mail Away Pharmacy   Does the patient have enough for 10 days?   [] Yes   [] No - Send as HP to POD

## 2025-07-24 RX ORDER — CLONAZEPAM 0.5 MG/1
0.5 TABLET ORAL 2 TIMES DAILY PRN
Qty: 60 TABLET | Refills: 0 | Status: SHIPPED | OUTPATIENT
Start: 2025-07-24

## 2025-07-25 LAB
DME PARACHUTE DELIVERY DATE REQUESTED: NORMAL
DME PARACHUTE ITEM DESCRIPTION: NORMAL
DME PARACHUTE ORDER STATUS: NORMAL
DME PARACHUTE SUPPLIER NAME: NORMAL
DME PARACHUTE SUPPLIER PHONE: NORMAL

## 2025-08-03 DIAGNOSIS — M25.59 PAIN IN OTHER JOINT: ICD-10-CM

## 2025-08-04 ENCOUNTER — OFFICE VISIT (OUTPATIENT)
Dept: URGENT CARE | Facility: CLINIC | Age: 80
End: 2025-08-04
Payer: MEDICARE

## 2025-08-04 ENCOUNTER — APPOINTMENT (OUTPATIENT)
Dept: RADIOLOGY | Facility: CLINIC | Age: 80
End: 2025-08-04
Attending: PHYSICIAN ASSISTANT
Payer: MEDICARE

## 2025-08-04 VITALS
TEMPERATURE: 97.3 F | DIASTOLIC BLOOD PRESSURE: 80 MMHG | SYSTOLIC BLOOD PRESSURE: 130 MMHG | WEIGHT: 162 LBS | OXYGEN SATURATION: 96 % | BODY MASS INDEX: 28.7 KG/M2 | HEART RATE: 66 BPM | HEIGHT: 63 IN | RESPIRATION RATE: 16 BRPM

## 2025-08-04 DIAGNOSIS — S93.602A FOOT SPRAIN, LEFT, INITIAL ENCOUNTER: ICD-10-CM

## 2025-08-04 DIAGNOSIS — S99.922A INJURY OF LEFT FOOT, INITIAL ENCOUNTER: ICD-10-CM

## 2025-08-04 DIAGNOSIS — S93.402A SPRAIN OF LEFT ANKLE, UNSPECIFIED LIGAMENT, INITIAL ENCOUNTER: Primary | ICD-10-CM

## 2025-08-04 DIAGNOSIS — S99.912A INJURY OF LEFT ANKLE, INITIAL ENCOUNTER: ICD-10-CM

## 2025-08-04 PROCEDURE — G0463 HOSPITAL OUTPT CLINIC VISIT: HCPCS | Performed by: PHYSICIAN ASSISTANT

## 2025-08-04 PROCEDURE — 99213 OFFICE O/P EST LOW 20 MIN: CPT | Performed by: PHYSICIAN ASSISTANT

## 2025-08-04 PROCEDURE — 73610 X-RAY EXAM OF ANKLE: CPT

## 2025-08-04 PROCEDURE — 73630 X-RAY EXAM OF FOOT: CPT

## 2025-08-05 RX ORDER — DULOXETIN HYDROCHLORIDE 30 MG/1
30 CAPSULE, DELAYED RELEASE ORAL 2 TIMES DAILY
Qty: 180 CAPSULE | Refills: 1 | Status: SHIPPED | OUTPATIENT
Start: 2025-08-05

## 2025-08-06 ENCOUNTER — OFFICE VISIT (OUTPATIENT)
Age: 80
End: 2025-08-06
Attending: PHYSICIAN ASSISTANT
Payer: MEDICARE

## 2025-08-06 ENCOUNTER — TELEPHONE (OUTPATIENT)
Dept: NEUROLOGY | Facility: CLINIC | Age: 80
End: 2025-08-06

## 2025-08-06 VITALS — WEIGHT: 162 LBS | BODY MASS INDEX: 28.7 KG/M2 | HEIGHT: 63 IN

## 2025-08-06 DIAGNOSIS — S93.602A FOOT SPRAIN, LEFT, INITIAL ENCOUNTER: ICD-10-CM

## 2025-08-06 DIAGNOSIS — S93.402A SPRAIN OF LEFT ANKLE, UNSPECIFIED LIGAMENT, INITIAL ENCOUNTER: ICD-10-CM

## 2025-08-06 DIAGNOSIS — M95.8 OSTEOCHONDRAL DEFECT OF TALUS: ICD-10-CM

## 2025-08-06 DIAGNOSIS — S92.102A CLOSED NONDISPLACED FRACTURE OF LEFT TALUS, UNSPECIFIED PORTION OF TALUS, INITIAL ENCOUNTER: Primary | ICD-10-CM

## 2025-08-06 PROCEDURE — 99213 OFFICE O/P EST LOW 20 MIN: CPT | Performed by: FAMILY MEDICINE

## 2025-08-06 RX ORDER — DICLOFENAC POTASSIUM 50 MG/1
50 TABLET, FILM COATED ORAL 3 TIMES DAILY
Qty: 90 TABLET | Refills: 0 | Status: SHIPPED | OUTPATIENT
Start: 2025-08-06

## 2025-08-06 RX ORDER — BACLOFEN 10 MG/1
10 TABLET ORAL
Qty: 90 TABLET | Refills: 1 | Status: SHIPPED | OUTPATIENT
Start: 2025-08-06

## 2025-08-07 ENCOUNTER — TELEPHONE (OUTPATIENT)
Age: 80
End: 2025-08-07

## 2025-08-07 LAB

## 2025-08-12 LAB

## (undated) DEVICE — GLOVE INDICATOR PI UNDERGLOVE SZ 8.5 BLUE

## (undated) DEVICE — GLOVE SRG BIOGEL 8

## (undated) DEVICE — SHEATH, GUIDE, SAVI SCOUT®: Brand: SAVI SCOUT®

## (undated) DEVICE — GLOVE SRG BIOGEL 6

## (undated) DEVICE — BETHLEHEM UNIVERSAL MINOR GEN: Brand: CARDINAL HEALTH

## (undated) DEVICE — MEDI-VAC YANKAUER SUCTION HANDLE W/BULBOUS AND CONTROL VENT: Brand: CARDINAL HEALTH

## (undated) DEVICE — SUPER SPONGES,MEDIUM: Brand: KERLIX

## (undated) DEVICE — DRAPE SHEET THREE QUARTER

## (undated) DEVICE — SUT MONOCRYL 3-0 SH 27 IN Y416H

## (undated) DEVICE — SCD SEQUENTIAL COMPRESSION COMFORT SLEEVE MEDIUM KNEE LENGTH: Brand: KENDALL SCD

## (undated) DEVICE — GAUZE SPONGES,16 PLY: Brand: CURITY

## (undated) DEVICE — GLOVE SRG BIOGEL 6.5

## (undated) DEVICE — ADHESIVE SKIN HIGH VISCOSITY EXOFIN 1ML

## (undated) DEVICE — DRAPE PROBE NEO-PROBE/ULTRASOUND

## (undated) DEVICE — GLOVE INDICATOR PI UNDERGLOVE SZ 6.5 BLUE

## (undated) DEVICE — DRAPE EQUIPMENT RF WAND

## (undated) DEVICE — TUBING SUCTION 5MM X 12 FT

## (undated) DEVICE — PLUMEPEN PRO 10FT

## (undated) DEVICE — SUT SILK 2-0 SH 30 IN K833H

## (undated) DEVICE — NEEDLE 25G X 1 1/2

## (undated) DEVICE — 2000CC GUARDIAN II: Brand: GUARDIAN

## (undated) DEVICE — SPONGE STICK WITH PVP-I: Brand: KENDALL

## (undated) DEVICE — SUT MONOCRYL 4-0 PS-2 27 IN Y426H

## (undated) DEVICE — INTENDED FOR TISSUE SEPARATION, AND OTHER PROCEDURES THAT REQUIRE A SHARP SURGICAL BLADE TO PUNCTURE OR CUT.: Brand: BARD-PARKER SAFETY BLADES SIZE 15, STERILE

## (undated) DEVICE — BRA SURGICAL SZ XLGE (39-42)